# Patient Record
Sex: FEMALE | Race: WHITE | NOT HISPANIC OR LATINO | Employment: OTHER | URBAN - METROPOLITAN AREA
[De-identification: names, ages, dates, MRNs, and addresses within clinical notes are randomized per-mention and may not be internally consistent; named-entity substitution may affect disease eponyms.]

---

## 2017-01-19 ENCOUNTER — ALLSCRIPTS OFFICE VISIT (OUTPATIENT)
Dept: OTHER | Facility: OTHER | Age: 72
End: 2017-01-19

## 2017-01-19 ENCOUNTER — LAB REQUISITION (OUTPATIENT)
Dept: LAB | Facility: HOSPITAL | Age: 72
End: 2017-01-19
Payer: MEDICARE

## 2017-01-19 DIAGNOSIS — N30.90 CYSTITIS WITHOUT HEMATURIA: ICD-10-CM

## 2017-01-19 LAB
BACTERIA UR QL AUTO: ABNORMAL /HPF
BILIRUB UR QL STRIP: NEGATIVE
BILIRUB UR QL STRIP: NORMAL
CLARITY UR: ABNORMAL
CLARITY UR: NORMAL
COLOR UR: ABNORMAL
COLOR UR: YELLOW
GLUCOSE (HISTORICAL): NORMAL
GLUCOSE UR STRIP-MCNC: NEGATIVE MG/DL
HGB UR QL STRIP.AUTO: ABNORMAL
HGB UR QL STRIP.AUTO: NORMAL
HYALINE CASTS #/AREA URNS LPF: ABNORMAL /LPF
KETONES UR STRIP-MCNC: NEGATIVE MG/DL
KETONES UR STRIP-MCNC: NORMAL MG/DL
LEUKOCYTE ESTERASE UR QL STRIP: ABNORMAL
LEUKOCYTE ESTERASE UR QL STRIP: NORMAL
NITRITE UR QL STRIP: NEGATIVE
NITRITE UR QL STRIP: NORMAL
NON-SQ EPI CELLS URNS QL MICRO: ABNORMAL /HPF
PH UR STRIP.AUTO: 5.5 [PH] (ref 4.5–8)
PH UR STRIP.AUTO: 6 [PH]
PROT UR STRIP-MCNC: ABNORMAL MG/DL
PROT UR STRIP-MCNC: NORMAL MG/DL
RBC #/AREA URNS AUTO: ABNORMAL /HPF
SP GR UR STRIP.AUTO: 1.01
SP GR UR STRIP.AUTO: 1.02 (ref 1–1.03)
UROBILINOGEN UR QL STRIP.AUTO: 0.2
UROBILINOGEN UR QL STRIP.AUTO: 0.2 E.U./DL
WBC #/AREA URNS AUTO: ABNORMAL /HPF

## 2017-01-19 PROCEDURE — 87186 SC STD MICRODIL/AGAR DIL: CPT | Performed by: FAMILY MEDICINE

## 2017-01-19 PROCEDURE — 87086 URINE CULTURE/COLONY COUNT: CPT | Performed by: FAMILY MEDICINE

## 2017-01-19 PROCEDURE — 87077 CULTURE AEROBIC IDENTIFY: CPT | Performed by: FAMILY MEDICINE

## 2017-01-19 PROCEDURE — 81001 URINALYSIS AUTO W/SCOPE: CPT | Performed by: FAMILY MEDICINE

## 2017-01-21 ENCOUNTER — GENERIC CONVERSION - ENCOUNTER (OUTPATIENT)
Dept: OTHER | Facility: OTHER | Age: 72
End: 2017-01-21

## 2017-01-22 LAB — BACTERIA UR CULT: NORMAL

## 2017-03-06 ENCOUNTER — TRANSCRIBE ORDERS (OUTPATIENT)
Dept: ADMINISTRATIVE | Facility: HOSPITAL | Age: 72
End: 2017-03-06

## 2017-03-06 ENCOUNTER — GENERIC CONVERSION - ENCOUNTER (OUTPATIENT)
Dept: OTHER | Facility: OTHER | Age: 72
End: 2017-03-06

## 2017-03-06 DIAGNOSIS — M86.9 SAPHO SYNDROME (HCC): Primary | ICD-10-CM

## 2017-03-06 DIAGNOSIS — M65.9 SAPHO SYNDROME (HCC): Primary | ICD-10-CM

## 2017-03-06 DIAGNOSIS — L40.3 SAPHO SYNDROME (HCC): Primary | ICD-10-CM

## 2017-03-06 DIAGNOSIS — M85.80 SAPHO SYNDROME (HCC): Primary | ICD-10-CM

## 2017-03-06 DIAGNOSIS — L70.9 SAPHO SYNDROME (HCC): Primary | ICD-10-CM

## 2017-03-10 ENCOUNTER — HOSPITAL ENCOUNTER (OUTPATIENT)
Dept: BONE DENSITY | Facility: MEDICAL CENTER | Age: 72
Discharge: HOME | End: 2017-03-10
Payer: MEDICARE

## 2017-03-10 DIAGNOSIS — M65.9 SAPHO SYNDROME (HCC): ICD-10-CM

## 2017-03-10 DIAGNOSIS — L40.3 SAPHO SYNDROME (HCC): ICD-10-CM

## 2017-03-10 DIAGNOSIS — M86.9 SAPHO SYNDROME (HCC): ICD-10-CM

## 2017-03-10 DIAGNOSIS — M85.80 SAPHO SYNDROME (HCC): ICD-10-CM

## 2017-03-10 DIAGNOSIS — L70.9 SAPHO SYNDROME (HCC): ICD-10-CM

## 2017-03-10 DIAGNOSIS — M85.80 OSTEOPENIA: ICD-10-CM

## 2017-03-10 PROCEDURE — 77080 DXA BONE DENSITY AXIAL: CPT

## 2017-03-31 ENCOUNTER — APPOINTMENT (EMERGENCY)
Dept: CT IMAGING | Facility: HOSPITAL | Age: 72
DRG: 315 | End: 2017-03-31
Payer: MEDICARE

## 2017-03-31 ENCOUNTER — APPOINTMENT (EMERGENCY)
Dept: RADIOLOGY | Facility: HOSPITAL | Age: 72
DRG: 315 | End: 2017-03-31
Payer: MEDICARE

## 2017-03-31 ENCOUNTER — HOSPITAL ENCOUNTER (INPATIENT)
Facility: HOSPITAL | Age: 72
LOS: 2 days | Discharge: HOME | DRG: 315 | End: 2017-04-02
Attending: EMERGENCY MEDICINE | Admitting: INTERNAL MEDICINE
Payer: MEDICARE

## 2017-03-31 ENCOUNTER — GENERIC CONVERSION - ENCOUNTER (OUTPATIENT)
Dept: OTHER | Facility: OTHER | Age: 72
End: 2017-03-31

## 2017-03-31 DIAGNOSIS — N39.0 UTI (URINARY TRACT INFECTION): Primary | ICD-10-CM

## 2017-03-31 DIAGNOSIS — R55 SYNCOPE: ICD-10-CM

## 2017-03-31 DIAGNOSIS — R00.1 BRADYCARDIA: ICD-10-CM

## 2017-03-31 DIAGNOSIS — R00.1 SINUS BRADYCARDIA: ICD-10-CM

## 2017-03-31 PROBLEM — E11.9 DIABETES MELLITUS (HCC): Chronic | Status: ACTIVE | Noted: 2017-03-31

## 2017-03-31 PROBLEM — E89.0 POSTOPERATIVE HYPOTHYROIDISM: Chronic | Status: ACTIVE | Noted: 2017-03-31

## 2017-03-31 PROBLEM — N18.30 CHRONIC KIDNEY DISEASE, STAGE III (MODERATE) (HCC): Chronic | Status: ACTIVE | Noted: 2017-03-31

## 2017-03-31 PROBLEM — R32 URINARY INCONTINENCE: Chronic | Status: ACTIVE | Noted: 2017-03-31

## 2017-03-31 PROBLEM — I10 HTN (HYPERTENSION): Chronic | Status: ACTIVE | Noted: 2017-03-31

## 2017-03-31 LAB
ANION GAP SERPL CALCULATED.3IONS-SCNC: 8 MMOL/L (ref 4–13)
ATRIAL RATE: 50 BPM
BACTERIA UR QL AUTO: ABNORMAL /HPF
BASOPHILS # BLD AUTO: 0.04 THOUSANDS/ΜL (ref 0–0.1)
BASOPHILS NFR BLD AUTO: 0 % (ref 0–1)
BILIRUB UR QL STRIP: NEGATIVE
BUN SERPL-MCNC: 21 MG/DL (ref 5–25)
CALCIUM SERPL-MCNC: 8.7 MG/DL (ref 8.3–10.1)
CHLORIDE SERPL-SCNC: 102 MMOL/L (ref 100–108)
CLARITY UR: CLEAR
CO2 SERPL-SCNC: 32 MMOL/L (ref 21–32)
COLOR UR: YELLOW
CREAT SERPL-MCNC: 1.23 MG/DL (ref 0.6–1.3)
EOSINOPHIL # BLD AUTO: 0.53 THOUSAND/ΜL (ref 0–0.61)
EOSINOPHIL NFR BLD AUTO: 4 % (ref 0–6)
ERYTHROCYTE [DISTWIDTH] IN BLOOD BY AUTOMATED COUNT: 14.4 % (ref 11.6–15.1)
GFR SERPL CREATININE-BSD FRML MDRD: 42.8 ML/MIN/1.73SQ M
GLUCOSE SERPL-MCNC: 65 MG/DL (ref 65–140)
GLUCOSE UR STRIP-MCNC: NEGATIVE MG/DL
HCT VFR BLD AUTO: 43.4 % (ref 34.8–46.1)
HGB BLD-MCNC: 14.6 G/DL (ref 11.5–15.4)
HGB UR QL STRIP.AUTO: NEGATIVE
KETONES UR STRIP-MCNC: NEGATIVE MG/DL
LEUKOCYTE ESTERASE UR QL STRIP: ABNORMAL
LYMPHOCYTES # BLD AUTO: 4 THOUSANDS/ΜL (ref 0.6–4.47)
LYMPHOCYTES NFR BLD AUTO: 28 % (ref 14–44)
MCH RBC QN AUTO: 30.2 PG (ref 26.8–34.3)
MCHC RBC AUTO-ENTMCNC: 33.6 G/DL (ref 31.4–37.4)
MCV RBC AUTO: 90 FL (ref 82–98)
MONOCYTES # BLD AUTO: 1.29 THOUSAND/ΜL (ref 0.17–1.22)
MONOCYTES NFR BLD AUTO: 9 % (ref 4–12)
NEUTROPHILS # BLD AUTO: 8.31 THOUSANDS/ΜL (ref 1.85–7.62)
NEUTS SEG NFR BLD AUTO: 59 % (ref 43–75)
NITRITE UR QL STRIP: POSITIVE
NON-SQ EPI CELLS URNS QL MICRO: ABNORMAL /HPF
NRBC BLD AUTO-RTO: 0 /100 WBCS
P AXIS: 69 DEGREES
PH UR STRIP.AUTO: 6.5 [PH] (ref 4.5–8)
PLATELET # BLD AUTO: 289 THOUSANDS/UL (ref 149–390)
PMV BLD AUTO: 11.5 FL (ref 8.9–12.7)
POTASSIUM SERPL-SCNC: 3.7 MMOL/L (ref 3.5–5.3)
PR INTERVAL: 184 MS
PROT UR STRIP-MCNC: ABNORMAL MG/DL
QRS AXIS: 51 DEGREES
QRSD INTERVAL: 76 MS
QT INTERVAL: 456 MS
QTC INTERVAL: 415 MS
RBC # BLD AUTO: 4.83 MILLION/UL (ref 3.81–5.12)
RBC #/AREA URNS AUTO: ABNORMAL /HPF
SODIUM SERPL-SCNC: 142 MMOL/L (ref 136–145)
SP GR UR STRIP.AUTO: 1.02 (ref 1–1.03)
SPECIMEN SOURCE: NORMAL
T WAVE AXIS: 59 DEGREES
T4 FREE SERPL-MCNC: 1.29 NG/DL (ref 0.76–1.46)
TROPONIN I BLD-MCNC: 0 NG/ML (ref 0–0.08)
TSH SERPL DL<=0.05 MIU/L-ACNC: 9.06 UIU/ML (ref 0.36–3.74)
UROBILINOGEN UR QL STRIP.AUTO: 1 E.U./DL
VENTRICULAR RATE: 50 BPM
WBC # BLD AUTO: 14.17 THOUSAND/UL (ref 4.31–10.16)
WBC #/AREA URNS AUTO: ABNORMAL /HPF

## 2017-03-31 PROCEDURE — 81002 URINALYSIS NONAUTO W/O SCOPE: CPT | Performed by: EMERGENCY MEDICINE

## 2017-03-31 PROCEDURE — 71020 HB CHEST X-RAY 2VW FRONTAL&LATL: CPT

## 2017-03-31 PROCEDURE — 70450 CT HEAD/BRAIN W/O DYE: CPT

## 2017-03-31 PROCEDURE — 80048 BASIC METABOLIC PNL TOTAL CA: CPT

## 2017-03-31 PROCEDURE — 84484 ASSAY OF TROPONIN QUANT: CPT

## 2017-03-31 PROCEDURE — 81001 URINALYSIS AUTO W/SCOPE: CPT

## 2017-03-31 PROCEDURE — 93005 ELECTROCARDIOGRAM TRACING: CPT

## 2017-03-31 PROCEDURE — 99285 EMERGENCY DEPT VISIT HI MDM: CPT

## 2017-03-31 PROCEDURE — 87040 BLOOD CULTURE FOR BACTERIA: CPT | Performed by: EMERGENCY MEDICINE

## 2017-03-31 PROCEDURE — 84439 ASSAY OF FREE THYROXINE: CPT | Performed by: EMERGENCY MEDICINE

## 2017-03-31 PROCEDURE — 84443 ASSAY THYROID STIM HORMONE: CPT | Performed by: EMERGENCY MEDICINE

## 2017-03-31 PROCEDURE — 87077 CULTURE AEROBIC IDENTIFY: CPT

## 2017-03-31 PROCEDURE — 87186 SC STD MICRODIL/AGAR DIL: CPT

## 2017-03-31 PROCEDURE — 36415 COLL VENOUS BLD VENIPUNCTURE: CPT | Performed by: EMERGENCY MEDICINE

## 2017-03-31 PROCEDURE — 87086 URINE CULTURE/COLONY COUNT: CPT

## 2017-03-31 PROCEDURE — 85025 COMPLETE CBC W/AUTO DIFF WBC: CPT

## 2017-03-31 RX ORDER — ASPIRIN 81 MG/1
81 TABLET, CHEWABLE ORAL DAILY
Status: DISCONTINUED | OUTPATIENT
Start: 2017-04-01 | End: 2017-04-02 | Stop reason: HOSPADM

## 2017-03-31 RX ORDER — ACETAMINOPHEN 325 MG/1
650 TABLET ORAL EVERY 6 HOURS PRN
Status: DISCONTINUED | OUTPATIENT
Start: 2017-03-31 | End: 2017-04-02 | Stop reason: HOSPADM

## 2017-03-31 RX ORDER — DONEPEZIL HYDROCHLORIDE 10 MG/1
TABLET, FILM COATED ORAL
COMMUNITY
Start: 2012-11-21 | End: 2017-09-24

## 2017-03-31 RX ORDER — LEVOFLOXACIN 5 MG/ML
750 INJECTION, SOLUTION INTRAVENOUS EVERY 24 HOURS
Status: DISCONTINUED | OUTPATIENT
Start: 2017-04-01 | End: 2017-04-01

## 2017-03-31 RX ORDER — B-COMPLEX WITH VITAMIN C
1 TABLET ORAL
Status: DISCONTINUED | OUTPATIENT
Start: 2017-04-01 | End: 2017-04-02 | Stop reason: HOSPADM

## 2017-03-31 RX ORDER — NICOTINE 21 MG/24HR
1 PATCH, TRANSDERMAL 24 HOURS TRANSDERMAL DAILY
Status: DISCONTINUED | OUTPATIENT
Start: 2017-04-01 | End: 2017-04-02 | Stop reason: HOSPADM

## 2017-03-31 RX ORDER — LISINOPRIL AND HYDROCHLOROTHIAZIDE 20; 12.5 MG/1; MG/1
TABLET ORAL
COMMUNITY
End: 2017-04-02 | Stop reason: HOSPADM

## 2017-03-31 RX ORDER — DONEPEZIL HYDROCHLORIDE 5 MG/1
10 TABLET, FILM COATED ORAL
Status: DISCONTINUED | OUTPATIENT
Start: 2017-03-31 | End: 2017-04-02 | Stop reason: HOSPADM

## 2017-03-31 RX ORDER — ONDANSETRON 2 MG/ML
4 INJECTION INTRAMUSCULAR; INTRAVENOUS EVERY 6 HOURS PRN
Status: DISCONTINUED | OUTPATIENT
Start: 2017-03-31 | End: 2017-04-02 | Stop reason: HOSPADM

## 2017-03-31 RX ORDER — DOCUSATE SODIUM 100 MG/1
100 CAPSULE, LIQUID FILLED ORAL 2 TIMES DAILY
Status: DISCONTINUED | OUTPATIENT
Start: 2017-04-01 | End: 2017-04-02 | Stop reason: HOSPADM

## 2017-03-31 RX ORDER — METHOCARBAMOL 500 MG/1
500 TABLET, FILM COATED ORAL 2 TIMES DAILY
Status: CANCELLED | OUTPATIENT
Start: 2017-03-31

## 2017-03-31 RX ORDER — CALCIUM CARBONATE 200(500)MG
1000 TABLET,CHEWABLE ORAL DAILY PRN
Status: DISCONTINUED | OUTPATIENT
Start: 2017-03-31 | End: 2017-04-02 | Stop reason: HOSPADM

## 2017-03-31 RX ORDER — LEVOFLOXACIN 5 MG/ML
750 INJECTION, SOLUTION INTRAVENOUS ONCE
Status: COMPLETED | OUTPATIENT
Start: 2017-03-31 | End: 2017-03-31

## 2017-03-31 RX ORDER — OXYBUTYNIN CHLORIDE 5 MG/1
5 TABLET ORAL 2 TIMES DAILY
Status: DISCONTINUED | OUTPATIENT
Start: 2017-04-01 | End: 2017-04-02 | Stop reason: HOSPADM

## 2017-03-31 RX ORDER — ATORVASTATIN CALCIUM 40 MG/1
TABLET, FILM COATED ORAL
COMMUNITY
End: 2017-09-24

## 2017-03-31 RX ORDER — ATORVASTATIN CALCIUM 40 MG/1
40 TABLET, FILM COATED ORAL
Status: DISCONTINUED | OUTPATIENT
Start: 2017-03-31 | End: 2017-04-02 | Stop reason: HOSPADM

## 2017-03-31 RX ORDER — LEVOTHYROXINE SODIUM 88 UG/1
88 TABLET ORAL
Status: DISCONTINUED | OUTPATIENT
Start: 2017-04-01 | End: 2017-04-01

## 2017-03-31 RX ORDER — LEVOTHYROXINE SODIUM 88 UG/1
TABLET ORAL
COMMUNITY
End: 2018-02-01 | Stop reason: SDUPTHER

## 2017-03-31 RX ADMIN — LEVOFLOXACIN 750 MG: 5 INJECTION, SOLUTION INTRAVENOUS at 21:11

## 2017-03-31 RX ADMIN — DONEPEZIL HYDROCHLORIDE 10 MG: 5 TABLET, FILM COATED ORAL at 23:21

## 2017-03-31 RX ADMIN — ATORVASTATIN CALCIUM 40 MG: 40 TABLET, FILM COATED ORAL at 23:21

## 2017-04-01 LAB
ALBUMIN SERPL BCP-MCNC: 3.2 G/DL (ref 3.5–5)
ALP SERPL-CCNC: 68 U/L (ref 46–116)
ALT SERPL W P-5'-P-CCNC: 30 U/L (ref 12–78)
ANION GAP SERPL CALCULATED.3IONS-SCNC: 8 MMOL/L (ref 4–13)
AST SERPL W P-5'-P-CCNC: 23 U/L (ref 5–45)
BILIRUB SERPL-MCNC: 0.44 MG/DL (ref 0.2–1)
BUN SERPL-MCNC: 21 MG/DL (ref 5–25)
CALCIUM SERPL-MCNC: 8.3 MG/DL (ref 8.3–10.1)
CHLORIDE SERPL-SCNC: 103 MMOL/L (ref 100–108)
CO2 SERPL-SCNC: 29 MMOL/L (ref 21–32)
CREAT SERPL-MCNC: 1.32 MG/DL (ref 0.6–1.3)
ERYTHROCYTE [DISTWIDTH] IN BLOOD BY AUTOMATED COUNT: 14.5 % (ref 11.6–15.1)
GFR SERPL CREATININE-BSD FRML MDRD: 39.4 ML/MIN/1.73SQ M
GLUCOSE SERPL-MCNC: 110 MG/DL (ref 65–140)
HCT VFR BLD AUTO: 41.4 % (ref 34.8–46.1)
HGB BLD-MCNC: 14.1 G/DL (ref 11.5–15.4)
MCH RBC QN AUTO: 30.1 PG (ref 26.8–34.3)
MCHC RBC AUTO-ENTMCNC: 34.1 G/DL (ref 31.4–37.4)
MCV RBC AUTO: 89 FL (ref 82–98)
PLATELET # BLD AUTO: 252 THOUSANDS/UL (ref 149–390)
PMV BLD AUTO: 11.3 FL (ref 8.9–12.7)
POTASSIUM SERPL-SCNC: 3.8 MMOL/L (ref 3.5–5.3)
PROT SERPL-MCNC: 6.5 G/DL (ref 6.4–8.2)
RBC # BLD AUTO: 4.68 MILLION/UL (ref 3.81–5.12)
SODIUM SERPL-SCNC: 140 MMOL/L (ref 136–145)
T4 SERPL-MCNC: 11 UG/DL (ref 4.7–13.3)
TROPONIN I SERPL-MCNC: <0.02 NG/ML
WBC # BLD AUTO: 13.54 THOUSAND/UL (ref 4.31–10.16)

## 2017-04-01 PROCEDURE — 84484 ASSAY OF TROPONIN QUANT: CPT | Performed by: INTERNAL MEDICINE

## 2017-04-01 PROCEDURE — 80053 COMPREHEN METABOLIC PANEL: CPT | Performed by: PHYSICIAN ASSISTANT

## 2017-04-01 PROCEDURE — 84436 ASSAY OF TOTAL THYROXINE: CPT | Performed by: INTERNAL MEDICINE

## 2017-04-01 PROCEDURE — 85027 COMPLETE CBC AUTOMATED: CPT | Performed by: PHYSICIAN ASSISTANT

## 2017-04-01 RX ORDER — LISINOPRIL 10 MG/1
10 TABLET ORAL DAILY
Status: DISCONTINUED | OUTPATIENT
Start: 2017-04-02 | End: 2017-04-02 | Stop reason: HOSPADM

## 2017-04-01 RX ORDER — LEVOTHYROXINE SODIUM 0.1 MG/1
100 TABLET ORAL
Status: DISCONTINUED | OUTPATIENT
Start: 2017-04-02 | End: 2017-04-02 | Stop reason: HOSPADM

## 2017-04-01 RX ORDER — CIPROFLOXACIN 250 MG/1
250 TABLET, FILM COATED ORAL 2 TIMES DAILY
Status: DISCONTINUED | OUTPATIENT
Start: 2017-04-01 | End: 2017-04-02 | Stop reason: HOSPADM

## 2017-04-01 RX ADMIN — OXYBUTYNIN CHLORIDE 5 MG: 5 TABLET ORAL at 09:17

## 2017-04-01 RX ADMIN — CALCIUM CARBONATE 500 MG (1,250 MG)-VITAMIN D3 200 UNIT TABLET 1 TABLET: at 09:13

## 2017-04-01 RX ADMIN — ASPIRIN 81 MG: 81 TABLET, CHEWABLE ORAL at 09:13

## 2017-04-01 RX ADMIN — OXYBUTYNIN CHLORIDE 5 MG: 5 TABLET ORAL at 17:36

## 2017-04-01 RX ADMIN — LEVOTHYROXINE SODIUM 88 MCG: 88 TABLET ORAL at 05:17

## 2017-04-01 RX ADMIN — METFORMIN HYDROCHLORIDE 1000 MG: 500 TABLET, FILM COATED ORAL at 09:17

## 2017-04-01 RX ADMIN — ENOXAPARIN SODIUM 30 MG: 30 INJECTION SUBCUTANEOUS at 09:15

## 2017-04-01 RX ADMIN — CIPROFLOXACIN 250 MG: 250 TABLET, FILM COATED ORAL at 20:00

## 2017-04-01 RX ADMIN — ATORVASTATIN CALCIUM 40 MG: 40 TABLET, FILM COATED ORAL at 17:36

## 2017-04-01 RX ADMIN — LISINOPRIL: 20 TABLET ORAL at 09:13

## 2017-04-01 RX ADMIN — DONEPEZIL HYDROCHLORIDE 10 MG: 5 TABLET, FILM COATED ORAL at 22:02

## 2017-04-02 ENCOUNTER — APPOINTMENT (INPATIENT)
Dept: NON INVASIVE DIAGNOSTICS | Facility: HOSPITAL | Age: 72
DRG: 315 | End: 2017-04-02
Payer: MEDICARE

## 2017-04-02 VITALS
SYSTOLIC BLOOD PRESSURE: 106 MMHG | DIASTOLIC BLOOD PRESSURE: 63 MMHG | TEMPERATURE: 97 F | OXYGEN SATURATION: 94 % | WEIGHT: 140.21 LBS | HEIGHT: 62 IN | HEART RATE: 71 BPM | RESPIRATION RATE: 18 BRPM | BODY MASS INDEX: 25.8 KG/M2

## 2017-04-02 RX ORDER — NICOTINE 21 MG/24HR
1 PATCH, TRANSDERMAL 24 HOURS TRANSDERMAL DAILY
Qty: 30 PATCH | Refills: 0 | Status: SHIPPED | OUTPATIENT
Start: 2017-04-02 | End: 2017-05-02

## 2017-04-02 RX ORDER — LEVOTHYROXINE SODIUM 0.1 MG/1
100 TABLET ORAL
Qty: 30 TABLET | Refills: 0 | Status: SHIPPED | OUTPATIENT
Start: 2017-04-02 | End: 2017-09-24

## 2017-04-02 RX ORDER — CIPROFLOXACIN 250 MG/1
250 TABLET, FILM COATED ORAL 2 TIMES DAILY
Qty: 8 TABLET | Refills: 0 | Status: SHIPPED | OUTPATIENT
Start: 2017-04-02 | End: 2017-04-06

## 2017-04-02 RX ORDER — LISINOPRIL 10 MG/1
10 TABLET ORAL DAILY
Qty: 30 TABLET | Refills: 0 | Status: SHIPPED | OUTPATIENT
Start: 2017-04-02 | End: 2018-01-31 | Stop reason: ALTCHOICE

## 2017-04-02 RX ADMIN — OXYBUTYNIN CHLORIDE 5 MG: 5 TABLET ORAL at 10:06

## 2017-04-02 RX ADMIN — METFORMIN HYDROCHLORIDE 1000 MG: 500 TABLET, FILM COATED ORAL at 10:05

## 2017-04-02 RX ADMIN — DOCUSATE SODIUM 100 MG: 100 CAPSULE, LIQUID FILLED ORAL at 10:05

## 2017-04-02 RX ADMIN — LISINOPRIL 10 MG: 20 TABLET ORAL at 10:06

## 2017-04-02 RX ADMIN — LEVOTHYROXINE SODIUM 100 MCG: 100 TABLET ORAL at 05:03

## 2017-04-02 RX ADMIN — ASPIRIN 81 MG: 81 TABLET, CHEWABLE ORAL at 10:05

## 2017-04-02 RX ADMIN — CALCIUM CARBONATE 500 MG (1,250 MG)-VITAMIN D3 200 UNIT TABLET 1 TABLET: at 10:05

## 2017-04-02 RX ADMIN — CIPROFLOXACIN 250 MG: 250 TABLET, FILM COATED ORAL at 05:03

## 2017-04-03 LAB
BACTERIA UR CULT: NORMAL
BACTERIA UR CULT: NORMAL

## 2017-04-06 LAB
BACTERIA BLD CULT: NORMAL
BACTERIA BLD CULT: NORMAL

## 2017-04-14 ENCOUNTER — ALLSCRIPTS OFFICE VISIT (OUTPATIENT)
Dept: OTHER | Facility: OTHER | Age: 72
End: 2017-04-14

## 2017-04-14 DIAGNOSIS — R79.89 OTHER SPECIFIED ABNORMAL FINDINGS OF BLOOD CHEMISTRY: ICD-10-CM

## 2017-04-26 ENCOUNTER — GENERIC CONVERSION - ENCOUNTER (OUTPATIENT)
Dept: OTHER | Facility: OTHER | Age: 72
End: 2017-04-26

## 2017-04-26 ENCOUNTER — ALLSCRIPTS OFFICE VISIT (OUTPATIENT)
Dept: OTHER | Facility: OTHER | Age: 72
End: 2017-04-26

## 2017-04-27 ENCOUNTER — GENERIC CONVERSION - ENCOUNTER (OUTPATIENT)
Dept: OTHER | Facility: OTHER | Age: 72
End: 2017-04-27

## 2017-06-20 ENCOUNTER — ALLSCRIPTS OFFICE VISIT (OUTPATIENT)
Dept: OTHER | Facility: OTHER | Age: 72
End: 2017-06-20

## 2017-09-06 ENCOUNTER — GENERIC CONVERSION - ENCOUNTER (OUTPATIENT)
Dept: OTHER | Facility: OTHER | Age: 72
End: 2017-09-06

## 2017-09-13 ENCOUNTER — GENERIC CONVERSION - ENCOUNTER (OUTPATIENT)
Dept: OTHER | Facility: OTHER | Age: 72
End: 2017-09-13

## 2017-09-24 ENCOUNTER — HOSPITAL ENCOUNTER (EMERGENCY)
Facility: HOSPITAL | Age: 72
Discharge: HOME | End: 2017-09-25
Attending: EMERGENCY MEDICINE | Admitting: EMERGENCY MEDICINE
Payer: MEDICARE

## 2017-09-24 ENCOUNTER — APPOINTMENT (EMERGENCY)
Dept: CT IMAGING | Facility: HOSPITAL | Age: 72
End: 2017-09-24
Payer: MEDICARE

## 2017-09-24 ENCOUNTER — APPOINTMENT (EMERGENCY)
Dept: RADIOLOGY | Facility: HOSPITAL | Age: 72
End: 2017-09-24
Payer: MEDICARE

## 2017-09-24 VITALS
DIASTOLIC BLOOD PRESSURE: 62 MMHG | TEMPERATURE: 97 F | SYSTOLIC BLOOD PRESSURE: 130 MMHG | HEART RATE: 55 BPM | RESPIRATION RATE: 16 BRPM | OXYGEN SATURATION: 96 %

## 2017-09-24 DIAGNOSIS — M79.605 LEFT LEG PAIN: ICD-10-CM

## 2017-09-24 DIAGNOSIS — N39.0 UTI (URINARY TRACT INFECTION): Primary | ICD-10-CM

## 2017-09-24 LAB
ALBUMIN SERPL BCP-MCNC: 4 G/DL (ref 3.5–5)
ALP SERPL-CCNC: 110 U/L (ref 46–116)
ALT SERPL W P-5'-P-CCNC: 17 U/L (ref 12–78)
ANION GAP SERPL CALCULATED.3IONS-SCNC: 9 MMOL/L (ref 4–13)
AST SERPL W P-5'-P-CCNC: 14 U/L (ref 5–45)
BACTERIA UR QL AUTO: ABNORMAL /HPF
BASOPHILS # BLD AUTO: 0.03 THOUSANDS/ΜL (ref 0–0.1)
BASOPHILS NFR BLD AUTO: 0 % (ref 0–1)
BILIRUB SERPL-MCNC: 0.47 MG/DL (ref 0.2–1)
BILIRUB UR QL STRIP: ABNORMAL
BUN SERPL-MCNC: 18 MG/DL (ref 5–25)
CALCIUM SERPL-MCNC: 10.2 MG/DL (ref 8.3–10.1)
CHLORIDE SERPL-SCNC: 103 MMOL/L (ref 100–108)
CLARITY UR: ABNORMAL
CO2 SERPL-SCNC: 30 MMOL/L (ref 21–32)
COARSE GRAN CASTS URNS QL MICRO: ABNORMAL /LPF
COLOR UR: YELLOW
CREAT SERPL-MCNC: 1.19 MG/DL (ref 0.6–1.3)
EOSINOPHIL # BLD AUTO: 0.19 THOUSAND/ΜL (ref 0–0.61)
EOSINOPHIL NFR BLD AUTO: 1 % (ref 0–6)
ERYTHROCYTE [DISTWIDTH] IN BLOOD BY AUTOMATED COUNT: 14.5 % (ref 11.6–15.1)
FINE GRAN CASTS URNS QL MICRO: ABNORMAL /LPF
GFR SERPL CREATININE-BSD FRML MDRD: 45 ML/MIN/1.73SQ M
GLUCOSE SERPL-MCNC: 107 MG/DL (ref 65–140)
GLUCOSE UR STRIP-MCNC: NEGATIVE MG/DL
HCT VFR BLD AUTO: 44.4 % (ref 34.8–46.1)
HGB BLD-MCNC: 15.1 G/DL (ref 11.5–15.4)
HGB UR QL STRIP.AUTO: ABNORMAL
HYALINE CASTS #/AREA URNS LPF: ABNORMAL /LPF
KETONES UR STRIP-MCNC: ABNORMAL MG/DL
LEUKOCYTE ESTERASE UR QL STRIP: ABNORMAL
LYMPHOCYTES # BLD AUTO: 5.14 THOUSANDS/ΜL (ref 0.6–4.47)
LYMPHOCYTES NFR BLD AUTO: 30 % (ref 14–44)
MCH RBC QN AUTO: 29.6 PG (ref 26.8–34.3)
MCHC RBC AUTO-ENTMCNC: 34 G/DL (ref 31.4–37.4)
MCV RBC AUTO: 87 FL (ref 82–98)
MONOCYTES # BLD AUTO: 1.12 THOUSAND/ΜL (ref 0.17–1.22)
MONOCYTES NFR BLD AUTO: 6 % (ref 4–12)
NEUTROPHILS # BLD AUTO: 10.95 THOUSANDS/ΜL (ref 1.85–7.62)
NEUTS SEG NFR BLD AUTO: 63 % (ref 43–75)
NITRITE UR QL STRIP: POSITIVE
NON-SQ EPI CELLS URNS QL MICRO: ABNORMAL /HPF
NRBC BLD AUTO-RTO: 0 /100 WBCS
PH UR STRIP.AUTO: 6.5 [PH] (ref 4.5–8)
PLATELET # BLD AUTO: 347 THOUSANDS/UL (ref 149–390)
PMV BLD AUTO: 10.9 FL (ref 8.9–12.7)
POTASSIUM SERPL-SCNC: 4 MMOL/L (ref 3.5–5.3)
PROT SERPL-MCNC: 8.2 G/DL (ref 6.4–8.2)
PROT UR STRIP-MCNC: >=300 MG/DL
RBC # BLD AUTO: 5.1 MILLION/UL (ref 3.81–5.12)
RBC #/AREA URNS AUTO: ABNORMAL /HPF
SODIUM SERPL-SCNC: 142 MMOL/L (ref 136–145)
SP GR UR STRIP.AUTO: 1.02 (ref 1–1.03)
SPECIMEN SOURCE: NORMAL
TROPONIN I BLD-MCNC: 0 NG/ML (ref 0–0.08)
UROBILINOGEN UR QL STRIP.AUTO: 0.2 E.U./DL
WBC # BLD AUTO: 17.43 THOUSAND/UL (ref 4.31–10.16)
WBC #/AREA URNS AUTO: ABNORMAL /HPF

## 2017-09-24 PROCEDURE — 93005 ELECTROCARDIOGRAM TRACING: CPT | Performed by: EMERGENCY MEDICINE

## 2017-09-24 PROCEDURE — 80053 COMPREHEN METABOLIC PANEL: CPT | Performed by: EMERGENCY MEDICINE

## 2017-09-24 PROCEDURE — 87077 CULTURE AEROBIC IDENTIFY: CPT

## 2017-09-24 PROCEDURE — 81001 URINALYSIS AUTO W/SCOPE: CPT

## 2017-09-24 PROCEDURE — 84484 ASSAY OF TROPONIN QUANT: CPT

## 2017-09-24 PROCEDURE — 87186 SC STD MICRODIL/AGAR DIL: CPT

## 2017-09-24 PROCEDURE — 36415 COLL VENOUS BLD VENIPUNCTURE: CPT

## 2017-09-24 PROCEDURE — 96366 THER/PROPH/DIAG IV INF ADDON: CPT

## 2017-09-24 PROCEDURE — 96361 HYDRATE IV INFUSION ADD-ON: CPT

## 2017-09-24 PROCEDURE — 96365 THER/PROPH/DIAG IV INF INIT: CPT

## 2017-09-24 PROCEDURE — 87086 URINE CULTURE/COLONY COUNT: CPT

## 2017-09-24 PROCEDURE — 71020 HB CHEST X-RAY 2VW FRONTAL&LATL: CPT

## 2017-09-24 PROCEDURE — 81002 URINALYSIS NONAUTO W/O SCOPE: CPT | Performed by: EMERGENCY MEDICINE

## 2017-09-24 PROCEDURE — 74177 CT ABD & PELVIS W/CONTRAST: CPT

## 2017-09-24 PROCEDURE — 85025 COMPLETE CBC W/AUTO DIFF WBC: CPT | Performed by: EMERGENCY MEDICINE

## 2017-09-24 RX ORDER — CIPROFLOXACIN 500 MG/1
500 TABLET, FILM COATED ORAL EVERY 12 HOURS SCHEDULED
Qty: 20 TABLET | Refills: 0 | Status: SHIPPED | OUTPATIENT
Start: 2017-09-24 | End: 2017-10-04

## 2017-09-24 RX ORDER — CIPROFLOXACIN 2 MG/ML
400 INJECTION, SOLUTION INTRAVENOUS ONCE
Status: COMPLETED | OUTPATIENT
Start: 2017-09-24 | End: 2017-09-25

## 2017-09-24 RX ADMIN — CIPROFLOXACIN 400 MG: 2 INJECTION, SOLUTION INTRAVENOUS at 22:29

## 2017-09-24 RX ADMIN — IODIXANOL 100 ML: 320 INJECTION, SOLUTION INTRAVASCULAR at 22:31

## 2017-09-24 RX ADMIN — SODIUM CHLORIDE 1000 ML: 0.9 INJECTION, SOLUTION INTRAVENOUS at 21:44

## 2017-09-25 LAB
ATRIAL RATE: 62 BPM
P AXIS: 72 DEGREES
PR INTERVAL: 182 MS
QRS AXIS: 37 DEGREES
QRSD INTERVAL: 82 MS
QT INTERVAL: 412 MS
QTC INTERVAL: 418 MS
T WAVE AXIS: 63 DEGREES
VENTRICULAR RATE: 62 BPM

## 2017-09-25 PROCEDURE — 99284 EMERGENCY DEPT VISIT MOD MDM: CPT

## 2017-09-26 ENCOUNTER — GENERIC CONVERSION - ENCOUNTER (OUTPATIENT)
Dept: OTHER | Facility: OTHER | Age: 72
End: 2017-09-26

## 2017-09-27 LAB — BACTERIA UR CULT: NORMAL

## 2017-10-02 ENCOUNTER — GENERIC CONVERSION - ENCOUNTER (OUTPATIENT)
Dept: OTHER | Facility: OTHER | Age: 72
End: 2017-10-02

## 2017-10-10 ENCOUNTER — GENERIC CONVERSION - ENCOUNTER (OUTPATIENT)
Dept: OTHER | Facility: OTHER | Age: 72
End: 2017-10-10

## 2017-10-10 DIAGNOSIS — M62.838 OTHER MUSCLE SPASM: ICD-10-CM

## 2017-10-11 ENCOUNTER — APPOINTMENT (OUTPATIENT)
Dept: PHYSICAL THERAPY | Facility: MEDICAL CENTER | Age: 72
End: 2017-10-11
Payer: MEDICARE

## 2017-10-11 ENCOUNTER — GENERIC CONVERSION - ENCOUNTER (OUTPATIENT)
Dept: OTHER | Facility: OTHER | Age: 72
End: 2017-10-11

## 2017-10-11 DIAGNOSIS — M62.838 OTHER MUSCLE SPASM: ICD-10-CM

## 2017-10-11 PROCEDURE — 97110 THERAPEUTIC EXERCISES: CPT

## 2017-10-11 PROCEDURE — 97161 PT EVAL LOW COMPLEX 20 MIN: CPT

## 2017-10-11 PROCEDURE — G8991 OTHER PT/OT GOAL STATUS: HCPCS

## 2017-10-11 PROCEDURE — G8990 OTHER PT/OT CURRENT STATUS: HCPCS

## 2017-10-16 ENCOUNTER — APPOINTMENT (OUTPATIENT)
Dept: PHYSICAL THERAPY | Facility: MEDICAL CENTER | Age: 72
End: 2017-10-16
Payer: MEDICARE

## 2017-10-16 PROCEDURE — 97110 THERAPEUTIC EXERCISES: CPT

## 2017-10-18 ENCOUNTER — APPOINTMENT (OUTPATIENT)
Dept: PHYSICAL THERAPY | Facility: MEDICAL CENTER | Age: 72
End: 2017-10-18
Payer: MEDICARE

## 2017-10-18 PROCEDURE — 97110 THERAPEUTIC EXERCISES: CPT

## 2017-10-23 ENCOUNTER — APPOINTMENT (OUTPATIENT)
Dept: PHYSICAL THERAPY | Facility: MEDICAL CENTER | Age: 72
End: 2017-10-23
Payer: MEDICARE

## 2017-10-23 PROCEDURE — 97110 THERAPEUTIC EXERCISES: CPT

## 2017-10-25 ENCOUNTER — APPOINTMENT (OUTPATIENT)
Dept: PHYSICAL THERAPY | Facility: MEDICAL CENTER | Age: 72
End: 2017-10-25
Payer: MEDICARE

## 2017-10-25 PROCEDURE — 97110 THERAPEUTIC EXERCISES: CPT

## 2017-11-14 ENCOUNTER — APPOINTMENT (OUTPATIENT)
Dept: LAB | Facility: CLINIC | Age: 72
End: 2017-11-14
Payer: MEDICARE

## 2017-11-14 ENCOUNTER — TRANSCRIBE ORDERS (OUTPATIENT)
Dept: LAB | Facility: CLINIC | Age: 72
End: 2017-11-14

## 2017-11-14 ENCOUNTER — ALLSCRIPTS OFFICE VISIT (OUTPATIENT)
Dept: OTHER | Facility: OTHER | Age: 72
End: 2017-11-14

## 2017-11-14 DIAGNOSIS — D51.0 VITAMIN B12 DEFICIENCY ANEMIA DUE TO INTRINSIC FACTOR DEFICIENCY: ICD-10-CM

## 2017-11-14 DIAGNOSIS — N30.00 ACUTE CYSTITIS WITHOUT HEMATURIA: ICD-10-CM

## 2017-11-14 DIAGNOSIS — D72.829 ELEVATED WHITE BLOOD CELL COUNT: ICD-10-CM

## 2017-11-14 DIAGNOSIS — E05.00 THYROTOXICOSIS WITH DIFFUSE GOITER AND WITHOUT THYROID STORM: ICD-10-CM

## 2017-11-14 DIAGNOSIS — R05.9 COUGH: ICD-10-CM

## 2017-11-14 DIAGNOSIS — F17.200 NICOTINE DEPENDENCE, UNCOMPLICATED: ICD-10-CM

## 2017-11-14 DIAGNOSIS — R63.4 ABNORMAL WEIGHT LOSS: ICD-10-CM

## 2017-11-14 DIAGNOSIS — I10 ESSENTIAL (PRIMARY) HYPERTENSION: ICD-10-CM

## 2017-11-14 DIAGNOSIS — Z12.31 ENCOUNTER FOR SCREENING MAMMOGRAM FOR MALIGNANT NEOPLASM OF BREAST: ICD-10-CM

## 2017-11-14 LAB
ALBUMIN SERPL BCP-MCNC: 3.8 G/DL (ref 3.5–5)
ALP SERPL-CCNC: 104 U/L (ref 46–116)
ALT SERPL W P-5'-P-CCNC: 36 U/L (ref 12–78)
ANION GAP SERPL CALCULATED.3IONS-SCNC: 6 MMOL/L (ref 4–13)
AST SERPL W P-5'-P-CCNC: 22 U/L (ref 5–45)
BASOPHILS # BLD AUTO: 0.03 THOUSANDS/ΜL (ref 0–0.1)
BASOPHILS NFR BLD AUTO: 0 % (ref 0–1)
BILIRUB SERPL-MCNC: 0.43 MG/DL (ref 0.2–1)
BUN SERPL-MCNC: 19 MG/DL (ref 5–25)
CALCIUM SERPL-MCNC: 9.5 MG/DL (ref 8.3–10.1)
CHLORIDE SERPL-SCNC: 101 MMOL/L (ref 100–108)
CO2 SERPL-SCNC: 31 MMOL/L (ref 21–32)
CREAT SERPL-MCNC: 1.05 MG/DL (ref 0.6–1.3)
EOSINOPHIL # BLD AUTO: 0.32 THOUSAND/ΜL (ref 0–0.61)
EOSINOPHIL NFR BLD AUTO: 2 % (ref 0–6)
ERYTHROCYTE [DISTWIDTH] IN BLOOD BY AUTOMATED COUNT: 14.6 % (ref 11.6–15.1)
GFR SERPL CREATININE-BSD FRML MDRD: 52 ML/MIN/1.73SQ M
GLUCOSE P FAST SERPL-MCNC: 95 MG/DL (ref 65–99)
HCT VFR BLD AUTO: 46 % (ref 34.8–46.1)
HGB BLD-MCNC: 15.3 G/DL (ref 11.5–15.4)
LYMPHOCYTES # BLD AUTO: 2.3 THOUSANDS/ΜL (ref 0.6–4.47)
LYMPHOCYTES NFR BLD AUTO: 11 % (ref 14–44)
MCH RBC QN AUTO: 29.3 PG (ref 26.8–34.3)
MCHC RBC AUTO-ENTMCNC: 33.3 G/DL (ref 31.4–37.4)
MCV RBC AUTO: 88 FL (ref 82–98)
MONOCYTES # BLD AUTO: 2.17 THOUSAND/ΜL (ref 0.17–1.22)
MONOCYTES NFR BLD AUTO: 11 % (ref 4–12)
NEUTROPHILS # BLD AUTO: 15.78 THOUSANDS/ΜL (ref 1.85–7.62)
NEUTS SEG NFR BLD AUTO: 76 % (ref 43–75)
NRBC BLD AUTO-RTO: 0 /100 WBCS
PLATELET # BLD AUTO: 331 THOUSANDS/UL (ref 149–390)
PMV BLD AUTO: 10.8 FL (ref 8.9–12.7)
POTASSIUM SERPL-SCNC: 4.1 MMOL/L (ref 3.5–5.3)
PROT SERPL-MCNC: 8 G/DL (ref 6.4–8.2)
RBC # BLD AUTO: 5.22 MILLION/UL (ref 3.81–5.12)
SODIUM SERPL-SCNC: 138 MMOL/L (ref 136–145)
TSH SERPL DL<=0.05 MIU/L-ACNC: 1.98 UIU/ML (ref 0.36–3.74)
VIT B12 SERPL-MCNC: 515 PG/ML (ref 100–900)
WBC # BLD AUTO: 20.66 THOUSAND/UL (ref 4.31–10.16)

## 2017-11-14 PROCEDURE — 85025 COMPLETE CBC W/AUTO DIFF WBC: CPT

## 2017-11-14 PROCEDURE — 87086 URINE CULTURE/COLONY COUNT: CPT

## 2017-11-14 PROCEDURE — 84443 ASSAY THYROID STIM HORMONE: CPT

## 2017-11-14 PROCEDURE — 36415 COLL VENOUS BLD VENIPUNCTURE: CPT

## 2017-11-14 PROCEDURE — 80053 COMPREHEN METABOLIC PANEL: CPT

## 2017-11-14 PROCEDURE — 82607 VITAMIN B-12: CPT

## 2017-11-15 ENCOUNTER — GENERIC CONVERSION - ENCOUNTER (OUTPATIENT)
Dept: OTHER | Facility: OTHER | Age: 72
End: 2017-11-15

## 2017-11-15 LAB — BACTERIA UR CULT: NORMAL

## 2017-11-28 ENCOUNTER — GENERIC CONVERSION - ENCOUNTER (OUTPATIENT)
Dept: OTHER | Facility: OTHER | Age: 72
End: 2017-11-28

## 2017-11-30 ENCOUNTER — HOSPITAL ENCOUNTER (OUTPATIENT)
Dept: CT IMAGING | Facility: HOSPITAL | Age: 72
Discharge: HOME | End: 2017-11-30
Payer: MEDICARE

## 2017-11-30 DIAGNOSIS — R05.9 COUGH: ICD-10-CM

## 2017-11-30 DIAGNOSIS — R63.4 ABNORMAL WEIGHT LOSS: ICD-10-CM

## 2017-11-30 DIAGNOSIS — F17.200 NICOTINE DEPENDENCE, UNCOMPLICATED: ICD-10-CM

## 2017-11-30 DIAGNOSIS — D72.829 ELEVATED WHITE BLOOD CELL COUNT: ICD-10-CM

## 2017-11-30 PROCEDURE — 71250 CT THORAX DX C-: CPT

## 2017-12-04 ENCOUNTER — GENERIC CONVERSION - ENCOUNTER (OUTPATIENT)
Dept: OTHER | Facility: OTHER | Age: 72
End: 2017-12-04

## 2017-12-07 ENCOUNTER — ALLSCRIPTS OFFICE VISIT (OUTPATIENT)
Dept: OTHER | Facility: OTHER | Age: 72
End: 2017-12-07

## 2017-12-08 NOTE — CONSULTS
Assessment  1  Candidiasis (112 9) (B37 9)   2  Graves' disease (242 00) (E05 00)   3  Dementia without behavioral disturbance (294 20) (F03 90)   4  Acute cystitis without hematuria (595 0) (N30 00)   5  Leukocytosis (288 60) (D72 829)    Plan  Leukocytosis    · (1) BCR/ABL, PCR; Status:Active; Requested for:28Rib8805;    Perform:[facility]; BJJ:34KIY9530; Ordered; For:Leukocytosis; Ordered By:Keaton Mccain Kit Reusing);   · (1) CBC/PLT/DIFF; Status:Active; Requested for:74Cnj4867;    Perform:[facility]; UJS:01BDR4083; Ordered; For:Leukocytosis; Ordered By:Keaton Mccain Kit Reusing);   · (1) COMPREHENSIVE METABOLIC PANEL; Status:Active; Requested for:32Hqn3444;    Perform:[facility]; JAYLENE:64WHD3569; Ordered;Ordered By:Yoli Mccain);   · * CT ABDOMEN PELVIS W CONTRAST; Status:Active; Requested for:11Jan201812:30PM;    Perform:[facility]; CRR:66NHS3285; Last Updated By:Janes Nunez; 12/7/2017 3:48:52 PM;Ordered;Ordered By:Yoli Mccain); · Follow-up visit in 6 months Evaluation and Treatment  Follow-up  Status: Complete Done: 35QNN5676 02:40PM   Ordered;Leukocytosis; Ordered By: Saloni Frederick) Performed:  Due: 50IOZ0908; Last Updated By: Benji Sequeira; 12/7/2017 3:41:42 PM    Discussion/Summary    1   Enlarged right tonsil any heavy smoker, bilateral pulmonary nodules measuring up to 3 millimeter in the left upper lobe on the right lower lobe near the diaphragm, physical examination showed tenderness in the left lower quadrant of the abdomen, she has dementia also she has Candida infection of area beneath the breasts bilaterally more pronounced beneath the left breast, non intentional weight loss 30 pounds over the past 6 months to 1 year, loss of appetite she eats 1 meal of the not only might be related to dementiaI will order CT scan of the abdomen and pelvis for weight lossfollow-up in 6 weeks, I will look at the right tonsil again if this is enlarging will have ENT evaluationCandida of the bilateral below the breasts area, gold Bond powder and to follow up with you for possible nystatin powder if no improvement this might be the reason for leukocytosis however bcr/ABL by PCR is orderedA repeat CT scan of the chest in 6-12 months  Chief Complaint  Leukocytosis, bilateral pulmonary nodules      History of Present Illness  58-year-old demented  female who is here with her daughter for discussion of mildly persistent leukocytosis, a new CT scan of the chest in November 2017 showed bilateral small pulmonary nodules measuring up to 3 millimeterspatient is demented, the information were obtained from Anastasia Osborn and Nikolai Addison her daughters and physical examinationNovember 2017 WBC 20 6, hemoglobin 15 3, MCV 88, platelets 016,920, 76 percent neutrophils, 11 percent lymphocytes, 11 percent monocytesJanuary 2015 WBC 14 5, hemoglobin 13 9, MCV 88, platelets 216223, 64 percent neutrophils, 23 percent lymphocytes, 8 percent monocytespatient had a history of cholecystectomy, thyroidectomy, hysterectomy and bilateral oophorectomies, psoriasis, dementia, COPD, Graves disease, non intentional weight loss about 30 pounds in 1 yearsmokes 1-2 pack of cigarettes daily for many many years, she does not drink alcoholhistory significant for cancer in her mother      Review of Systems   Constitutional: recent 30pounds in 1 year lb weight loss, but-- no fever,-- not feeling poorly,-- no chills-- and-- not feeling tired  Eyes: No complaints of eye pain, no red eyes, no eyesight problems, no discharge, no dry eyes, no itching of eyes  ENT: no earache-- and-- no sore throat  Cardiovascular: No complaints of slow heart rate, no fast heart rate, no chest pain, no palpitations, no leg claudication, no lower extremity edema  Respiratory: shortness of breath,-- wheezing-- and-- shortness of breath during exertion  Gastrointestinal: no abdominal pain-- and-- no blood in stools  Genitourinary: No complaints of dysuria, no incontinence, no pelvic pain, no dysmenorrhea, no vaginal discharge or bleeding  Musculoskeletal: No complaints of arthralgias, no myalgias, no joint swelling or stiffness, no limb pain or swelling  Integumentary: No complaints of skin rash or lesions, no itching, no skin wounds, no breast pain or lump--   The patient presents with complaints of bilateral truncal area a rash  Neurological: No complaints of headache, no confusion, no convulsions, no numbness, no dizziness or fainting, no tingling, no limb weakness, no difficulty walking  Psychiatric: Not suicidal, no sleep disturbance, no anxiety or depression, no change in personality, no emotional problems  Endocrine: No complaints of proptosis, no hot flashes, no muscle weakness, no deepening of the voice, no feelings of weakness  Hematologic/Lymphatic: No complaints of swollen glands, no swollen glands in the neck, does not bleed easily, does not bruise easily  Active Problems  1  History of Accidental fall (E888 9) (W19 XXXA)   2  Acute cystitis without hematuria (595 0) (N30 00)   3  History of Bedbug bite (919 4) (W57 XXXA)   4  Candidiasis (112 9) (B37 9)   5  Cervical strain (847 0) (S16 1XXA)   6  Copy of advanced directive obtained from patient (V49 89) (Z78 9)   7  Cough (786 2) (R05)   8  Creatinine elevation (790 99) (R79 89)   9  Current every day smoker (305 1) (F17 200)   10  Dementia without behavioral disturbance (294 20) (F03 90)   11  Disc degeneration, lumbar (722 52) (M51 36)   12  Dizziness (780 4) (R42)   13  Esophageal Reflux   14  Gait disturbance (781 2) (R26 9)   15  Graves' disease (242 00) (E05 00)   16  Hypercholesterolemia (272 0) (E78 00)   17  Hyperglycemia (790 29) (R73 9)   18  Hypertension (401 9) (I10)   19  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)   20  Leukocytosis (288 60) (D72 829)   21  History of LLL pneumonia (486) (J18 1)   22   Low back pain radiating to left lower extremity (724 2) (M54 5)   23  Malaise (780 79) (R53 81)   24  Neuropathy of both feet (356 9) (G57 93)   25  Osteoarthritis (715 90) (M19 90)   26  Pernicious anemia (281 0) (D51 0)   27  Postsurgical hypothyroidism (244 0) (E89 0)   28  Sciatica of left side (724 3) (M54 32)   29  History of Syncope and collapse (780 2) (R55)   30  History of Thyroid Surgery Total Thyroidectomy   31  Trigger middle finger of right hand (727 03) (M65 331)   32  Umbilical hernia (410 3) (K42 9)   33  Urinary incontinence (788 30) (R32)   34  Weight loss (783 21) (R63 4)    Past Medical History  1  History of Accidental fall (E888 9) (W19 XXXA)   2  History of Bedbug bite (919 4) (W57 XXXA)   3  History of Grief reaction (309 0) (F43 20)   4  History of acute conjunctivitis (V12 49) (Z86 69)   5  History of Graves' disease (V12 29) (Z86 39)   6  History of impacted cerumen (V12 49) (Z86 69)   7  History of migraine (V12 49) (Z86 69)   8  History of scabies (V12 09) (Z86 19)   9  History of screening mammography (V15 89) (Z92 89)   10  History of urinary incontinence (V13 09) (Z87 898)   11  History of urinary tract infection (V13 02) (Z87 440)   12  History of urinary tract infection (V13 02) (Z87 440)   13  History of vertigo (V12 49) (Z87 898)   14  History of LLL pneumonia (486) (J18 1)   15  History of Syncope and collapse (780 2) (R55)    The active problems and past medical history were reviewed and updated today  Surgical History  1  History of Cholecystectomy   2  History of Hysterectomy   3  History of Thyroid Surgery Total Thyroidectomy   4  History of Tubal Ligation    The surgical history was reviewed and updated today  Family History  Sister    1  Family history of malignant neoplasm of breast (V16 3) (Z80 3)  Family History    2  Family history of Diabetes Mellitus (V18 0)    The family history was reviewed and updated today         Social History     · Copy of advanced directive obtained from patient (V43 05) (Z78 9)   · Current every day smoker (305 1) (F17 200)   · No alcohol use   ·   The social history was reviewed and updated today  Current Meds   1  Adult Aspirin Low Strength 81 MG TBDP; Take 1 tablet daily; Therapy: (Recorded:41Ihz5966) to Recorded   2  Atorvastatin Calcium 40 MG Oral Tablet; Take 1 tablet daily; Therapy: (IZHOZANW:95CHG2461) to Recorded   3  Donepezil HCl - 10 MG Oral Tablet; Take 1 tablet daily; Therapy: 45UJX2381 to (Evaluate:33Jlb4668)  Requested for: 41FID9063; Last Rx:20Nov2017 Ordered   4  Kombiglyze XR 2 5-1000 MG Oral Tablet Extended Release 24 Hour; 1 TABLET DAILY WITH SUPPER; Therapy: (Recorded:14Rjo2128) to Recorded   5  Levothyroxine Sodium 100 MCG Oral Tablet; Take 1 tablet daily; Therapy: (Recorded:71Sis2700) to Recorded   6  Lisinopril-Hydrochlorothiazide 20-12 5 MG Oral Tablet; TAKE 2 TABLET Every morning rcvd via Endo; Therapy: (Recorded:14Nov2017) to Recorded   7  Os-Dharmesh TABS; Take 1 tablet twice daily; Therapy: (Recorded:32Jge8478) to Recorded    The medication list was reviewed and updated today  Allergies  1  Azithromycin TABS   2  Cephalexin CAPS   3  NSAIDs   4  Naproxen TABS   5  Simcor TB24    Vitals  Vital Signs    Recorded: 09Mtt2601 03:08PM   Temperature 96 9 F   Heart Rate 112   Respiration 16   Systolic 741   Diastolic 70   Height 5 ft 2 in   Weight 125 lb 4 0 oz   BMI Calculated 22 91   BSA Calculated 1 57   O2 Saturation 96       Physical Exam   Constitutional  General appearance: No acute distress, well appearing and well nourished  Eyes  Conjunctiva and lids: No swelling, erythema or discharge  Pupils and irises: Equal, round and reactive to light  Ears, Nose, Mouth, and Throat  External inspection of ears and nose: Normal    Oropharynx: Abnormal   The posterior pharynx was erythematous  Oropharynx examination showed no lesions,-- no leukoplakia-- and-- no ulcer  There was 1+ enlargement of the right tonsil    Pulmonary  Auscultation of lungs: Abnormal   diminished breath sounds bilaterally  Cardiovascular  Auscultation of heart: Normal rate and rhythm, normal S1 and S2, without murmurs  Examination of extremities for edema and/or varicosities: Normal    Carotid pulses: Normal    Abdomen  Abdomen: Non-tender, no masses  Liver and spleen: No hepatomegaly or splenomegaly  Lymphatic  Palpation of lymph nodes in neck: No lymphadenopathy  Musculoskeletal  Gait and station: Normal    Skin  Skin and subcutaneous tissue: Normal without rashes or lesions  Neurologic  Cranial nerves: Cranial nerves 2-12 intact  Reflexes: 2+ and symmetric  Sensation: No sensory loss  Psychiatric  Orientation to person, place, and time: Abnormal   Mental Status: disoriented to person,-- disoriented to place,-- disoriented to time-- and-- inadequate knowledge of current events, but-- no difficulty naming common objects,-- no difficulty repeating a phrase-- and-- adequate knowledge regarding past history  Mood and affect: Normal         ECOG 1       Results/Data  * CT CHEST WO CONTRAST 15WIC9632 07:48PM Maria Teresa Horner Order Number: [id number]  Performing Comments: do CT scan re cough/ weight loss/ smoker/ leukocytosis   - Patient Instructions: To schedule this appointment, please contact Central Scheduling at [phone]  Test Name Result Flag Reference   CT CHEST WO CONTRAST (Report)       CT CHEST WITHOUT IV CONTRAST   INDICATION: Cough, weight loss  Elevated white blood cell count, smoker  COMPARISON: Chest radiograph 9/24/2017   TECHNIQUE: CT examination of the chest was performed without intravenous contrast  Reformatted images were created in axial, sagittal, and coronal planes  Radiation dose length product (DLP) for this visit: 364 mGy-cm    This examination, like all CT scans performed in the [facility], was performed utilizing techniques to minimize radiation dose exposure, including the use of iterative reconstruction and automated exposure control  FINDINGS:   LUNGS: There is a 3 mm left lower lobe nodule (series 3 image 28)  3 mm nodular density at the right lung base (series 3 image 40)  No pulmonary mass or endobronchial lesion  No focal consolidation  PLEURA: Unremarkable  HEART/GREAT VESSELS: The heart is normal size without pericardial mass or effusion  There are atherosclerotic changes of the coronary arteries and thoracic aorta  MEDIASTINUM AND PHILLY: Unremarkable  CHEST WALL AND LOWER NECK:    Normal    VISUALIZED STRUCTURES IN THE UPPER ABDOMEN: Status post cholecystectomy  OSSEOUS STRUCTURES: No acute fracture  No destructive osseous lesion  There are degenerative changes of the thoracic spine  IMPRESSION:   1  A few bilateral pulmonary nodules measuring up to 3 mm  Based on current Fleischner Society 2017 Guidelines on incidental pulmonary nodule, no routine follow-up is needed if the patient is considered low risk for lung cancer  If the patient  is considered high risk for lung cancer, 12 month follow-up non-contrast chest CT is recommended  2  No focal consolidation to suggest pneumonia  Workstation performed: [id number]   Signed by:  Vanessa Avery MD  12/4/17     (1) TSH WITH FT4 REFLEX [id number] 10:43AM Elli Foss Order Number: [id number]     Test Name Result Flag Reference   TSH 1 980 uIU/mL  0 358-3 740     Patients undergoing fluorescein dye angiography may retain small amounts of fluorescein in the body for 48-72 hours post procedure  Samples containing fluorescein can produce falsely depressed TSH values  If the patient had this procedure,a specimen should be resubmitted post fluorescein clearance       The recommended reference ranges for TSH during pregnancy are as follows: First trimester 0 1 to 2 5 uIU/mL Second trimester  0 2 to 3 0 uIU/mL Third trimester 0 3 to 3 0 uIU/m     (1) CBC/PLT/DIFF 20KFQ3709 10:43AM Marva BLACKMAN Order Number: [id number]     Test Name Result Flag Reference   WBC COUNT 20 66 Thousand/uL H 4 31-10 16   RBC COUNT 5 22 Million/uL H 3 81-5 12   HEMOGLOBIN 15 3 g/dL  11 5-15 4   HEMATOCRIT 46 0 %  34 8-46  1   MCV 88 fL  82-98   MCH 29 3 pg  26 8-34 3   MCHC 33 3 g/dL  31 4-37 4   RDW 14 6 %  11 6-15 1   MPV 10 8 fL  8 9-12 7   PLATELET COUNT 962 Thousands/uL  149-390   nRBC AUTOMATED 0 /100 WBCs     NEUTROPHILS RELATIVE PERCENT 76 % H 43-75   LYMPHOCYTES RELATIVE PERCENT 11 % L 14-44   MONOCYTES RELATIVE PERCENT 11 %  4-12   EOSINOPHILS RELATIVE PERCENT 2 %  0-6   BASOPHILS RELATIVE PERCENT 0 %  0-1   NEUTROPHILS ABSOLUTE COUNT 15 78 Thousands/? ??L H 1 85-7 62   LYMPHOCYTES ABSOLUTE COUNT 2 30 Thousands/? ??L  0 60-4 47   MONOCYTES ABSOLUTE COUNT 2 17 Thousand/? ??L H 0 17-1 22   EOSINOPHILS ABSOLUTE COUNT 0 32 Thousand/? ??L  0 00-0 61   BASOPHILS ABSOLUTE COUNT 0 03 Thousands/? ??L  0 00-0 10     (1) VITAMIN B12 88UPI4999 10:43AM [facility] [place] Number: [id number]     Test Name Result Flag Reference   VITAMIN B12 515 pg/mL  100-900     (1) COMPREHENSIVE METABOLIC PANEL 43IKF7021 59:84GV [facility] [place] [facility] [place] Order Number: [id number]     Test Name Result Flag Reference   SODIUM 138 mmol/L  136-145   POTASSIUM 4 1 mmol/L  3 5-5 3   CHLORIDE 101 mmol/L  100-108   CARBON DIOXIDE 31 mmol/L  21-32   ANION GAP (CALC) 6 mmol/L  4-13   BLOOD UREA NITROGEN 19 mg/dL  5-25   CREATININE 1 05 mg/dL  0 60-1 30   Standardized to IDMS reference method   CALCIUM 9 5 mg/dL  8 3-10 1   BILI, TOTAL 0 43 mg/dL  0 20-1 00   ALK PHOSPHATAS 104 U/L     ALT (SGPT) 36 U/L  12-78   Specimen collection should occur prior to Sulfasalazine and/or Sulfapyridine administration due to the potential for falsely depressed results  AST(SGOT) 22 U/L  5-45   Specimen collection should occur prior to Sulfasalazine administration due to the potential for falsely depressed results     ALBUMIN 3 8 g/dL  3 5-5 0   TOTAL PROTEIN 8 0 g/dL  6 4-8 2   eGFR 52 ml/min/1 73sq m       National Kidney Disease Education Program recommendations are as follows: GFR calculation is accurate only with a steady state creatinine Chronic Kidney disease less than 60 ml/min/1 73 sq  meters Kidney failure less than 15 ml/min/1 73 sq  meters  GLUCOSE FASTING 95 mg/dL  65-99   Specimen collection should occur prior to Sulfasalazine administration due to the potential for falsely depressed results  Specimen collection should occur prior to Sulfapyridine administration due to the potential for falsely elevated results  (1) URINE CULTURE [id number] 10:43AM Edilberto Watson Order Number: [id number]     Test Name Result Flag Reference   CLINICAL REPORT (Report)       Test:        Urine culture Specimen Type:   Urine Specimen Date:   11/14/2017 10:43 AM Result Date:    11/15/2017 3:36 PM Result Status:   Final result Resulting Lab:   [place] [address]           Tel: [phone]   CULTURE                                      ------------------                                 20,000-29,000 cfu/ml    *** Mixed Contaminants X3 ***     Future Appointments    Date/Time Provider Specialty Site   01/18/2018 02:40 PM GENNY Gibson  Hematology Oncology [facility]   03/15/2018 09:30 AM Joselin Rosnethal DO Family Medicine [address] [facility]       Signatures   Electronically signed by :  GENNY Rosales ; Dec  7 2017  4:42PM EST                       (Author)

## 2018-01-09 NOTE — RESULT NOTES
Verified Results  (1) TSH WITH FT4 REFLEX [id number] 10:43AM Arnoldo Bravo Order Number: [id number]     Test Name Result Flag Reference   TSH 1 980 uIU/mL  0 358-3 740   Patients undergoing fluorescein dye angiography may retain small amounts of fluorescein in the body for 48-72 hours post procedure  Samples containing fluorescein can produce falsely depressed TSH values  If the patient had this procedure,a specimen should be resubmitted post fluorescein clearance  The recommended reference ranges for TSH during pregnancy are as follows:  First trimester 0 1 to 2 5 uIU/mL  Second trimester  0 2 to 3 0 uIU/mL  Third trimester 0 3 to 3 0 uIU/m     (1) CBC/PLT/DIFF [id number] 10:43AM Arnoldo Bravo Order Number: [id number]     Test Name Result Flag Reference   WBC COUNT 20 66 Thousand/uL H 4 31-10 16   RBC COUNT 5 22 Million/uL H 3 81-5 12   HEMOGLOBIN 15 3 g/dL  11 5-15 4   HEMATOCRIT 46 0 %  34 8-46  1   MCV 88 fL  82-98   MCH 29 3 pg  26 8-34 3   MCHC 33 3 g/dL  31 4-37 4   RDW 14 6 %  11 6-15 1   MPV 10 8 fL  8 9-12 7   PLATELET COUNT 348 Thousands/uL  149-390   nRBC AUTOMATED 0 /100 WBCs     NEUTROPHILS RELATIVE PERCENT 76 % H 43-75   LYMPHOCYTES RELATIVE PERCENT 11 % L 14-44   MONOCYTES RELATIVE PERCENT 11 %  4-12   EOSINOPHILS RELATIVE PERCENT 2 %  0-6   BASOPHILS RELATIVE PERCENT 0 %  0-1   NEUTROPHILS ABSOLUTE COUNT 15 78 Thousands/? ??L H 1 85-7 62   LYMPHOCYTES ABSOLUTE COUNT 2 30 Thousands/? ??L  0 60-4 47   MONOCYTES ABSOLUTE COUNT 2 17 Thousand/? ??L H 0 17-1 22   EOSINOPHILS ABSOLUTE COUNT 0 32 Thousand/? ??L  0 00-0 61   BASOPHILS ABSOLUTE COUNT 0 03 Thousands/? ??L  0 00-0 10     (1) VITAMIN B12 [id number] 10:43AM Arnoldo Bravo Order Number: [id number]     Test Name Result Flag Reference   VITAMIN B12 515 pg/mL  100-900     (1) COMPREHENSIVE METABOLIC PANEL [id number] 11:67HA Arnoldo Bravo Order Number: [id number]     Test Name Result Flag Reference   SODIUM 138 mmol/L  136-145   POTASSIUM 4 1 mmol/L  3 5-5 3   CHLORIDE 101 mmol/L  100-108   CARBON DIOXIDE 31 mmol/L  21-32   ANION GAP (CALC) 6 mmol/L  4-13   BLOOD UREA NITROGEN 19 mg/dL  5-25   CREATININE 1 05 mg/dL  0 60-1 30   Standardized to IDMS reference method   CALCIUM 9 5 mg/dL  8 3-10 1   BILI, TOTAL 0 43 mg/dL  0 20-1 00   ALK PHOSPHATAS 104 U/L     ALT (SGPT) 36 U/L  12-78   Specimen collection should occur prior to Sulfasalazine and/or Sulfapyridine administration due to the potential for falsely depressed results  AST(SGOT) 22 U/L  5-45   Specimen collection should occur prior to Sulfasalazine administration due to the potential for falsely depressed results  ALBUMIN 3 8 g/dL  3 5-5 0   TOTAL PROTEIN 8 0 g/dL  6 4-8 2   eGFR 52 ml/min/1 73sq m     National Kidney Disease Education Program recommendations are as follows:  GFR calculation is accurate only with a steady state creatinine  Chronic Kidney disease less than 60 ml/min/1 73 sq  meters  Kidney failure less than 15 ml/min/1 73 sq  meters  GLUCOSE FASTING 95 mg/dL  65-99   Specimen collection should occur prior to Sulfasalazine administration due to the potential for falsely depressed results  Specimen collection should occur prior to Sulfapyridine administration due to the potential for falsely elevated results         Plan  Leukocytosis    · *1 - [facility]  *  Status: Hold For - Scheduling   Requested for: 24OCJ6783  Care Summary provided  : Yes

## 2018-01-09 NOTE — RESULT NOTES
Verified Results  (1) URINE CULTURE [id number] 10:43AM Cliff Home Order Number: [id number]     Test Name Result Flag Reference   CLINICAL REPORT (Report)     Test:        Urine culture  Specimen Type:   Urine  Specimen Date:   11/14/2017 10:43 AM  Result Date:    11/15/2017 3:36 PM  Result Status:   Final result  Resulting Lab:   [place] [address]            Tel: [phone]      CULTURE                                       ------------------                                   20,000-29,000 cfu/ml      *** Mixed Contaminants X3 ***

## 2018-01-11 ENCOUNTER — APPOINTMENT (OUTPATIENT)
Dept: LAB | Facility: HOSPITAL | Age: 73
End: 2018-01-11
Attending: INTERNAL MEDICINE
Payer: MEDICARE

## 2018-01-11 ENCOUNTER — HOSPITAL ENCOUNTER (OUTPATIENT)
Dept: CT IMAGING | Facility: HOSPITAL | Age: 73
Discharge: HOME | End: 2018-01-11
Attending: INTERNAL MEDICINE
Payer: MEDICARE

## 2018-01-11 DIAGNOSIS — D72.829 ELEVATED WHITE BLOOD CELL COUNT: ICD-10-CM

## 2018-01-11 LAB
ALBUMIN SERPL BCP-MCNC: 3.5 G/DL (ref 3.5–5)
ALP SERPL-CCNC: 109 U/L (ref 46–116)
ALT SERPL W P-5'-P-CCNC: 14 U/L (ref 12–78)
ANION GAP SERPL CALCULATED.3IONS-SCNC: 11 MMOL/L (ref 4–13)
AST SERPL W P-5'-P-CCNC: 16 U/L (ref 5–45)
BASOPHILS # BLD MANUAL: 0 THOUSAND/UL (ref 0–0.1)
BASOPHILS NFR MAR MANUAL: 0 % (ref 0–1)
BILIRUB SERPL-MCNC: 0.28 MG/DL (ref 0.2–1)
BUN SERPL-MCNC: 14 MG/DL (ref 5–25)
CALCIUM SERPL-MCNC: 9.5 MG/DL (ref 8.3–10.1)
CHLORIDE SERPL-SCNC: 98 MMOL/L (ref 100–108)
CO2 SERPL-SCNC: 31 MMOL/L (ref 21–32)
CREAT SERPL-MCNC: 1.05 MG/DL (ref 0.6–1.3)
EOSINOPHIL # BLD MANUAL: 0.4 THOUSAND/UL (ref 0–0.4)
EOSINOPHIL NFR BLD MANUAL: 3 % (ref 0–6)
ERYTHROCYTE [DISTWIDTH] IN BLOOD BY AUTOMATED COUNT: 14 % (ref 11.6–15.1)
GFR SERPL CREATININE-BSD FRML MDRD: 52 ML/MIN/1.73SQ M
GLUCOSE P FAST SERPL-MCNC: 90 MG/DL (ref 65–99)
HCT VFR BLD AUTO: 43.7 % (ref 34.8–46.1)
HGB BLD-MCNC: 14.3 G/DL (ref 11.5–15.4)
LYMPHOCYTES # BLD AUTO: 27 % (ref 14–44)
LYMPHOCYTES # BLD AUTO: 3.64 THOUSAND/UL (ref 0.6–4.47)
MCH RBC QN AUTO: 28.5 PG (ref 26.8–34.3)
MCHC RBC AUTO-ENTMCNC: 32.7 G/DL (ref 31.4–37.4)
MCV RBC AUTO: 87 FL (ref 82–98)
MONOCYTES # BLD AUTO: 1.08 THOUSAND/UL (ref 0–1.22)
MONOCYTES NFR BLD: 8 % (ref 4–12)
NEUTROPHILS # BLD MANUAL: 8.22 THOUSAND/UL (ref 1.85–7.62)
NEUTS BAND NFR BLD MANUAL: 3 % (ref 0–8)
NEUTS SEG NFR BLD AUTO: 58 % (ref 43–75)
PLATELET # BLD AUTO: 417 THOUSANDS/UL (ref 149–390)
PLATELET BLD QL SMEAR: ABNORMAL
PMV BLD AUTO: 9.9 FL (ref 8.9–12.7)
POTASSIUM SERPL-SCNC: 4.6 MMOL/L (ref 3.5–5.3)
PROT SERPL-MCNC: 8.4 G/DL (ref 6.4–8.2)
RBC # BLD AUTO: 5.02 MILLION/UL (ref 3.81–5.12)
RBC MORPH BLD: NORMAL
SODIUM SERPL-SCNC: 140 MMOL/L (ref 136–145)
TOTAL CELLS COUNTED SPEC: 100
VARIANT LYMPHS # BLD AUTO: 1 %
WBC # BLD AUTO: 13.47 THOUSAND/UL (ref 4.31–10.16)

## 2018-01-11 PROCEDURE — 74177 CT ABD & PELVIS W/CONTRAST: CPT

## 2018-01-11 PROCEDURE — 81207 BCR/ABL1 GENE MINOR BP: CPT

## 2018-01-11 PROCEDURE — 85007 BL SMEAR W/DIFF WBC COUNT: CPT

## 2018-01-11 PROCEDURE — 80053 COMPREHEN METABOLIC PANEL: CPT

## 2018-01-11 PROCEDURE — 81206 BCR/ABL1 GENE MAJOR BP: CPT

## 2018-01-11 PROCEDURE — 85027 COMPLETE CBC AUTOMATED: CPT

## 2018-01-11 PROCEDURE — 36415 COLL VENOUS BLD VENIPUNCTURE: CPT

## 2018-01-11 RX ADMIN — IOHEXOL 100 ML: 350 INJECTION, SOLUTION INTRAVENOUS at 13:05

## 2018-01-11 NOTE — MISCELLANEOUS
Assessment    1  Syncope and collapse (780 2) (R55)   2  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)   3  Urinary tract infection (599 0) (N39 0)   4  Dementia without behavioral disturbance (294 20) (F03 90)   5  Current every day smoker (305 1) (F17 200)   6  Postsurgical hypothyroidism (244 0) (E89 0)   7  Pernicious anemia (281 0) (D51 0)   8  History of Bedbug bite (919 4) (W57 XXXA)   9  Creatinine elevation (790 99) (R79 89)    Plan  Exercise counseling    · Begin or continue regular aerobic exercise  Gradually work up to at least 3 sessions of  30 minutes of exercise a week ; Status:Complete - Retrospective By Protocol  Authorization;   Done: 22LAO2602 11:33AM   Ordered;  For:Exercise counseling; Ordered By:Shady Davis; Discussion/Summary  Discussion Summary:   We reviewed her hospital stay at [facility] March 31 through April 2 with witnessed syncopal episode at rest in home - she has had no further episodes  We reviewed her medications, she is not using beta blocker, EKG slightly bradycardic otherwise unremarkable  Blood work shows hemoglobin 14 1, white count chronically in 13 range, creatinine slightly elevated 1 32 -   Her lisinopril HCT was changed to plain 10 mg lisinopril daily, slip given to redo nonfasting blood test, BMP  We will continue to follow her blood pressure - she did see cardiology inpatient, consult reviewed  Her TSH was mildly elevated inpatient and her levothyroxine dose was increased from 88 to 100 mcg daily, she will be seeing her endocrinologist next month to redo TSH and address this  --Inpatient CT of the head did show chronic lacunar infarcts, we discussed stroke risk, unfortunately she does continue to smoke and accepts increased risk of cancer, stroke, heart disease etc     --She was treated with Cipro x 5 days for UTI symptoms inpatient, long-standing frequent UTIs  See previous discussions -     - B-12 injection given here today   History pernicious anemia as before --Past history of bedbugs, she does have some lingering pruritus with lesions lower extremities, she can use triamcinolone cream twice daily ÃÆÃ¢â¬â10-14 days -- also use skin moisturizer used    --I would like her to keep her June appointment with us, call sooner if needed  Chief Complaint  Chief Complaint Free Text Note Form: GURINDER- SLA Syncope // UTI      History of Present Illness  TCM Communication [facility]: The patient is being contacted for follow-up after hospitalization  Hospital records were not available  She was hospitalized at [facility]  The date of admission: 3/31/2017, date of discharge: 4/2/2017  Diagnosis: SYNCOPE, UTI  She was discharged to home  Medications were not reviewed today  She scheduled a follow up appointment  Symptoms: leg pain left side and leg pain right side  Communication performed and completed by KARO NEUMANN [facility] RN/JANA Macdonald RN   HPI: She is in for recheck after hospital stay with syncopal episode, also found to have another UTI  She is accompanied by her daughter who relates the past with seated in chair and complained of a warmth/flushing, then proceeded to pass out  See hospital notes regarding full history and testing  She was found to be mildly bradycardic at hospital, is not using beta blocker, daughter has medication list with her today  She does have some continued skin lesions lower extremities with past history bedbugs, also with scaling at elbow  Daughter relates no one else at home has issues and that bedbug issue has been resolved with removing furniture/treating home  Had does continue to smoke one half pack per day or so, she has refused to quit despite many years of encouragement to stop  Her CT scan of head inpatient did show chronic infarcts/lacunar  She denies recent falls, no new weakness arms or legs  Chronic slowly progressive dementia as before        Review of Systems  Complete-Female:   Constitutional: No recent lightheadedness or dizziness, appetite is okay, but as noted in HPI, no fever and no chills  Eyes: Denies change in vision, did see eye doctor recently and uses artificial tears  ENT: hearing loss, but no earache and no sore throat  Cardiovascular: No increased palpitations, but as noted in HPI, no chest pain and no lower extremity edema  Respiratory: Occasional chronic cough as before, no worse, no increased dyspnea on exertion, but no orthopnea and no wheezing  Gastrointestinal: No change in bowel, but no abdominal pain, no nausea, no vomiting and no blood in stools  Genitourinary: No further dysuria, long-standing incontinence unchanged, but as noted in HPI and no dysuria  Musculoskeletal: Low back pain actually improved recently  Integumentary: as noted in HPI  Neurological: confusion, but as noted in HPI, no headache, no dizziness and no convulsions  Psychiatric: no anxiety and no depression  Hematologic/Lymphatic: no swollen glands, no tendency for easy bleeding, no tendency for easy bruising and no swollen glands in the neck  Active Problems    1  Accidental fall (E888 9) (W19 XXXA)   2  History of Bedbug bite (919 4) (W57 XXXA)   3  Bladder infection (595 9) (N30 90)   4  Candidiasis (112 9) (B37 9)   5  Current every day smoker (305 1) (F17 200)   6  Dementia without behavioral disturbance (294 20) (F03 90)   7  Disc degeneration, lumbar (722 52) (M51 36)   8  Dizziness (780 4) (R42)   9  Esophageal Reflux   10  Gait disturbance (781 2) (R26 9)   11  Graves' disease (242 00) (E05 00)   12  Hypercholesterolemia (272 0) (E78 00)   13  Hyperglycemia (790 29) (R73 9)   14  Hypertension (401 9) (I10)   15  LLL pneumonia (486) (J18 1)   16  Low back pain (724 2) (M54 5)   17  Lumbar radiculopathy (724 4) (M54 16)   18  Malaise (780 79) (R53 81)   19  Neuropathy of both feet (356 9) (G57 93)   20  Osteoarthritis (715 90) (M19 90)   21  Pernicious anemia (281 0) (D51 0)   22  Postsurgical hypothyroidism (244 0) (E89 0)   23  Scabies (133 0) (B86)   24  History of Thyroid Surgery Total Thyroidectomy   25  Trigger middle finger of right hand (727 03) (M65 331)   26  Urinary incontinence (788 30) (R32)   27  Urinary tract infection (599 0) (N39 0)   28  Visit for screening mammogram (V76 12) (Z12 31)    Past Medical History    1  History of Bedbug bite (919 4) (W57 XXXA)   2  History of Grief reaction (309 0) (F43 20)   3  History of acute conjunctivitis (V12 49) (Z86 69)   4  History of Graves' disease (V12 29) (Z86 39)   5  History of impacted cerumen (V12 49) (Z86 69)   6  History of migraine (V12 49) (Z86 69)   7  History of urinary incontinence (V13 09) (Z87 898)   8  History of urinary tract infection (V13 02) (Z87 440)   9  History of vertigo (V12 49) (P56 165)    Surgical History    1  History of Cholecystectomy   2  History of Hysterectomy   3  History of Thyroid Surgery Total Thyroidectomy   4  History of Tubal Ligation  Surgical History Reviewed: The surgical history was reviewed and updated today  Family History  Sister    1  Family history of malignant neoplasm of breast (V16 3) (Z80 3)  Family History    2  Family history of Diabetes Mellitus (V18 0)    Social History    · Current every day smoker (305 1) (F17 200)   · No alcohol use   ·   Social History Reviewed: The social history was reviewed and updated today  Current Meds   1  Adult Aspirin Low Strength 81 MG TBDP; Take 1 tablet daily; Therapy: (Recorded:50Lhw5549) to Recorded   2  Artificial Tears SOLN;   Therapy: (Recorded:17Zcc9780) to Recorded   3  Atorvastatin Calcium 40 MG Oral Tablet; Take 1 tablet daily; Therapy: (WXAOPRGK:43IKJ4837) to Recorded   4  Donepezil HCl - 10 MG Oral Tablet; Take 1 tablet daily; Therapy: 69LNH8562 to (Evaluate:03Dln7615)  Requested for: 92LID2576; Last   Rx:12Vam9411 Ordered   5  Kombiglyze XR 2 5-1000 MG Oral Tablet Extended Release 24 Hour; 1 TABLET DAILY   WITH SUPPER;    Therapy: (Recorded:17Ryl7816) to Recorded   6  Levothyroxine Sodium 100 MCG Oral Tablet; Take 1 tablet daily; Therapy: (Recorded:14Apr2017) to Recorded   7  Lisinopril 10 MG Oral Tablet; TAKE 1 TABLET DAILY; Therapy: (Recorded:14Apr2017) to Recorded   8  Lisinopril-Hydrochlorothiazide 20-12 5 MG Oral Tablet; TAKE 1 TABLET DAILY; Therapy: (Recorded:24Mar2016) to Recorded   9  Metoprolol Tartrate 50 MG Oral Tablet; TAKE 1 TABLET TWICE DAILY  Requested for:   89KCP1364; Last Rx:12Nov2015 Ordered   10  Nitrofurantoin Monohyd Macro 100 MG Oral Capsule; TAKE 1 CAPSULE TWICE DAILY    WITH MEALS; Therapy: 27GJH4673 to (YFXHVVCF:14CBO3782)  Requested for: 96IUL4574; Last    Rx:23Jan2017 Ordered   11  Os-Dharmesh TABS; Take 1 tablet twice daily; Therapy: (Recorded:54Zkm8243) to Recorded   12  Sulfamethoxazole-Trimethoprim 800-160 MG Oral Tablet; TAKE 1 TABLET TWICE DAILY    UNTIL FINISHED; Therapy: 19QGQ8017 to (Evaluate:24Jan2017)  Requested for: 64SYZ6292; Last    Rx:19Jan2017 Ordered  Medication List Reviewed: The medication list was reviewed and updated today  Allergies    1  Cephalexin CAPS   2  NSAIDs   3  Naproxen TABS   4  Simcor TB24    Vitals  Signs   Recorded: 52ZQU4509 11:26AM   Heart Rate: 82  Systolic: 078  Diastolic: 80  Height: 5 ft 2 in  Weight: 141 lb 4 oz  BMI Calculated: 25 84  BSA Calculated: 1 65    Physical Exam    Constitutional Pleasant 66year-old seated on table no acute distress, not orthostatic here today  Mild cognitive deficit/dementia present as before, appears unchanged  Eyes   Conjunctiva and lids: No swelling, erythema or discharge  No pallor, no icterus  Ears, Nose, Mouth, and Throat   Oropharynx: Normal with no erythema, edema, exudate or lesions  Pulmonary   Auscultation of lungs: Clear to auscultation  Cardiovascular Regular rate and rhythm  No edema  Abdomen   Abdomen: Non-tender, no masses  Lymphatic   Palpation of lymph nodes in neck: No lymphadenopathy      Musculoskeletal Stable gait    Neurologic No focal weakness arms or legs  Psychiatric   Mood and affect: Normal          Health Management  Hyperglycemia   *VB - Eye Exam; every 1 year; Last 31DXL9531; Next Due: 83SWQ6280; Overdue    Future Appointments    Date/Time Provider Specialty Site   06/20/2017 10:30 AM Cullen Hodgson DO Family [facility] [address] [facility]     Signatures   Electronically signed by : Collette Punch, ; Apr  3 2017  6:33PM EST                       (Author)    Electronically signed by : Irene Sousa DO;  Apr 14 2017  1:17PM EST                       (Author)

## 2018-01-13 VITALS
WEIGHT: 130.25 LBS | SYSTOLIC BLOOD PRESSURE: 132 MMHG | HEIGHT: 62 IN | DIASTOLIC BLOOD PRESSURE: 78 MMHG | HEART RATE: 96 BPM | BODY MASS INDEX: 23.97 KG/M2

## 2018-01-13 VITALS
DIASTOLIC BLOOD PRESSURE: 80 MMHG | HEIGHT: 62 IN | SYSTOLIC BLOOD PRESSURE: 138 MMHG | WEIGHT: 141.25 LBS | HEART RATE: 82 BPM | BODY MASS INDEX: 25.99 KG/M2

## 2018-01-13 VITALS
SYSTOLIC BLOOD PRESSURE: 128 MMHG | DIASTOLIC BLOOD PRESSURE: 72 MMHG | WEIGHT: 139.13 LBS | HEIGHT: 62 IN | HEART RATE: 92 BPM | BODY MASS INDEX: 25.6 KG/M2

## 2018-01-13 NOTE — RESULT NOTES
Verified Results  (1) URINALYSIS w URINE C/S REFLEX (will reflex a microscopy if leukocytes, occult blood, or nitrites are not within normal limits) 36XRA3499 05:10PM Gl[facility] BLACKMAN Order Number: [id number]     Test Name Result Flag Reference   COLOR Dk Yellow     CLARITY Cloudy     PH UA 5 5  4 5-8 0   LEUKOCYTE ESTERASE UA Moderate A Negative   NITRITE UA Negative  Negative   PROTEIN UA Trace mg/dl A Negative   GLUCOSE UA Negative mg/dl  Negative   KETONES UA Negative mg/dl  Negative   UROBILINOGEN UA 0 2 E U /dl  0 2, 1 0 E U /dl   BILIRUBIN UA Negative  Negative   BLOOD UA Trace A Negative   SPECIFIC GRAVITY UA 1 021  1 003-1 030   BACTERIA Occasional /hpf  None Seen, Occasional   EPITHELIAL CELLS None Seen /hpf  None Seen, Occasional   HYALINE CASTS 5-10 /lpf A None Seen   RBC UA 2-4 /hpf A None Seen   WBC UA 20-30 /hpf A None Seen

## 2018-01-14 VITALS
BODY MASS INDEX: 25.63 KG/M2 | DIASTOLIC BLOOD PRESSURE: 80 MMHG | TEMPERATURE: 98 F | SYSTOLIC BLOOD PRESSURE: 122 MMHG | RESPIRATION RATE: 16 BRPM | HEART RATE: 74 BPM | WEIGHT: 140.13 LBS

## 2018-01-14 VITALS
BODY MASS INDEX: 25.65 KG/M2 | WEIGHT: 139.38 LBS | TEMPERATURE: 98.1 F | HEART RATE: 88 BPM | OXYGEN SATURATION: 95 % | SYSTOLIC BLOOD PRESSURE: 122 MMHG | HEIGHT: 62 IN | DIASTOLIC BLOOD PRESSURE: 80 MMHG

## 2018-01-14 NOTE — PROGRESS NOTES
Assessment    1  Medicare annual wellness visit, subsequent (V70 0) (Z00 00)   2  Acute cystitis without hematuria (595 0) (N30 00)   3  Dementia without behavioral disturbance (294 20) (F03 90)   · MILD EARLY   4  Pernicious anemia (281 0) (D51 0)   5  Current every day smoker (305 1) (F17 200)   · 1/2 PPD   6  Hypertension (401 9) (I10)   7  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)   8  Creatinine elevation (790 99) (R79 89)   9  Low back pain radiating to left lower extremity (724 2) (M54 5)    Plan  Acute cystitis without hematuria    · (1) URINE CULTURE; Source:Urine, Clean Catch; Status:Active; Requested  for:14Nov2017;   Encounter for screening mammogram for breast cancer    · * MAMMO SCREENING BILATERAL W CAD; Status:Hold For - Scheduling; Requested  for:14Nov2017;   Graves' disease    · (1) TSH WITH FT4 REFLEX; Status:Active; Requested for:14Nov2017;   Hypertension    · Lisinopril 10 MG Oral Tablet   · (1) COMPREHENSIVE METABOLIC PANEL; Status:Active; Requested for:14Nov2017;   Medicare annual wellness visit, subsequent    · *VB - Fall Risk Assessment  (Dx Z13 89 Screen for Neurologic Disorder);  Status:Complete;   Done: 31DXQ8608 11:02AM   · *VB - Urinary Incontinence Screen (Dx Z13 89 Screen for UI); Status:Complete;   Done:  42GZF9933 11:02AM   · *VB-Depression Screening; Status:Complete;   Done: 91WPF0084 11:02AM  Pernicious anemia    · Cyanocobalamin 1000 MCG/ML Injection Solution   · (1) CBC/PLT/DIFF; Status:Active; Requested for:14Nov2017;    · (1) VITAMIN B12; Status:Active; Requested TBI:79FXX7688;     Discussion/Summary    Reviewed health hx w daughter  She will go for blood work this morning, does have malaise, fatigue, sleeps throughout the day, appears to be related to being up at night, regarding dementia she should continue on donepezil every day as is  After blood work this morning she will return for B12 shot regarding history pernicious anemia - has been off B12 pill - poss restart      We did review her visit with Endocrinology in September, the increased lisinopril HCT to 20/12 52 tablets daily  Await blood work regarding creatinine/potassium  Blood pressure acceptable here today, not orthostatic  She does A1c/lipids with Endocrinology, appears by history she has not been using her Kombiglyze  She continues to smoke 1/3 to 1/2 pack per day, well aware of risk, unable to get her to quit, has degree chronic lung disease  We reviewed her visit to emergency room in September, she had E coli UTI with elevated WBC, redo culture, redo CBC  Daughter relates they did receive other antibiotic from emergency room to replace Cipro as culture was resistant to Cipro/Bactrim  She has dropped 10 lb this year, encourage healthy eating  Would like to see her back in 3 to 4 months, call us sooner if needed  Watch for falls, she does have ongoing chronic back pain, recent months has been radicular to left thigh, no hip findings are worrisome symptoms/findings on exam here today, hold off on further imaging at present  She should do home exercise program, she did do therapy  Impression: Subsequent Annual Wellness Visit  Cardiovascular screening and counseling: screening is current  Diabetes screening and counseling: screening is current  Colorectal cancer screening and counseling: the risks and benefits of screening were discussed and the patient declines screening  Breast cancer screening and counseling: due for a screening mammogram    Osteoporosis screening and counseling: screening is current  Glaucoma screening and counseling: screening is current  Immunizations: influenza vaccine is up to date this year, influenza vaccination is recommended annually and the lifetime pneumococcal vaccine has been completed  Advance Directive Planning: complete and up to date        Chief Complaint  AWV/ reg check up      Advance Directives  Advance Directive [facility]:   The patient is not in agreement to receive the Advance Care Planning service    YES - Patient has an advance health care directive  The patient has a living will located   Instructed patient to bring a copy of living will to next office visit  History of Present Illness  HPI: She is in today accompanied by her daughter with whom she resides, she denies any new issues other than some ongoing low back/left thigh pain, she has completed therapy with some improvement, no new weakness in leg  They deny any falls  She continues to see her endocrinologist, lisinopril HCT 2 pills daily was prescribed by Endocrinology, not lisinopril 10 mg daily as we have given in past     She continues with slowly progressive dementia, is hard of hearing, hygiene remains an issue, continues to smoke 1/3 to 1/2 pack daily  Daughter relates she is up at night, does not wander out of home, sleeps a lot of the time during the day    Is not using B12 pills, last B12 injection in September    She did have emergency room visit in September, urine culture had been positive for E coli, resistant to Cipro/Bactrim, daughter relates that they were called and prescribed a 2nd antibiotic to use, unsure of name  They deny dysuria, fever, abdominal pain  CT of abdomen and pelvis was performed at ER, unremarkable  CBC showed elevated WBC  Welcome to [employer] and Wellness Visits: The patient is being seen for the initial annual wellness visit  Medicare Screening and Risk Factors   Hospitalizations: no previous hospitalizations  Medicare Screening Tests Risk Questions   Drug and Alcohol Use: The patient currently smokes 1 1/2 PPD cigarettes per day  The patient reports never drinking alcohol  She has never used illicit drugs     Diet and Physical Activity: Current diet includes well balanced meals, 1 servings of fruit per day, 1 servings of vegetables per day, 1 servings of meat per day, 1 servings of dairy products per day, 2 cups of coffee per day, 1 cans of regular soda per day and 8 oz glass of water daily  She exercises infrequently  Mood Disorder and Cognitive Impairment Screening: She denies feeling down, depressed, or hopeless over the past two weeks  She denies feeling little interest or pleasure in doing things over the past two weeks  Cognitive impairment screening: difficulty learning/retaining new information, difficulty handling complex tasks, difficulty with reasoning, difficulty with spatial ability and orientation, difficulty with language, difficulty with behavior and hx Dementia  Functional Ability/Level of Safety: Hearing is slightly decreased in the right ear and normal in the left ear  She reports hearing difficulties  She uses a hearing aid  Activities of daily living details: transportation help needed, needs help shopping, meal preparation help needed, needs help doing housework, needs help doing laundry, needs help managing medications and needs help managing money, but does not need help using the phone   daughter assists  Fall risk factors: The patient fell none times in the past 12 months  Injury History: urinary incontinence   urinary hesitancy  Home safety risk factors:  no grab bars in the bathroom and no handrails on the stairs, but no unfamiliar surroundings, no loose rugs, no poor household lighting, no uneven floors and no household clutter  Advance Directives: Advance directives: living will, durable power of  for health care directives and advance directives  Co-Managers and Medical Equipment/Suppliers: See Patient Care Team      Review of Systems    Constitutional: She has dropped 10 lb this year, eats 2 meals a day, but as noted in HPI, no fever and no chills  Head and Face: no facial pain and no facial pressure  Eyes: No recent change in vision  ENT: hearing loss, but no earache and no sore throat     Cardiovascular: no chest pain, no palpitations, the heart is not racing, no lightheadedness and no lower extremity edema  Respiratory: Longstanding cough, but no shortness of breath, no wheezing and not vomiting blood  Gastrointestinal: no abdominal pain, no abdominal bloating, no nausea, no vomiting, no bright red blood per rectum and no melena  Genitourinary: as noted in HPI and no dysuria  Musculoskeletal: as noted in HPI and no diffuse joint pain  Integumentary and Breasts: No rash left thigh  Neurological: confusion and No recent dizziness, but no headache, no dizziness and no fainting  Psychiatric: as noted in HPI and no depression  Hematologic and Lymphatic: negative  Active Problems    1  History of Accidental fall (E888 9) (W19 XXXA)   2  Acute cystitis without hematuria (595 0) (N30 00)   3  History of Bedbug bite (919 4) (W57 XXXA)   4  Candidiasis (112 9) (B37 9)   5  Creatinine elevation (790 99) (R79 89)   6  Current every day smoker (305 1) (F17 200)   7  Dementia without behavioral disturbance (294 20) (F03 90)   8  Disc degeneration, lumbar (722 52) (M51 36)   9  Dizziness (780 4) (R42)   10  Esophageal Reflux   11  Gait disturbance (781 2) (R26 9)   12  Graves' disease (242 00) (E05 00)   13  Hypercholesterolemia (272 0) (E78 00)   14  Hyperglycemia (790 29) (R73 9)   15  Hypertension (401 9) (I10)   16  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)   17  History of LLL pneumonia (486) (J18 1)   18  Low back pain radiating to left lower extremity (724 2) (M54 5)   19  Malaise (780 79) (R53 81)   20  Neuropathy of both feet (356 9) (G57 93)   21  Osteoarthritis (715 90) (M19 90)   22  Pernicious anemia (281 0) (D51 0)   23  Postsurgical hypothyroidism (244 0) (E89 0)   24  Sciatica of left side (724 3) (M54 32)   25  History of Syncope and collapse (780 2) (R55)   26  History of Thyroid Surgery Total Thyroidectomy   27  Trigger middle finger of right hand (727 03) (M65 331)   28   Urinary incontinence (788 30) (R32)    Past Medical History    · History of Accidental fall (E888 9) (X29 MDBN)   · History of Bedbug bite (919 4) (W57 XXXA)   · History of Grief reaction (309 0) (F43 20)   · History of acute conjunctivitis (V12 49) (Z86 69)   · History of Graves' disease (V12 29) (Z86 39)   · History of impacted cerumen (V12 49) (Z86 69)   · History of migraine (V12 49) (Z86 69)   · History of scabies (V12 09) (Z86 19)   · History of urinary incontinence (V13 09) (A88 793)   · History of urinary tract infection (V13 02) (Z87 440)   · History of urinary tract infection (V13 02) (Z87 440)   · History of vertigo (V12 49) (Z87 898)   · History of LLL pneumonia (486) (J18 1)   · History of Syncope and collapse (780 2) (R55)    The active problems and past medical history were reviewed and updated today  Surgical History    · History of Cholecystectomy   · History of Hysterectomy   · History of Thyroid Surgery Total Thyroidectomy   · History of Tubal Ligation    The surgical history was reviewed and updated today  Family History  Sister    · Family history of malignant neoplasm of breast (V16 3) (Z80 3)  Family History    · Family history of Diabetes Mellitus (V18 0)    Social History    · Current every day smoker (305 1) (F17 200)   · 1/2 PPD   · No alcohol use   ·   The social history was reviewed and updated today  Current Meds   1  Adult Aspirin Low Strength 81 MG TBDP; Take 1 tablet daily; Therapy: (Recorded:22Sfz3774) to Recorded   2  Atorvastatin Calcium 40 MG Oral Tablet; Take 1 tablet daily; Therapy: (LCJUHXDE:12DIC0121) to Recorded   3  Donepezil HCl - 10 MG Oral Tablet; Take 1 tablet daily; Therapy: 92TKM2138 to (Evaluate:79Yvn0868)  Requested for: 01Aug2017; Last   Rx:46Qmr5252 Ordered   4  Kombiglyze XR 2 5-1000 MG Oral Tablet Extended Release 24 Hour; 1 TABLET DAILY   WITH SUPPER; Therapy: (Recorded:14Apr2017) to Recorded   5  Levothyroxine Sodium 100 MCG Oral Tablet; Take 1 tablet daily; Therapy: (Recorded:14Apr2017) to Recorded   6   Lisinopril 10 MG Oral Tablet; TAKE 1 TABLET DAILY  Requested for: 49KZO8926; Last   Rx:77Zyh7540 Ordered   7  Lisinopril-Hydrochlorothiazide 20-12 5 MG Oral Tablet; TAKE 2 TABLET Every morning   rcvd via Endo; Therapy: (Recorded:24Ybd2147) to Recorded   8  Os-Dharmesh TABS; Take 1 tablet twice daily; Therapy: (Recorded:88Kux1611) to Recorded    The medication list was reviewed and updated today  Allergies    1  Azithromycin TABS   2  Cephalexin CAPS   3  NSAIDs   4  Naproxen TABS   5  Simcor TB24    Immunizations   ** Printed in Appendix #1 below  Vitals  Signs    Heart Rate: 96  Systolic: 665  Diastolic: 78  Height: 5 ft 2 in  Weight: 130 lb 4 oz  BMI Calculated: 23 82  BSA Calculated: 1 59    Physical Exam    Constitutional 76-year-old female seated on table, slightly disheveled, hard of hearing  Head and Face   Head and face: Normal     Eyes   Conjunctiva and lids: No swelling, erythema or discharge  No pallor, no icterus  Ears, Nose, Mouth, and Throat   Otoscopic examination: Tympanic membranes translucent with normal light reflex  Canals patent without erythema  Decreased bilateral    Oropharynx: Normal with no erythema, edema, exudate or lesions  Neck   Neck: Supple, symmetric, trachea midline, no masses  Pulmonary Decreased breath sounds bilateral but clear without rhonchi, rales, wheeze  Cardiovascular RRR 2/6 JESSIKA R2ics  Carotid pulses: 2+ bilaterally  No ankle edema  Abdomen   Abdomen: Non-tender, no masses  Lymphatic   Palpation of lymph nodes in neck: No lymphadenopathy  Musculoskeletal Stable gait here today  Mild tenderness left sacroiliac, negative straight leg raising, no weakness in legs, no femoral head tenderness, no increased pain with internal external rotation of hip  Psychiatric Alert, cooperative, oriented to person and place        Results/Data  *VB - Fall Risk Assessment  (Dx Z13 89 Screen for Neurologic Disorder) 81HQH1404 11:02AM Joselin Antis     Test Name Result Flag Reference   Falls Risk      No falls in the past year     *VB - Urinary Incontinence Screen (Dx Z13 89 Screen for UI) 10MIG7026 11:02AM [facility]     Test Name Result Flag Reference   Urinary Incontinence Assessment 46FJB5145       *VB-Depression Screening 74ZDE7005 11:02AM [facility]     Test Name Result Flag Reference   Depression Scale Result      Depression Screen - Negative For Symptoms       Health Management  Hyperglycemia   *VB - Eye Exam; every 1 year; Last 99Egg7964; Next Due: 55Frz7421;  Active    Future Appointments    Date/Time Provider Specialty Site   03/15/2018 09:30 AM Gonzalo Pemberton DO Family [facility] [address] [facility]     Signatures   Electronically signed by : Yaneth Meyers DO; 2017 12:42PM EST                       (Author)    Appendix #1     Patient: Rosette Pacheco ; : 1945; MRN: [id number]      1 2 3 4 5 6    Influenza  27-Sep-2011 18-Sep-2012 14-Nov-2013 25-Sep-2014 08-Sep-2015 29-Aug-2017    PCV  2017         Pneumo Other  2009         Td/DT  14-Sep-2007

## 2018-01-15 NOTE — RESULT NOTES
Verified Results  * MAMMO SCREENING BILATERAL W CAD 49Zpl2995 11:17AM Samy Willson Order Number: [id number]    - Patient Instructions: To schedule this appointment, please contact Central Scheduling at [phone]  Do not wear any perfume, powder, lotion or deodorant on breast or underarm area  Please bring your doctors order, referral (if needed) and insurance information with you on the day of the test  Failure to bring this information may result in this test being rescheduled  Arrive 15 minutes prior to your appointment time to register  On the day of your test, please bring any prior mammogram or breast studies with you that were not performed at a [facility]  Failure to bring prior exams may result in your test needing to be rescheduled   Order Number: [id number]    - Patient Instructions: To schedule this appointment, please contact Central Scheduling at [phone]  Do not wear any perfume, powder, lotion or deodorant on breast or underarm area  Please bring your doctors order, referral (if needed) and insurance information with you on the day of the test  Failure to bring this information may result in this test being rescheduled  Arrive 15 minutes prior to your appointment time to register  On the day of your test, please bring any prior mammogram or breast studies with you that were not performed at a [facility]  Failure to bring prior exams may result in your test needing to be rescheduled  Test Name Result Flag Reference   MAMMO SCREENING BILATERAL W CAD (Report)     Patient History:   Patient is postmenopausal    Family history of breast cancer in sister at age 48 or over,    breast cancer in mother at age 67, and breast cancer in daughter    at age 50  Benign WB stereo breast biopsy of both breasts, January 24, 2014  Pathology Report of both breasts, January 24, 2014  Took estrogen for 5 years     Patient is an every day smoker, and has smoked for 25 years  Patient's BMI is 25 9  Reason for exam: screening (asymptomatic)  Mammo Screening Bilateral W CAD: August 6, 2016 - Check In #:    [de-identified]   Bilateral CC and MLO view(s) were taken  Technologist: SHAYLEE Bolaños T (R)(M)   Prior study comparison: February 25, 2015, bilateral digital    screening mammogram performed at [place] [address]  January 24, 2014, bilateral Follow-Up, performed   at [address] [place] [address]  January 15, 2014, right    breast unilateral diagnostic mammogram, performed at [address] [place] [address]  January 6, 2014, bilateral digital    screening mammogram performed at [place] [address]  January 3, 2013, bilateral digital screening    mammogram performed at [place] [address]  December 1, 2011, bilateral digital screening mammogram    performed at [place] [address]  There are scattered fibroglandular densities  No dominant soft tissue mass, architectural distortion or    suspicious calcifications are noted in either breast   The skin    and nipple structures are within normal limits  Scattered benign   appearing calcifications are noted  No evidence of malignancy  No significant changes when compared with prior studies  ASSESSMENT: BiRad:2 - Benign     Recommendation:   Routine screening mammogram of both breasts in 1 year  A    reminder letter will be scheduled  Analyzed by CAD     8-10% of cancers will be missed on mammography  Management of a    palpable abnormality must be based on clinical grounds  Patients   will be notified of their results via letter from our facility  Accredited by [employer] of Rad[employer] and FDA       Transcription Location: SHAYLEE Irwin 98: [id number]     Risk Value(s):   Stoneer-Cujerryck 10 Year: 16 620%, Tyrer-Cuzick Lifetime: 21 918%,    Myriad Table: 2 6%, VLADIMIR 5 Year: 8 3%, NCI Lifetime: 20 3%, MRS    : Based on personal and/or family history,    consideration of hereditary risk assessment may be warranted     Signed by:   Abhay Barr MD   8/7/16

## 2018-01-15 NOTE — MISCELLANEOUS
Message   Recorded as Task   Date: 04/27/2017 12:14 PM, Created By: Alem Stearns   Task Name: Medical Complaint Callback   Assigned To: Alem Stearns   Regarding Patient: MARIN WILLOUGHBY, Status: Active   Comment:    Alem Stearns - 27 Apr 2017 12:14 PM     TASK CREATED  Caller: Ligia Martinez; [phone]  Started azithromycin this am   Now nauseous, sweating, and has red blotches under her eyes  Shady Davis - 27 Apr 2017 1:08 PM     TASK REPLIED TO: Previously Assigned To Shady Davis  stop it-           I sent in diff antibx-   Levaquin x 1 week   Alem Stearns - 27 Apr 2017 1:35 PM     TASK EDITED  dtr notified        Active Problems    1  History of Accidental fall (E888 9) (W19 XXXA)   2  Acute bronchitis (466 0) (J20 9)   3  History of Bedbug bite (919 4) (W57 XXXA)   4  Candidiasis (112 9) (B37 9)   5  Creatinine elevation (790 99) (R79 89)   6  Current every day smoker (305 1) (F17 200)   7  Dementia without behavioral disturbance (294 20) (F03 90)   8  Disc degeneration, lumbar (722 52) (M51 36)   9  Dizziness (780 4) (R42)   10  Esophageal Reflux   11  Gait disturbance (781 2) (R26 9)   12  Graves' disease (242 00) (E05 00)   13  Hypercholesterolemia (272 0) (E78 00)   14  Hyperglycemia (790 29) (R73 9)   15  Hypertension (401 9) (I10)   16  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)   17  History of LLL pneumonia (486) (J18 1)   18  Low back pain (724 2) (M54 5)   19  Lumbar radiculopathy (724 4) (M54 16)   20  Malaise (780 79) (R53 81)   21  Neuropathy of both feet (356 9) (G57 93)   22  Osteoarthritis (715 90) (M19 90)   23  Pernicious anemia (281 0) (D51 0)   24  Postsurgical hypothyroidism (244 0) (E89 0)   25  Syncope and collapse (780 2) (R55)   26  History of Thyroid Surgery Total Thyroidectomy   27  Trigger middle finger of right hand (727 03) (M65 331)   28  Urinary incontinence (818 30) (R32)    Current Meds   1  Adult Aspirin Low Strength 81 MG TBDP; Take 1 tablet daily;    Therapy: (Recorded:98Gpg5645) to Recorded   2  Artificial Tears SOLN;   Therapy: (Recorded:70Zoi9163) to Recorded   3  Atorvastatin Calcium 40 MG Oral Tablet; Take 1 tablet daily; Therapy: (XSXMCADM:61RPW4546) to Recorded   4  Donepezil HCl - 10 MG Oral Tablet; Take 1 tablet daily; Therapy: 50MDZ9705 to (Evaluate:13Aip5764)  Requested for: 25NKM9597; Last   Rx:28Lbo4624 Ordered   5  Kombiglyze XR 2 5-1000 MG Oral Tablet Extended Release 24 Hour; 1 TABLET DAILY   WITH SUPPER; Therapy: (Recorded:36Udd4922) to Recorded   6  LevoFLOXacin 500 MG Oral Tablet; Take 1 tablet daily; Therapy: 09Zzg7024 to (Complete:21Kou3483)  Requested for: 27Apr2017; Last   Rx:27Apr2017 Ordered   7  Levothyroxine Sodium 100 MCG Oral Tablet; Take 1 tablet daily; Therapy: (Recorded:72Lcl6188) to Recorded   8  Lisinopril 10 MG Oral Tablet; TAKE 1 TABLET DAILY; Therapy: (Recorded:52Fwm3268) to Recorded   9  Os-Dharmesh TABS; Take 1 tablet twice daily; Therapy: (Recorded:20Hfn7860) to Recorded   10  Triamcinolone Acetonide 0 1 % External Cream; Apply small amt BID to TID to rash legs    as needed; Therapy: 14Apr2017 to (Complete:28Apr2017)  Requested for: 14Apr2017; Last    Rx:14Apr2017 Ordered    Allergies    1  Azithromycin TABS   2  Cephalexin CAPS   3  NSAIDs   4  Naproxen TABS   5   Simcor Aductions    Signatures   Electronically signed by : Natan New, ; Apr 27 2017  1:35PM EST                       (Author)

## 2018-01-15 NOTE — MISCELLANEOUS
Message  Message Free Text Note Form: Patient's daughter called  Patient with shoulder pain and chest pain  Onset this morning  No shortness of breath  Recommended ER evaluation  Daughter to drive patient to emergency room for evaluation        Signatures   Electronically signed by : Claritza Braxton DO; Jul  3 2016 12:10PM EST                       (Author)

## 2018-01-17 LAB — SCAN RESULT: NORMAL

## 2018-01-18 ENCOUNTER — ALLSCRIPTS OFFICE VISIT (OUTPATIENT)
Dept: OTHER | Facility: OTHER | Age: 73
End: 2018-01-18

## 2018-01-18 DIAGNOSIS — J18.1 LOBAR PNEUMONIA (HCC): ICD-10-CM

## 2018-01-18 DIAGNOSIS — D72.829 ELEVATED WHITE BLOOD CELL COUNT: ICD-10-CM

## 2018-01-19 NOTE — PROGRESS NOTES
Assessment   1  Dementia without behavioral disturbance (294 20) (F03 90)   2  Leukocytosis (288 60) (D72 829)   3  History of LLL pneumonia (486) (J18 1)    Plan   Leukocytosis    · Follow-up PRN Evaluation and Treatment  Follow-up  Status: Complete  Done:    [id number]   Ordered; For: Leukocytosis; Ordered By: Sherrell Belle) Performed:  Due: 80LDH0987   · * CT CHEST WO CONTRAST; Status:Active; Requested ELQ:47GMH2123; Perform:[facility]; ITN:[id number]; Last Updated By:Ricardo Nunez; 1/18/2018 3:20:22 PM;Ordered; For:Leukocytosis; Ordered By:Teofilo Mccain);  PMH: LLL pneumonia    · (1) CBC/PLT/DIFF; Status:Active; Requested SYO:[id number]; Perform:[facility]; VRI:[id number];OAPUQGT; [address]: LLL pneumonia; Ordered By:Teofilo Mccain);   · (1) COMPREHENSIVE METABOLIC PANEL; Status:Active; Requested UII:[id number]; Perform:[facility]; MALICK:[id number];ZIHFCFR; [address]: LLL pneumonia; Ordered By:Teofilo Mccain); Discussion/Summary   Discussion Summary:    1  Leukocytosis in a patient who has severe dementia, physical examination at this time showed no enlarged right tonsil, WBC went down to 13,000 from 20,000, most likely secondary to infection /inflammation, she has candidal infection below the breasts bilaterally CT scan of the abdomen pelvis showed no evidence of masses however there are consolidation in both lungs bilaterally most likely post infection/ inflammation, this suggest repeat the CT scan of the chest in 3 months I ordered  I ordered CBC, CMP and the patient to follow up with you Weight loss secondary to severe dementia        Chief Complaint   Chief Complaint Free Text Note Form: Leukocytosis, bilateral pulmonary nodules      History of Present Illness   HPI: 57-year-old demented  female who is here with her daughter for discussion of mildly persistent leukocytosis, a new CT scan of the chest in November 2017 showed bilateral small pulmonary nodules measuring up to 3 millimeters patient is demented, the information were obtained from Juwan Parkinson and Arsalan Book her daughters and physical examination  November 2017 WBC 20 6, hemoglobin 15 3, MCV 88, platelets 254,196, 76 percent neutrophils, 11 percent lymphocytes, 11 percent monocytes January 2015 WBC 14 5, hemoglobin 13 9, MCV 88, platelets 891455, 64 percent neutrophils, 23 percent lymphocytes, 8 percent monocytes patient had a history of cholecystectomy, thyroidectomy, hysterectomy and bilateral oophorectomies, psoriasis, dementia, COPD, Graves disease, non intentional weight loss about 30 pounds in 1 year smokes 1-2 pack of cigarettes daily for many many years, she does not drink alcohol history significant for cancer in her mother          Review of Systems   Complete-Female:      Constitutional: recent 30pounds in 1 year lb weight loss, but-- no fever,-- not feeling poorly,-- no chills-- and-- not feeling tired  Eyes: No complaints of eye pain, no red eyes, no eyesight problems, no discharge, no dry eyes, no itching of eyes  ENT: no earache-- and-- no sore throat  Cardiovascular: No complaints of slow heart rate, no fast heart rate, no chest pain, no palpitations, no leg claudication, no lower extremity edema  Respiratory: shortness of breath,-- wheezing-- and-- shortness of breath during exertion  Gastrointestinal: no abdominal pain-- and-- no blood in stools  Genitourinary: No complaints of dysuria, no incontinence, no pelvic pain, no dysmenorrhea, no vaginal discharge or bleeding  Musculoskeletal: No complaints of arthralgias, no myalgias, no joint swelling or stiffness, no limb pain or swelling  Integumentary: No complaints of skin rash or lesions, no itching, no skin wounds, no breast pain or lump--       The patient presents with complaints of bilateral truncal area a rash        Neurological: No complaints of headache, no confusion, no convulsions, no numbness, no dizziness or fainting, no tingling, no limb weakness, no difficulty walking  Psychiatric: Not suicidal, no sleep disturbance, no anxiety or depression, no change in personality, no emotional problems  Endocrine: No complaints of proptosis, no hot flashes, no muscle weakness, no deepening of the voice, no feelings of weakness  Hematologic/Lymphatic: No complaints of swollen glands, no swollen glands in the neck, does not bleed easily, does not bruise easily  Active Problems   1  History of Accidental fall (E888 9) (W19 XXXA)   2  Acute cystitis without hematuria (595 0) (N30 00)   3  History of Bedbug bite (919 4) (W57 XXXA)   4  Candidiasis (112 9) (B37 9)   5  Cervical strain (847 0) (S16 1XXA)   6  Copy of advanced directive obtained from patient (V49 89) (Z78 9)   7  Cough (786 2) (R05)   8  Creatinine elevation (790 99) (R79 89)   9  Current every day smoker (305 1) (F17 200)   10  Dementia without behavioral disturbance (294 20) (F03 90)   11  Disc degeneration, lumbar (722 52) (M51 36)   12  Dizziness (780 4) (R42)   13  Esophageal Reflux   14  Gait disturbance (781 2) (R26 9)   15  Graves' disease (242 00) (E05 00)   16  Hypercholesterolemia (272 0) (E78 00)   17  Hyperglycemia (790 29) (R73 9)   18  Hypertension (401 9) (I10)   19  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)   20  Leukocytosis (288 60) (D72 829)   21  History of LLL pneumonia (486) (J18 1)   22  Low back pain radiating to left lower extremity (724 2) (M54 5)   23  Malaise (780 79) (R53 81)   24  Neuropathy of both feet (356 9) (G57 93)   25  Osteoarthritis (715 90) (M19 90)   26  Pernicious anemia (281 0) (D51 0)   27  Postsurgical hypothyroidism (244 0) (E89 0)   28  Sciatica of left side (724 3) (M54 32)   29  History of Syncope and collapse (780 2) (R55)   30  History of Thyroid Surgery Total Thyroidectomy   31  Trigger middle finger of right hand (727 03) (M65 331)   32  Umbilical hernia (944 7) (K42 9)   33   Urinary incontinence (788 30) (R32)   34  Weight loss (783 21) (R63 4)    Past Medical History   1  History of Accidental fall (E888 9) (W19 XXXA)   2  History of Bedbug bite (919 4) (W57 XXXA)   3  History of Grief reaction (309 0) (F43 20)   4  History of acute conjunctivitis (V12 49) (Z86 69)   5  History of Graves' disease (V12 29) (Z86 39)   6  History of impacted cerumen (V12 49) (Z86 69)   7  History of migraine (V12 49) (Z86 69)   8  History of scabies (V12 09) (Z86 19)   9  History of screening mammography (V15 89) (Z92 89)   10  History of urinary incontinence (V13 09) (Z87 898)   11  History of urinary tract infection (V13 02) (Z87 440)   12  History of urinary tract infection (V13 02) (Z87 440)   13  History of vertigo (V12 49) (Z87 898)   14  History of LLL pneumonia (486) (J18 1)   15  History of Syncope and collapse (780 2) (R55)    Surgical History   1  History of Cholecystectomy   2  History of Hysterectomy   3  History of Thyroid Surgery Total Thyroidectomy   4  History of Tubal Ligation    Family History   Sister    1  Family history of malignant neoplasm of breast (V16 3) (Z80 3)  Family History    2  Family history of Diabetes Mellitus (V18 0)    Social History    · Copy of advanced directive obtained from patient (V49 89) (Z78 9)   · Current every day smoker (305 1) (F17 200)   · No alcohol use   ·     Current Meds    1  Adult Aspirin Low Strength 81 MG TBDP; Take 1 tablet daily; Therapy: (Recorded:58Emo6088) to Recorded   2  Atorvastatin Calcium 40 MG Oral Tablet; Take 1 tablet daily; Therapy: (CODSWPOX:95WSA2761) to Recorded   3  Donepezil HCl - 10 MG Oral Tablet; Take 1 tablet daily; Therapy: 73XEI1292 to (Evaluate:66Adb4132)  Requested for: 27TLL3216; Last     Rx:20Nov2017 Ordered   4  Kombiglyze XR 2 5-1000 MG Oral Tablet Extended Release 24 Hour; 1 TABLET DAILY     WITH SUPPER; Therapy: (Recorded:31Zbm8566) to Recorded   5  Levothyroxine Sodium 100 MCG Oral Tablet;  Take 1 tablet daily; Therapy: (Recorded:73Kgv8141) to Recorded   6  Lisinopril-Hydrochlorothiazide 20-12 5 MG Oral Tablet; TAKE 2 TABLET Every morning     rcvd via Endo; Therapy: (Recorded:32Jov2647) to Recorded   7  Os-Dharmesh TABS; Take 1 tablet twice daily; Therapy: (Recorded:76Inn2774) to Recorded    Allergies   1  Azithromycin TABS   2  Cephalexin CAPS   3  NSAIDs   4  Naproxen TABS   5  Simcor TB24    Vitals   Vital Signs    Recorded: [id number] 02:44PM   Temperature 97 6 F   Heart Rate 94   Respiration 16   Systolic 950   Diastolic 80   Height 5 ft 2 in   Weight 123 lb    BMI Calculated 22 5   BSA Calculated 1 55   O2 Saturation 95     Physical Exam        Constitutional      General appearance: No acute distress, well appearing and well nourished  Eyes      Conjunctiva and lids: No swelling, erythema or discharge  Pupils and irises: Equal, round and reactive to light  Ears, Nose, Mouth, and Throat      External inspection of ears and nose: Normal        Oropharynx: Abnormal   The posterior pharynx was erythematous  Oropharynx examination showed no lesions,-- no leukoplakia-- and-- no ulcer  There was 1+ enlargement of the right tonsil  Pulmonary      Auscultation of lungs: Abnormal   diminished breath sounds bilaterally  Cardiovascular      Auscultation of heart: Normal rate and rhythm, normal S1 and S2, without murmurs  Examination of extremities for edema and/or varicosities: Normal        Carotid pulses: Normal        Abdomen      Abdomen: Non-tender, no masses  Liver and spleen: No hepatomegaly or splenomegaly  Lymphatic      Palpation of lymph nodes in neck: No lymphadenopathy  Musculoskeletal      Gait and station: Normal        Skin      Skin and subcutaneous tissue: Normal without rashes or lesions  Neurologic      Cranial nerves: Cranial nerves 2-12 intact  Reflexes: 2+ and symmetric  Sensation: No sensory loss         Psychiatric Orientation to person, place, and time: Abnormal   Mental Status: disoriented to person,-- disoriented to place,-- disoriented to time-- and-- inadequate knowledge of current events, but-- no difficulty naming common objects,-- no difficulty repeating a phrase-- and-- adequate knowledge regarding past history  Mood and affect: Normal            ECOG 1       Results/Data   Results Form:    Results      Pathology Bcr ABL by PCR is negative  (1) CBC/PLT/DIFF [id number] 12:26PM [facility])    Order Number: [id number]      Test Name Result Flag Reference   WBC COUNT 13 47 Thousand/uL H 4 31-10 16   RBC COUNT 5 02 Million/uL  3 81-5 12   HEMOGLOBIN 14 3 g/dL  11 5-15 4   HEMATOCRIT 43 7 %  34 8-46  1   MCV 87 fL  82-98   MCH 28 5 pg  26 8-34 3   MCHC 32 7 g/dL  31 4-37 4   RDW 14 0 %  11 6-15 1   MPV 9 9 fL  8 9-12 7   PLATELET COUNT 317 Thousands/uL H 149-390   This is an appended report  These results have been appended to a previously verified report     NEUTROPHILS - REL 58 %  43-75   BANDS - REL 3 %  0-8   LYMPHOCYTES - REL 27 %  14-44   MONOCYTES - REL 8 %  4-12   EOSINOPHILS - REL 3 %  0-6   BASOPHILS - REL 0 %  0-1   ATYPICAL LYMPH 1 % H <=0   NEUTROPHILS ABS 8 22 Thousand/uL H 1 85-7 62   LYMPHOTCYTES ABS 3 64 Thousand/uL  0 60-4 47   MONOCYTES ABS 1 08 Thousand/uL  0 00-1 22   EOSINOPHILS ABS 0 40 Thousand/uL  0 00-0 40   BASOPHILS ABS 0 00 Thousand/uL  0 00-0 10   TOTAL COUNTED 100     RBC MORPHOLOGY Normal     PLT ESTIMATE Increased A Adequate      (1) BCR/ABL, PCR [id number] 12:26PM [facility])    Order Number: [id number]      Test Name Result Flag Reference   SCAN RESULT SEE WRITTEN REPORT        (1) COMPREHENSIVE METABOLIC PANEL 05WUY8747 19:77OZ [facility])    Order Number: [id number]      Test Name Result Flag Reference   SODIUM 140 mmol/L  136-145   POTASSIUM 4 6 mmol/L  3 5-5 3   CHLORIDE 98 mmol/L L 100-108   CARBON DIOXIDE 31 mmol/L  21-32   ANION GAP (CALC) 11 mmol/L  4-13   BLOOD UREA NITROGEN 14 mg/dL  5-25   CREATININE 1 05 mg/dL  0 60-1 30   Standardized to IDMS reference method   CALCIUM 9 5 mg/dL  8 3-10 1   BILI, TOTAL 0 28 mg/dL  0 20-1 00   ALK PHOSPHATAS 109 U/L     ALT (SGPT) 14 U/L  12-78   Specimen collection should occur prior to Sulfasalazine administration due to the potential for falsely depressed results  AST(SGOT) 16 U/L  5-45   Specimen collection should occur prior to Sulfasalazine administration due to the potential for falsely depressed results  ALBUMIN 3 5 g/dL  3 5-5 0   TOTAL PROTEIN 8 4 g/dL H 6 4-8 2   eGFR 52 ml/min/1 73sq m     National Kidney Disease Education Program recommendations are as follows:     GFR calculation is accurate only with a steady state creatinine     Chronic Kidney disease less than 60 ml/min/1 73 sq  meters     Kidney failure less than 15 ml/min/1 73 sq  meters  GLUCOSE FASTING 90 mg/dL  65-99   Specimen collection should occur prior to Sulfasalazine administration due to the potential for falsely depressed results  Specimen collection should occur prior to Sulfapyridine administration due to the potential for falsely elevated results  * CT ABDOMEN PELVIS W CONTRAST [id number] 12:14PM Maciej Perdomo    Order Number: [id number]       - Patient Instructions: To schedule this appointment, please contact Central Scheduling at [phone]  Test Name Result Flag Reference   CT ABDOMEN PELVIS W CONTRAST (Report)     CT ABDOMEN AND PELVIS WITH IV CONTRAST           INDICATION: D72 829: Elevated white blood cell count, unspecified  History taken directly from the electronic ordering system  COMPARISON: CT abdomen pelvis 9/24/2017  Chest CT 11/30/2017  TECHNIQUE: CT examination of the abdomen and pelvis was performed  Reformatted images were created in axial, sagittal, and coronal planes              Radiation dose length product (DLP) for this visit: 241 mGy-cm   This examination, like all CT scans performed in the [facility], was performed utilizing techniques to minimize radiation dose exposure, including the use of iterative       reconstruction and automated exposure control  IV Contrast: 100 mL of iohexol (OMNIPAQUE)           Enteric Contrast: Enteric contrast was not administered  FINDINGS:           ABDOMEN           LOWER CHEST: There are new densities in the right lower lobe, measuring 1 5 cm and 1 1 cm on image 2/3, and smaller densities at the left base on image 2/7-8  These are suggestive of postinfectious/inflammatory process  No effusions  LIVER/BILIARY TREE: Unremarkable  GALLBLADDER: Gallbladder is surgically absent  SPLEEN: Unremarkable  PANCREAS: Unremarkable  ADRENAL GLANDS: Unremarkable  KIDNEYS/URETERS: Unremarkable  No hydronephrosis  STOMACH AND BOWEL: There is colonic diverticulosis without evidence of acute diverticulitis  APPENDIX: No findings to suggest appendicitis  ABDOMINOPELVIC CAVITY: No ascites or free intraperitoneal air  No lymphadenopathy  VESSELS: Atherosclerotic changes are present  No evidence of aneurysm  PELVIS           REPRODUCTIVE ORGANS: Patient is status post hysterectomy  URINARY BLADDER: Unremarkable  ABDOMINAL WALL/INGUINAL REGIONS: Unremarkable  OSSEOUS STRUCTURES: No acute fracture or destructive osseous lesion  IMPRESSION:           Bibasilar lung densities, new since 11/30/2027, suggestive of postinfectious/inflammatory process  Suggest follow-up in 3 months to ensure resolution  No acute pathology in the abdomen and pelvis  Colonic diverticulosis without diverticulitis                  Workstation performed: [id number]           Signed by:      Ba Araujo MD      1/11/18      * CT CHEST WO CONTRAST [id number] 07:48PM [facility] Order Number: [id number]      Performing Comments: do CT scan re cough/ weight loss/ smoker/ leukocytosis   - Patient Instructions: To schedule this appointment, please contact Central Scheduling at [phone]  Test Name Result Flag Reference   CT CHEST WO CONTRAST (Report)     CT CHEST WITHOUT IV CONTRAST           INDICATION: Cough, weight loss  Elevated white blood cell count, smoker  COMPARISON: Chest radiograph 9/24/2017           TECHNIQUE: CT examination of the chest was performed without intravenous contrast  Reformatted images were created in axial, sagittal, and coronal planes  Radiation dose length product (DLP) for this visit: 364 mGy-cm   This examination, like all CT scans performed in the [facility], was performed utilizing techniques to minimize radiation dose exposure, including the use of iterative       reconstruction and automated exposure control  FINDINGS:           LUNGS: There is a 3 mm left lower lobe nodule (series 3 image 28)  3 mm nodular density at the right lung base (series 3 image 40)  No pulmonary mass or endobronchial lesion  No focal consolidation  PLEURA: Unremarkable  HEART/GREAT VESSELS: The heart is normal size without pericardial mass or effusion  There are atherosclerotic changes of the coronary arteries and thoracic aorta  MEDIASTINUM AND PHILLY: Unremarkable  CHEST WALL AND LOWER NECK:    Normal            VISUALIZED STRUCTURES IN THE UPPER ABDOMEN: Status post cholecystectomy  OSSEOUS STRUCTURES: No acute fracture  No destructive osseous lesion  There are degenerative changes of the thoracic spine  IMPRESSION:           1  A few bilateral pulmonary nodules measuring up to 3 mm       Based on current Fleischner Society 2017 Guidelines on incidental pulmonary nodule, no routine follow-up is needed if the patient is considered low risk for lung cancer  If the patient      is considered high risk for lung cancer, 12 month follow-up non-contrast chest CT is recommended  2  No focal consolidation to suggest pneumonia  Workstation performed: [id number]           Signed by:      Mario Cooper MD      12/4/17      Health Management   Hyperglycemia   *VB - Eye Exam; every 1 year; Last 13Sep2017; Next Due: 45Wzp8996; Active    Future Appointments      Date/Time Provider Specialty Site   03/15/2018 09:30 AM Yaquelin Suarez DO Family Medicine [address] FAMILY MED[facility]     Signatures    Electronically signed by :  GENNY Simon ; Jan 18 2018  3:26PM EST                       (Author)

## 2018-01-22 VITALS
TEMPERATURE: 98 F | HEART RATE: 83 BPM | HEIGHT: 62 IN | DIASTOLIC BLOOD PRESSURE: 70 MMHG | WEIGHT: 135.5 LBS | BODY MASS INDEX: 24.93 KG/M2 | RESPIRATION RATE: 18 BRPM | OXYGEN SATURATION: 97 % | SYSTOLIC BLOOD PRESSURE: 120 MMHG

## 2018-01-22 VITALS
HEART RATE: 92 BPM | WEIGHT: 125.38 LBS | SYSTOLIC BLOOD PRESSURE: 142 MMHG | DIASTOLIC BLOOD PRESSURE: 66 MMHG | BODY MASS INDEX: 23.07 KG/M2 | HEIGHT: 62 IN | OXYGEN SATURATION: 96 %

## 2018-01-22 VITALS
OXYGEN SATURATION: 96 % | HEIGHT: 62 IN | DIASTOLIC BLOOD PRESSURE: 70 MMHG | SYSTOLIC BLOOD PRESSURE: 122 MMHG | BODY MASS INDEX: 23.05 KG/M2 | HEART RATE: 112 BPM | RESPIRATION RATE: 16 BRPM | WEIGHT: 125.25 LBS | TEMPERATURE: 96.9 F

## 2018-01-22 VITALS
BODY MASS INDEX: 24.29 KG/M2 | SYSTOLIC BLOOD PRESSURE: 124 MMHG | DIASTOLIC BLOOD PRESSURE: 80 MMHG | RESPIRATION RATE: 18 BRPM | HEART RATE: 80 BPM | WEIGHT: 132 LBS | HEIGHT: 62 IN

## 2018-01-23 VITALS
SYSTOLIC BLOOD PRESSURE: 118 MMHG | RESPIRATION RATE: 16 BRPM | OXYGEN SATURATION: 95 % | HEART RATE: 94 BPM | WEIGHT: 123 LBS | TEMPERATURE: 97.6 F | BODY MASS INDEX: 22.63 KG/M2 | HEIGHT: 62 IN | DIASTOLIC BLOOD PRESSURE: 80 MMHG

## 2018-01-23 NOTE — RESULT NOTES
Verified Results  * CT CHEST WO CONTRAST [id number] 07:48PM Shannan Raamn Order Number: [id number]   Performing Comments: do CT scan re cough/ weight loss/ smoker/ leukocytosis   - Patient Instructions: To schedule this appointment, please contact Central Scheduling at [phone]  Test Name Result Flag Reference   CT CHEST WO CONTRAST (Report)     CT CHEST WITHOUT IV CONTRAST     INDICATION: Cough, weight loss  Elevated white blood cell count, smoker  COMPARISON: Chest radiograph 9/24/2017     TECHNIQUE: CT examination of the chest was performed without intravenous contrast  Reformatted images were created in axial, sagittal, and coronal planes  Radiation dose length product (DLP) for this visit: 364 mGy-cm   This examination, like all CT scans performed in the [facility], was performed utilizing techniques to minimize radiation dose exposure, including the use of iterative    reconstruction and automated exposure control  FINDINGS:     LUNGS: There is a 3 mm left lower lobe nodule (series 3 image 28)  3 mm nodular density at the right lung base (series 3 image 40)  No pulmonary mass or endobronchial lesion  No focal consolidation  PLEURA: Unremarkable  HEART/GREAT VESSELS: The heart is normal size without pericardial mass or effusion  There are atherosclerotic changes of the coronary arteries and thoracic aorta  MEDIASTINUM AND PHILLY: Unremarkable  CHEST WALL AND LOWER NECK:    Normal      VISUALIZED STRUCTURES IN THE UPPER ABDOMEN: Status post cholecystectomy  OSSEOUS STRUCTURES: No acute fracture  No destructive osseous lesion  There are degenerative changes of the thoracic spine  IMPRESSION:     1  A few bilateral pulmonary nodules measuring up to 3 mm  Based on current Fleischner Society 2017 Guidelines on incidental pulmonary nodule, no routine follow-up is needed if the patient is considered low risk for lung cancer   If the patient is considered high risk for lung cancer, 12 month follow-up non-contrast chest CT is recommended  2  No focal consolidation to suggest pneumonia         Workstation performed: [id number]     Signed by:   Sydnie Meyers MD   12/4/17

## 2018-01-23 NOTE — CONSULTS
Chief Complaint  Leukocytosis, bilateral pulmonary nodules      History of Present Illness  26-year-old demented  female who is here with her daughter for discussion of mildly persistent leukocytosis, a new CT scan of the chest in November 2017 showed bilateral small pulmonary nodules measuring up to 3 millimeters  the patient is demented, the information were obtained from Adiel Dhaliwal and Lenka Flores her daughters and physical examination   On November 2017 WBC 20 6, hemoglobin 15 3, MCV 88, platelets 680,601, 76 percent neutrophils, 11 percent lymphocytes, 11 percent monocytes  In January 2015 WBC 14 5, hemoglobin 13 9, MCV 88, platelets 785640, 64 percent neutrophils, 23 percent lymphocytes, 8 percent monocytes  The patient had a history of cholecystectomy, thyroidectomy, hysterectomy and bilateral oophorectomies, psoriasis, dementia, COPD, Graves disease, non intentional weight loss about 30 pounds in 1 year  she smokes 1-2 pack of cigarettes daily for many many years, she does not drink alcohol  family history significant for cancer in her mother       Review of Systems    Constitutional: recent 30pounds in 1 year lb weight loss, but no fever, not feeling poorly, no chills and not feeling tired  Eyes: No complaints of eye pain, no red eyes, no eyesight problems, no discharge, no dry eyes, no itching of eyes  ENT: no earache and no sore throat  Cardiovascular: No complaints of slow heart rate, no fast heart rate, no chest pain, no palpitations, no leg claudication, no lower extremity edema  Respiratory: shortness of breath, wheezing and shortness of breath during exertion  Gastrointestinal: no abdominal pain and no blood in stools  Genitourinary: No complaints of dysuria, no incontinence, no pelvic pain, no dysmenorrhea, no vaginal discharge or bleeding  Musculoskeletal: No complaints of arthralgias, no myalgias, no joint swelling or stiffness, no limb pain or swelling     Integumentary: No complaints of skin rash or lesions, no itching, no skin wounds, no breast pain or lump    The patient presents with complaints of bilateral truncal area a rash  Neurological: No complaints of headache, no confusion, no convulsions, no numbness, no dizziness or fainting, no tingling, no limb weakness, no difficulty walking  Psychiatric: Not suicidal, no sleep disturbance, no anxiety or depression, no change in personality, no emotional problems  Endocrine: No complaints of proptosis, no hot flashes, no muscle weakness, no deepening of the voice, no feelings of weakness  Hematologic/Lymphatic: No complaints of swollen glands, no swollen glands in the neck, does not bleed easily, does not bruise easily  Active Problems    1  History of Accidental fall (E888 9) (W19 XXXA)   2  Acute cystitis without hematuria (595 0) (N30 00)   3  History of Bedbug bite (919 4) (W57 XXXA)   4  Candidiasis (112 9) (B37 9)   5  Cervical strain (847 0) (S16 1XXA)   6  Copy of advanced directive obtained from patient (V49 89) (Z78 9)   7  Cough (786 2) (R05)   8  Creatinine elevation (790 99) (R79 89)   9  Current every day smoker (305 1) (F17 200)   10  Dementia without behavioral disturbance (294 20) (F03 90)   11  Disc degeneration, lumbar (722 52) (M51 36)   12  Dizziness (780 4) (R42)   13  Esophageal Reflux   14  Gait disturbance (781 2) (R26 9)   15  Graves' disease (242 00) (E05 00)   16  Hypercholesterolemia (272 0) (E78 00)   17  Hyperglycemia (790 29) (R73 9)   18  Hypertension (401 9) (I10)   19  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)   20  Leukocytosis (288 60) (D72 829)   21  History of LLL pneumonia (486) (J18 1)   22  Low back pain radiating to left lower extremity (724 2) (M54 5)   23  Malaise (780 79) (R53 81)   24  Neuropathy of both feet (356 9) (G57 93)   25  Osteoarthritis (715 90) (M19 90)   26  Pernicious anemia (281 0) (D51 0)   27  Postsurgical hypothyroidism (244 0) (E89 0)   28   Sciatica of left side (724 3) (M54 32)   29  History of Syncope and collapse (780 2) (R55)   30  History of Thyroid Surgery Total Thyroidectomy   31  Trigger middle finger of right hand (727 03) (M65 331)   32  Umbilical hernia (898 2) (K42 9)   33  Urinary incontinence (788 30) (R32)   34  Weight loss (783 21) (R63 4)    Past Medical History    · History of Accidental fall (E888 9) (W19 XXXA)   · History of Bedbug bite (919 4) (W57 XXXA)   · History of Grief reaction (309 0) (F43 20)   · History of acute conjunctivitis (V12 49) (Z86 69)   · History of Graves' disease (V12 29) (Z86 39)   · History of impacted cerumen (V12 49) (Z86 69)   · History of migraine (V12 49) (Z86 69)   · History of scabies (V12 09) (Z86 19)   · History of screening mammography (V15 89) (Z92 89)   · History of urinary incontinence (V13 09) (O34 029)   · History of urinary tract infection (V13 02) (Z87 440)   · History of urinary tract infection (V13 02) (Z87 440)   · History of vertigo (V12 49) (Z87 898)   · History of LLL pneumonia (486) (J18 1)   · History of Syncope and collapse (780 2) (R55)    The active problems and past medical history were reviewed and updated today  Surgical History    · History of Cholecystectomy   · History of Hysterectomy   · History of Thyroid Surgery Total Thyroidectomy   · History of Tubal Ligation    The surgical history was reviewed and updated today  Family History    · Family history of malignant neoplasm of breast (V16 3) (Z80 3)    · Family history of Diabetes Mellitus (V18 0)    The family history was reviewed and updated today  Social History    · Copy of advanced directive obtained from patient (V49 89) (Z78 9)   · Current every day smoker (305 1) (F17 200)   · 1/2 PPD   · No alcohol use   ·   The social history was reviewed and updated today  Current Meds   1  Adult Aspirin Low Strength 81 MG TBDP; Take 1 tablet daily; Therapy: (Recorded:61Gqj7791) to Recorded   2   Atorvastatin Calcium 40 MG Oral Tablet; Take 1 tablet daily; Therapy: (NUSHULFR:60CLZ3807) to Recorded   3  Donepezil HCl - 10 MG Oral Tablet; Take 1 tablet daily; Therapy: 87DRD1245 to (Evaluate:03Tio7210)  Requested for: 68JNE9954; Last   Rx:20Nov2017 Ordered   4  Kombiglyze XR 2 5-1000 MG Oral Tablet Extended Release 24 Hour; 1 TABLET DAILY   WITH SUPPER; Therapy: (Recorded:14Apr2017) to Recorded   5  Levothyroxine Sodium 100 MCG Oral Tablet; Take 1 tablet daily; Therapy: (Recorded:14Apr2017) to Recorded   6  Lisinopril-Hydrochlorothiazide 20-12 5 MG Oral Tablet; TAKE 2 TABLET Every morning   rcvd via Endo; Therapy: (Recorded:14Nov2017) to Recorded   7  Os-Dharmesh TABS; Take 1 tablet twice daily; Therapy: (Recorded:55Ssz4011) to Recorded    The medication list was reviewed and updated today  Allergies    1  Azithromycin TABS   2  Cephalexin CAPS   3  NSAIDs   4  Naproxen TABS   5  Simcor TB24    Vitals   Recorded: [id number] 03:08PM   Temperature 96 9 F   Heart Rate 112   Respiration 16   Systolic 738   Diastolic 70   Height 5 ft 2 in   Weight 125 lb 4 0 oz   BMI Calculated 22 91   BSA Calculated 1 57   O2 Saturation 96     Physical Exam    Constitutional   General appearance: No acute distress, well appearing and well nourished  Eyes   Conjunctiva and lids: No swelling, erythema or discharge  Pupils and irises: Equal, round and reactive to light  Ears, Nose, Mouth, and Throat   External inspection of ears and nose: Normal     Oropharynx: Abnormal   The posterior pharynx was erythematous  Oropharynx examination showed no lesions, no leukoplakia and no ulcer  There was 1+ enlargement of the right tonsil  Pulmonary   Auscultation of lungs: Abnormal   diminished breath sounds bilaterally  Cardiovascular   Auscultation of heart: Normal rate and rhythm, normal S1 and S2, without murmurs      Examination of extremities for edema and/or varicosities: Normal     Carotid pulses: Normal     Abdomen   Abdomen: Non-tender, no masses  Liver and spleen: No hepatomegaly or splenomegaly  Lymphatic   Palpation of lymph nodes in neck: No lymphadenopathy  Musculoskeletal   Gait and station: Normal     Skin   Skin and subcutaneous tissue: Normal without rashes or lesions  Neurologic   Cranial nerves: Cranial nerves 2-12 intact  Reflexes: 2+ and symmetric  Sensation: No sensory loss  Psychiatric   Orientation to person, place, and time: Abnormal   Mental Status: disoriented to person, disoriented to place, disoriented to time and inadequate knowledge of current events, but no difficulty naming common objects, no difficulty repeating a phrase and adequate knowledge regarding past history  Mood and affect: Normal         ECOG 1       Results/Data  * CT CHEST WO CONTRAST 49FJT0814 07:48PM Urban Rinaldi Order Number: [id number]   Performing Comments: do CT scan re cough/ weight loss/ smoker/ leukocytosis   - Patient Instructions: To schedule this appointment, please contact Central Scheduling at [phone]  Test Name Result Flag Reference   CT CHEST WO CONTRAST (Report)     CT CHEST WITHOUT IV CONTRAST     INDICATION: Cough, weight loss  Elevated white blood cell count, smoker  COMPARISON: Chest radiograph 9/24/2017     TECHNIQUE: CT examination of the chest was performed without intravenous contrast  Reformatted images were created in axial, sagittal, and coronal planes  Radiation dose length product (DLP) for this visit: 364 mGy-cm   This examination, like all CT scans performed in the [facility], was performed utilizing techniques to minimize radiation dose exposure, including the use of iterative    reconstruction and automated exposure control  FINDINGS:     LUNGS: There is a 3 mm left lower lobe nodule (series 3 image 28)  3 mm nodular density at the right lung base (series 3 image 40)  No pulmonary mass or endobronchial lesion  No focal consolidation       PLEURA: Unremarkable  HEART/GREAT VESSELS: The heart is normal size without pericardial mass or effusion  There are atherosclerotic changes of the coronary arteries and thoracic aorta  MEDIASTINUM AND PHILLY: Unremarkable  CHEST WALL AND LOWER NECK:    Normal      VISUALIZED STRUCTURES IN THE UPPER ABDOMEN: Status post cholecystectomy  OSSEOUS STRUCTURES: No acute fracture  No destructive osseous lesion  There are degenerative changes of the thoracic spine  IMPRESSION:     1  A few bilateral pulmonary nodules measuring up to 3 mm  Based on current Fleischner Society 2017 Guidelines on incidental pulmonary nodule, no routine follow-up is needed if the patient is considered low risk for lung cancer  If the patient   is considered high risk for lung cancer, 12 month follow-up non-contrast chest CT is recommended  2  No focal consolidation to suggest pneumonia  Workstation performed: [id number]     Signed by:   Marquise Garza MD   12/4/17     (1) TSH WITH FT4 REFLEX [id number] 10:43AM Delorise Slim Order Number: [id number]     Test Name Result Flag Reference   TSH 1 980 uIU/mL  0 358-3 740   Patients undergoing fluorescein dye angiography may retain small amounts of fluorescein in the body for 48-72 hours post procedure  Samples containing fluorescein can produce falsely depressed TSH values  If the patient had this procedure,a specimen should be resubmitted post fluorescein clearance  The recommended reference ranges for TSH during pregnancy are as follows:  First trimester 0 1 to 2 5 uIU/mL  Second trimester  0 2 to 3 0 uIU/mL  Third trimester 0 3 to 3 0 uIU/m     (1) CBC/PLT/DIFF [id number] 10:43AM ISISim Order Number: [id number]     Test Name Result Flag Reference   WBC COUNT 20 66 Thousand/uL H 4 31-10 16   RBC COUNT 5 22 Million/uL H 3 81-5 12   HEMOGLOBIN 15 3 g/dL  11 5-15 4   HEMATOCRIT 46 0 %  34 8-46  1   MCV 88 fL  82-98   MCH 29 3 pg 26 8-34 3   MCHC 33 3 g/dL  31 4-37 4   RDW 14 6 %  11 6-15 1   MPV 10 8 fL  8 9-12 7   PLATELET COUNT 465 Thousands/uL  149-390   nRBC AUTOMATED 0 /100 WBCs     NEUTROPHILS RELATIVE PERCENT 76 % H 43-75   LYMPHOCYTES RELATIVE PERCENT 11 % L 14-44   MONOCYTES RELATIVE PERCENT 11 %  4-12   EOSINOPHILS RELATIVE PERCENT 2 %  0-6   BASOPHILS RELATIVE PERCENT 0 %  0-1   NEUTROPHILS ABSOLUTE COUNT 15 78 Thousands/? ??L H 1 85-7 62   LYMPHOCYTES ABSOLUTE COUNT 2 30 Thousands/? ??L  0 60-4 47   MONOCYTES ABSOLUTE COUNT 2 17 Thousand/? ??L H 0 17-1 22   EOSINOPHILS ABSOLUTE COUNT 0 32 Thousand/? ??L  0 00-0 61   BASOPHILS ABSOLUTE COUNT 0 03 Thousands/? ??L  0 00-0 10     (1) VITAMIN B12 [id number] 10:43AM Areatha Pear Order Number: [id number]     Test Name Result Flag Reference   VITAMIN B12 515 pg/mL  100-900     (1) COMPREHENSIVE METABOLIC PANEL 26PYX2348 23:10GB Areatha Pear Order Number: [id number]     Test Name Result Flag Reference   SODIUM 138 mmol/L  136-145   POTASSIUM 4 1 mmol/L  3 5-5 3   CHLORIDE 101 mmol/L  100-108   CARBON DIOXIDE 31 mmol/L  21-32   ANION GAP (CALC) 6 mmol/L  4-13   BLOOD UREA NITROGEN 19 mg/dL  5-25   CREATININE 1 05 mg/dL  0 60-1 30   Standardized to IDMS reference method   CALCIUM 9 5 mg/dL  8 3-10 1   BILI, TOTAL 0 43 mg/dL  0 20-1 00   ALK PHOSPHATAS 104 U/L     ALT (SGPT) 36 U/L  12-78   Specimen collection should occur prior to Sulfasalazine and/or Sulfapyridine administration due to the potential for falsely depressed results  AST(SGOT) 22 U/L  5-45   Specimen collection should occur prior to Sulfasalazine administration due to the potential for falsely depressed results  ALBUMIN 3 8 g/dL  3 5-5 0   TOTAL PROTEIN 8 0 g/dL  6 4-8 2   eGFR 52 ml/min/1 73sq m     National Kidney Disease Education Program recommendations are as follows:  GFR calculation is accurate only with a steady state creatinine  Chronic Kidney disease less than 60 ml/min/1 73 sq  meters  Kidney failure less than 15 ml/min/1 73 sq  meters  GLUCOSE FASTING 95 mg/dL  65-99   Specimen collection should occur prior to Sulfasalazine administration due to the potential for falsely depressed results  Specimen collection should occur prior to Sulfapyridine administration due to the potential for falsely elevated results  (1) URINE CULTURE [id number] 10:43AM Allen Gifford Order Number: [id number]     Test Name Result Flag Reference   CLINICAL REPORT (Report)     Test:        Urine culture  Specimen Type:   Urine  Specimen Date:   11/14/2017 10:43 AM  Result Date:    11/15/2017 3:36 PM  Result Status:   Final result  Resulting Lab:   [place] [address]            Tel: [phone]      CULTURE                                       ------------------                                   20,000-29,000 cfu/ml      *** Mixed Contaminants X3 ***     Assessment    1  Candidiasis (112 9) (B37 9)   2  Graves' disease (242 00) (E05 00)   3  Dementia without behavioral disturbance (294 20) (F03 90)   · MILD EARLY   4  Acute cystitis without hematuria (595 0) (N30 00)   5  Leukocytosis (288 60) (D72 829)    Plan  Leukocytosis    · (1) BCR/ABL, PCR; Status:Active; Requested for:65Njw6653;    Perform:[facility]; NEVILLE:98GTI2809; Ordered; For:Leukocytosis; Ordered By:Rosalina Mccain);   · (1) CBC/PLT/DIFF; Status:Active; Requested for:76Ezm7966;    Perform:[facility]; SEW:50MFP5857; Ordered; For:Leukocytosis; Ordered By:Rosalina Mccain);   · (1) COMPREHENSIVE METABOLIC PANEL; Status:Active; Requested for:06Fdw0310;    Perform:[facility]; CYQ:65QDQ4141; Ordered; For:Leukocytosis; Ordered By:Ganesh Mccain);   · * CT ABDOMEN PELVIS W CONTRAST; Status:Active;  Requested YTM:27WCL9462  12:30PM;    Perform:[facility]; JVP:53OVI9189; Last Updated By:Courtney Nunez; 12/7/2017 3:48:52 PM;Ordered; For:Leukocytosis; Ordered By:Faroun, Reyes Sos [place]); · Follow-up visit in 6 months Evaluation and Treatment  Follow-up  Status: Complete   Done: 25NSQ2167 02:40PM   Ordered; For: Leukocytosis; Ordered By: Randy Ureña) Performed:  Due: 41SJD7496; Last Updated By: Jihan Holloway; 12/7/2017 3:41:42 PM    Discussion/Summary    1  Enlarged right tonsil any heavy smoker, bilateral pulmonary nodules measuring up to 3 millimeter in the left upper lobe on the right lower lobe near the diaphragm, physical examination showed tenderness in the left lower quadrant of the abdomen, she has dementia also she has Candida infection of area beneath the breasts bilaterally more pronounced beneath the left breast, non intentional weight loss 30 pounds over the past 6 months to 1 year, loss of appetite she eats 1 meal of the not only might be related to dementia  2  I will order CT scan of the abdomen and pelvis for weight loss  3  follow-up in 6 weeks, I will look at the right tonsil again if this is enlarging will have ENT evaluation  4  Candida of the bilateral below the breasts area, gold Bond powder and to follow up with you for possible nystatin powder if no improvement this might be the reason for leukocytosis however bcr/ABL by PCR is ordered  5  A repeat CT scan of the chest in 6-12 months  Signatures   Electronically signed by :  GENNY Lang ; Dec  7 2017  4:42PM EST                       (Author)

## 2018-01-31 PROBLEM — M54.50 LOW BACK PAIN RADIATING TO LEFT LOWER EXTREMITY: Status: ACTIVE | Noted: 2017-10-10

## 2018-01-31 PROBLEM — IMO0002 CREATININE ELEVATION: Status: ACTIVE | Noted: 2017-04-14

## 2018-01-31 PROBLEM — R05.9 COUGH: Status: ACTIVE | Noted: 2017-11-28

## 2018-01-31 PROBLEM — I63.81 LACUNAR STROKE (HCC): Status: ACTIVE | Noted: 2017-04-14

## 2018-01-31 PROBLEM — N30.00 ACUTE CYSTITIS WITHOUT HEMATURIA: Status: ACTIVE | Noted: 2017-09-26

## 2018-01-31 PROBLEM — D72.829 LEUKOCYTOSIS: Status: ACTIVE | Noted: 2017-11-15

## 2018-01-31 PROBLEM — R79.89 CREATININE ELEVATION: Status: ACTIVE | Noted: 2017-04-14

## 2018-01-31 PROBLEM — R63.4 WEIGHT LOSS: Status: ACTIVE | Noted: 2017-11-28

## 2018-01-31 PROBLEM — S16.1XXA CERVICAL STRAIN: Status: ACTIVE | Noted: 2017-11-28

## 2018-01-31 PROBLEM — M79.605 LOW BACK PAIN RADIATING TO LEFT LOWER EXTREMITY: Status: ACTIVE | Noted: 2017-10-10

## 2018-01-31 PROBLEM — K42.9 UMBILICAL HERNIA: Status: ACTIVE | Noted: 2017-11-14

## 2018-01-31 RX ORDER — LISINOPRIL AND HYDROCHLOROTHIAZIDE 20; 12.5 MG/1; MG/1
2 TABLET ORAL DAILY
COMMUNITY
End: 2018-02-01 | Stop reason: SDUPTHER

## 2018-02-01 ENCOUNTER — TELEPHONE (OUTPATIENT)
Dept: FAMILY MEDICINE CLINIC | Facility: CLINIC | Age: 73
End: 2018-02-01

## 2018-02-01 ENCOUNTER — CONSULT (OUTPATIENT)
Dept: FAMILY MEDICINE CLINIC | Facility: CLINIC | Age: 73
End: 2018-02-01
Payer: MEDICARE

## 2018-02-01 VITALS
SYSTOLIC BLOOD PRESSURE: 132 MMHG | DIASTOLIC BLOOD PRESSURE: 68 MMHG | HEIGHT: 62 IN | HEART RATE: 72 BPM | OXYGEN SATURATION: 95 % | WEIGHT: 123.8 LBS | BODY MASS INDEX: 22.78 KG/M2

## 2018-02-01 DIAGNOSIS — I10 ESSENTIAL HYPERTENSION: Chronic | ICD-10-CM

## 2018-02-01 DIAGNOSIS — E89.0 POSTOPERATIVE HYPOTHYROIDISM: Chronic | ICD-10-CM

## 2018-02-01 DIAGNOSIS — D51.0 PERNICIOUS ANEMIA: ICD-10-CM

## 2018-02-01 DIAGNOSIS — I63.81 LACUNAR STROKE (HCC): ICD-10-CM

## 2018-02-01 DIAGNOSIS — R63.4 WEIGHT LOSS: ICD-10-CM

## 2018-02-01 DIAGNOSIS — F02.81 LATE ONSET ALZHEIMER'S DISEASE WITH BEHAVIORAL DISTURBANCE (HCC): Primary | ICD-10-CM

## 2018-02-01 DIAGNOSIS — G30.1 LATE ONSET ALZHEIMER'S DISEASE WITH BEHAVIORAL DISTURBANCE (HCC): Primary | ICD-10-CM

## 2018-02-01 PROCEDURE — 99214 OFFICE O/P EST MOD 30 MIN: CPT | Performed by: FAMILY MEDICINE

## 2018-02-01 PROCEDURE — 96372 THER/PROPH/DIAG INJ SC/IM: CPT

## 2018-02-01 RX ORDER — LEVOTHYROXINE SODIUM 0.1 MG/1
100 TABLET ORAL EVERY MORNING
Qty: 30 TABLET | Refills: 5 | Status: SHIPPED | OUTPATIENT
Start: 2018-02-01 | End: 2018-12-12 | Stop reason: ALTCHOICE

## 2018-02-01 RX ORDER — CYANOCOBALAMIN 1000 UG/ML
1000 INJECTION INTRAMUSCULAR; SUBCUTANEOUS
Status: DISCONTINUED | OUTPATIENT
Start: 2018-02-01 | End: 2018-12-13

## 2018-02-01 RX ORDER — LISINOPRIL AND HYDROCHLOROTHIAZIDE 20; 12.5 MG/1; MG/1
1 TABLET ORAL 2 TIMES DAILY
Qty: 60 TABLET | Refills: 5 | Status: SHIPPED | OUTPATIENT
Start: 2018-02-01 | End: 2018-08-25 | Stop reason: SDUPTHER

## 2018-02-01 RX ORDER — DONEPEZIL HYDROCHLORIDE 10 MG/1
10 TABLET, FILM COATED ORAL
Qty: 30 TABLET | Refills: 5 | Status: SHIPPED | OUTPATIENT
Start: 2018-02-01 | End: 2018-08-25 | Stop reason: SDUPTHER

## 2018-02-01 RX ADMIN — CYANOCOBALAMIN 1000 MCG: 1000 INJECTION INTRAMUSCULAR; SUBCUTANEOUS at 17:44

## 2018-02-01 NOTE — TELEPHONE ENCOUNTER
Spoke omar Vyas at [address] [facility] and initiated referral   He will call her dtr, Andry Flynn and get all necessary infor

## 2018-02-01 NOTE — PROGRESS NOTES
FAMILY PRACTICE OFFICE VISIT       NAME: Jenny Ramsey  AGE: 74 y o  SEX: female       : 1945        MRN: [id number]    DATE: 2018  TIME: 7:24 PM    Assessment and Plan     Problem List Items Addressed This Visit     Postoperative hypothyroidism (Chronic)    Relevant Medications    levothyroxine 100 mcg tablet    Hypertension (Chronic)    Relevant Medications    lisinopril-hydrochlorothiazide (PRINZIDE,ZESTORETIC) 20-12 5 MG per tablet    Weight loss    Pernicious anemia    Relevant Medications    cyanocobalamin injection 1,000 mcg    Lacunar stroke ([facility] [address] )    Late onset Alzheimer's disease with behavioral disturbance - Primary    Relevant Medications    donepezil (ARICEPT) 10 mg tablet            Patient Instructions   She continues with progressive dementia, mental status exam here today scores 1830  We will try to get increased care in home, family wishes to try to do all they can to keep her in home, referral to [facility] agency on Aging, is nursing home eligible    -hold off on any medications regarding behavioral changes at this point  Maximize proper lighting, encourage proper sleep habits, try to encourage her to use hearing aids which she has been refusing  She will use other medication as is, is off any medication regarding sugars, with weight loss I would like her to use Ensure / boost / milkshake or [place] instant once to twice daily for extra caloric intake    CBC, CMP were fine earlier in January, last TSH in  0 98, B12 515, she did receive her monthly B12 shot here today regarding pernicious anemia     previous CT scanning abdomen and pelvis did not show any tumor/mass, CT of chest with chronic changes, she is a smoker, recent evaluation with Hematology/Oncology reviewed, she does plan to redo CT of chest in a few months, last CT of head 2017, after discussion we will hold off on re-doing this, does have stroke risk with smoking history along with other medical issues, continue to try to encourage smoke cessation, continue with aspirin, statin  Use other medication as is, endocrine had been using lisinopril HCT b i d , I did refill that dose  redo B12 here 1 month    Visit here 3 months  Call us sooner as needed  Chief Complaint     Chief Complaint   Patient presents with    Weight Loss       History of Present Illness   Loren Calderon is a 74y o -year-old female who is in today accompanied by her 3 daughters, 2 of whom she lives with  She has progressive dementia as outlined in prior notes, she is very resistant to self-care at home, appetite is decreasing, continues to lose weight, irritable very often, they are frustrated  She has had no falls  She does continue to smoke, daughters monitor number of cigarettes she smokes per day and when so as to prevent a fire  She is off any medication regarding hyperglycemia, she does continue to see Endocrinology  She did see Hematology for consultation, redo of CT abdomen and pelvis was unremarkable, has slip from them to redo CT of chest   No increased headaches, no focal neurological deficit to suggest new stroke  Review of Systems   Review of Systems   Constitutional: Positive for activity change, appetite change, fatigue and unexpected weight change  HENT: Positive for hearing loss (Refuses to wear hearing aids) and sinus pain  Negative for sore throat and voice change  Eyes: Negative for pain  Respiratory: Positive for cough (Chronic mild cough)  Negative for apnea, choking, chest tightness and wheezing  Cardiovascular: Negative for chest pain, palpitations and leg swelling  Gastrointestinal: Positive for constipation (Bowel movement every 1 to 2 days)  Negative for abdominal distention, abdominal pain, nausea and vomiting  Genitourinary: Negative for dysuria and hematuria  Musculoskeletal: Positive for back pain (Chronic back pain, unchanged)  Neurological: Positive for tremors  Negative for dizziness, syncope, light-headedness and headaches  Psychiatric/Behavioral: Positive for agitation, behavioral problems, confusion and sleep disturbance  Negative for hallucinations, self-injury and suicidal ideas  Active Problem List     Patient Active Problem List   Diagnosis    Syncope    Sinus bradycardia    Postoperative hypothyroidism    [facility]    Hypertension    Urinary incontinence    Chronic kidney disease, stage III (moderate)    Weight loss    Umbilical hernia    Trigger middle finger of right hand    Tobacco abuse    Thyroid nodule    Pernicious anemia    Osteoarthritis    Neuropathy of both feet    Malaise    Low back pain radiating to left lower extremity    Leukocytosis    Lacunar stroke (HCC)    Hyperglycemia    Hypercholesterolemia    Graves' disease    Gait disturbance    Gastro-esophageal reflux    Dizziness    Disc degeneration, lumbar    Late onset Alzheimer's disease with behavioral disturbance    Creatinine elevation    Cough    Cervical strain    Candidiasis    Acute cystitis without hematuria       Past Medical History:  Past Medical History:   Diagnosis Date    Bed bug bite     LAST ASSESSED [id number]    Disease of thyroid gland     Graves' disease     Hyperlipidemia     Hypertension     [facility]     LAST ASSESSED [id number]    Migraine 2001    OCULAR    Scabies     LAST ASSESSED [id number]    Syncope and collapse     LAST ASSESSED [id number]    Vertigo     RESOLVED        Past Surgical History:  Past Surgical History:   Procedure Laterality Date    CHOLECYSTECTOMY      HYSTERECTOMY      TOTAL THYROIDECTOMY      TUBAL LIGATION Bilateral        Family History:  Family History   Problem Relation Age of Onset    Breast cancer Sister     Diabetes Family        Social History:  Social History     Social History    Marital status:       Spouse name: N/A    Number of children: N/A    Years of education: N/A     Occupational History    Not on file  Social History Main Topics    Smoking status: Current Every Day Smoker     Packs/day: 0 50    Smokeless tobacco: Never Used    Alcohol use No    Drug use: No    Sexual activity: Not on file     Other Topics Concern    Not on file     Social History Narrative    COPY OF ADVANCED DIRECTIVE OBTAINED FROM PATIENT            Objective     Vitals:    02/01/18 1347   BP: 132/68   Pulse: 72   SpO2: 95%   Weight: 56 2 kg (123 lb 12 8 oz)   Height: 5' 2" (1 575 m)     Wt Readings from Last 3 Encounters:   02/01/18 56 2 kg (123 lb 12 8 oz)   01/18/18 55 8 kg (123 lb)   12/07/17 56 8 kg (125 lb 4 oz)       Physical Exam   Constitutional:   75-year-old female, somewhat withdrawn, seated on table no acute distress, mildly disheveled   Eyes: Conjunctivae are normal  No scleral icterus  Cardiovascular:   Regular rate and rhythm with occasional ectopic, 2/6 systolic ejection murmur left sternal border   Pulmonary/Chest:   Decreased breath sounds bilateral but clear   Abdominal: Soft  There is no tenderness  Lymphadenopathy:     She has no cervical adenopathy  Neurological: She is alert  Oriented to person and place, 18/30 on mini-mental status exam here today       Pertinent Laboratory/Diagnostic Studies:          ALLERGIES:  Allergies   Allergen Reactions    Naproxen Itching    Nsaids Other (See Comments) and Swelling    Simcor  [Niacin-Simvastatin Er]      Reaction Date: 20Apr2011;     Azithromycin Nausea Only and Rash    Cephalexin Rash       Current Medications     Current Outpatient Prescriptions   Medication Sig Dispense Refill    aspirin 81 MG tablet Take 81 mg by mouth daily   atorvastatin (LIPITOR) 40 mg tablet Take 40 mg by mouth daily   calcium-vitamin D (OSCAL) 250-125 MG-UNIT per tablet Take 1 tablet by mouth daily        donepezil (ARICEPT) 10 mg tablet Take 1 tablet (10 mg total) by mouth daily at bedtime 30 tablet 5    levothyroxine 100 mcg tablet Take 1 tablet (100 mcg total) by mouth every morning 30 tablet 5    lisinopril-hydrochlorothiazide (PRINZIDE,ZESTORETIC) 20-12 5 MG per tablet Take 1 tablet by mouth 2 (two) times a day 60 tablet 5     Current Facility-Administered Medications   Medication Dose Route Frequency Provider Last Rate Last Dose    cyanocobalamin injection 1,000 mcg  1,000 mcg Intramuscular Q30 Days Maryse Singh DO   1,000 mcg at 02/01/18 2316 [facility] DO Khai

## 2018-02-01 NOTE — PATIENT INSTRUCTIONS
She continues with progressive dementia, mental status exam here today scores 18/30  We will try to get increased care in home, family wishes to try to do all they can to keep her in home, referral to area agency on Aging, is nursing home eligible    -hold off on any medications regarding behavioral changes at this point  Maximize proper lighting, encourage proper sleep habits, try to encourage her to use hearing aids which she has been refusing  She will use other medication as is, is off any medication regarding sugars, with weight loss I would like her to use Ensure / boost / milkshake or [place] instant once to twice daily for extra caloric intake  CBC, CMP were fine earlier in January, last TSH in November 1 0 98, B12 515, she did receive her monthly B12 shot here today regarding pernicious anemia     previous CT scanning abdomen and pelvis did not show any tumor/mass, CT of chest with chronic changes, she is a smoker, recent evaluation with Hematology/Oncology reviewed, she does plan to redo CT of chest in a few months, last CT of head March 2017, after discussion we will hold off on re-doing this, does have stroke risk with smoking history along with other medical issues, continue to try to encourage smoke cessation, continue with aspirin, statin  Use other medication as is, endocrine had been using lisinopril HCT b i d , I did refill that dose  redo B12 here 1 month    Visit here 3 months  Call us sooner as needed

## 2018-03-02 LAB
ALBUMIN SERPL-MCNC: 4.2 G/DL (ref 3.6–5.1)
ALBUMIN/GLOB SERPL: 1.3 (CALC) (ref 1–2.5)
ALP SERPL-CCNC: 85 U/L (ref 33–130)
ALT SERPL-CCNC: 13 U/L (ref 6–29)
AST SERPL-CCNC: 16 U/L (ref 10–35)
BASOPHILS # BLD AUTO: 55 CELLS/UL (ref 0–200)
BASOPHILS NFR BLD AUTO: 0.4 %
BILIRUB SERPL-MCNC: 0.5 MG/DL (ref 0.2–1.2)
BUN SERPL-MCNC: 25 MG/DL (ref 7–25)
BUN/CREAT SERPL: 25 (CALC) (ref 6–22)
CALCIUM SERPL-MCNC: 9.8 MG/DL (ref 8.6–10.4)
CHLORIDE SERPL-SCNC: 99 MMOL/L (ref 98–110)
CO2 SERPL-SCNC: 30 MMOL/L (ref 20–31)
CREAT SERPL-MCNC: 1.01 MG/DL (ref 0.6–0.93)
EOSINOPHIL # BLD AUTO: 356 CELLS/UL (ref 15–500)
EOSINOPHIL NFR BLD AUTO: 2.6 %
ERYTHROCYTE [DISTWIDTH] IN BLOOD BY AUTOMATED COUNT: 13.4 % (ref 11–15)
GLOBULIN SER CALC-MCNC: 3.2 G/DL (CALC) (ref 1.9–3.7)
GLUCOSE SERPL-MCNC: 87 MG/DL (ref 65–99)
HCT VFR BLD AUTO: 42.6 % (ref 35–45)
HGB BLD-MCNC: 14 G/DL (ref 11.7–15.5)
LYMPHOCYTES # BLD AUTO: 2370 CELLS/UL (ref 850–3900)
LYMPHOCYTES NFR BLD AUTO: 17.3 %
MCH RBC QN AUTO: 29.3 PG (ref 27–33)
MCHC RBC AUTO-ENTMCNC: 32.9 G/DL (ref 32–36)
MCV RBC AUTO: 89.1 FL (ref 80–100)
MONOCYTES # BLD AUTO: 1014 CELLS/UL (ref 200–950)
MONOCYTES NFR BLD AUTO: 7.4 %
NEUTROPHILS # BLD AUTO: 9905 CELLS/UL (ref 1500–7800)
NEUTROPHILS NFR BLD AUTO: 72.3 %
PLATELET # BLD AUTO: 372 THOUSAND/UL (ref 140–400)
PMV BLD REES-ECKER: 10.7 FL (ref 7.5–12.5)
POTASSIUM SERPL-SCNC: 4.8 MMOL/L (ref 3.5–5.3)
PROT SERPL-MCNC: 7.4 G/DL (ref 6.1–8.1)
RBC # BLD AUTO: 4.78 MILLION/UL (ref 3.8–5.1)
SL AMB EGFR AFRICAN AMERICAN: 64 ML/MIN/1.73M2
SL AMB EGFR NON AFRICAN AMERICAN: 55 ML/MIN/1.73M2
SODIUM SERPL-SCNC: 139 MMOL/L (ref 135–146)
WBC # BLD AUTO: 13.7 THOUSAND/UL (ref 3.8–10.8)

## 2018-05-03 ENCOUNTER — OFFICE VISIT (OUTPATIENT)
Dept: FAMILY MEDICINE CLINIC | Facility: CLINIC | Age: 73
End: 2018-05-03
Payer: MEDICARE

## 2018-05-03 VITALS
WEIGHT: 130 LBS | BODY MASS INDEX: 23.92 KG/M2 | OXYGEN SATURATION: 95 % | HEART RATE: 105 BPM | DIASTOLIC BLOOD PRESSURE: 64 MMHG | HEIGHT: 62 IN | SYSTOLIC BLOOD PRESSURE: 112 MMHG

## 2018-05-03 DIAGNOSIS — E89.0 POSTOPERATIVE HYPOTHYROIDISM: Chronic | ICD-10-CM

## 2018-05-03 DIAGNOSIS — F02.81 LATE ONSET ALZHEIMER'S DISEASE WITH BEHAVIORAL DISTURBANCE (HCC): ICD-10-CM

## 2018-05-03 DIAGNOSIS — I63.81 LACUNAR STROKE (HCC): ICD-10-CM

## 2018-05-03 DIAGNOSIS — I10 ESSENTIAL HYPERTENSION: Chronic | ICD-10-CM

## 2018-05-03 DIAGNOSIS — Z72.0 TOBACCO ABUSE: ICD-10-CM

## 2018-05-03 DIAGNOSIS — D51.0 PERNICIOUS ANEMIA: Primary | ICD-10-CM

## 2018-05-03 DIAGNOSIS — G30.1 LATE ONSET ALZHEIMER'S DISEASE WITH BEHAVIORAL DISTURBANCE (HCC): ICD-10-CM

## 2018-05-03 PROBLEM — R55 SYNCOPE: Status: RESOLVED | Noted: 2017-03-31 | Resolved: 2018-05-03

## 2018-05-03 PROBLEM — E11.9 DIABETES MELLITUS (HCC): Chronic | Status: RESOLVED | Noted: 2017-03-31 | Resolved: 2018-05-03

## 2018-05-03 PROCEDURE — 99214 OFFICE O/P EST MOD 30 MIN: CPT | Performed by: FAMILY MEDICINE

## 2018-05-03 PROCEDURE — 96372 THER/PROPH/DIAG INJ SC/IM: CPT | Performed by: FAMILY MEDICINE

## 2018-05-03 RX ORDER — CIPROFLOXACIN 500 MG/1
TABLET, FILM COATED ORAL
COMMUNITY
End: 2018-05-03 | Stop reason: ALTCHOICE

## 2018-05-03 RX ORDER — CYANOCOBALAMIN 1000 UG/ML
1000 INJECTION INTRAMUSCULAR; SUBCUTANEOUS
Status: DISCONTINUED | OUTPATIENT
Start: 2018-05-03 | End: 2018-12-13

## 2018-05-03 RX ADMIN — CYANOCOBALAMIN 1000 MCG: 1000 INJECTION INTRAMUSCULAR; SUBCUTANEOUS at 13:52

## 2018-05-03 RX ADMIN — CYANOCOBALAMIN 1000 MCG: 1000 INJECTION INTRAMUSCULAR; SUBCUTANEOUS at 13:37

## 2018-05-03 NOTE — PROGRESS NOTES
FAMILY PRACTICE OFFICE VISIT  [address]  [address] [facility] [address]  [place], [place], [address]      NAME: Ravi Roberts  AGE: 74 y o  SEX: female  : 1945   MRN: [id number]    DATE: 5/3/2018  TIME: 1:27 PM    Assessment and Plan     Problem List Items Addressed This Visit        Endocrine    Postoperative hypothyroidism (Chronic)       Cardiovascular and Mediastinum    Hypertension (Chronic)       Nervous and Auditory    Lacunar stroke ([address] )    Late onset Alzheimer's disease with behavioral disturbance       Other    Pernicious anemia - Primary    Tobacco abuse          Patient Instructions    Medically stable here today, B12 1000 mcg injection here today and I would like her to return monthly for this, we will see her again in 5-6 months, redo AWV then  Mini-mental status at last visit , she will stay on Aricept / donepezil as is  Continue with home assistance  She will continue to see her endocrinologist, remains off medication with history diabetes  She will stay on levothyroxine as is, last TSH in November was fine  March CBC, CMP were stable  She also will be seeing Hematology in follow-up in   Call if any new issues arise in the meantime      Chief Complaint     Chief Complaint   Patient presents with    Follow-up     3 month        History of Present Illness   Ravi Roberts is a 74y o -year-old female who Is in today for re-evaluation, I had seen her 3 months ago  She now has home aide twice a week, she is eating better and has gained 7 lb  She has had no falls  She is using medication as directed  Still smoking the same, unwilling/unable to quit  Sees Endo  Remains off meds for diabetes  Review of Systems   Review of Systems   Constitutional: Negative for appetite change, fatigue, fever and unexpected weight change (eating better   gained 7 lbs x 3 mo)   Activity change: gets out to marjorie, watches baseball games down the street from her  HENT: Negative for congestion, sore throat and trouble swallowing  Respiratory: Negative for cough (min), chest tightness and shortness of breath  Cardiovascular: Negative for chest pain, palpitations and leg swelling  Gastrointestinal: Negative for abdominal pain, blood in stool, diarrhea and nausea  Genitourinary: Negative for dysuria and hematuria  Musculoskeletal: Positive for back pain (no current back or neck pain   long hx low back)  Neurological: Negative for dizziness, syncope, light-headedness and headaches  Psychiatric/Behavioral: Positive for confusion (18/30 MMSE at visit 3 mo ago      appears stable)  Negative for behavioral problems         Active Problem List     Patient Active Problem List   Diagnosis    Sinus bradycardia    Postoperative hypothyroidism    Hypertension    Urinary incontinence    Chronic kidney disease, stage III (moderate)    Weight loss    Umbilical hernia    Trigger middle finger of right hand    Tobacco abuse    Thyroid nodule    Pernicious anemia    Osteoarthritis    Neuropathy of both feet    Low back pain radiating to left lower extremity    Leukocytosis    Lacunar stroke (HCC)    Hyperglycemia    Hypercholesterolemia    Graves' disease    Gait disturbance    Gastro-esophageal reflux    Dizziness    Disc degeneration, lumbar    Late onset Alzheimer's disease with behavioral disturbance    Creatinine elevation    Cough    Candidiasis       Past Medical History:  Past Medical History:   Diagnosis Date    Bed bug bite     LAST ASSESSED [id number]    Disease of thyroid gland     Graves' disease     Hyperlipidemia     Hypertension     LLL pneumonia ([address] )     LAST ASSESSED [id number]    Migraine 2001    OCULAR    Scabies     LAST ASSESSED [id number]    Syncope and collapse     LAST ASSESSED [id number]    Vertigo     RESOLVED        Past Surgical History:  Past Surgical History: Procedure Laterality Date    CHOLECYSTECTOMY      HYSTERECTOMY      TOTAL THYROIDECTOMY      TUBAL LIGATION Bilateral        Family History:  Family History   Problem Relation Age of Onset    Breast cancer Sister     Diabetes Family        Social History:  Social History     Social History    Marital status:      Spouse name: N/A    Number of children: N/A    Years of education: N/A     Occupational History    Not on file  Social History Main Topics    Smoking status: Current Every Day Smoker     Packs/day: 0 50    Smokeless tobacco: Never Used    Alcohol use No    Drug use: No    Sexual activity: Not on file     Other Topics Concern    Not on file     Social History Narrative    COPY OF ADVANCED DIRECTIVE OBTAINED FROM PATIENT            Objective     Vitals:    05/03/18 1255   BP: 112/64   Pulse: 105   SpO2: 95%   Weight: 59 kg (130 lb)   Height: 5' 2" (1 575 m)     Body mass index is 23 78 kg/m²  BP Readings from Last 3 Encounters:   05/03/18 112/64   02/01/18 132/68   01/18/18 118/80       Wt Readings from Last 3 Encounters:   05/03/18 59 kg (130 lb)   02/01/18 56 2 kg (123 lb 12 8 oz)   01/18/18 55 8 kg (123 lb)       Physical Exam   Constitutional:   Pleasant well kept here today  NAD   HENT:   Right Ear: External ear normal    Left Ear: External ear normal    Eyes: Conjunctivae are normal  No scleral icterus  Cardiovascular:   RRR   occ ectopic  2/6 LSB/ R2ics     Pulmonary/Chest: Effort normal  No respiratory distress  Dec b/l but clear   Abdominal: Soft  There is no tenderness  Musculoskeletal: She exhibits no edema  Lymphadenopathy:     She has no cervical adenopathy  Neurological: She is alert     Oriented to person, place     W/ prompting she guesses correctly that it is Thursday   Psychiatric:   Good spirits here today       ALLERGIES:  Allergies   Allergen Reactions    Naproxen Itching    Nsaids Other (See Comments) and Swelling    Simcor  [Niacin-Simvastatin Er]      Reaction Date: 20Apr2011;     Azithromycin Nausea Only and Rash    Cephalexin Rash       Current Medications     Current Outpatient Prescriptions   Medication Sig Dispense Refill    aspirin 81 MG tablet Take 81 mg by mouth daily   atorvastatin (LIPITOR) 40 mg tablet Take 40 mg by mouth daily   calcium-vitamin D (OSCAL) 250-125 MG-UNIT per tablet Take 1 tablet by mouth daily   donepezil (ARICEPT) 10 mg tablet Take 1 tablet (10 mg total) by mouth daily at bedtime 30 tablet 5    levothyroxine 100 mcg tablet Take 1 tablet (100 mcg total) by mouth every morning 30 tablet 5    lisinopril-hydrochlorothiazide (PRINZIDE,ZESTORETIC) 20-12 5 MG per tablet Take 1 tablet by mouth 2 (two) times a day 60 tablet 5     Current Facility-Administered Medications   Medication Dose Route Frequency Provider Last Rate Last Dose    cyanocobalamin injection 1,000 mcg  1,000 mcg Intramuscular Q30 Days Terrea Goldmann, DO   1,000 mcg at 02/01/18 1744             Most recent labs available from [address] [place] [address]   ( others may be available in Care Everywhere / Media sections)  Lab Results   Component Value Date    WBC 13 47 (H) 01/11/2018    HGB 14 0 03/01/2018    HCT 42 6 03/01/2018     03/01/2018    ALT 14 01/11/2018    AST 16 01/11/2018     01/11/2018    K 4 6 01/11/2018    CL 98 (L) 01/11/2018    CREATININE 1 01 (H) 03/01/2018    BUN 25 03/01/2018    CO2 31 01/11/2018    GLUF 90 01/11/2018    HGBA1C 5 8 12/05/2016     No results found for: LDLCALC  No results found for: TSH      No orders of the defined types were placed in this encounter          Terrea Goldmann, DO

## 2018-05-03 NOTE — PATIENT INSTRUCTIONS
Medically stable here today, B12 1000 mcg injection here today and I would like her to return monthly for this, we will see her again in 5-6 months, redo AWV then  Mini-mental status at last visit 18/30, she will stay on Aricept / donepezil as is  Continue with home assistance  She will continue to see her endocrinologist, remains off medication with history diabetes  She will stay on levothyroxine as is, last TSH in November was fine  March CBC, CMP were stable  She also will be seeing Hematology in follow-up in June        Call if any new issues arise in the meantime

## 2018-06-04 ENCOUNTER — CLINICAL SUPPORT (OUTPATIENT)
Dept: FAMILY MEDICINE CLINIC | Facility: CLINIC | Age: 73
End: 2018-06-04
Payer: MEDICARE

## 2018-06-04 DIAGNOSIS — D51.0 PERNICIOUS ANEMIA: Primary | ICD-10-CM

## 2018-06-04 PROCEDURE — 96372 THER/PROPH/DIAG INJ SC/IM: CPT | Performed by: FAMILY MEDICINE

## 2018-06-04 RX ORDER — CYANOCOBALAMIN 1000 UG/ML
1000 INJECTION INTRAMUSCULAR; SUBCUTANEOUS
Status: DISCONTINUED | OUTPATIENT
Start: 2018-06-04 | End: 2018-12-13

## 2018-06-04 RX ADMIN — CYANOCOBALAMIN 1000 MCG: 1000 INJECTION INTRAMUSCULAR; SUBCUTANEOUS at 11:31

## 2018-06-08 LAB
ALBUMIN SERPL-MCNC: 4.3 G/DL (ref 3.6–5.1)
ALBUMIN/GLOB SERPL: 1.4 (CALC) (ref 1–2.5)
ALP SERPL-CCNC: 80 U/L (ref 33–130)
ALT SERPL-CCNC: 8 U/L (ref 6–29)
AST SERPL-CCNC: 12 U/L (ref 10–35)
BASOPHILS # BLD AUTO: 94 CELLS/UL (ref 0–200)
BASOPHILS NFR BLD AUTO: 0.8 %
BILIRUB SERPL-MCNC: 0.5 MG/DL (ref 0.2–1.2)
BUN SERPL-MCNC: 21 MG/DL (ref 7–25)
BUN/CREAT SERPL: 16 (CALC) (ref 6–22)
CALCIUM SERPL-MCNC: 10 MG/DL (ref 8.6–10.4)
CALCIUM SERPL-MCNC: 10 MG/DL (ref 8.6–10.4)
CHLORIDE SERPL-SCNC: 101 MMOL/L (ref 98–110)
CO2 SERPL-SCNC: 29 MMOL/L (ref 20–31)
CREAT SERPL-MCNC: 1.28 MG/DL (ref 0.6–0.93)
EOSINOPHIL # BLD AUTO: 661 CELLS/UL (ref 15–500)
EOSINOPHIL NFR BLD AUTO: 5.6 %
ERYTHROCYTE [DISTWIDTH] IN BLOOD BY AUTOMATED COUNT: 12.8 % (ref 11–15)
GLOBULIN SER CALC-MCNC: 3.1 G/DL (CALC) (ref 1.9–3.7)
GLUCOSE SERPL-MCNC: 84 MG/DL (ref 65–99)
HBA1C MFR BLD: 5.2 % OF TOTAL HGB
HCT VFR BLD AUTO: 44 % (ref 35–45)
HGB BLD-MCNC: 14.6 G/DL (ref 11.7–15.5)
LYMPHOCYTES # BLD AUTO: 3741 CELLS/UL (ref 850–3900)
LYMPHOCYTES NFR BLD AUTO: 31.7 %
MCH RBC QN AUTO: 29.3 PG (ref 27–33)
MCHC RBC AUTO-ENTMCNC: 33.2 G/DL (ref 32–36)
MCV RBC AUTO: 88.4 FL (ref 80–100)
MONOCYTES # BLD AUTO: 897 CELLS/UL (ref 200–950)
MONOCYTES NFR BLD AUTO: 7.6 %
NEUTROPHILS # BLD AUTO: 6407 CELLS/UL (ref 1500–7800)
NEUTROPHILS NFR BLD AUTO: 54.3 %
PLATELET # BLD AUTO: 354 THOUSAND/UL (ref 140–400)
PMV BLD REES-ECKER: 10.3 FL (ref 7.5–12.5)
POTASSIUM SERPL-SCNC: 4.5 MMOL/L (ref 3.5–5.3)
PROT SERPL-MCNC: 7.4 G/DL (ref 6.1–8.1)
PTH-INTACT SERPL-MCNC: 24 PG/ML (ref 14–64)
RBC # BLD AUTO: 4.98 MILLION/UL (ref 3.8–5.1)
SL AMB EGFR AFRICAN AMERICAN: 48 ML/MIN/1.73M2
SL AMB EGFR NON AFRICAN AMERICAN: 41 ML/MIN/1.73M2
SODIUM SERPL-SCNC: 140 MMOL/L (ref 135–146)
T4 FREE SERPL-MCNC: 1.7 NG/DL (ref 0.8–1.8)
TSH SERPL-ACNC: 3.27 MIU/L (ref 0.4–4.5)
WBC # BLD AUTO: 11.8 THOUSAND/UL (ref 3.8–10.8)

## 2018-07-05 ENCOUNTER — CLINICAL SUPPORT (OUTPATIENT)
Dept: FAMILY MEDICINE CLINIC | Facility: CLINIC | Age: 73
End: 2018-07-05
Payer: MEDICARE

## 2018-07-05 DIAGNOSIS — D51.0 PERNICIOUS ANEMIA: Primary | ICD-10-CM

## 2018-07-05 RX ADMIN — CYANOCOBALAMIN 1000 MCG: 1000 INJECTION INTRAMUSCULAR; SUBCUTANEOUS at 13:16

## 2018-08-08 ENCOUNTER — CLINICAL SUPPORT (OUTPATIENT)
Dept: FAMILY MEDICINE CLINIC | Facility: CLINIC | Age: 73
End: 2018-08-08
Payer: MEDICARE

## 2018-08-08 DIAGNOSIS — E53.8 B12 DEFICIENCY: Primary | ICD-10-CM

## 2018-08-08 RX ADMIN — CYANOCOBALAMIN 1000 MCG: 1000 INJECTION INTRAMUSCULAR; SUBCUTANEOUS at 14:12

## 2018-08-23 DIAGNOSIS — E89.0 POSTOPERATIVE HYPOTHYROIDISM: Chronic | ICD-10-CM

## 2018-08-23 RX ORDER — LEVOTHYROXINE SODIUM 0.1 MG/1
100 TABLET ORAL EVERY MORNING
Qty: 30 TABLET | Refills: 5 | OUTPATIENT
Start: 2018-08-23

## 2018-08-23 NOTE — TELEPHONE ENCOUNTER
She should receive levothyroxine prescription from her endocrinologist, this is requesting 100 mcg tablet, endocrinology note from March states 88 mcg daily, I am unsure which dose she is to be on and she does see them long-term    Please let her endocrinologist office know she needs refill

## 2018-08-24 NOTE — TELEPHONE ENCOUNTER
Spoke to daughter, Leisa Aguilera, who will contact her mom's endocrinologist, Dr Chad Darby for levothyroxine refill

## 2018-08-25 DIAGNOSIS — F02.81 LATE ONSET ALZHEIMER'S DISEASE WITH BEHAVIORAL DISTURBANCE (HCC): ICD-10-CM

## 2018-08-25 DIAGNOSIS — G30.1 LATE ONSET ALZHEIMER'S DISEASE WITH BEHAVIORAL DISTURBANCE (HCC): ICD-10-CM

## 2018-08-25 DIAGNOSIS — I10 ESSENTIAL HYPERTENSION: Chronic | ICD-10-CM

## 2018-08-25 RX ORDER — DONEPEZIL HYDROCHLORIDE 10 MG/1
10 TABLET, FILM COATED ORAL
Qty: 30 TABLET | Refills: 5 | Status: SHIPPED | OUTPATIENT
Start: 2018-08-25 | End: 2019-03-12 | Stop reason: SDUPTHER

## 2018-08-25 RX ORDER — LISINOPRIL AND HYDROCHLOROTHIAZIDE 20; 12.5 MG/1; MG/1
TABLET ORAL
Qty: 60 TABLET | Refills: 5 | Status: SHIPPED | OUTPATIENT
Start: 2018-08-25 | End: 2019-03-04 | Stop reason: SDUPTHER

## 2018-12-10 LAB
LEFT EYE DIABETIC RETINOPATHY: NORMAL
RIGHT EYE DIABETIC RETINOPATHY: NORMAL

## 2018-12-12 PROBLEM — IMO0002 CREATININE ELEVATION: Status: RESOLVED | Noted: 2017-04-14 | Resolved: 2018-12-12

## 2018-12-12 PROBLEM — R79.89 CREATININE ELEVATION: Status: RESOLVED | Noted: 2017-04-14 | Resolved: 2018-12-12

## 2018-12-12 PROBLEM — I10 ESSENTIAL HYPERTENSION: Status: ACTIVE | Noted: 2017-03-31

## 2018-12-12 RX ORDER — LEVOTHYROXINE SODIUM 88 UG/1
1 TABLET ORAL DAILY
Refills: 0 | COMMUNITY
Start: 2018-11-29 | End: 2020-02-19 | Stop reason: HOSPADM

## 2018-12-12 NOTE — PROGRESS NOTES
FAMILY PRACTICE OFFICE VISIT  [address] [facility] [address]  [place], [place], [address]      NAME: Connie Martinez  AGE: 74 y o  SEX: female  : 1945   MRN: [id number]    DATE: 2018  TIME: 4:14 PM    Assessment and Plan     Problem List Items Addressed This Visit        Unprioritized    Essential hypertension     BP controlled, continue on lisinopril HCT  5 twice daily  Lacunar stroke     Continues on atorvastatin 40 mg daily, aspirin 81 mg daily  Late onset Alzheimer's disease with behavioral disturbance     Alzheimers w vascular dementia - Last mini-mental status exam   Mental status has worsened since daughter Florian Campbell  suddenly in September  She lives with 1 of her other daughters, does have twice weekly 3 hour home assistance  Continue on Aricept 10 mg daily  Is looking at adult   Could see Geriatrics for opinion  Discussed poss adding Namenda-  Hold off for now  Still smokes 1/2 PPD, will not quit  Pernicious anemia     Last B12 injection was in August, she will redo B12 level with blood work for Endocrinology next week, possible restart injections  Previous poor response with oral supplementation         Relevant Orders    Vitamin B12    Tobacco abuse     Still 1/2 PPD smoker             Other Visit Diagnoses     Medicare annual wellness visit, subsequent    -  Primary          Patient Instructions       Immunization History   Administered Date(s) Administered    Influenza Split High Dose Preservative Free IM 2012, 2013, 2014, 2015, 2017    Influenza TIV (IM) 2011    Pneumococcal Conjugate 13-Valent 2017    Pneumococcal Polysaccharide PPV23 2009    Td (adult), adsorbed 2007    influenza, trivalent, adjuvanted 2018     Discussed Pneumococcal vaccines,    Prevnar  is up to date   Pneumovax  is up to date  She does do yearly Flu shot  Tdap/tetanus shot will be done at a future date  (done every 10 yrs for superficial cuts, every 5 yrs for deep wounds)   Can also look into coverage for new shingles shot, Shingrix (indicated if over 48years of age ) Can do that at pharmacy  still smokes- 1/2 PPD     Regarding Colon Cancer screening,    Had colonoscopy around 2008, after discussion hold off on redo  She does not see her Gynecologist routinely  not indicated  Mammogram screening was discussed, last August 2016, wishes to hold off at present  Discussed bone density screening/ DEXA Scan, this is  ordered by Endo ( will see Dr Greg Mays again in January)  has slip to do blood work this month from him      We discussed end of life planning, she does have a  "LIVING WILL"     Glaucoma screening is up-to-date -  does have nonocclusive hard wax bilateral ear canals, use peroxide, is seeing audiology  We did review previous blood work, back in June TSH, PTH were fine  CMP was fine with creatinine 1 28, A1c 5 3  She is up to date with Lipid screening  She is up to date with Diabetes screening  Is off medication regarding hyperglycemia  Has gained 14 lb with healthier diet since February  Had seen Hematology in the past w CT C/A/P -  Had slip to redo CT chest-   Has held off on doing  Last WBC 11 8 w Hb 14 6    We will see her back in 6 months, sooner as needed  Await B12 level -  Poss resume monthly B12               Chief Complaint     Chief Complaint   Patient presents with    Medicare Wellness Visit     Subsequent        History of Present Illness   Camron Haney is a 74y o -year-old female who is in today for a follow-up visit along with annual wellness, she is accompanied by her daughter who she resides with  Unfortunately another daughter with whom she resided passed away suddenly in September    This appeared to worsen pats mental status which was not good to start with, previous mini-mental status 1830  She continues on Aricept  She continues to see Endocrinology, is using medication  She does continue to smoke, 1/2 pack per day  She has had no falls, has not wandered out of home, does not touch stove  She does have home assistance 3 hr twice weekly  They had looked into adult , was not covered at present but may be covered in the future      Review of Systems   Review of Systems   Constitutional: Positive for unexpected weight change (She has gained weight since February with improved appetite)  Negative for activity change, appetite change, chills, fatigue and fever  HENT: Positive for hearing loss (Does not use hearing aids, has them at home)  Negative for congestion, mouth sores, nosebleeds, sinus pressure, sore throat and trouble swallowing  Eyes: Positive for visual disturbance (No worse than baseline)  Respiratory: Positive for cough (Mild few days cough, nonproductive, no wheezing or increased shortness of breath, no hemoptysis, no orthopnea)  Negative for choking, chest tightness and shortness of breath  Cardiovascular: Negative for chest pain, palpitations and leg swelling  Gastrointestinal: Negative for abdominal pain, blood in stool, constipation, diarrhea, nausea and vomiting  No acid reflux     No change in bowel   Genitourinary: Negative for dysuria and hematuria  Uses adult diapers, no skin breakdown, occasional irritation   Musculoskeletal:        Longstanding back pain, no complaints recently   Neurological: Negative for dizziness (None recent), syncope, speech difficulty, light-headedness and headaches  Hematological: Bruises/bleeds easily  Psychiatric/Behavioral: Positive for confusion and hallucinations (Family relates she sees  Nimesh aB who  years ago)  Negative for behavioral problems         Active Problem List     Patient Active Problem List   Diagnosis    Sinus bradycardia    Postoperative hypothyroidism    Essential hypertension    Urinary incontinence    Chronic kidney disease, stage III (moderate) (HCC)    Umbilical hernia    Trigger middle finger of right hand    Tobacco abuse    Thyroid nodule    Pernicious anemia    Osteoarthritis    Neuropathy of both feet    Low back pain radiating to left lower extremity    Leukocytosis    Lacunar stroke    Hyperglycemia    Hypercholesterolemia    Graves' disease    Gait disturbance    Dizziness    Disc degeneration, lumbar    Late onset Alzheimer's disease with behavioral disturbance    Cough    Hearing loss       Past Medical History:  Past Medical History:   Diagnosis Date    Bed bug bite     LAST ASSESSED [id number]    Disease of thyroid gland     Graves' disease     Hyperlipidemia     Hypertension     LLL pneumonia ([address] )     LAST ASSESSED [id number]    Migraine 2001    OCULAR    Scabies     LAST ASSESSED [id number]    Syncope and collapse     LAST ASSESSED [id number]    Vertigo     RESOLVED        Past Surgical History:  Past Surgical History:   Procedure Laterality Date    CHOLECYSTECTOMY      HYSTERECTOMY      TOTAL THYROIDECTOMY      TUBAL LIGATION Bilateral        Family History:  Family History   Problem Relation Age of Onset    Breast cancer Sister     Diabetes Family        Social History:  Social History     Social History    Marital status:      Spouse name: N/A    Number of children: N/A    Years of education: N/A     Occupational History    Not on file       Social History Main Topics    Smoking status: Current Every Day Smoker     Packs/day: 0 50    Smokeless tobacco: Never Used    Alcohol use No    Drug use: No    Sexual activity: Not on file     Other Topics Concern    Not on file     Social History Narrative    COPY OF ADVANCED DIRECTIVE OBTAINED FROM PATIENT            Objective     Vitals:    12/13/18 1100   BP: 130/70   BP Location: Left arm   Patient Position: Sitting   Cuff Size: Large   Pulse: 81 SpO2: 95%   Weight: 62 4 kg (137 lb 9 6 oz)   Height: 5' 2" (1 575 m)     Body mass index is 25 17 kg/m²  BP Readings from Last 3 Encounters:   12/13/18 130/70   05/03/18 112/64   02/01/18 132/68       Wt Readings from Last 3 Encounters:   12/13/18 62 4 kg (137 lb 9 6 oz)   05/03/18 59 kg (130 lb)   02/01/18 56 2 kg (123 lb 12 8 oz)       Physical Exam   Constitutional: No distress  Pleasant somewhat reserved female seated on table, no acute distress  Is aware of person, place  Does relate to me that her daughter had passed away   HENT:   Mouth/Throat: Oropharynx is clear and moist  No oropharyngeal exudate  Hard nonocclusive wax bilateral TM clear   Eyes: Conjunctivae are normal  No scleral icterus  Cardiovascular: Normal rate and regular rhythm  Murmur (2/6 systolic ejection murmur) heard  No carotid bruit   Pulmonary/Chest: Effort normal and breath sounds normal  No respiratory distress  She has no wheezes  She has no rales  Abdominal: Soft  There is no tenderness  Musculoskeletal: She exhibits no edema  Lymphadenopathy:     She has no cervical adenopathy  Neurological: She is alert  Psychiatric: She has a normal mood and affect  ALLERGIES:  Allergies   Allergen Reactions    Naproxen Itching    Nsaids Other (See Comments) and Swelling    Simcor  [Niacin-Simvastatin Er]      Reaction Date: 20Apr2011;     Azithromycin Nausea Only and Rash    Cephalexin Rash       Current Medications     Current Outpatient Prescriptions   Medication Sig Dispense Refill    aspirin 81 MG tablet Take 81 mg by mouth daily   atorvastatin (LIPITOR) 40 mg tablet Take 40 mg by mouth daily   calcium-vitamin D (OSCAL) 250-125 MG-UNIT per tablet Take 1 tablet by mouth daily        donepezil (ARICEPT) 10 mg tablet TAKE 1 TABLET (10 MG TOTAL) BY MOUTH DAILY AT BEDTIME 30 tablet 5    levothyroxine 88 mcg tablet 1 tablet daily  0    lisinopril-hydrochlorothiazide (PRINZIDE,ZESTORETIC) 20-12 5 MG per tablet TAKE 1TABLET BY MOUTH 2 (TWO) TIMES A DAY 60 tablet 5     No current facility-administered medications for this visit                Most recent labs available from [address] [place] [address]   ( others may be available in [facility] / Media sections)  Lab Results   Component Value Date    WBC 11 8 (H) 06/07/2018    HGB 14 6 06/07/2018    HCT 44 0 06/07/2018     06/07/2018    ALT 14 01/11/2018    AST 16 01/11/2018     06/08/2015    K 4 5 06/07/2018     06/07/2018    CREATININE 1 05 01/11/2018    BUN 21 06/07/2018    CO2 29 06/07/2018    TSH 3 27 06/07/2018    GLUF 90 01/11/2018    HGBA1C 5 2 06/07/2018     No results found for: [address]  Lab Results   Component Value Date    MSS1NYESLQCT 1 980 11/14/2017    TSH 3 27 06/07/2018         Orders Placed This Encounter   Procedures    Vitamin B12         Mally Quintanilla DO

## 2018-12-13 ENCOUNTER — OFFICE VISIT (OUTPATIENT)
Dept: FAMILY MEDICINE CLINIC | Facility: CLINIC | Age: 73
End: 2018-12-13
Payer: MEDICARE

## 2018-12-13 VITALS
HEART RATE: 81 BPM | HEIGHT: 62 IN | OXYGEN SATURATION: 95 % | WEIGHT: 137.6 LBS | SYSTOLIC BLOOD PRESSURE: 130 MMHG | BODY MASS INDEX: 25.32 KG/M2 | DIASTOLIC BLOOD PRESSURE: 70 MMHG

## 2018-12-13 DIAGNOSIS — I10 ESSENTIAL HYPERTENSION: ICD-10-CM

## 2018-12-13 DIAGNOSIS — G30.1 LATE ONSET ALZHEIMER'S DISEASE WITHOUT BEHAVIORAL DISTURBANCE (HCC): Primary | ICD-10-CM

## 2018-12-13 DIAGNOSIS — Z00.00 MEDICARE ANNUAL WELLNESS VISIT, SUBSEQUENT: Primary | ICD-10-CM

## 2018-12-13 DIAGNOSIS — I63.81 LACUNAR STROKE (HCC): ICD-10-CM

## 2018-12-13 DIAGNOSIS — G30.1 LATE ONSET ALZHEIMER'S DISEASE WITH BEHAVIORAL DISTURBANCE (HCC): ICD-10-CM

## 2018-12-13 DIAGNOSIS — Z86.73 HISTORY OF CVA IN ADULTHOOD: ICD-10-CM

## 2018-12-13 DIAGNOSIS — D51.0 PERNICIOUS ANEMIA: ICD-10-CM

## 2018-12-13 DIAGNOSIS — F02.81 LATE ONSET ALZHEIMER'S DISEASE WITH BEHAVIORAL DISTURBANCE (HCC): ICD-10-CM

## 2018-12-13 DIAGNOSIS — F02.80 LATE ONSET ALZHEIMER'S DISEASE WITHOUT BEHAVIORAL DISTURBANCE (HCC): Primary | ICD-10-CM

## 2018-12-13 DIAGNOSIS — Z72.0 TOBACCO ABUSE: ICD-10-CM

## 2018-12-13 PROBLEM — H91.90 HEARING LOSS: Status: ACTIVE | Noted: 2018-12-13

## 2018-12-13 PROBLEM — R63.4 WEIGHT LOSS: Status: RESOLVED | Noted: 2017-11-28 | Resolved: 2018-12-13

## 2018-12-13 PROCEDURE — G0439 PPPS, SUBSEQ VISIT: HCPCS | Performed by: FAMILY MEDICINE

## 2018-12-13 PROCEDURE — 99213 OFFICE O/P EST LOW 20 MIN: CPT | Performed by: FAMILY MEDICINE

## 2018-12-13 NOTE — PROGRESS NOTES
Assessment and Plan:    Problem List Items Addressed This Visit        Unprioritized    Essential hypertension     BP controlled, continue on lisinopril HCT  5 twice daily  Lacunar stroke     Continues on atorvastatin 40 mg daily, aspirin 81 mg daily  Late onset Alzheimer's disease with behavioral disturbance     Alzheimers w vascular dementia - Last mini-mental status exam   Mental status has worsened since daughter Joel Singh  suddenly in September  She lives with 1 of her other daughters, does have twice weekly 3 hour home assistance  Continue on Aricept 10 mg daily  Is looking at adult   Could see Geriatrics for opinion  Discussed poss adding Namenda-  Hold off for now  Still smokes 1/2 PPD, will not quit  Pernicious anemia     Last B12 injection was in August, she will redo B12 level with blood work for Endocrinology next week, possible restart injections  Previous poor response with oral supplementation         Relevant Orders    Vitamin B12    Tobacco abuse     Still 1/2 PPD smoker  Other Visit Diagnoses     Medicare annual wellness visit, subsequent    -  Primary        Health Maintenance Due   Topic Date Due    Depression Screening PHQ  1945    Medicare Annual Wellness Visit (AWV)  1945    CRC Screening: Colonoscopy  1945    DTaP,Tdap,and Td Vaccines (1 - Tdap) 09/15/2007    HEMOGLOBIN A1C  2018     See other note today regarding provider information    HPI:  Julianna Ashraf is a 74 y o  female here for her Subsequent Wellness Visit      Patient Active Problem List   Diagnosis    Sinus bradycardia    Postoperative hypothyroidism    Essential hypertension    Urinary incontinence    Chronic kidney disease, stage III (moderate) (HCC)    Umbilical hernia    Trigger middle finger of right hand    Tobacco abuse    Thyroid nodule    Pernicious anemia    Osteoarthritis    Neuropathy of both feet    Low back pain radiating to left lower extremity    Leukocytosis    Lacunar stroke    Hyperglycemia    Hypercholesterolemia    Graves' disease    Gait disturbance    Dizziness    Disc degeneration, lumbar    Late onset Alzheimer's disease with behavioral disturbance    Cough    Hearing loss     Past Medical History:   Diagnosis Date    Bed bug bite     LAST ASSESSED [id number]    Disease of thyroid gland     Graves' disease     Hyperlipidemia     Hypertension     LLL pneumonia ([address] )     LAST ASSESSED [id number]    Migraine 2001    OCULAR    Scabies     LAST ASSESSED [id number]    Syncope and collapse     LAST ASSESSED [id number]    Vertigo     RESOLVED      Past Surgical History:   Procedure Laterality Date    CHOLECYSTECTOMY      HYSTERECTOMY      TOTAL THYROIDECTOMY      TUBAL LIGATION Bilateral      Family History   Problem Relation Age of Onset    Breast cancer Sister     Diabetes Family      History   Smoking Status    Current Every Day Smoker    Packs/day: 0 50   Smokeless Tobacco    Never Used     History   Alcohol Use No      History   Drug Use No       Current Outpatient Prescriptions   Medication Sig Dispense Refill    aspirin 81 MG tablet Take 81 mg by mouth daily   atorvastatin (LIPITOR) 40 mg tablet Take 40 mg by mouth daily   calcium-vitamin D (OSCAL) 250-125 MG-UNIT per tablet Take 1 tablet by mouth daily   donepezil (ARICEPT) 10 mg tablet TAKE 1 TABLET (10 MG TOTAL) BY MOUTH DAILY AT BEDTIME 30 tablet 5    levothyroxine 88 mcg tablet 1 tablet daily  0    lisinopril-hydrochlorothiazide (PRINZIDE,ZESTORETIC) 20-12 5 MG per tablet TAKE 1TABLET BY MOUTH 2 (TWO) TIMES A DAY 60 tablet 5     No current facility-administered medications for this visit        Allergies   Allergen Reactions    Naproxen Itching    Nsaids Other (See Comments) and Swelling    Simcor  [Niacin-Simvastatin Er]      Reaction Date: 20Apr2011;     Azithromycin Nausea Only and Rash    Cephalexin Rash Immunization History   Administered Date(s) Administered    Influenza Split High Dose Preservative Free IM 09/18/2012, 11/14/2013, 09/25/2014, 09/08/2015, 08/29/2017    Influenza TIV (IM) 09/27/2011    Pneumococcal Conjugate 13-Valent 06/20/2017    Pneumococcal Polysaccharide PPV23 01/16/2009    Td (adult), adsorbed 09/14/2007    influenza, trivalent, adjuvanted 09/30/2018       Patient Care Team:  Maryse Singh DO as PCP - General  Anup Felix MD    Medicare Screening Tests and Risk Assessments:  Coleen Olivier is here for her Subsequent Wellness visit  Health Risk Assessment:  Patient rates overall health as good  Patient feels that their physical health rating is Same  Eyesight was rated as Same  Hearing was rated as Slightly worse  Patient feels that their emotional and mental health rating is Same  Pain experienced by patient in the last 7 days has been Some  Patient's pain rating has been 3/10  Emotional/Mental Health:  Patient has been feeling nervous/anxious  PHQ-9 Depression Screening:    Frequency of the following problems over the past two weeks:      1  Little interest or pleasure in doing things: 1 - several days      2  Feeling down, depressed, or hopeless: 0 - not at all  PHQ-2 Score: 1          Broken Bones/Falls: Fall Risk Assessment:    In the past year, patient has experienced: No history of falling in past year          Bladder/Bowel:  Patient has leaked urine accidently in the last six months  Patient reports no loss of bowel control  Immunizations:  Patient has had a flu vaccination within the last year  Patient has received a pneumonia shot  Patient has not received a shingles shot  Patient has received tetanus/diphtheria shot  Date of tetanus/diphtheria shot: 9/14/2007    Home Safety:  Patient does not have trouble with stairs inside or outside of their home     Patient currently reports that there are no safety hazards present in home, working smoke alarms, working carbon monoxide detectors  Preventative Screenings:   Breast cancer screening performed, no colon cancer screen completed, cholesterol screen completed, glaucoma eye exam completed,     Nutrition:  Current diet: Regular and No Added Salt with servings of the following:    Medications:  Patient is currently taking over-the-counter supplements  List of OTC medications includes: see medication list   Patient is able to manage medications  Lifestyle Choices:  Patient reports current tobacco use  Patient reports no alcohol use  Patient does not drive a vehicle  Patient wears seat belt  Activities of Daily Living:  Can get out of bed by his or her self, able to dress self, unable to make own meals, able to do own shopping, able to bathe self, unable to do laundry/housekeeping, can manage own money, pay bills and track expenses    Previous Hospitalizations:  No hospitalization or ED visit in past 12 months        Advanced Directives:  Patient has decided on a power of   Patient has spoken to designated power of   Patient has completed advanced directive

## 2018-12-13 NOTE — ASSESSMENT & PLAN NOTE
Alzheimers w vascular dementia - Last mini-mental status exam   Mental status has worsened since daughter Nisreen Douglas  suddenly in September  She lives with 1 of her other daughters, does have twice weekly 3 hour home assistance  Continue on Aricept 10 mg daily  Is looking at adult   Could see Geriatrics for opinion  Discussed poss adding Namenda-  Hold off for now  Still smokes 1/2 PPD, will not quit

## 2018-12-13 NOTE — ASSESSMENT & PLAN NOTE
Last B12 injection was in August, she will redo B12 level with blood work for Endocrinology next week, possible restart injections    Previous poor response with oral supplementation

## 2018-12-13 NOTE — PATIENT INSTRUCTIONS
Immunization History   Administered Date(s) Administered    Influenza Split High Dose Preservative Free IM 09/18/2012, 11/14/2013, 09/25/2014, 09/08/2015, 08/29/2017    Influenza TIV (IM) 09/27/2011    Pneumococcal Conjugate 13-Valent 06/20/2017    Pneumococcal Polysaccharide PPV23 01/16/2009    Td (adult), adsorbed 09/14/2007    influenza, trivalent, adjuvanted 09/30/2018     Discussed Pneumococcal vaccines,    Prevnar  is up to date   Pneumovax  is up to date  She does do yearly Flu shot  Tdap/tetanus shot will be done at a future date  (done every 10 yrs for superficial cuts, every 5 yrs for deep wounds)   Can also look into coverage for new shingles shot, Shingrix (indicated if over 48years of age ) Can do that at pharmacy  still smokes- 1/2 PPD     Regarding Colon Cancer screening,    Had colonoscopy around 2008, after discussion hold off on redo  She does not see her Gynecologist routinely  not indicated  Mammogram screening was discussed, last August 2016, wishes to hold off at present  Discussed bone density screening/ DEXA Scan, this is  ordered by Endo ( will see Dr Pascale Nixon again in January)  has slip to do blood work this month from him      We discussed end of life planning, she does have a  "LIVING WILL"     Glaucoma screening is up-to-date -  does have nonocclusive hard wax bilateral ear canals, use peroxide, is seeing audiology  We did review previous blood work, back in June TSH, PTH were fine  CMP was fine with creatinine 1 28, A1c 5 3  She is up to date with Lipid screening  She is up to date with Diabetes screening  Is off medication regarding hyperglycemia  Has gained 14 lb with healthier diet since February  Had seen Hematology in the past w CT C/A/P -  Had slip to redo CT chest-   Has held off on doing  Last WBC 11 8 w Hb 14 6    We will see her back in 6 months, sooner as needed    Await B12 level -  Poss resume monthly B12

## 2018-12-27 LAB — VIT B12 SERPL-MCNC: 407 PG/ML (ref 200–1100)

## 2019-03-04 DIAGNOSIS — I10 ESSENTIAL HYPERTENSION: Chronic | ICD-10-CM

## 2019-03-04 RX ORDER — LISINOPRIL AND HYDROCHLOROTHIAZIDE 20; 12.5 MG/1; MG/1
TABLET ORAL
Qty: 60 TABLET | Refills: 5 | Status: SHIPPED | OUTPATIENT
Start: 2019-03-04 | End: 2019-06-18 | Stop reason: SDUPTHER

## 2019-03-12 DIAGNOSIS — G30.1 LATE ONSET ALZHEIMER'S DISEASE WITH BEHAVIORAL DISTURBANCE (HCC): ICD-10-CM

## 2019-03-12 DIAGNOSIS — F02.81 LATE ONSET ALZHEIMER'S DISEASE WITH BEHAVIORAL DISTURBANCE (HCC): ICD-10-CM

## 2019-03-12 RX ORDER — DONEPEZIL HYDROCHLORIDE 10 MG/1
TABLET, FILM COATED ORAL
Qty: 30 TABLET | Refills: 5 | Status: SHIPPED | OUTPATIENT
Start: 2019-03-12 | End: 2019-06-18 | Stop reason: SDUPTHER

## 2019-04-01 ENCOUNTER — TELEPHONE (OUTPATIENT)
Dept: GERIATRICS | Age: 74
End: 2019-04-01

## 2019-04-03 ENCOUNTER — CONSULT (OUTPATIENT)
Dept: GERIATRICS | Age: 74
End: 2019-04-03
Payer: MEDICARE

## 2019-04-03 VITALS
RESPIRATION RATE: 20 BRPM | HEART RATE: 88 BPM | BODY MASS INDEX: 24.55 KG/M2 | OXYGEN SATURATION: 97 % | HEIGHT: 62 IN | DIASTOLIC BLOOD PRESSURE: 82 MMHG | SYSTOLIC BLOOD PRESSURE: 100 MMHG | TEMPERATURE: 97.8 F | WEIGHT: 133.4 LBS

## 2019-04-03 DIAGNOSIS — G31.84 MCI (MILD COGNITIVE IMPAIRMENT): Primary | ICD-10-CM

## 2019-04-03 DIAGNOSIS — F32.0 CURRENT MILD EPISODE OF MAJOR DEPRESSIVE DISORDER WITHOUT PRIOR EPISODE (HCC): ICD-10-CM

## 2019-04-03 DIAGNOSIS — I63.81 LACUNAR STROKE (HCC): ICD-10-CM

## 2019-04-03 DIAGNOSIS — K08.109 FULL DENTURES: ICD-10-CM

## 2019-04-03 DIAGNOSIS — H91.93 BILATERAL HEARING LOSS, UNSPECIFIED HEARING LOSS TYPE: ICD-10-CM

## 2019-04-03 DIAGNOSIS — H54.7 VISUAL IMPAIRMENT: ICD-10-CM

## 2019-04-03 DIAGNOSIS — E78.00 HYPERCHOLESTEROLEMIA: ICD-10-CM

## 2019-04-03 DIAGNOSIS — Z72.0 TOBACCO ABUSE: ICD-10-CM

## 2019-04-03 DIAGNOSIS — E89.0 POSTOPERATIVE HYPOTHYROIDISM: Chronic | ICD-10-CM

## 2019-04-03 DIAGNOSIS — R32 URINARY INCONTINENCE, UNSPECIFIED TYPE: Chronic | ICD-10-CM

## 2019-04-03 DIAGNOSIS — Z97.2 FULL DENTURES: ICD-10-CM

## 2019-04-03 DIAGNOSIS — I10 ESSENTIAL HYPERTENSION: ICD-10-CM

## 2019-04-03 PROBLEM — F32.9 MAJOR DEPRESSIVE DISORDER WITH SINGLE EPISODE: Status: ACTIVE | Noted: 2019-04-03

## 2019-04-03 PROCEDURE — 99205 OFFICE O/P NEW HI 60 MIN: CPT | Performed by: FAMILY MEDICINE

## 2019-04-15 ENCOUNTER — TELEPHONE (OUTPATIENT)
Dept: GERIATRICS | Age: 74
End: 2019-04-15

## 2019-04-16 ENCOUNTER — TELEPHONE (OUTPATIENT)
Dept: GERIATRICS | Age: 74
End: 2019-04-16

## 2019-04-16 ENCOUNTER — OFFICE VISIT (OUTPATIENT)
Dept: GERIATRICS | Age: 74
End: 2019-04-16
Payer: MEDICARE

## 2019-04-16 DIAGNOSIS — G30.1 LATE ONSET ALZHEIMER'S DISEASE WITH BEHAVIORAL DISTURBANCE (HCC): ICD-10-CM

## 2019-04-16 DIAGNOSIS — F02.81 LATE ONSET ALZHEIMER'S DISEASE WITH BEHAVIORAL DISTURBANCE (HCC): ICD-10-CM

## 2019-04-16 DIAGNOSIS — G31.84 MCI (MILD COGNITIVE IMPAIRMENT): ICD-10-CM

## 2019-04-16 PROCEDURE — 96138 PSYCL/NRPSYC TECH 1ST: CPT | Performed by: FAMILY MEDICINE

## 2019-04-16 PROCEDURE — 96139 PSYCL/NRPSYC TST TECH EA: CPT | Performed by: FAMILY MEDICINE

## 2019-04-17 ENCOUNTER — OFFICE VISIT (OUTPATIENT)
Dept: GERIATRICS | Age: 74
End: 2019-04-17
Payer: MEDICARE

## 2019-04-17 VITALS
HEART RATE: 74 BPM | OXYGEN SATURATION: 97 % | SYSTOLIC BLOOD PRESSURE: 102 MMHG | RESPIRATION RATE: 18 BRPM | TEMPERATURE: 98.2 F | HEIGHT: 62 IN | BODY MASS INDEX: 23.96 KG/M2 | WEIGHT: 130.2 LBS | DIASTOLIC BLOOD PRESSURE: 70 MMHG

## 2019-04-17 DIAGNOSIS — Z72.0 TOBACCO ABUSE: ICD-10-CM

## 2019-04-17 DIAGNOSIS — F32.A DEPRESSION, UNSPECIFIED DEPRESSION TYPE: Primary | ICD-10-CM

## 2019-04-17 DIAGNOSIS — F02.80 LATE ONSET ALZHEIMER'S DISEASE WITHOUT BEHAVIORAL DISTURBANCE (HCC): ICD-10-CM

## 2019-04-17 DIAGNOSIS — H90.3 SENSORINEURAL HEARING LOSS (SNHL) OF BOTH EARS: ICD-10-CM

## 2019-04-17 DIAGNOSIS — G30.1 LATE ONSET ALZHEIMER'S DISEASE WITHOUT BEHAVIORAL DISTURBANCE (HCC): ICD-10-CM

## 2019-04-17 DIAGNOSIS — N39.46 MIXED STRESS AND URGE URINARY INCONTINENCE: Chronic | ICD-10-CM

## 2019-04-17 PROBLEM — M85.80 SAPHO SYNDROME (HCC): Status: ACTIVE | Noted: 2019-04-17

## 2019-04-17 PROBLEM — M86.9 SAPHO SYNDROME (HCC): Status: ACTIVE | Noted: 2019-04-17

## 2019-04-17 PROBLEM — L70.9 SAPHO SYNDROME (HCC): Status: ACTIVE | Noted: 2019-04-17

## 2019-04-17 PROBLEM — M65.90 SAPHO SYNDROME (HCC): Status: ACTIVE | Noted: 2019-04-17

## 2019-04-17 PROBLEM — M65.9 SAPHO SYNDROME (HCC): Status: ACTIVE | Noted: 2019-04-17

## 2019-04-17 PROBLEM — L40.3 SAPHO SYNDROME (HCC): Status: ACTIVE | Noted: 2019-04-17

## 2019-04-17 PROCEDURE — 99215 OFFICE O/P EST HI 40 MIN: CPT | Performed by: FAMILY MEDICINE

## 2019-04-17 RX ORDER — ERGOCALCIFEROL 1.25 MG/1
CAPSULE ORAL
Refills: 0 | COMMUNITY
Start: 2019-03-18 | End: 2021-03-10 | Stop reason: SDUPTHER

## 2019-04-17 RX ORDER — CITALOPRAM 10 MG/1
10 TABLET ORAL DAILY
Qty: 90 TABLET | Refills: 1 | Status: SHIPPED | OUTPATIENT
Start: 2019-04-17 | End: 2019-10-07 | Stop reason: SDUPTHER

## 2019-06-18 ENCOUNTER — OFFICE VISIT (OUTPATIENT)
Dept: FAMILY MEDICINE CLINIC | Facility: CLINIC | Age: 74
End: 2019-06-18
Payer: MEDICARE

## 2019-06-18 VITALS
OXYGEN SATURATION: 96 % | SYSTOLIC BLOOD PRESSURE: 102 MMHG | HEIGHT: 62 IN | WEIGHT: 125.2 LBS | BODY MASS INDEX: 23.04 KG/M2 | DIASTOLIC BLOOD PRESSURE: 66 MMHG | HEART RATE: 75 BPM

## 2019-06-18 DIAGNOSIS — F17.200 CURRENT SMOKER: ICD-10-CM

## 2019-06-18 DIAGNOSIS — I10 ESSENTIAL HYPERTENSION: Primary | ICD-10-CM

## 2019-06-18 DIAGNOSIS — F02.81 LATE ONSET ALZHEIMER'S DISEASE WITH BEHAVIORAL DISTURBANCE (HCC): ICD-10-CM

## 2019-06-18 DIAGNOSIS — G30.1 LATE ONSET ALZHEIMER'S DISEASE WITH BEHAVIORAL DISTURBANCE (HCC): ICD-10-CM

## 2019-06-18 DIAGNOSIS — D51.0 PERNICIOUS ANEMIA: ICD-10-CM

## 2019-06-18 DIAGNOSIS — I63.81 LACUNAR STROKE (HCC): ICD-10-CM

## 2019-06-18 PROCEDURE — 99214 OFFICE O/P EST MOD 30 MIN: CPT | Performed by: FAMILY MEDICINE

## 2019-06-18 RX ORDER — LISINOPRIL AND HYDROCHLOROTHIAZIDE 20; 12.5 MG/1; MG/1
1 TABLET ORAL 2 TIMES DAILY
Qty: 60 TABLET | Refills: 5 | Status: SHIPPED | OUTPATIENT
Start: 2019-06-18 | End: 2019-09-04 | Stop reason: HOSPADM

## 2019-06-18 RX ORDER — DONEPEZIL HYDROCHLORIDE 10 MG/1
10 TABLET, FILM COATED ORAL
Qty: 30 TABLET | Refills: 5 | Status: SHIPPED | OUTPATIENT
Start: 2019-06-18 | End: 2020-04-09

## 2019-06-18 RX ORDER — NIACIN 1000 MG/1
1 TABLET, EXTENDED RELEASE ORAL DAILY
Refills: 0 | COMMUNITY
Start: 2019-05-19 | End: 2019-07-09

## 2019-06-20 DIAGNOSIS — D51.0 PERNICIOUS ANEMIA: Primary | ICD-10-CM

## 2019-06-20 LAB
25(OH)D3 SERPL-MCNC: 36 NG/ML (ref 30–100)
ALBUMIN SERPL-MCNC: 4 G/DL (ref 3.6–5.1)
ALBUMIN/GLOB SERPL: 1.5 (CALC) (ref 1–2.5)
ALP SERPL-CCNC: 76 U/L (ref 33–130)
ALT SERPL-CCNC: 9 U/L (ref 6–29)
AST SERPL-CCNC: 13 U/L (ref 10–35)
BILIRUB SERPL-MCNC: 0.4 MG/DL (ref 0.2–1.2)
BUN SERPL-MCNC: 15 MG/DL (ref 7–25)
BUN/CREAT SERPL: 13 (CALC) (ref 6–22)
CALCIUM SERPL-MCNC: 9.4 MG/DL (ref 8.6–10.4)
CALCIUM SERPL-MCNC: 9.4 MG/DL (ref 8.6–10.4)
CHLORIDE SERPL-SCNC: 103 MMOL/L (ref 98–110)
CHOLEST SERPL-MCNC: 112 MG/DL
CHOLEST/HDLC SERPL: 3.2 (CALC)
CO2 SERPL-SCNC: 28 MMOL/L (ref 20–32)
CREAT SERPL-MCNC: 1.2 MG/DL (ref 0.6–0.93)
GLOBULIN SER CALC-MCNC: 2.7 G/DL (CALC) (ref 1.9–3.7)
GLUCOSE SERPL-MCNC: 95 MG/DL (ref 65–99)
HBA1C MFR BLD: 5.5 % OF TOTAL HGB
HDLC SERPL-MCNC: 35 MG/DL
LDLC SERPL CALC-MCNC: 57 MG/DL (CALC)
NONHDLC SERPL-MCNC: 77 MG/DL (CALC)
POTASSIUM SERPL-SCNC: 4.8 MMOL/L (ref 3.5–5.3)
PROT SERPL-MCNC: 6.7 G/DL (ref 6.1–8.1)
PTH-INTACT SERPL-MCNC: 42 PG/ML (ref 14–64)
SL AMB EGFR AFRICAN AMERICAN: 51 ML/MIN/1.73M2
SL AMB EGFR NON AFRICAN AMERICAN: 44 ML/MIN/1.73M2
SODIUM SERPL-SCNC: 139 MMOL/L (ref 135–146)
T4 FREE SERPL-MCNC: 1.4 NG/DL (ref 0.8–1.8)
TRIGL SERPL-MCNC: 121 MG/DL
TSH SERPL-ACNC: 1.79 MIU/L (ref 0.4–4.5)
VIT B12 SERPL-MCNC: 270 PG/ML (ref 200–1100)

## 2019-06-20 RX ORDER — CYANOCOBALAMIN 1000 UG/ML
INJECTION INTRAMUSCULAR; SUBCUTANEOUS
Qty: 1 ML
Start: 2019-06-20 | End: 2022-06-21 | Stop reason: ALTCHOICE

## 2019-06-28 ENCOUNTER — CLINICAL SUPPORT (OUTPATIENT)
Dept: FAMILY MEDICINE CLINIC | Facility: CLINIC | Age: 74
End: 2019-06-28
Payer: MEDICARE

## 2019-06-28 DIAGNOSIS — E53.8 B12 DEFICIENCY: Primary | ICD-10-CM

## 2019-06-28 PROCEDURE — 96372 THER/PROPH/DIAG INJ SC/IM: CPT

## 2019-06-28 RX ORDER — CYANOCOBALAMIN 1000 UG/ML
1000 INJECTION INTRAMUSCULAR; SUBCUTANEOUS
Status: DISCONTINUED | OUTPATIENT
Start: 2019-06-28 | End: 2020-02-19 | Stop reason: HOSPADM

## 2019-06-28 RX ADMIN — CYANOCOBALAMIN 1000 MCG: 1000 INJECTION INTRAMUSCULAR; SUBCUTANEOUS at 16:11

## 2019-07-06 ENCOUNTER — HOSPITAL ENCOUNTER (EMERGENCY)
Facility: HOSPITAL | Age: 74
Discharge: HOME | End: 2019-07-06
Attending: EMERGENCY MEDICINE | Admitting: EMERGENCY MEDICINE
Payer: MEDICARE

## 2019-07-06 ENCOUNTER — APPOINTMENT (EMERGENCY)
Dept: RADIOLOGY | Facility: HOSPITAL | Age: 74
End: 2019-07-06
Payer: MEDICARE

## 2019-07-06 VITALS
WEIGHT: 125.22 LBS | DIASTOLIC BLOOD PRESSURE: 59 MMHG | RESPIRATION RATE: 16 BRPM | SYSTOLIC BLOOD PRESSURE: 126 MMHG | TEMPERATURE: 99.5 F | HEART RATE: 100 BPM | OXYGEN SATURATION: 96 % | BODY MASS INDEX: 22.9 KG/M2

## 2019-07-06 DIAGNOSIS — M25.511 ACUTE PAIN OF RIGHT SHOULDER: ICD-10-CM

## 2019-07-06 DIAGNOSIS — M62.838 TRAPEZIUS MUSCLE SPASM: Primary | ICD-10-CM

## 2019-07-06 PROCEDURE — 73030 X-RAY EXAM OF SHOULDER: CPT

## 2019-07-06 PROCEDURE — 99283 EMERGENCY DEPT VISIT LOW MDM: CPT

## 2019-07-06 PROCEDURE — 99283 EMERGENCY DEPT VISIT LOW MDM: CPT | Performed by: PHYSICIAN ASSISTANT

## 2019-07-06 RX ORDER — ACETAMINOPHEN 325 MG/1
650 TABLET ORAL ONCE
Status: COMPLETED | OUTPATIENT
Start: 2019-07-06 | End: 2019-07-06

## 2019-07-06 RX ORDER — LIDOCAINE 50 MG/G
1 PATCH TOPICAL ONCE
Status: DISCONTINUED | OUTPATIENT
Start: 2019-07-06 | End: 2019-07-07 | Stop reason: HOSPADM

## 2019-07-06 RX ADMIN — ACETAMINOPHEN 650 MG: 325 TABLET ORAL at 21:50

## 2019-07-06 RX ADMIN — LIDOCAINE 1 PATCH: 50 PATCH TOPICAL at 21:50

## 2019-07-08 ENCOUNTER — TRANSCRIBE ORDERS (OUTPATIENT)
Dept: ADMINISTRATIVE | Facility: HOSPITAL | Age: 74
End: 2019-07-08

## 2019-07-08 DIAGNOSIS — R63.4 WEIGHT LOSS: Primary | ICD-10-CM

## 2019-07-08 NOTE — ED PROVIDER NOTES
History  Chief Complaint   Patient presents with    Shoulder Pain     pain right shoulder radiating into right lateral neck worse with movement  denies injury  pain "several days" worse today  74 y o  Female presents with daughter for evaluation of right anterior shoulder pain when pushing up from a seated position, and rolling onto right side  She also notes posterior/ lateral right neck pain when turning head to left or "pushing on it " First noticed pain several days ago getting out of bed, neck pain started yesterday  Denies injury, slips, trips, falls, HA, blurry vision, SOB, CP, syncope, near syncope, fevers, chills, N/V/D  Notes she took tylenol and it did help, states pain is only there sometimes and with certain movements  PMHx significant for dementia, vertigo, graves disease          Prior to Admission Medications   Prescriptions Last Dose Informant Patient Reported? Taking?   aspirin 81 MG tablet   Yes No   Sig: Take 81 mg by mouth daily  atorvastatin (LIPITOR) 40 mg tablet   Yes No   Sig: Take 40 mg by mouth daily  calcium-vitamin D (OSCAL) 250-125 MG-UNIT per tablet   Yes No   Sig: Take 1 tablet by mouth daily     citalopram (CeleXA) 10 mg tablet   No No   Sig: Take 1 tablet (10 mg total) by mouth daily   cyanocobalamin 1,000 mcg/mL   No No   Sig: Injection Q 6-8 weeks re PA   donepezil (ARICEPT) 10 mg tablet   No No   Sig: Take 1 tablet (10 mg total) by mouth daily at bedtime   ergocalciferol (VITAMIN D2) 50,000 units   Yes No   levothyroxine 88 mcg tablet   Yes No   Si tablet daily   lisinopril-hydrochlorothiazide (PRINZIDE,ZESTORETIC) 20-12 5 MG per tablet   No No   Sig: Take 1 tablet by mouth 2 (two) times a day   niacin (NIASPAN) 1000 MG CR tablet   Yes No   Sig: Take 1 tablet by mouth daily      Facility-Administered Medications Last Administration Doses Remaining   cyanocobalamin injection 1,000 mcg 2019  4:11 PM           Past Medical History:   Diagnosis Date    Bed bug bite LAST ASSESSED [id number]    Disease of thyroid gland     Graves' disease     Hyperlipidemia     Hypertension     LLL pneumonia ([address] )     LAST ASSESSED [id number]    Migraine 2001    OCULAR    Scabies     LAST ASSESSED [id number]    Syncope and collapse     LAST ASSESSED [id number]    Vertigo     RESOLVED        Past Surgical History:   Procedure Laterality Date    CHOLECYSTECTOMY      HYSTERECTOMY      TOTAL THYROIDECTOMY      TUBAL LIGATION Bilateral        Family History   Problem Relation Age of Onset    Breast cancer Sister     Diabetes Family      I have reviewed and agree with the history as documented  Social History     Tobacco Use    Smoking status: Current Every Day Smoker     Packs/day: 0 50    Smokeless tobacco: Never Used   Substance Use Topics    Alcohol use: No    Drug use: No        Review of Systems   Musculoskeletal: Positive for arthralgias and myalgias  All other systems reviewed and are negative  Physical Exam  Physical Exam   Constitutional: She is oriented to person, place, and time  She appears well-developed and well-nourished  HENT:   Head: Normocephalic and atraumatic  Right Ear: External ear normal    Left Ear: External ear normal    Nose: Nose normal    Eyes: Conjunctivae and EOM are normal    Neck: Trachea normal, normal range of motion and phonation normal  Neck supple  Muscular tenderness present  No spinous process tenderness present  No neck rigidity  No edema, no erythema and normal range of motion present  Cardiovascular: Normal rate, regular rhythm, normal heart sounds and intact distal pulses  Pulmonary/Chest: Effort normal and breath sounds normal    Abdominal: Soft  Musculoskeletal: Normal range of motion  She exhibits tenderness  Right shoulder: She exhibits tenderness, pain and spasm  She exhibits normal range of motion, no swelling, no effusion, no crepitus, no deformity and no laceration          Arms:  Neurological: She is alert and oriented to person, place, and time  Skin: Skin is warm  Capillary refill takes less than 2 seconds  Psychiatric: She has a normal mood and affect  Her behavior is normal    Nursing note and vitals reviewed  Vital Signs  ED Triage Vitals   Temperature Pulse Respirations Blood Pressure SpO2   07/06/19 2112 07/06/19 2111 07/06/19 2111 07/06/19 2111 07/06/19 2111   99 5 °F (37 5 °C) 100 16 126/59 96 %      Temp Source Heart Rate Source Patient Position - Orthostatic VS BP Location FiO2 (%)   07/06/19 2112 -- 07/06/19 2111 07/06/19 2111 --   Temporal  Sitting Left arm       Pain Score       07/06/19 2111       6           Vitals:    07/06/19 2111   BP: 126/59   Pulse: 100   Patient Position - Orthostatic VS: Sitting         Visual Acuity      ED Medications  Medications   acetaminophen (TYLENOL) tablet 650 mg (650 mg Oral Given 7/6/19 2150)       Diagnostic Studies  Results Reviewed     None                 XR shoulder 2+ views RIGHT   Final Result by Joel Flores MD (07/07 1033)      No acute osseous abnormality  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  Workstation performed: [id number]                    Procedures  Procedures       ED Course                               MDM  Number of Diagnoses or Management Options  Acute pain of right shoulder: new and requires workup  Trapezius muscle spasm: new and requires workup  Diagnosis management comments: 74 y o  Female presents with c o  Right anterior shoulder pain when pushing up from seated position, not present at rest, exacerbated by certain weight bearing motions, also notes development of pain and tenderness along righ lateral/posterior neck  Denies numbness, tingling, SOB, CP, HA, blurry vision, trauma, surgeries, slips or falls  Will check xray give tylenol, lidocaine patch and reassess  Pt  Notes she is feeling better and xrays with no acute pathology   Will DC home follow up with PCP and ortho as needed  Discussed strict return precautions  Daughter and pt verbalized undersdtanding       Amount and/or Complexity of Data Reviewed  Tests in the radiology section of CPT®: ordered and reviewed        Disposition  Final diagnoses:   Trapezius muscle spasm   Acute pain of right shoulder     Time reflects when diagnosis was documented in both MDM as applicable and the Disposition within this note     Time User Action Codes Description Comment    7/6/2019 10:22 PM Tally Overcast Add [I62 993] Trapezius muscle spasm     7/6/2019 10:22 PM Tally Overcast Add [M25 511] Acute pain of right shoulder       ED Disposition     ED Disposition Condition Date/Time Comment    Discharge Stable Sat Jul 6, 2019 10:22 PM Miley Rasmey discharge to home/self care  Follow-up Information     Follow up With Specialties Details Why Contact Info Additional [address]   [address]   [address] [facility]   [address] [facility] [address] [address]  [address] [place] [address] Specialists [facility], [address] [facility],  [address] [place] [address], [phone]          Discharge Medication List as of 7/6/2019 10:23 PM      CONTINUE these medications which have NOT CHANGED    Details   aspirin 81 MG tablet Take 81 mg by mouth daily  , Until Discontinued, Historical Med      atorvastatin (LIPITOR) 40 mg tablet Take 40 mg by mouth daily  , Until Discontinued, Historical Med      calcium-vitamin D (OSCAL) 250-125 MG-UNIT per tablet Take 1 tablet by mouth daily  , Until Discontinued, Historical Med      citalopram (CeleXA) 10 mg tablet Take 1 tablet (10 mg total) by mouth daily, Starting Wed 4/17/2019, Normal      cyanocobalamin 1,000 mcg/mL Injection Q 6-8 weeks re PA, No Print      donepezil (ARICEPT) 10 mg tablet Take 1 tablet (10 mg total) by mouth daily at bedtime, Starting Tue 6/18/2019, Normal      ergocalciferol (VITAMIN D2) 50,000 units Starting Mon 3/18/2019, Historical Med      levothyroxine 88 mcg tablet 1 tablet daily, Starting Thu 11/29/2018, Historical Med      lisinopril-hydrochlorothiazide (PRINZIDE,ZESTORETIC) 20-12 5 MG per tablet Take 1 tablet by mouth 2 (two) times a day, Starting Tue 6/18/2019, Normal      niacin (NIASPAN) 1000 MG CR tablet Take 1 tablet by mouth daily, Starting Sun 5/19/2019, Historical Med           No discharge procedures on file      ED Provider  Electronically Signed by           Clare Tobar PA-C  07/08/19 3319

## 2019-07-09 DIAGNOSIS — E78.00 HYPERCHOLESTEROLEMIA: Primary | ICD-10-CM

## 2019-07-09 RX ORDER — NIACIN 1000 MG/1
2000 TABLET, EXTENDED RELEASE ORAL DAILY
Refills: 0
Start: 2019-07-09 | End: 2021-06-20 | Stop reason: SDUPTHER

## 2019-07-09 RX ORDER — ATORVASTATIN CALCIUM 20 MG/1
20 TABLET, FILM COATED ORAL DAILY
Start: 2019-07-09 | End: 2020-02-19 | Stop reason: HOSPADM

## 2019-07-09 NOTE — PROGRESS NOTES
I updated medication list, endocrinology changed atorvastatin to 20 mg daily, niacin 2000 at bedtime

## 2019-07-11 ENCOUNTER — HOSPITAL ENCOUNTER (OUTPATIENT)
Dept: BONE DENSITY | Facility: MEDICAL CENTER | Age: 74
Discharge: HOME | End: 2019-07-11
Payer: MEDICARE

## 2019-07-11 DIAGNOSIS — R63.4 WEIGHT LOSS: ICD-10-CM

## 2019-07-11 DIAGNOSIS — Z78.0 POSTMENOPAUSAL: ICD-10-CM

## 2019-07-11 DIAGNOSIS — M85.80 SENILE OSTEOPENIA: ICD-10-CM

## 2019-07-11 PROCEDURE — 77080 DXA BONE DENSITY AXIAL: CPT

## 2019-07-29 ENCOUNTER — CLINICAL SUPPORT (OUTPATIENT)
Dept: FAMILY MEDICINE CLINIC | Facility: CLINIC | Age: 74
End: 2019-07-29
Payer: MEDICARE

## 2019-07-29 DIAGNOSIS — D51.0 PERNICIOUS ANEMIA: Primary | ICD-10-CM

## 2019-07-29 PROCEDURE — 90471 IMMUNIZATION ADMIN: CPT

## 2019-07-29 RX ADMIN — CYANOCOBALAMIN 1000 MCG: 1000 INJECTION INTRAMUSCULAR; SUBCUTANEOUS at 13:10

## 2019-08-29 ENCOUNTER — CLINICAL SUPPORT (OUTPATIENT)
Dept: FAMILY MEDICINE CLINIC | Facility: CLINIC | Age: 74
End: 2019-08-29
Payer: MEDICARE

## 2019-08-29 DIAGNOSIS — E53.8 B12 DEFICIENCY: Primary | ICD-10-CM

## 2019-08-29 PROCEDURE — 96372 THER/PROPH/DIAG INJ SC/IM: CPT

## 2019-08-29 RX ADMIN — CYANOCOBALAMIN 1000 MCG: 1000 INJECTION INTRAMUSCULAR; SUBCUTANEOUS at 14:03

## 2019-09-02 ENCOUNTER — APPOINTMENT (EMERGENCY)
Dept: CT IMAGING | Facility: HOSPITAL | Age: 74
DRG: 683 | End: 2019-09-02
Payer: MEDICARE

## 2019-09-02 ENCOUNTER — HOSPITAL ENCOUNTER (INPATIENT)
Facility: HOSPITAL | Age: 74
LOS: 2 days | Discharge: HOME HEALTH | DRG: 683 | End: 2019-09-04
Attending: EMERGENCY MEDICINE | Admitting: INTERNAL MEDICINE
Payer: MEDICARE

## 2019-09-02 ENCOUNTER — APPOINTMENT (EMERGENCY)
Dept: RADIOLOGY | Facility: HOSPITAL | Age: 74
DRG: 683 | End: 2019-09-02
Payer: MEDICARE

## 2019-09-02 ENCOUNTER — TELEPHONE (OUTPATIENT)
Dept: OTHER | Facility: OTHER | Age: 74
End: 2019-09-02
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
## 2019-09-02 DIAGNOSIS — D72.829 LEUKOCYTOSIS: ICD-10-CM

## 2019-09-02 DIAGNOSIS — N17.9 AKI (ACUTE KIDNEY INJURY) (HCC): ICD-10-CM

## 2019-09-02 DIAGNOSIS — I10 ESSENTIAL HYPERTENSION: ICD-10-CM
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
## 2019-09-02 PROBLEM — R53.1 WEAKNESS: Status: ACTIVE | Noted: 2019-09-02

## 2019-09-02 LAB
ANION GAP SERPL CALCULATED.3IONS-SCNC: 13 MMOL/L (ref 4–13)
BACTERIA UR QL AUTO: ABNORMAL /HPF
BASOPHILS # BLD AUTO: 0.04 THOUSANDS/ΜL (ref 0–0.1)
BASOPHILS NFR BLD AUTO: 0 % (ref 0–1)
BILIRUB UR QL STRIP: ABNORMAL
BUN SERPL-MCNC: 46 MG/DL (ref 5–25)
CALCIUM SERPL-MCNC: 9.3 MG/DL (ref 8.3–10.1)
CHLORIDE SERPL-SCNC: 99 MMOL/L (ref 100–108)
CLARITY UR: CLEAR
CO2 SERPL-SCNC: 25 MMOL/L (ref 21–32)
COLOR UR: YELLOW
CREAT SERPL-MCNC: 1.84 MG/DL (ref 0.6–1.3)
EOSINOPHIL # BLD AUTO: 0.04 THOUSAND/ΜL (ref 0–0.61)
EOSINOPHIL NFR BLD AUTO: 0 % (ref 0–6)
ERYTHROCYTE [DISTWIDTH] IN BLOOD BY AUTOMATED COUNT: 14 % (ref 11.6–15.1)
GFR SERPL CREATININE-BSD FRML MDRD: 26 ML/MIN/1.73SQ M
GLUCOSE SERPL-MCNC: 94 MG/DL (ref 65–140)
GLUCOSE UR STRIP-MCNC: NEGATIVE MG/DL
HCT VFR BLD AUTO: 42.3 % (ref 34.8–46.1)
HGB BLD-MCNC: 13.5 G/DL (ref 11.5–15.4)
HGB UR QL STRIP.AUTO: ABNORMAL
IMM GRANULOCYTES # BLD AUTO: 0.07 THOUSAND/UL (ref 0–0.2)
IMM GRANULOCYTES NFR BLD AUTO: 1 % (ref 0–2)
KETONES UR STRIP-MCNC: ABNORMAL MG/DL
LEUKOCYTE ESTERASE UR QL STRIP: ABNORMAL
LYMPHOCYTES # BLD AUTO: 1.28 THOUSANDS/ΜL (ref 0.6–4.47)
LYMPHOCYTES NFR BLD AUTO: 9 % (ref 14–44)
MCH RBC QN AUTO: 29.8 PG (ref 26.8–34.3)
MCHC RBC AUTO-ENTMCNC: 31.9 G/DL (ref 31.4–37.4)
MCV RBC AUTO: 93 FL (ref 82–98)
MONOCYTES # BLD AUTO: 1.06 THOUSAND/ΜL (ref 0.17–1.22)
MONOCYTES NFR BLD AUTO: 8 % (ref 4–12)
NEUTROPHILS # BLD AUTO: 11.68 THOUSANDS/ΜL (ref 1.85–7.62)
NEUTS SEG NFR BLD AUTO: 82 % (ref 43–75)
NITRITE UR QL STRIP: NEGATIVE
NON-SQ EPI CELLS URNS QL MICRO: ABNORMAL /HPF
NRBC BLD AUTO-RTO: 0 /100 WBCS
PH UR STRIP.AUTO: 5 [PH] (ref 4.5–8)
PLATELET # BLD AUTO: 314 THOUSANDS/UL (ref 149–390)
PMV BLD AUTO: 10.1 FL (ref 8.9–12.7)
POTASSIUM SERPL-SCNC: 4.2 MMOL/L (ref 3.5–5.3)
PROT UR STRIP-MCNC: ABNORMAL MG/DL
RBC # BLD AUTO: 4.53 MILLION/UL (ref 3.81–5.12)
RBC #/AREA URNS AUTO: ABNORMAL /HPF
SODIUM SERPL-SCNC: 137 MMOL/L (ref 136–145)
SP GR UR STRIP.AUTO: 1.02 (ref 1–1.03)
T4 FREE SERPL-MCNC: 1.64 NG/DL (ref 0.76–1.46)
TROPONIN I SERPL-MCNC: 0.02 NG/ML
TROPONIN I SERPL-MCNC: 0.02 NG/ML
TROPONIN I SERPL-MCNC: <0.02 NG/ML
TSH SERPL DL<=0.05 MIU/L-ACNC: 3.85 UIU/ML (ref 0.36–3.74)
UROBILINOGEN UR QL STRIP.AUTO: 0.2 E.U./DL
WBC # BLD AUTO: 14.17 THOUSAND/UL (ref 4.31–10.16)
WBC #/AREA URNS AUTO: ABNORMAL /HPF
WBC CASTS URNS QL MICRO: ABNORMAL /LPF

## 2019-09-02 PROCEDURE — 70450 CT HEAD/BRAIN W/O DYE: CPT

## 2019-09-02 PROCEDURE — 84439 ASSAY OF FREE THYROXINE: CPT | Performed by: EMERGENCY MEDICINE

## 2019-09-02 PROCEDURE — 99285 EMERGENCY DEPT VISIT HI MDM: CPT | Performed by: EMERGENCY MEDICINE

## 2019-09-02 PROCEDURE — 1124F ACP DISCUSS-NO DSCNMKR DOCD: CPT | Performed by: EMERGENCY MEDICINE

## 2019-09-02 PROCEDURE — 84484 ASSAY OF TROPONIN QUANT: CPT | Performed by: INTERNAL MEDICINE

## 2019-09-02 PROCEDURE — 72125 CT NECK SPINE W/O DYE: CPT

## 2019-09-02 PROCEDURE — 84443 ASSAY THYROID STIM HORMONE: CPT | Performed by: EMERGENCY MEDICINE

## 2019-09-02 PROCEDURE — 99285 EMERGENCY DEPT VISIT HI MDM: CPT

## 2019-09-02 PROCEDURE — 99223 1ST HOSP IP/OBS HIGH 75: CPT | Performed by: INTERNAL MEDICINE

## 2019-09-02 PROCEDURE — 85025 COMPLETE CBC W/AUTO DIFF WBC: CPT | Performed by: EMERGENCY MEDICINE

## 2019-09-02 PROCEDURE — 81001 URINALYSIS AUTO W/SCOPE: CPT

## 2019-09-02 PROCEDURE — 84484 ASSAY OF TROPONIN QUANT: CPT | Performed by: EMERGENCY MEDICINE

## 2019-09-02 PROCEDURE — 96360 HYDRATION IV INFUSION INIT: CPT

## 2019-09-02 PROCEDURE — 80048 BASIC METABOLIC PNL TOTAL CA: CPT | Performed by: EMERGENCY MEDICINE

## 2019-09-02 PROCEDURE — 71046 X-RAY EXAM CHEST 2 VIEWS: CPT

## 2019-09-02 PROCEDURE — 36415 COLL VENOUS BLD VENIPUNCTURE: CPT | Performed by: EMERGENCY MEDICINE

## 2019-09-02 RX ORDER — ATORVASTATIN CALCIUM 10 MG/1
20 TABLET, FILM COATED ORAL
Status: DISCONTINUED | OUTPATIENT
Start: 2019-09-02 | End: 2019-09-04 | Stop reason: HOSPADM

## 2019-09-02 RX ORDER — LEVOTHYROXINE SODIUM 88 UG/1
88 TABLET ORAL
Status: DISCONTINUED | OUTPATIENT
Start: 2019-09-03 | End: 2019-09-04 | Stop reason: HOSPADM

## 2019-09-02 RX ORDER — NIACIN 500 MG/1
2000 TABLET, EXTENDED RELEASE ORAL DAILY
Status: DISCONTINUED | OUTPATIENT
Start: 2019-09-03 | End: 2019-09-04 | Stop reason: HOSPADM

## 2019-09-02 RX ORDER — HEPARIN SODIUM 5000 [USP'U]/ML
5000 INJECTION, SOLUTION INTRAVENOUS; SUBCUTANEOUS EVERY 8 HOURS SCHEDULED
Status: DISCONTINUED | OUTPATIENT
Start: 2019-09-02 | End: 2019-09-04 | Stop reason: HOSPADM

## 2019-09-02 RX ORDER — ASPIRIN 81 MG/1
81 TABLET ORAL DAILY
Status: DISCONTINUED | OUTPATIENT
Start: 2019-09-03 | End: 2019-09-04 | Stop reason: HOSPADM

## 2019-09-02 RX ORDER — SODIUM CHLORIDE 9 MG/ML
100 INJECTION, SOLUTION INTRAVENOUS CONTINUOUS
Status: DISCONTINUED | OUTPATIENT
Start: 2019-09-02 | End: 2019-09-03

## 2019-09-02 RX ORDER — NICOTINE 21 MG/24HR
1 PATCH, TRANSDERMAL 24 HOURS TRANSDERMAL DAILY
Status: DISCONTINUED | OUTPATIENT
Start: 2019-09-02 | End: 2019-09-03

## 2019-09-02 RX ORDER — DONEPEZIL HYDROCHLORIDE 10 MG/1
10 TABLET, FILM COATED ORAL
Status: DISCONTINUED | OUTPATIENT
Start: 2019-09-02 | End: 2019-09-04 | Stop reason: HOSPADM

## 2019-09-02 RX ORDER — CITALOPRAM 20 MG/1
10 TABLET ORAL DAILY
Status: DISCONTINUED | OUTPATIENT
Start: 2019-09-03 | End: 2019-09-04 | Stop reason: HOSPADM

## 2019-09-02 RX ADMIN — SODIUM CHLORIDE 100 ML/HR: 0.9 INJECTION, SOLUTION INTRAVENOUS at 16:19

## 2019-09-02 RX ADMIN — DONEPEZIL HYDROCHLORIDE 10 MG: 10 TABLET, FILM COATED ORAL at 21:43

## 2019-09-02 RX ADMIN — ATORVASTATIN CALCIUM 20 MG: 80 TABLET, FILM COATED ORAL at 16:19

## 2019-09-02 RX ADMIN — NICOTINE 1 PATCH: 14 PATCH TRANSDERMAL at 16:19

## 2019-09-02 RX ADMIN — HEPARIN SODIUM 5000 UNITS: 5000 INJECTION INTRAVENOUS; SUBCUTANEOUS at 21:43

## 2019-09-02 RX ADMIN — SODIUM CHLORIDE 1000 ML: 0.9 INJECTION, SOLUTION INTRAVENOUS at 13:17

## 2019-09-02 NOTE — ASSESSMENT & PLAN NOTE
-will hold ACE-inhibitor, hydrochlorothiazide  -patient's blood pressure is normotensive/hypotensive there for at this point will monitor blood pressure and continue with IV fluids

## 2019-09-02 NOTE — ED PROVIDER NOTES
History  Chief Complaint   Patient presents with   [facility] Fall     Per daughter, she was upstairs when she heard a "loud thud" from downstairs  She states she came downstairs and her mother was on the floor and told her she became weak and fell  Daughter reporting patient was "clammy" when she found her  No known LOC, daughter states patient is not on blood thinners  This is a 68-year-old female with a history of Alzheimer's dementia, hyperlipidemia, hypertension who presents with an unwitnessed fall  The patient lives at home with her daughter  The daughter states that the patient went to the bathroom and walked downstairs  She then heard a "loud thud"  She went downstairs to find the patient awake and alert and on the floor  The patient is unsure what happened  Unsure of head strike or loss of consciousness  Daughter was able to help the patient onto the couch  The daughter states that the patient did look clammy while on the floor  When interviewing the patient, she is unable to remember the events of this morning  This is baseline according to the daughter  Currently, the patient has no complaints  The daughter states that over the past few days, the patient has been sleeping more than usual   However, she offers no other complaints  The patient is alert and oriented x2 (to person and place)  Denies fever/chills, nausea/vomiting, lightheadedness/dizziness, numbness/weakness, headache, change in vision, URI symptoms, neck pain, chest pain, palpitations, shortness of breath, cough, back pain, flank pain, abdominal pain, diarrhea, hematochezia, melena, dysuria, hematuria, abnormal vaginal discharge/bleeding  Prior to Admission Medications   Prescriptions Last Dose Informant Patient Reported? Taking?   aspirin 81 MG tablet   Yes No   Sig: Take 81 mg by mouth daily     atorvastatin (LIPITOR) 20 mg tablet   No Yes   Sig: Take 1 tablet (20 mg total) by mouth daily   calcium-vitamin D (OSCAL) 250-125 MG-UNIT per tablet   Yes Yes   Sig: Take 1 tablet by mouth daily  citalopram (CeleXA) 10 mg tablet   No Yes   Sig: Take 1 tablet (10 mg total) by mouth daily   cyanocobalamin 1,000 mcg/mL   No Yes   Sig: Injection Q 6-8 weeks re PA   donepezil (ARICEPT) 10 mg tablet   No Yes   Sig: Take 1 tablet (10 mg total) by mouth daily at bedtime   ergocalciferol (VITAMIN D2) 50,000 units   Yes Yes   levothyroxine 88 mcg tablet   Yes Yes   Si tablet daily   lisinopril-hydrochlorothiazide (PRINZIDE,ZESTORETIC) 20-12 5 MG per tablet   No Yes   Sig: Take 1 tablet by mouth 2 (two) times a day   niacin (NIASPAN) 1000 MG CR tablet   No Yes   Sig: Take 2 tablets (2,000 mg total) by mouth daily      Facility-Administered Medications Last Administration Doses Remaining   cyanocobalamin injection 1,000 mcg 2019  2:03 PM           Past Medical History:   Diagnosis Date    Bed bug bite     LAST ASSESSED [id number]    Disease of thyroid gland     Graves' disease     Hyperlipidemia     Hypertension     LLL pneumonia ([facility] [address] )     LAST ASSESSED [id number]    Migraine     OCULAR    Scabies     LAST ASSESSED [id number]    Syncope and collapse     LAST ASSESSED [id number]    Vertigo     RESOLVED        Past Surgical History:   Procedure Laterality Date    CHOLECYSTECTOMY      HYSTERECTOMY      TOTAL THYROIDECTOMY      TUBAL LIGATION Bilateral        Family History   Problem Relation Age of Onset    Breast cancer Sister     Diabetes Family      I have reviewed and agree with the history as documented  Social History     Tobacco Use    Smoking status: Current Every Day Smoker     Packs/day: 0 50    Smokeless tobacco: Never Used   Substance Use Topics    Alcohol use: No    Drug use: No        Review of Systems   Constitutional: Negative for chills and fever  HENT: Negative for rhinorrhea, sore throat and trouble swallowing  Eyes: Negative for photophobia and visual disturbance     Respiratory: Negative for cough, chest tightness and shortness of breath  Cardiovascular: Negative for chest pain, palpitations and leg swelling  Gastrointestinal: Negative for abdominal pain, blood in stool, diarrhea, nausea and vomiting  Endocrine: Negative for polyuria  Genitourinary: Negative for dysuria, flank pain, hematuria, vaginal bleeding and vaginal discharge  Musculoskeletal: Negative for back pain and neck pain  Skin: Negative for color change and rash  Allergic/Immunologic: Negative for immunocompromised state  Neurological: Negative for dizziness, weakness, light-headedness, numbness and headaches  All other systems reviewed and are negative  Physical Exam  Physical Exam   Constitutional: Vital signs are normal  She appears well-developed and well-nourished  She is cooperative  Non-toxic appearance  She does not appear ill  HENT:   Head: Normocephalic and atraumatic  Right Ear: Hearing, tympanic membrane, external ear and ear canal normal  No hemotympanum  Left Ear: Hearing, tympanic membrane, external ear and ear canal normal  No hemotympanum  Nose: Nose normal    Mouth/Throat: Uvula is midline, oropharynx is clear and moist and mucous membranes are normal  No tonsillar exudate  Eyes: Pupils are equal, round, and reactive to light  Conjunctivae, EOM and lids are normal    Neck: Trachea normal, normal range of motion and full passive range of motion without pain  Neck supple  No spinous process tenderness present  Cardiovascular: Normal rate, regular rhythm and normal pulses  Pulses:       Radial pulses are 2+ on the right side, and 2+ on the left side  Dorsalis pedis pulses are 2+ on the right side, and 2+ on the left side  Pulmonary/Chest: Effort normal and breath sounds normal  She exhibits no tenderness, no bony tenderness and no crepitus  Abdominal: Soft  Normal appearance and bowel sounds are normal  There is no tenderness   There is no rigidity, no rebound, no guarding, no CVA tenderness, no tenderness at McBurney's point and negative Elliott's sign  Musculoskeletal:   No C-spine, T-spine, L-spine tenderness  No bony tenderness throughout but otherwise noted  No other evidence of trauma  Patient able to range all joints without pain  Pelvis is stable and nontender  Negative MEEK/FADIR  Neurological: She is alert  She has normal strength  No cranial nerve deficit or sensory deficit  Coordination normal  GCS eye subscore is 4  GCS verbal subscore is 5  GCS motor subscore is 6  Cranial nerves 2-12 intact, strength 5/5 throughout, sensation intact throughout  Visual fields normal  Normal finger-to-nose  Psychiatric: She has a normal mood and affect   Her speech is normal and behavior is normal  Thought content normal        Vital Signs  ED Triage Vitals   Temperature Pulse Respirations Blood Pressure SpO2   09/02/19 1301 09/02/19 1301 09/02/19 1301 09/02/19 1301 09/02/19 1301   98 4 °F (36 9 °C) 84 18 99/57 94 %      Temp Source Heart Rate Source Patient Position - Orthostatic VS BP Location FiO2 (%)   09/02/19 1301 09/02/19 1301 09/02/19 1301 09/02/19 1301 --   Oral Monitor Lying Right arm       Pain Score       09/02/19 1303       No Pain           Vitals:    09/02/19 1301 09/02/19 1458   BP: 99/57 109/59   Pulse: 84 86   Patient Position - Orthostatic VS: Lying Lying         Visual Acuity  Visual Acuity      Most Recent Value   L Pupil Size (mm)  2   R Pupil Size (mm)  2          ED Medications  Medications   sodium chloride 0 9 % bolus 1,000 mL (0 mL Intravenous Stopped 9/2/19 1422)       Diagnostic Studies  Results Reviewed     Procedure Component Value Units Date/Time    Urine Microscopic [[id number]]  (Abnormal) Collected:  09/02/19 1416    Lab Status:  Final result Specimen:  Urine, Clean Catch Updated:  09/02/19 1438     RBC, UA 1-2 /hpf      WBC, UA 2-4 /hpf      Epithelial Cells Occasional /hpf      Bacteria, UA Occasional /hpf      WBC Casts, UA 0-3 /lpf     POCT urinalysis dipstick [[id number]]  (Abnormal) Resulted:  09/02/19 1423    Lab Status:  Final result Specimen:  Urine Updated:  09/02/19 1423    ED Urine Macroscopic [[id number]]  (Abnormal) Collected:  09/02/19 1416    Lab Status:  Final result Specimen:  Urine Updated:  09/02/19 1417     Color, UA Yellow     Clarity, UA Clear     pH, UA 5 0     Leukocytes, UA Small     Nitrite, UA Negative     Protein,  (2+) mg/dl      Glucose, UA Negative mg/dl      Ketones, UA 15 (1+) mg/dl      Urobilinogen, UA 0 2 E U /dl      Bilirubin, UA Interference- unable to analyze     Blood, UA Trace     Specific [place], UA 1 025    Narrative:       CLINITEK RESULT    TSH, 3rd generation with Free T4 reflex [[id number]]  (Abnormal) Collected:  09/02/19 1317    Lab Status:  Final result Specimen:  Blood from Arm, Left Updated:  09/02/19 1354     TSH 3RD GENERATON 3 849 uIU/mL     Narrative:       Patients undergoing fluorescein dye angiography may retain small amounts of fluorescein in the body for 48-72 hours post procedure  Samples containing fluorescein can produce falsely depressed TSH values  If the patient had this procedure,a specimen should be resubmitted post fluorescein clearance  T4, free [[id number]] Collected:  09/02/19 1317    Lab Status:   In process Specimen:  Blood from Arm, Left Updated:  09/02/19 1354    Troponin I [[id number]]  (Normal) Collected:  09/02/19 1316    Lab Status:  Final result Specimen:  Blood from Arm, Left Updated:  09/02/19 1343     Troponin I 0 02 ng/mL     Basic metabolic panel [[id number]]  (Abnormal) Collected:  09/02/19 1316    Lab Status:  Final result Specimen:  Blood from Arm, Left Updated:  09/02/19 1334     Sodium 137 mmol/L      Potassium 4 2 mmol/L      Chloride 99 mmol/L      CO2 25 mmol/L      ANION GAP 13 mmol/L      BUN 46 mg/dL      Creatinine 1 84 mg/dL      Glucose 94 mg/dL      Calcium 9 3 mg/dL      eGFR 26 ml/min/1 73sq m     Narrative:       National Kidney Disease Foundation guidelines for Chronic Kidney Disease (CKD):     Stage 1 with normal or high GFR (GFR > 90 mL/min/1 73 square meters)    Stage 2 Mild CKD (GFR = 60-89 mL/min/1 73 square meters)    Stage 3A Moderate CKD (GFR = 45-59 mL/min/1 73 square meters)    Stage 3B Moderate CKD (GFR = 30-44 mL/min/1 73 square meters)    Stage 4 Severe CKD (GFR = 15-29 mL/min/1 73 square meters)    Stage 5 End Stage CKD (GFR <15 mL/min/1 73 square meters)  Note: GFR calculation is accurate only with a steady state creatinine    CBC and differential [[id number]]  (Abnormal) Collected:  09/02/19 1316    Lab Status:  Final result Specimen:  Blood from Arm, Left Updated:  09/02/19 1326     WBC 14 17 Thousand/uL      RBC 4 53 Million/uL      Hemoglobin 13 5 g/dL      Hematocrit 42 3 %      MCV 93 fL      MCH 29 8 pg      MCHC 31 9 g/dL      RDW 14 0 %      MPV 10 1 fL      Platelets 195 Thousands/uL      nRBC 0 /100 WBCs      Neutrophils Relative 82 %      Immat GRANS % 1 %      Lymphocytes Relative 9 %      Monocytes Relative 8 %      Eosinophils Relative 0 %      Basophils Relative 0 %      Neutrophils Absolute 11 68 Thousands/µL      Immature Grans Absolute 0 07 Thousand/uL      Lymphocytes Absolute 1 28 Thousands/µL      Monocytes Absolute 1 06 Thousand/µL      Eosinophils Absolute 0 04 Thousand/µL      Basophils Absolute 0 04 Thousands/µL                  XR chest 2 views   Final Result by Kate Hull MD (09/02 1407)      No acute cardiopulmonary disease  Workstation performed: [id number]         CT cervical spine without contrast   Final Result by Deidra Kong MD (09/02 1340)      No cervical spine fracture or traumatic malalignment  Workstation performed: [id number]         CT head without contrast   Final Result by Deidra Kong MD (09/02 1335)      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  No acute intracranial abnormality                          Workstation performed: [id number]                    Procedures  ECG 12 Lead Documentation Only  Date/Time: 9/2/2019 1:05 PM  Performed by: Hilary Ferrer MD  Authorized by: Hilary Ferrer MD     ECG reviewed by me, the ED Provider: yes    Patient location:  ED  Previous ECG:     Previous ECG:  Compared to current    Comparison ECG info:  9/24/17    Similarity:  No change    Comparison to cardiac monitor: Yes    Interpretation:     Interpretation: normal    Rate:     ECG rate:  74    ECG rate assessment: normal    Rhythm:     Rhythm: sinus rhythm    Ectopy:     Ectopy: none    QRS:     QRS axis:  Normal    QRS intervals:  Normal  Conduction:     Conduction: normal    ST segments:     ST segments:  Normal  T waves:     T waves: normal             ED Course  ED Course as of Sep 02 1518   Mon Sep 02, 2019   1326 Will await urine sample, afebrile, not tachy    WBC(!): 14 17   1349 Layla, receiving fluids   Creatinine(!): 1 84                               MDM  Number of Diagnoses or Management Options  Diagnosis management comments:   Vasovagal syncope versus fall  The patient does not take any blood thinners or aspirin  Will check CT head, neck, chest x-ray  Will check lab work and urinalysis  Disposition pending results and ambulatory test       Disposition  Final diagnoses:   Fall, initial encounter   LAYLA (acute kidney injury) ([facility] [address] )   Leukocytosis     Time reflects when diagnosis was documented in both MDM as applicable and the Disposition within this note     Time User Action Codes Description Comment    9/2/2019  2:42 PM Goldie Saver Add [O74  XXXA] Fall, initial encounter     9/2/2019  2:42 PM Randal BATES Add [N17 9] [facility]     9/2/2019  2:42 PM Goldie Saver Add [L98 [id number]] Leukocytosis       ED Disposition     ED Disposition Condition Date/Time Comment    Admit Stable Mon Sep 2, 2019  2:51 PM Case was discussed with Dr Livia Alexander and the patient's admission status was agreed to be Admission Status: inpatient status to the service of Dr Cr Choi   Follow-up Information    None         Patient's Medications   Discharge Prescriptions    No medications on file     No discharge procedures on file      ED Provider  Electronically Signed by           Félix Whitman MD  09/02/19 0190

## 2019-09-02 NOTE — ASSESSMENT & PLAN NOTE
-patient with creatinine of 1 84, baseline creatinine is close to 1 2  -likely prerenal from dehydration from recent diarrhea for the last 2 days  -continue with IV fluids, hold ACE-inhibitor/hydrochlorothiazide  -monitor renal function and avoid nephrotoxin

## 2019-09-02 NOTE — H&P
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, no acute intracranial abnormality  - CT cervical spine negative for any fracture or traumatic malalignment -patient currently at her baseline mental status, no focal neurological deficits  -PT OT eval    LAYLA (acute kidney injury) ([facility] [address] )  Assessment & Plan  -patient with creatinine of 1 84, baseline creatinine is close to 1 2  -likely prerenal from dehydration from recent diarrhea for the last 2 days  -urinalysis reveals trace blood, negative nitrite, small leukocytes, WBC 2-4, occasional bacteria  -patient denies any dysuria, hematuria  -continue with IV fluids, hold ACE-inhibitor/hydrochlorothiazide  -monitor renal function and avoid nephrotoxin    Leukocytosis  Assessment & Plan  -white blood cell count 14, patient afebrile  -will defer antibiotics at this point, monitor CBC    Essential hypertension  Assessment & Plan  -will hold ACE-inhibitor, hydrochlorothiazide  -patient's blood pressure is normotensive/hypotensive there for at this point will monitor blood pressure and continue with IV fluids    Major depressive disorder with single episode  Assessment & Plan  -continue with Celexa    Late onset Alzheimer's disease with behavioral disturbance  Assessment & Plan  -continue with Aricept  -mental status at baseline    Hypercholesterolemia  Assessment & Plan  -resume statin    Postoperative hypothyroidism  Assessment & Plan  -continue levothyroxine  -TSH 3 849        VTE Prophylaxis: Heparin  / sequential compression device   Code Status: DNR/DNI  POLST: There is no POLST form on file for this patient (pre-hospital)    Anticipated Length of Stay:  Patient will be admitted on an Inpatient basis with an anticipated length of stay of  greater than 2 midnights  Justification for Hospital Stay:  Fall, acute kidney injury    Total Time for Visit, including Counseling / Coordination of Care: 30 minutes  Greater than 50% of this total time spent on direct patient counseling and coordination of care  Chief Complaint:   Fall    History of Present Illness:    Arun Johnston is a 74 y o  female who presents with fall  Patient has history of Alzheimer's dementia, hyperlipidemia, hypertension, hypothyroidism who lives at home with her daughter and walks without assistance  As per daughter, at bedside patient woke up to go to the bathroom and then went down the stairs  Daughter heard a "loud thump"she went downstairs and found her mother awake and alert on the floor  Patient cannot recall details of events that took place  She knows she was on the floor  She does not know how she got there  Patient denies any headaches, chest pain, palpitations, dyspnea  Denies any nausea, vomiting, fever, chills  Patient currently has no other complaints  Daughter does state that patient has had been having diarrhea for the last 2 days cannot recall rosey times per day  No recent travel or sick contact  Upon arrival to the ED patient's vitals are stable, blood work significant for acute kidney injury and mild leukocytosis  Patient will be admitted for further management and evaluation  Review of Systems:    Review of Systems   All other systems reviewed and are negative        Past Medical and Surgical History:     Past Medical History:   Diagnosis Date    Bed bug bite     LAST ASSESSED [id number]    Disease of thyroid gland     Graves' disease     Hyperlipidemia     Hypertension     LLL pneumonia ([facility] [address] )     LAST ASSESSED [id number]    Migraine 2001    OCULAR    Scabies     LAST ASSESSED [id number]    Syncope and collapse     LAST ASSESSED [id number]    Vertigo     RESOLVED        Past Surgical History:   Procedure Laterality Date    CHOLECYSTECTOMY      HYSTERECTOMY      TOTAL THYROIDECTOMY      TUBAL LIGATION Bilateral        Meds/Allergies:    Prior to Admission medications    Medication Sig Start Date End Date Taking? Authorizing Provider   atorvastatin (LIPITOR) 20 mg tablet Take 1 tablet (20 mg total) by mouth daily 7/9/19  Yes Juan Coronado DO   calcium-vitamin D (OSCAL) 250-125 MG-UNIT per tablet Take 1 tablet by mouth daily  Yes Historical Provider, MD   citalopram (CeleXA) 10 mg tablet Take 1 tablet (10 mg total) by mouth daily 4/17/19  Yes Natalie Taylor MD   cyanocobalamin 1,000 mcg/mL Injection Q 6-8 weeks re PA 6/20/19  Yes Juan Coronado DO   donepezil (ARICEPT) 10 mg tablet Take 1 tablet (10 mg total) by mouth daily at bedtime 6/18/19  Yes Juan Coronado DO   ergocalciferol (VITAMIN D2) 50,000 units  3/18/19  Yes Historical Provider, MD   levothyroxine 88 mcg tablet 1 tablet daily 11/29/18  Yes Historical Provider, MD   lisinopril-hydrochlorothiazide (PRINZIDE,ZESTORETIC) 20-12 5 MG per tablet Take 1 tablet by mouth 2 (two) times a day 6/18/19  Yes Juan Coronado DO   niacin (NIASPAN) 1000 MG CR tablet Take 2 tablets (2,000 mg total) by mouth daily 7/9/19  Yes Juan Coronado DO   aspirin 81 MG tablet Take 81 mg by mouth daily  Historical Provider, MD     I have reviewed home medications with patient personally  Allergies:    Allergies   Allergen Reactions    Naproxen Itching    Nsaids Other (See Comments) and Swelling    Azithromycin Nausea Only and Rash    Cephalexin Rash       Social History:     Social History     Substance and Sexual Activity   Alcohol Use Never    Binge frequency: Never     Social History     Tobacco Use   Smoking Status Current Every Day Smoker    Packs/day: 0 50   Smokeless Tobacco Never Used     Social History     Substance and Sexual Activity   Drug Use No       Family History:    Family History   Problem Relation Age of Onset    Breast cancer Sister     Diabetes Family        Physical Exam:     Vitals:   Blood Pressure: 108/58 (09/02/19 1518)  Pulse: 70 (09/02/19 1518)  Temperature: 98 2 °F (36 8 °C) (09/02/19 1518)  Temp Source: Temporal (09/02/19 1518)  Respirations: 18 (09/02/19 1518)  Height: 5' 2" (157 5 cm) (09/02/19 1518)  Weight - Scale: 54 1 kg (119 lb 4 3 oz) (09/02/19 1518)  SpO2: 96 % (09/02/19 1518)    Constitutional: Patient is oriented to person, and place-which is at her baseline  HEENT:  Normocephalic, atraumatic, EOMI, PERRLA, no scleral icterus, no pallor, moist oral mucosa  Neck:  Supple, no masses, no thyromegaly, no bruits Normal range of motion  Lymph nodes:  No lymphadenopathy  Cardiovascular: Normal S1S2, RRR, No murmurs/rubs/gallops appreciated  Pulmonary:  Bilateral air entry, No rhonchi/rales/wheezing appreciated  Abdominal: Soft, Bowel sounds present, Non-tender, Non-distended, No rebound/guarding, no hepatomegaly   Musculoskeletal: No tenderness/abnormality   Extremities:  No cyanosis, clubbing or edema  Peripheral pulses palpable and equal bilaterally  Neurological: Cranial nerves II-XII grossly intact, sensation intact, otherwise no focal neurological symptoms  Skin: Skin is warm and dry, no rashes  Additional Data:     Lab Results: I have personally reviewed pertinent reports        Results from last 7 days   Lab Units 09/02/19  1316   WBC Thousand/uL 14 17*   HEMOGLOBIN g/dL 13 5   HEMATOCRIT % 42 3   PLATELETS Thousands/uL 314   NEUTROS PCT % 82*   LYMPHS PCT % 9*   MONOS PCT % 8   EOS PCT % 0     Results from last 7 days   Lab Units 09/02/19  1316   POTASSIUM mmol/L 4 2   CHLORIDE mmol/L 99*   CO2 mmol/L 25   BUN mg/dL 46*   CREATININE mg/dL 1 84*   CALCIUM mg/dL 9 3           Imaging: I have personally reviewed pertinent reports  Xr Chest 2 Views    Result Date: 9/2/2019  Narrative: CHEST INDICATION:   fall  COMPARISON:  Chest x-ray dated September 24, 2017  EXAM PERFORMED/VIEWS:  XR CHEST PA & LATERAL FINDINGS: Cardiomediastinal silhouette appears unremarkable  The lungs are clear  No pneumothorax or pleural effusion  Osseous structures appear within normal limits for patient age  Impression: No acute cardiopulmonary disease  Workstation performed: [id number]     Ct Head Without Contrast    Result Date: 9/2/2019  Narrative: CT BRAIN - WITHOUT CONTRAST INDICATION:  Syncope COMPARISON:  None  TECHNIQUE:  CT examination of the brain was performed  In addition to axial images, coronal reformatted images were created and submitted for interpretation  Radiation dose length product (DLP) for this visit:  415 mGy-cm   This examination, like all CT scans performed in the [facility], was performed utilizing techniques to minimize radiation dose exposure, including the use of iterative reconstruction and automated exposure control  IMAGE QUALITY:  Diagnostic  FINDINGS:  PARENCHYMA:  Encephalomalacia and volume loss left frontal opercular region, and right temporal parietal periventricular region representing sequela of prior infarct  Mild chronic microangiopathic changes are noted  No mass, mass effect, or midline shift  There is no parenchymal hemorrhage  VENTRICLES AND EXTRA-AXIAL SPACES:  Normal for patient's age  VISUALIZED ORBITS AND PARANASAL SINUSES:  Unremarkable  CALVARIUM AND EXTRACRANIAL SOFT TISSUES:   Normal      Impression: 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  No acute intracranial abnormality  Workstation performed: [id number]     Ct Cervical Spine Without Contrast    Result Date: 9/2/2019  Narrative: CT CERVICAL SPINE - WITHOUT CONTRAST INDICATION:   fall  COMPARISON:  None  TECHNIQUE:  CT examination of the cervical spine was performed without intravenous contrast   Contiguous axial images were obtained  Sagittal and coronal reconstructions were performed  Radiation dose length product (DLP) for this visit:  987 mGy-cm   This examination, like all CT scans performed in the [facility], was performed utilizing techniques to minimize radiation dose exposure, including the use of iterative reconstruction and automated exposure control  IMAGE QUALITY:  Diagnostic  FINDINGS: ALIGNMENT:  Normal alignment of the cervical spine  No subluxation  VERTEBRAL BODIES:  No fracture  DEGENERATIVE CHANGES:  Moderate multilevel cervical degenerative changes are noted  No critical central canal stenosis  PREVERTEBRAL AND PARASPINAL SOFT TISSUES:  Unremarkable  THORACIC INLET:  Normal      Impression: No cervical spine fracture or traumatic malalignment  Workstation performed: [id number]       EKG, Pathology, and Other Studies Reviewed on Admission:   · EKG:  Sinus arrhythmia, no acute ST-T changes    Allscripts / Epic Records Reviewed: Yes     ** Please Note: This note has been constructed using a voice recognition system   **

## 2019-09-02 NOTE — PLAN OF CARE
Problem: Potential for Falls  Goal: Patient will remain free of falls  Description  INTERVENTIONS:  - Assess patient frequently for physical needs  -  Identify cognitive and physical deficits and behaviors that affect risk of falls  -  [place] fall precautions as indicated by assessment   - Educate patient/family on patient safety including physical limitations  - Instruct patient to call for assistance with activity based on assessment  - Modify environment to reduce risk of injury  - Consider OT/PT consult to assist with strengthening/mobility  Outcome: Progressing     Problem: DISCHARGE PLANNING  Goal: Discharge to home or other facility with appropriate resources  Description  INTERVENTIONS:  - Identify barriers to discharge w/patient and caregiver  - Arrange for needed discharge resources and transportation as appropriate  - Identify discharge learning needs (meds, wound care, etc )  - Arrange for interpretive services to assist at discharge as needed  - Refer to Case Management Department for coordinating discharge planning if the patient needs post-hospital services based on physician/advanced practitioner order or complex needs related to functional status, cognitive ability, or social support system  Outcome: Progressing     Problem: Knowledge Deficit  Goal: Patient/family/caregiver demonstrates understanding of disease process, treatment plan, medications, and discharge instructions  Description  Complete learning assessment and assess knowledge base    Interventions:  - Provide teaching at level of understanding  - Provide teaching via preferred learning methods  Outcome: Progressing     Problem: Prexisting or High Potential for Compromised Skin Integrity  Goal: Skin integrity is maintained or improved  Description  INTERVENTIONS:  - Identify patients at risk for skin breakdown  - Assess and monitor skin integrity  - Assess and monitor nutrition and hydration status  - Monitor labs   - Assess for incontinence   - Turn and reposition patient  - Assist with mobility/ambulation  - Relieve pressure over bony prominences  - Avoid friction and shearing  - Provide appropriate hygiene as needed including keeping skin clean and dry  - Evaluate need for skin moisturizer/barrier cream  - Collaborate with interdisciplinary team   - Patient/family teaching  - Consider wound care consult   Outcome: Progressing     Problem: CARDIOVASCULAR - ADULT  Goal: Maintains optimal cardiac output and hemodynamic stability  Description  INTERVENTIONS:  - Monitor I/O, vital signs and rhythm  - Monitor for S/S and trends of decreased cardiac output  - Administer and titrate ordered vasoactive medications to optimize hemodynamic stability  - Assess quality of pulses, skin color and temperature  - Assess for signs of decreased coronary artery perfusion  - Instruct patient to report change in severity of symptoms  Outcome: Progressing     Problem: RESPIRATORY - ADULT  Goal: Achieves optimal ventilation and oxygenation  Description  INTERVENTIONS:  - Assess for changes in respiratory status  - Assess for changes in mentation and behavior  - Position to facilitate oxygenation and minimize respiratory effort  - Oxygen administered by appropriate delivery if ordered  - Initiate smoking cessation education as indicated  - Encourage broncho-pulmonary hygiene including cough, deep breathe, Incentive Spirometry  - Assess the need for suctioning and aspirate as needed  - Assess and instruct to report SOB or any respiratory difficulty  - Respiratory Therapy support as indicated  Outcome: Progressing     Problem: METABOLIC, FLUID AND ELECTROLYTES - ADULT  Goal: Electrolytes maintained within normal limits  Description  INTERVENTIONS:  - Monitor labs and assess patient for signs and symptoms of electrolyte imbalances  - Administer electrolyte replacement as ordered  - Monitor response to electrolyte replacements, including repeat lab results as appropriate  - Instruct patient on fluid and nutrition as appropriate  Outcome: Progressing  Goal: Fluid balance maintained  Description  INTERVENTIONS:  - Monitor labs   - Monitor I/O and WT  - Instruct patient on fluid and nutrition as appropriate  - Assess for signs & symptoms of volume excess or deficit  Outcome: Progressing     Problem: SKIN/TISSUE INTEGRITY - ADULT  Goal: Skin integrity remains intact  Description  INTERVENTIONS  - Identify patients at risk for skin breakdown  - Assess and monitor skin integrity  - Assess and monitor nutrition and hydration status  - Monitor labs (i e  albumin)  - Assess for incontinence   - Turn and reposition patient  - Assist with mobility/ambulation  - Relieve pressure over bony prominences  - Avoid friction and shearing  - Provide appropriate hygiene as needed including keeping skin clean and dry  - Evaluate need for skin moisturizer/barrier cream  - Collaborate with interdisciplinary team (i e  Nutrition, Rehabilitation, etc )   - Patient/family teaching  Outcome: Progressing     Problem: PAIN - ADULT  Goal: Verbalizes/displays adequate comfort level or baseline comfort level  Description  Interventions:  - Encourage patient to monitor pain and request assistance  - Assess pain using appropriate pain scale  - Administer analgesics based on type and severity of pain and evaluate response  - Implement non-pharmacological measures as appropriate and evaluate response  - Consider cultural and social influences on pain and pain management  - Notify physician/advanced practitioner if interventions unsuccessful or patient reports new pain  Outcome: Progressing     Problem: INFECTION - ADULT  Goal: Absence or prevention of progression during hospitalization  Description  INTERVENTIONS:  - Assess and monitor for signs and symptoms of infection  - Monitor lab/diagnostic results  - Monitor all insertion sites, i e  indwelling lines, tubes, and drains  - Monitor endotracheal if appropriate and nasal secretions for changes in amount and color  - [place] appropriate cooling/warming therapies per order  - Administer medications as ordered  - Instruct and encourage patient and family to use good hand hygiene technique  - Identify and instruct in appropriate isolation precautions for identified infection/condition  Outcome: Progressing  Goal: Absence of fever/infection during neutropenic period  Description  INTERVENTIONS:  - Monitor WBC    Outcome: Progressing     Problem: SAFETY ADULT  Goal: Maintain or return to baseline ADL function  Description  INTERVENTIONS:  -  Assess patient's ability to carry out ADLs; assess patient's baseline for ADL function and identify physical deficits which impact ability to perform ADLs (bathing, care of mouth/teeth, toileting, grooming, dressing, etc )  - Assess/evaluate cause of self-care deficits   - Assess range of motion  - Assess patient's mobility; develop plan if impaired  - Assess patient's need for assistive devices and provide as appropriate  - Encourage maximum independence but intervene and supervise when necessary  - Involve family in performance of ADLs  - Assess for home care needs following discharge   - Consider OT consult to assist with ADL evaluation and planning for discharge  - Provide patient education as appropriate  Outcome: Progressing  Goal: Maintain or return mobility status to optimal level  Description  INTERVENTIONS:  - Assess patient's baseline mobility status (ambulation, transfers, stairs, etc )    - Identify cognitive and physical deficits and behaviors that affect mobility  - Identify mobility aids required to assist with transfers and/or ambulation (gait belt, sit-to-stand, lift, walker, cane, etc )  - [place] fall precautions as indicated by assessment  - Record patient progress and toleration of activity level on Mobility SBAR; progress patient to next Phase/Stage  - Instruct patient to call for assistance with activity based on assessment  - Consider rehabilitation consult to assist with strengthening/weightbearing, etc   Outcome: Progressing

## 2019-09-02 NOTE — TELEPHONE ENCOUNTER
Alexey Vela 1945  CONFIDENTIALTY NOTICE: This fax transmission is intended only for the addressee  It contains information that is legally privileged,  confidential or otherwise protected from use or disclosure  If you are not the intended recipient, you are strictly prohibited from reviewing,  disclosing, copying using or disseminating any of this information or taking any action in reliance on or regarding this information  If you have  received this fax in error, please notify us immediately by telephone so that we can arrange for its return to us  Page: 1  2  Call Id: [id number]  Health Call  Standard Call Report  Health Call  Patient Name: Alexey Vela  Gender: Female  : 1945  Age: 74 Y 6 M 13 D  Return Phone  Number: [phone] (Home)  Address: [address] [place] [address]/[facility]/Zip: [address] [place] [address]  Practice Name: Jorge Acharya [id number]  Practice Charged:  Physician:  0 [facility] Name: Aracelis Stevenson  Relationship To  Patient: Daughter  Return Phone Number: [phone] (Home)  Presenting Problem: "My mom for the past 2 days she  has been sleeping a lot  She felt sick  and fell over this morning  She feels  clammy "  Service Type: Triage  Charged Service 1: Virgen Matson U  38  Name and  Number:  Nurse Assessment  Nurse: Lopez Moreno Date/Time: 2019 12:32:08 PM  Type of assessment required:  ---General (Adult or Child)  Duration of Current S/S  ---Few days  Location/Radiation  ---Generalized  Temperature (F) and route:  ---None  Symptom Specific Meds (Dose/Time):  ---Nothing other than routine medication  Other S/S  ---Diarrhea on and off for the last few days  Sleeping more then normal  Today got out  of bed and fell over  Feels clammy to daughter  Pain Scale on scale of 1-10, 10 being the worst:  ---None  Symptom progression:  ---worse  Intake and Output  Alexey Vela 1945  CONFIDENTIALTY NOTICE: This fax transmission is intended only for the addressee   It contains information that is legally privileged,  confidential or otherwise protected from use or disclosure  If you are not the intended recipient, you are strictly prohibited from reviewing,  disclosing, copying using or disseminating any of this information or taking any action in reliance on or regarding this information  If you have  received this fax in error, please notify us immediately by telephone so that we can arrange for its return to us  Page: 2 of 2  Call Id: [id number]  Nurse Assessment  ---Not Assessed  Last Exam/Treatment:  ---June - Well Check  Protocols  Protocol Title Nurse Date/Time  Weakness (Generalized) and Fatigue Fatmata Poe 9/2/2019 12:35:31 PM  Question Caller Affirmed  Disp  Time Disposition Final User  9/2/2019 12:36:50 PM Go to ED Now (or PCP triage) Fatmata Vaughnm  9/2/2019 12:37:10 PM RN Triaged Yes Manolo Cook [facility] Advice Given Per Protocol  GO TO ED NOW (OR PCP TRIAGE): * IF NO PCP TRIAGE: You need to be seen  [facility] to the [facility] at [facility]  within the next hour  Leave as soon as you can  DRIVING: Another adult should drive  CARE ADVICE given per Weakness and Fatigue  (Adult) guideline    Caller Understands: Yes  Caller Disagree/Comply: Comply  PreDisposition: Unsure

## 2019-09-02 NOTE — ASSESSMENT & PLAN NOTE
-patient admitted for fall and weakness at home  -unclear whether this is a mechanical fall as patient cannot recall details of event due to dementia  -patient has had diarrhea in the past 2 days defer could be dehydration and possible mechanical fall as she was going down the stairs  -CT head negative, patient currently at her baseline mental status, no focal neurological deficits  -PT OT hanane

## 2019-09-02 NOTE — PLAN OF CARE
Problem: Potential for Falls  Goal: Patient will remain free of falls  Description  INTERVENTIONS:  - Assess patient frequently for physical needs  -  Identify cognitive and physical deficits and behaviors that affect risk of falls  -  [place]le fall precautions as indicated by assessment   - Educate patient/family on patient safety including physical limitations  - Instruct patient to call for assistance with activity based on assessment  - Modify environment to reduce risk of injury  - Consider OT/PT consult to assist with strengthening/mobility  Outcome: Progressing     Problem: DISCHARGE PLANNING  Goal: Discharge to home or other facility with appropriate resources  Description  INTERVENTIONS:  - Identify barriers to discharge w/patient and caregiver  - Arrange for needed discharge resources and transportation as appropriate  - Identify discharge learning needs (meds, wound care, etc )  - Arrange for interpretive services to assist at discharge as needed  - Refer to Case Management Department for coordinating discharge planning if the patient needs post-hospital services based on physician/advanced practitioner order or complex needs related to functional status, cognitive ability, or social support system  Outcome: Progressing     Problem: Knowledge Deficit  Goal: Patient/family/caregiver demonstrates understanding of disease process, treatment plan, medications, and discharge instructions  Description  Complete learning assessment and assess knowledge base    Interventions:  - Provide teaching at level of understanding  - Provide teaching via preferred learning methods  Outcome: Progressing

## 2019-09-03 PROBLEM — N17.9 AKI (ACUTE KIDNEY INJURY) (HCC): Status: RESOLVED | Noted: 2019-09-02 | Resolved: 2019-09-03

## 2019-09-03 PROBLEM — D64.9 ANEMIA: Status: ACTIVE | Noted: 2019-09-03

## 2019-09-03 LAB
ALBUMIN SERPL BCP-MCNC: 2.8 G/DL (ref 3.5–5)
ALP SERPL-CCNC: 65 U/L (ref 46–116)
ALT SERPL W P-5'-P-CCNC: 13 U/L (ref 12–78)
ANION GAP SERPL CALCULATED.3IONS-SCNC: 9 MMOL/L (ref 4–13)
AST SERPL W P-5'-P-CCNC: 17 U/L (ref 5–45)
BASOPHILS # BLD AUTO: 0.02 THOUSANDS/ΜL (ref 0–0.1)
BASOPHILS NFR BLD AUTO: 0 % (ref 0–1)
BILIRUB SERPL-MCNC: 0.4 MG/DL (ref 0.2–1)
BUN SERPL-MCNC: 33 MG/DL (ref 5–25)
CALCIUM SERPL-MCNC: 8.2 MG/DL (ref 8.3–10.1)
CHLORIDE SERPL-SCNC: 103 MMOL/L (ref 100–108)
CO2 SERPL-SCNC: 25 MMOL/L (ref 21–32)
CREAT SERPL-MCNC: 1.34 MG/DL (ref 0.6–1.3)
EOSINOPHIL # BLD AUTO: 0.14 THOUSAND/ΜL (ref 0–0.61)
EOSINOPHIL NFR BLD AUTO: 1 % (ref 0–6)
ERYTHROCYTE [DISTWIDTH] IN BLOOD BY AUTOMATED COUNT: 13.9 % (ref 11.6–15.1)
GFR SERPL CREATININE-BSD FRML MDRD: 39 ML/MIN/1.73SQ M
GLUCOSE SERPL-MCNC: 86 MG/DL (ref 65–140)
HCT VFR BLD AUTO: 33.6 % (ref 34.8–46.1)
HGB BLD-MCNC: 10.7 G/DL (ref 11.5–15.4)
IMM GRANULOCYTES # BLD AUTO: 0.04 THOUSAND/UL (ref 0–0.2)
IMM GRANULOCYTES NFR BLD AUTO: 0 % (ref 0–2)
LYMPHOCYTES # BLD AUTO: 2.64 THOUSANDS/ΜL (ref 0.6–4.47)
LYMPHOCYTES NFR BLD AUTO: 24 % (ref 14–44)
MCH RBC QN AUTO: 29.7 PG (ref 26.8–34.3)
MCHC RBC AUTO-ENTMCNC: 31.8 G/DL (ref 31.4–37.4)
MCV RBC AUTO: 93 FL (ref 82–98)
MONOCYTES # BLD AUTO: 0.9 THOUSAND/ΜL (ref 0.17–1.22)
MONOCYTES NFR BLD AUTO: 8 % (ref 4–12)
NEUTROPHILS # BLD AUTO: 7.19 THOUSANDS/ΜL (ref 1.85–7.62)
NEUTS SEG NFR BLD AUTO: 67 % (ref 43–75)
NRBC BLD AUTO-RTO: 0 /100 WBCS
PLATELET # BLD AUTO: 244 THOUSANDS/UL (ref 149–390)
PMV BLD AUTO: 10.1 FL (ref 8.9–12.7)
POTASSIUM SERPL-SCNC: 3.5 MMOL/L (ref 3.5–5.3)
PROT SERPL-MCNC: 5.7 G/DL (ref 6.4–8.2)
RBC # BLD AUTO: 3.6 MILLION/UL (ref 3.81–5.12)
SODIUM SERPL-SCNC: 137 MMOL/L (ref 136–145)
WBC # BLD AUTO: 10.93 THOUSAND/UL (ref 4.31–10.16)

## 2019-09-03 PROCEDURE — 80053 COMPREHEN METABOLIC PANEL: CPT | Performed by: INTERNAL MEDICINE

## 2019-09-03 PROCEDURE — 97116 GAIT TRAINING THERAPY: CPT

## 2019-09-03 PROCEDURE — 99232 SBSQ HOSP IP/OBS MODERATE 35: CPT | Performed by: PHYSICIAN ASSISTANT

## 2019-09-03 PROCEDURE — G8979 MOBILITY GOAL STATUS: HCPCS

## 2019-09-03 PROCEDURE — 85025 COMPLETE CBC W/AUTO DIFF WBC: CPT | Performed by: INTERNAL MEDICINE

## 2019-09-03 PROCEDURE — G8978 MOBILITY CURRENT STATUS: HCPCS

## 2019-09-03 PROCEDURE — 97163 PT EVAL HIGH COMPLEX 45 MIN: CPT

## 2019-09-03 RX ORDER — SODIUM CHLORIDE 9 MG/ML
100 INJECTION, SOLUTION INTRAVENOUS CONTINUOUS
Status: DISPENSED | OUTPATIENT
Start: 2019-09-03 | End: 2019-09-03

## 2019-09-03 RX ORDER — NICOTINE 21 MG/24HR
1 PATCH, TRANSDERMAL 24 HOURS TRANSDERMAL DAILY
Status: DISCONTINUED | OUTPATIENT
Start: 2019-09-03 | End: 2019-09-04 | Stop reason: HOSPADM

## 2019-09-03 RX ORDER — SODIUM CHLORIDE 9 MG/ML
100 INJECTION, SOLUTION INTRAVENOUS CONTINUOUS
Status: DISCONTINUED | OUTPATIENT
Start: 2019-09-03 | End: 2019-09-03

## 2019-09-03 RX ADMIN — NICOTINE 1 PATCH: 14 PATCH TRANSDERMAL at 22:21

## 2019-09-03 RX ADMIN — HEPARIN SODIUM 5000 UNITS: 5000 INJECTION INTRAVENOUS; SUBCUTANEOUS at 05:19

## 2019-09-03 RX ADMIN — CITALOPRAM HYDROBROMIDE 10 MG: 20 TABLET ORAL at 09:27

## 2019-09-03 RX ADMIN — NIACIN 2000 MG: 500 TABLET, EXTENDED RELEASE ORAL at 09:29

## 2019-09-03 RX ADMIN — DONEPEZIL HYDROCHLORIDE 10 MG: 10 TABLET, FILM COATED ORAL at 22:14

## 2019-09-03 RX ADMIN — SODIUM CHLORIDE 100 ML/HR: 0.9 INJECTION, SOLUTION INTRAVENOUS at 12:59

## 2019-09-03 RX ADMIN — LEVOTHYROXINE SODIUM 88 MCG: 88 TABLET ORAL at 05:19

## 2019-09-03 RX ADMIN — HEPARIN SODIUM 5000 UNITS: 5000 INJECTION INTRAVENOUS; SUBCUTANEOUS at 22:15

## 2019-09-03 RX ADMIN — HEPARIN SODIUM 5000 UNITS: 5000 INJECTION INTRAVENOUS; SUBCUTANEOUS at 13:00

## 2019-09-03 RX ADMIN — ATORVASTATIN CALCIUM 20 MG: 80 TABLET, FILM COATED ORAL at 16:22

## 2019-09-03 RX ADMIN — SODIUM CHLORIDE 100 ML/HR: 0.9 INJECTION, SOLUTION INTRAVENOUS at 01:16

## 2019-09-03 RX ADMIN — ASPIRIN 81 MG: 81 TABLET, COATED ORAL at 09:28

## 2019-09-03 RX ADMIN — NICOTINE 1 PATCH: 14 PATCH TRANSDERMAL at 09:27

## 2019-09-03 NOTE — ASSESSMENT & PLAN NOTE
· hgb dropped 13 5 to 10 7   · Could be aspect of dilution with IVF given   · No active signs of bleeding   · Will continue to monitor

## 2019-09-03 NOTE — ASSESSMENT & PLAN NOTE
· patient admitted for fall and weakness at home  · unclear whether this is a mechanical fall as patient cannot recall details of event due to dementia  · patient has had diarrhea in the past 2 days defer could be dehydration and possible mechanical fall as she was going down the stairs  · CT head negative, CT cervical spine negative for traumatic injuries, CXR without acute abnormalities   · Did present with LAYLA likely 2/2 dehydration as stated below now resolved   · patient currently at her baseline mental status, no focal neurological deficits  · PT OT eval pending

## 2019-09-03 NOTE — ASSESSMENT & PLAN NOTE
· patient admitted for fall and weakness at home  · unclear whether this is a mechanical fall as patient cannot recall details of event due to dementia  · patient has had diarrhea in the past 2 days defer could be dehydration and possible mechanical fall as she was going down the stairs  · CT head negative, CT cervical spine negative for traumatic injuries, CXR without acute abnormalities   · Did present with LAYLA likely 2/2 dehydration as stated below now resolved   · patient currently at her baseline mental status, no focal neurological deficits  · PT eval recommending home PT upon discharge   · Walker script provided   · Lives with daughter

## 2019-09-03 NOTE — ASSESSMENT & PLAN NOTE
· hgb dropped 13 5 to 10 7 improved today to 11 2 no evidence of active bleeding   · likely aspect of dilution with IVF given   · No active signs of bleeding   · Monitor CBC outpatient

## 2019-09-03 NOTE — UTILIZATION REVIEW
Initial Clinical Review    Admission: Date/Time/Statement: Inpatient Admission Orders (From admission, onward)     Ordered        09/02/19 1451  Inpatient Admission  Once                   Orders Placed This Encounter   Procedures    Inpatient Admission     Standing Status:   Standing     Number of Occurrences:   1     Order Specific Question:   Admitting Physician     Answer:   Alexsander Wang [[id number]]     Order Specific Question:   Level of Care     Answer:   Med Surg [[id number]]     Order Specific Question:   Estimated length of stay     Answer:   More than 2 Midnights     Order Specific Question:   Certification     Answer:   I certify that inpatient services are medically necessary for this patient for a duration of greater than two midnights  See H&P and MD Progress Notes for additional information about the patient's course of treatment  ED Arrival Information     Expected Arrival Acuity Means of Arrival Escorted By Service Admission Type    - 9/2/2019 12:51 Urgent Walk-In Family Member Hospitalist Urgent    Arrival Complaint    weekness, fall         Chief Complaint   Patient presents with   Mollie Sizer Fall     Per daughter, she was upstairs when she heard a "loud thud" from downstairs  She states she came downstairs and her mother was on the floor and told her she became weak and fell  Daughter reporting patient was "clammy" when she found her  No known LOC, daughter states patient is not on blood thinners        Assessment/Plan: Fall  Assessment & Plan  -unclear whether this is a mechanical fall as patient cannot recall details of event due to dementia  -patient has had diarrhea in the past 2 days defer could be dehydration and possible mechanical fall as she was going down the stairs  -CT 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, no acute intracranial abnormality  - CT cervical spine negative for any fracture or traumatic malalignment -patient currently at her baseline mental status, no focal neurological deficits  -PT OT eval     LAYLA (acute kidney injury) ([facility] [address] )  Assessment & Plan  -patient with creatinine of 1 84, baseline creatinine is close to 1 2  -likely prerenal from dehydration from recent diarrhea for the last 2 days  -urinalysis reveals trace blood, negative nitrite, small leukocytes, WBC 2-4, occasional bacteria  -continue with IV fluids, hold ACE-inhibitor/hydrochlorothiazide  -monitor renal function and avoid nephrotoxin     Leukocytosis  Assessment & Plan  -white blood cell count 14, patient afebrile  -will defer antibiotics at this point, monitor CBC     Essential hypertension  Assessment & Plan  -will hold ACE-inhibitor, hydrochlorothiazide  -patient's blood pressure is normotensive/hypotensive there for at this point will monitor blood pressure and continue with IV fluids      Late onset Alzheimer's disease with behavioral disturbance  Assessment & Plan  -continue with Aricept  -mental status at baseline          9/3  · present with LAYLA likely 2/2 dehydration as stated below now resolved   · patient currently at her baseline mental status, no focal neurological deficits  · Still with episodes of intermittent hypotension will continue to hole ACE-inhibitor and HCTZ to prevent hypotension and fluctuations in BP   · Continue with IVF hydration for now   · monitor renal function and avoid nephrotoxin        ED Triage Vitals   Temperature Pulse Respirations Blood Pressure SpO2   09/02/19 1301 09/02/19 1301 09/02/19 1301 09/02/19 1301 09/02/19 1301   98 4 °F (36 9 °C) 84 18 99/57 94 %      Temp Source Heart Rate Source Patient Position - Orthostatic VS BP Location FiO2 (%)   09/02/19 1301 09/02/19 1301 09/02/19 1301 09/02/19 1301 --   Oral Monitor Lying Right arm       Pain Score       09/02/19 1303       No Pain        Wt Readings from Last 1 Encounters:   09/02/19 54 1 kg (119 lb 4 3 oz)     Additional Vital Signs:   09/03/19 0754  96 8 °F (36 °C)Abnormal   84  18  125/63  94 %  None (Room air)  Lying   09/03/19 0024  97 8 °F (36 6 °C)  66  18  90/51  95 %  None (Room air)  Lying   09/02/19 1921    68    96/52      Lying   09/02/19 1916  98 °F (36 7 °C)  72  18  81/48Abnormal   96 %  None (Room air)  Sitting   09/02/19 1518  98 2 °F (36 8 °C)  70  18  108/58  96 %  None (Room air)  Lying   09/02/19 1458    86  18  109/59  97 %           Pertinent Labs/Diagnostic Test Results:   9/2/2019  - EKG , nsr   -CT 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, no acute intracranial abnormality  - CT cervical spine negative for any fracture or traumatic malalignment -patient currently at her baseline mental status, no focal neurological deficits  -  CXR -  nad    Results from last 7 days   Lab Units 09/03/19 0227 09/02/19  1316   WBC Thousand/uL 10 93* 14 17*   HEMOGLOBIN g/dL 10 7* 13 5   HEMATOCRIT % 33 6* 42 3   PLATELETS Thousands/uL 244 314   NEUTROS ABS Thousands/µL 7 19 11 68*         Results from last 7 days   Lab Units 09/03/19 0227 09/02/19  1316   SODIUM mmol/L 137 137   POTASSIUM mmol/L 3 5 4 2   CHLORIDE mmol/L 103 99*   CO2 mmol/L 25 25   ANION GAP mmol/L 9 13   BUN mg/dL 33* 46*   CREATININE mg/dL 1 34* 1 84*   EGFR ml/min/1 73sq m 39 26   CALCIUM mg/dL 8 2* 9 3     Results from last 7 days   Lab Units 09/03/19  0227   AST U/L 17   ALT U/L 13   ALK PHOS U/L 65   TOTAL PROTEIN g/dL 5 7*   ALBUMIN g/dL 2 8*   TOTAL BILIRUBIN mg/dL 0 40     Results from last 7 days   Lab Units 09/03/19 0227 09/02/19  1316   GLUCOSE RANDOM mg/dL 86 94       Results from last 7 days   Lab Units 09/02/19  2024 09/02/19  1714 09/02/19  1316   TROPONIN I ng/mL 0 02 <0 02 0 02     Results from last 7 days   Lab Units 09/02/19  1317   TSH 3RD GENERATON uIU/mL 3 849*     Results from last 7 days   Lab Units 09/02/19  1416   CLARITY UA  Clear   COLOR UA  Yellow   SPEC GRAV UA 1 025   PH UA  5 0   GLUCOSE UA mg/dl Negative   KETONES UA mg/dl 15 (1+)*   BLOOD UA  Trace*   PROTEIN UA mg/dl 100 (2+)*   NITRITE UA  Negative   BILIRUBIN UA  Interference- unable to analyze*   UROBILINOGEN UA E U /dl 0 2   LEUKOCYTES UA  Small*   WBC UA /hpf 2-4*   RBC UA /hpf 1-2*   BACTERIA UA /hpf Occasional   EPITHELIAL CELLS WET PREP /hpf Occasional     ED Treatment:   Medication Administration from 09/02/2019 1251 to 09/02/2019 1520       Date/Time Order Dose Route     09/02/2019 1317 sodium chloride 0 9 % bolus 1,000 mL 1,000 mL Intravenous        Past Medical History:   Diagnosis Date    Bed bug bite     LAST ASSESSED [id number]    Disease of thyroid gland     Graves' disease     Hyperlipidemia     Hypertension     LLL pneumonia ([facility] [address] )     LAST ASSESSED [id number]    Migraine 2001    OCULAR    Scabies     LAST ASSESSED [id number]    Syncope and collapse     LAST ASSESSED [id number]    Vertigo     RESOLVED      Present on Admission:   Leukocytosis   Postoperative hypothyroidism   Essential hypertension   Hypercholesterolemia   Late onset Alzheimer's disease with behavioral disturbance   Major depressive disorder with single episode      Admitting Diagnosis: Leukocytosis [D72 829]  Weakness [R53 1]  LAYLA (acute kidney injury) ([facility] [address] ) [N17 9]  Fall, initial encounter [W19  XXXA]  Age/Sex: 74 y o  female  Admission Orders:  Telemetry;   SCD's;  PT/OT eval and treat;  IVF NS @  100     Current Facility-Administered Medications:  aspirin 81 mg Oral Daily   atorvastatin 20 mg Oral Daily With Dinner   citalopram 10 mg Oral Daily   donepezil 10 mg Oral HS   heparin (porcine) 5,000 Units Subcutaneous Q8H [address]   levothyroxine 88 mcg Oral Early Morning   niacin 2,000 mg Oral Daily   nicotine 1 patch Transdermal Daily   sodium chloride 100 mL/hr Intravenous Continuous       Network Utilization Review Department  Phone: [phone]; Fax [phone]  [email or web address]  ATTENTION: Please call with any questions or concerns to [phone]  and carefully listen to the prompts so that you are directed to the right person  Send all requests for admission clinical reviews, approved or denied determinations and any other requests to fax [phone]   All voicemails are confidential

## 2019-09-03 NOTE — ASSESSMENT & PLAN NOTE
· held ACE-inhibitor and hydrochlorothiazide on admission due to hypotension and LAYLA   · Patient blood pressures have been running low normal   · Will d/c HCTZ upon discharge to prevent hypotension and continue lisinopril only   · Would recommend keep BP log upon discharge and follow up with PCP outpatient

## 2019-09-03 NOTE — PHYSICAL THERAPY NOTE
PT EVALUATION    74 y o     [id number]    Leukocytosis [D72 829]  Weakness [R53 1]  LAYLA (acute kidney injury) ([facility] [address] ) [N17 9]  Fall, initial encounter [W19  XXXA]    Past Medical History:   Diagnosis Date    Bed bug bite     LAST ASSESSED [id number]    Disease of thyroid gland     Graves' disease     Hyperlipidemia     Hypertension     LLL pneumonia ([facility] [address] )     LAST ASSESSED [id number]    Migraine 2001    OCULAR    Scabies     LAST ASSESSED [id number]    Syncope and collapse     LAST ASSESSED [id number]    Vertigo     RESOLVED          Past Surgical History:   Procedure Laterality Date    CHOLECYSTECTOMY      HYSTERECTOMY      TOTAL THYROIDECTOMY      TUBAL LIGATION Bilateral         09/03/19 1115   Note Type   Note type Eval/Treat   Pain Assessment   Pain Assessment No/denies pain   Pain Score No Pain   Home Living   Type of [address] [place] [address] Two level;Stairs to enter with rails;Bed/bath upstairs  (4 JOE)   Bathroom Shower/Tub Tub/shower unit   Bathroom Toilet Standard   Bathroom Equipment Grab bars in [address]; Wheelchair-manual   Prior Function   Level of [place] Independent with ADLs and functional mobility   Lives With Daughter  (24 hour supervision)   Receives Help From Family;Personal care attendant  (Aide 2x/wk (Adult and Aging) - bathing)   ADL Assistance Independent   IADLs Needs assistance   Falls in the last 6 months 1 to 4   Vocational Retired   [address] without AD   General   Additional Pertinent History Fall on 9/2, unwitnessed (pt reported not feeling well to dtr)   Family/Caregiver Present Yes   Cognition   Overall Cognitive Status WFL   Arousal/Participation Cooperative   Orientation Level Oriented X4   Following Commands Follows one step commands without difficulty   RUE Assessment   RUE Assessment WFL   LUE Assessment   LUE Assessment WFL   RLE Assessment   RLE Assessment WFL  (4/5 throughout)   LLE Assessment LLE Assessment WFL  (4/5 throughout)   Coordination   Movements are Fluid and Coordinated 1   Bed Mobility   Supine to Sit 5  Supervision   Transfers   Sit to Stand 5  Supervision   Additional items   (cues for safety)   Ambulation/Elevation   Gait pattern Decreased foot clearance; Wide SARAHI; Forward Flexion; Shuffling;Excessively slow; Step to  (Unsteady, reaching for wall/ furniture)   Gait Assistance 4  Minimal assist   Additional items Assist x 1   Assistive Device   (None)   Distance 100   Balance   Static Sitting Good   Dynamic Sitting Good   Static Standing Fair   Dynamic Standing Poor +   Ambulatory Poor +   Endurance Deficit   Endurance Deficit Yes   Endurance Deficit Description needs rest breaks between walks   Activity Tolerance   Activity Tolerance Patient tolerated treatment well   Assessment   Prognosis Good   Problem List Decreased endurance; Impaired balance;Decreased mobility; Decreased safety awareness   Assessment Pt  Admitted on 9/2/19 s/p unwitnessed fall and treated for LAYLA (now resolved)  PT consulted for Eval and tx with up and OOB orders  Pt lives with her daughter in a [facility] with 4 JOE  Pt has 24 hour supervision  Pt also has an aide 2x/wk for bathing and dressing  However, pt/ dtr report pt bathes and dresses independently the other days  At baseline, pt ambulates without an AD  Pt currently presents with unsteady gait and reaches for furniture and walls during ambulation  Recommend RW for safe ambulation and improved stability and return home at discharge with continued 24 hour supervision and home PT to maximize safety with mobility  Barriers to Discharge None   Goals   Patient Goals 10 days: 1)  Pt will perform bed mobility with supervision demonstrating appropriate technique 100% of the time in order to improve function  2)  Perform all transfers with supervision demonstrating safe and appropriate technique 100% of the time in order to improve ability to negotiate safely in home environment  3) Amb with least restrictive AD > 200'x1 with supervision in order to demonstrate ability to negotiate in home environment  4)  Improve overall strength and balance 1/2 grade in order to optimize ability to perform functional tasks and reduce fall risk  5) Increase activity tolerance to 45 minutes in order to improve endurance to functional tasks  6)  Negotiate stairs using most appropriate technique and S in order to be able to negotiate safely in home environment  7) PT for ongoing patient and family/caregiver education, DME needs and d/c planning in order to promote highest level of function in least restrictive environment  STG Expiration Date 09/13/19   Plan   Treatment/Interventions Functional transfer training;LE strengthening/ROM; Therapeutic exercise; Endurance training;Patient/family training;Equipment eval/education;Gait training;Bed mobility   PT Frequency   (3-5x/wk)   Recommendation   Recommendation 24 hour supervision/assist;Home PT   Equipment Recommended Walker   PT - OK to Discharge Yes     Barthel = 75      History: co - morbidities, fall risk, use of assistive device, assist for adl's  Exam: impairments in locomotion, musculoskeletal, balance,posture, cardiac   Clinical: unstable/unpredictable  Complexity:high      Time In: 11:15   Time Out: 11:25  Total Time: 10 minutes      S:  Pt denies pain, 0/10  O:  GT x 10'         Pt ambulated 100' with SPC and min A  Pt with unsteady gait and improper sequencing despite constant cuing, (+) LOB x 2  Pt demonstrated uneven step lengths  Pt ambulated 100' with RW and Cl S    A:  Improved gait pattern and stability noted with use of RW  Recommend RW and home PT/ continued 24 hour supervision     P:  Cont per POC    Ailyn Telles, PT    Ailyn Telles, PT

## 2019-09-03 NOTE — ASSESSMENT & PLAN NOTE
· white blood cell count 14 on admission --> 10 93 today likely secondary to dehydration as well as fall PTA   ·  patient has remained afebrile  · No indication for antibiotics at this time

## 2019-09-03 NOTE — PROGRESS NOTES
Progress Note - Zara Hernandes 1945, 74 y o  female MRN: [id number]  Unit/Bed#: E4 -01 Encounter: [id number]  Primary Care Provider: Lars Leblanc DO   Date and time admitted to hospital: 9/2/2019 12:57 PM    * [address]  · patient admitted for fall and weakness at home  · unclear whether this is a mechanical fall as patient cannot recall details of event due to dementia  · patient has had diarrhea in the past 2 days defer could be dehydration and possible mechanical fall as she was going down the stairs  · CT head negative, CT cervical spine negative for traumatic injuries, CXR without acute abnormalities   · Did present with LAYLA likely 2/2 dehydration as stated below now resolved   · patient currently at her baseline mental status, no focal neurological deficits  · PT OT eval pending     LAYLA (acute kidney injury) (HCC)resolved as of 9/3/2019  Assessment & Plan  · patient with creatinine of 1 84 POA improved to 1 34 today with IVF   · baseline creatinine is close to 1 2  · likely prerenal from dehydration from recent diarrhea for the last 2 days PTA   · Still with episodes of intermittent hypotension will continue to hole ACE-inhibitor and HCTZ to prevent hypotension and fluctuations in BP   · Continue with IVF hydration for now   · monitor renal function and avoid nephrotoxin    Anemia  Assessment & Plan  · hgb dropped 13 5 to 10 7   · Could be aspect of dilution with IVF given   · No active signs of bleeding   · Will continue to monitor     Essential hypertension  Assessment & Plan  · will continue to hold ACE-inhibitor, hydrochlorothiazide  · BP continued to be on the low end with epidoes of hypotension   · Continue with IVF hydration   · Monitor VS closely     Hypercholesterolemia  Assessment & Plan  · Continue statin     Late onset Alzheimer's disease with behavioral disturbance  Assessment & Plan  · continue with Aricept  · mental status at baseline    Leukocytosis  Assessment & Plan  · white blood cell count 14 on admission --> 10 93 today likely secondary to dehydration as well as fall PTA   ·  patient has remained afebrile  · No indication for antibiotics at this time       Major depressive disorder with single episode  Assessment & Plan  -continue with Celexa    Postoperative hypothyroidism  Assessment & Plan  -continue levothyroxine  -TSH 3 849  - recheck thyroid function tests in 4-6 week outpatient       VTE Pharmacologic Prophylaxis:   Pharmacologic: Heparin  Mechanical VTE Prophylaxis in Place: Yes    Patient Centered Rounds: I have performed bedside rounds with nursing staff today  Discussions with Specialists or Other Care Team Provider:     Education and Discussions with Family / Patient: patient  Daughter at bedside     Time Spent for Care: 20 minutes  More than 50% of total time spent on counseling and coordination of care as described above  Current Length of Stay: 1 day(s)    Current Patient Status: Inpatient   Certification Statement: The patient will continue to require additional inpatient hospital stay due to fall    Discharge Plan: need PT/OT eval     Code Status: Level 3 - DNAR and DNI      Subjective:   Doing well without acute complaints  No chest pain, SOB, lightheaedeness, dizziness, chest tightness, no abdominal pain, diarrhea, bleeding noted from anywhere, no urinary compalints, no N/V, eating well  No pain anywhere  Objective:     Vitals:   Temp (24hrs), Av 8 °F (36 6 °C), Min:96 8 °F (36 °C), Max:98 4 °F (36 9 °C)    Temp:  [96 8 °F (36 °C)-98 4 °F (36 9 °C)] 96 8 °F (36 °C)  HR:  [66-86] 84  Resp:  [18] 18  BP: ()/(48-63) 125/63  SpO2:  [94 %-97 %] 94 %  Body mass index is 21 81 kg/m²  Input and Output Summary (last 24 hours):        Intake/Output Summary (Last 24 hours) at 9/3/2019 1043  Last data filed at 9/3/2019 0114  Gross per 24 hour   Intake 1841 67 ml   Output    Net 1841 67 ml       Physical Exam:     Physical Exam Constitutional: No distress  HENT:   Head: Normocephalic and atraumatic  Eyes: Conjunctivae are normal    Neck: Neck supple  Cardiovascular: Normal rate and regular rhythm  Pulmonary/Chest: Effort normal and breath sounds normal    Abdominal: Soft  Bowel sounds are normal  She exhibits no distension  There is no tenderness  Musculoskeletal: She exhibits no edema  Neurological: She is alert  Oriented to person, place, month only    Skin: Skin is warm  She is not diaphoretic  Psychiatric: Cognition and memory are impaired  Nursing note and vitals reviewed  Additional Data:     Labs:    Results from last 7 days   Lab Units 09/03/19  0227   WBC Thousand/uL 10 93*   HEMOGLOBIN g/dL 10 7*   HEMATOCRIT % 33 6*   PLATELETS Thousands/uL 244   NEUTROS PCT % 67   LYMPHS PCT % 24   MONOS PCT % 8   EOS PCT % 1     Results from last 7 days   Lab Units 09/03/19 0227   SODIUM mmol/L 137   POTASSIUM mmol/L 3 5   CHLORIDE mmol/L 103   CO2 mmol/L 25   BUN mg/dL 33*   CREATININE mg/dL 1 34*   ANION GAP mmol/L 9   CALCIUM mg/dL 8 2*   ALBUMIN g/dL 2 8*   TOTAL BILIRUBIN mg/dL 0 40   ALK PHOS U/L 65   ALT U/L 13   AST U/L 17   GLUCOSE RANDOM mg/dL 86                           * I Have Reviewed All Lab Data Listed Above  * Additional Pertinent Lab Tests Reviewed:  All Labs Within Last 24 Hours Reviewed    Imaging:    Imaging Reports Reviewed Today Include: reviewed  Imaging Personally Reviewed by Myself Includes:  none    Recent Cultures (last 7 days):           Last 24 Hours Medication List:     Current Facility-Administered Medications:  aspirin 81 mg Oral Daily Marciano Cancino MD   atorvastatin 20 mg Oral Daily With Farhan Espinoza MD   citalopram 10 mg Oral Daily Marciano Cancino MD   donepezil 10 mg Oral HS Marciano Cancino MD   heparin (porcine) 5,000 Units Subcutaneous Q8H [address] Marciano Cancino MD   levothyroxine 88 mcg Oral Early Morning Marciano Cancino MD   niacin 2,000 mg Oral Daily Marciano Cancino MD   nicotine 1 patch Transdermal Daily Latricia Jones MD   sodium chloride 100 mL/hr Intravenous Continuous Juan Ervin PA-C        Today, Patient Was Seen By: Juan Ervin PA-C    ** Please Note: Dictation voice to text software may have been used in the creation of this document   **

## 2019-09-03 NOTE — PLAN OF CARE
Problem: PHYSICAL THERAPY ADULT  Goal: Performs mobility at highest level of function for planned discharge setting  See evaluation for individualized goals  Description  Treatment/Interventions: Functional transfer training, LE strengthening/ROM, Therapeutic exercise, Endurance training, Patient/family training, Equipment eval/education, Gait training, Bed mobility  Equipment Recommended: Walker       See flowsheet documentation for full assessment, interventions and recommendations  Note:   Prognosis: Good  Problem List: Decreased endurance, Impaired balance, Decreased mobility, Decreased safety awareness  Assessment: Pt  Admitted on 9/2/19 s/p unwitnessed fall and treated for LAYLA (now resolved)  PT consulted for Eval and tx with up and OOB orders  Pt lives with her daughter in a [facility] with 4 JOE  Pt has 24 hour supervision  Pt also has an aide 2x/wk for bathing and dressing  However, pt/ dtr report pt bathes and dresses independently the other days  At baseline, pt ambulates without an AD  Pt currently presents with unsteady gait and reaches for furniture and walls during ambulation  Recommend RW for safe ambulation and improved stability and return home at discharge with continued 24 hour supervision and home PT to maximize safety with mobility  Barriers to Discharge: None     Recommendation: 24 hour supervision/assist, Home PT     PT - OK to Discharge: Yes    See flowsheet documentation for full assessment

## 2019-09-03 NOTE — PLAN OF CARE
Problem: Potential for Falls  Goal: Patient will remain free of falls  Description  INTERVENTIONS:  - Assess patient frequently for physical needs  -  Identify cognitive and physical deficits and behaviors that affect risk of falls  -  [place] fall precautions as indicated by assessment   - Educate patient/family on patient safety including physical limitations  - Instruct patient to call for assistance with activity based on assessment  - Modify environment to reduce risk of injury  - Consider OT/PT consult to assist with strengthening/mobility  Outcome: Progressing     Problem: DISCHARGE PLANNING  Goal: Discharge to home or other facility with appropriate resources  Description  INTERVENTIONS:  - Identify barriers to discharge w/patient and caregiver  - Arrange for needed discharge resources and transportation as appropriate  - Identify discharge learning needs (meds, wound care, etc )  - Arrange for interpretive services to assist at discharge as needed  - Refer to Case Management Department for coordinating discharge planning if the patient needs post-hospital services based on physician/advanced practitioner order or complex needs related to functional status, cognitive ability, or social support system  Outcome: Progressing     Problem: Knowledge Deficit  Goal: Patient/family/caregiver demonstrates understanding of disease process, treatment plan, medications, and discharge instructions  Description  Complete learning assessment and assess knowledge base    Interventions:  - Provide teaching at level of understanding  - Provide teaching via preferred learning methods  Outcome: Progressing     Problem: Prexisting or High Potential for Compromised Skin Integrity  Goal: Skin integrity is maintained or improved  Description  INTERVENTIONS:  - Identify patients at risk for skin breakdown  - Assess and monitor skin integrity  - Assess and monitor nutrition and hydration status  - Monitor labs   - Assess for incontinence   - Turn and reposition patient  - Assist with mobility/ambulation  - Relieve pressure over bony prominences  - Avoid friction and shearing  - Provide appropriate hygiene as needed including keeping skin clean and dry  - Evaluate need for skin moisturizer/barrier cream  - Collaborate with interdisciplinary team   - Patient/family teaching  - Consider wound care consult   Outcome: Progressing     Problem: CARDIOVASCULAR - ADULT  Goal: Maintains optimal cardiac output and hemodynamic stability  Description  INTERVENTIONS:  - Monitor I/O, vital signs and rhythm  - Monitor for S/S and trends of decreased cardiac output  - Administer and titrate ordered vasoactive medications to optimize hemodynamic stability  - Assess quality of pulses, skin color and temperature  - Assess for signs of decreased coronary artery perfusion  - Instruct patient to report change in severity of symptoms  Outcome: Progressing     Problem: RESPIRATORY - ADULT  Goal: Achieves optimal ventilation and oxygenation  Description  INTERVENTIONS:  - Assess for changes in respiratory status  - Assess for changes in mentation and behavior  - Position to facilitate oxygenation and minimize respiratory effort  - Oxygen administered by appropriate delivery if ordered  - Initiate smoking cessation education as indicated  - Encourage broncho-pulmonary hygiene including cough, deep breathe, Incentive Spirometry  - Assess the need for suctioning and aspirate as needed  - Assess and instruct to report SOB or any respiratory difficulty  - Respiratory Therapy support as indicated  Outcome: Progressing     Problem: METABOLIC, FLUID AND ELECTROLYTES - ADULT  Goal: Electrolytes maintained within normal limits  Description  INTERVENTIONS:  - Monitor labs and assess patient for signs and symptoms of electrolyte imbalances  - Administer electrolyte replacement as ordered  - Monitor response to electrolyte replacements, including repeat lab results as appropriate  - Instruct patient on fluid and nutrition as appropriate  Outcome: Progressing  Goal: Fluid balance maintained  Description  INTERVENTIONS:  - Monitor labs   - Monitor I/O and WT  - Instruct patient on fluid and nutrition as appropriate  - Assess for signs & symptoms of volume excess or deficit  Outcome: Progressing     Problem: SKIN/TISSUE INTEGRITY - ADULT  Goal: Skin integrity remains intact  Description  INTERVENTIONS  - Identify patients at risk for skin breakdown  - Assess and monitor skin integrity  - Assess and monitor nutrition and hydration status  - Monitor labs (i e  albumin)  - Assess for incontinence   - Turn and reposition patient  - Assist with mobility/ambulation  - Relieve pressure over bony prominences  - Avoid friction and shearing  - Provide appropriate hygiene as needed including keeping skin clean and dry  - Evaluate need for skin moisturizer/barrier cream  - Collaborate with interdisciplinary team (i e  Nutrition, Rehabilitation, etc )   - Patient/family teaching  Outcome: Progressing     Problem: PAIN - ADULT  Goal: Verbalizes/displays adequate comfort level or baseline comfort level  Description  Interventions:  - Encourage patient to monitor pain and request assistance  - Assess pain using appropriate pain scale  - Administer analgesics based on type and severity of pain and evaluate response  - Implement non-pharmacological measures as appropriate and evaluate response  - Consider cultural and social influences on pain and pain management  - Notify physician/advanced practitioner if interventions unsuccessful or patient reports new pain  Outcome: Progressing     Problem: INFECTION - ADULT  Goal: Absence or prevention of progression during hospitalization  Description  INTERVENTIONS:  - Assess and monitor for signs and symptoms of infection  - Monitor lab/diagnostic results  - Monitor all insertion sites, i e  indwelling lines, tubes, and drains  - Monitor endotracheal if appropriate and nasal secretions for changes in amount and color  - [place] appropriate cooling/warming therapies per order  - Administer medications as ordered  - Instruct and encourage patient and family to use good hand hygiene technique  - Identify and instruct in appropriate isolation precautions for identified infection/condition  Outcome: Progressing  Goal: Absence of fever/infection during neutropenic period  Description  INTERVENTIONS:  - Monitor WBC    Outcome: Progressing     Problem: SAFETY ADULT  Goal: Maintain or return to baseline ADL function  Description  INTERVENTIONS:  -  Assess patient's ability to carry out ADLs; assess patient's baseline for ADL function and identify physical deficits which impact ability to perform ADLs (bathing, care of mouth/teeth, toileting, grooming, dressing, etc )  - Assess/evaluate cause of self-care deficits   - Assess range of motion  - Assess patient's mobility; develop plan if impaired  - Assess patient's need for assistive devices and provide as appropriate  - Encourage maximum independence but intervene and supervise when necessary  - Involve family in performance of ADLs  - Assess for home care needs following discharge   - Consider OT consult to assist with ADL evaluation and planning for discharge  - Provide patient education as appropriate  Outcome: Progressing  Goal: Maintain or return mobility status to optimal level  Description  INTERVENTIONS:  - Assess patient's baseline mobility status (ambulation, transfers, stairs, etc )    - Identify cognitive and physical deficits and behaviors that affect mobility  - Identify mobility aids required to assist with transfers and/or ambulation (gait belt, sit-to-stand, lift, walker, cane, etc )  - [place] fall precautions as indicated by assessment  - Record patient progress and toleration of activity level on Mobility SBAR; progress patient to next Phase/Stage  - Instruct patient to call for assistance with activity based on assessment  - Consider rehabilitation consult to assist with strengthening/weightbearing, etc   Outcome: Progressing

## 2019-09-03 NOTE — SOCIAL WORK
Cm reviewed pt care coordination rounds  Pt is not medically cleared for d/c  Anticipating d/c for tomorrow  PT recommended - Home PT with a assist of a walker  Gee Mac from [facility] made aware and the need of a script to make DME referrals  CM will f/u for final medial clearance and dc/c planning

## 2019-09-03 NOTE — ASSESSMENT & PLAN NOTE
· patient with creatinine of 1 84 POA improved to 1 34 today with IVF   · baseline creatinine is close to 1 2  · likely prerenal from dehydration from recent diarrhea for the last 2 days PTA   · Still with episodes of intermittent hypotension will continue to hole ACE-inhibitor and HCTZ to prevent hypotension and fluctuations in BP   · Continue with IVF hydration for now   · monitor renal function and avoid nephrotoxin

## 2019-09-03 NOTE — ASSESSMENT & PLAN NOTE
· white blood cell count 14 on admission --> mild 11 49 today   ·  patient has remained afebrile  · No indication for antibiotics at this time   · No signs of acute infection   · Check CBC outpatient in 3-5 days to monitor resolution

## 2019-09-03 NOTE — ASSESSMENT & PLAN NOTE
· will continue to hold ACE-inhibitor, hydrochlorothiazide  · BP continued to be on the low end with epidoes of hypotension   · Continue with IVF hydration   · Monitor VS closely

## 2019-09-04 ENCOUNTER — TRANSITIONAL CARE MANAGEMENT (OUTPATIENT)
Dept: FAMILY MEDICINE CLINIC | Facility: CLINIC | Age: 74
End: 2019-09-04

## 2019-09-04 VITALS
BODY MASS INDEX: 21.95 KG/M2 | DIASTOLIC BLOOD PRESSURE: 65 MMHG | SYSTOLIC BLOOD PRESSURE: 117 MMHG | TEMPERATURE: 97.4 F | OXYGEN SATURATION: 93 % | RESPIRATION RATE: 18 BRPM | WEIGHT: 119.27 LBS | HEART RATE: 79 BPM | HEIGHT: 62 IN

## 2019-09-04 LAB
ANION GAP SERPL CALCULATED.3IONS-SCNC: 9 MMOL/L (ref 4–13)
BASOPHILS # BLD AUTO: 0.04 THOUSANDS/ΜL (ref 0–0.1)
BASOPHILS NFR BLD AUTO: 0 % (ref 0–1)
BUN SERPL-MCNC: 14 MG/DL (ref 5–25)
CALCIUM SERPL-MCNC: 8.7 MG/DL (ref 8.3–10.1)
CHLORIDE SERPL-SCNC: 106 MMOL/L (ref 100–108)
CO2 SERPL-SCNC: 26 MMOL/L (ref 21–32)
CREAT SERPL-MCNC: 0.96 MG/DL (ref 0.6–1.3)
EOSINOPHIL # BLD AUTO: 0.49 THOUSAND/ΜL (ref 0–0.61)
EOSINOPHIL NFR BLD AUTO: 4 % (ref 0–6)
ERYTHROCYTE [DISTWIDTH] IN BLOOD BY AUTOMATED COUNT: 13.7 % (ref 11.6–15.1)
GFR SERPL CREATININE-BSD FRML MDRD: 58 ML/MIN/1.73SQ M
GLUCOSE SERPL-MCNC: 93 MG/DL (ref 65–140)
HCT VFR BLD AUTO: 35.7 % (ref 34.8–46.1)
HGB BLD-MCNC: 11.2 G/DL (ref 11.5–15.4)
IMM GRANULOCYTES # BLD AUTO: 0.04 THOUSAND/UL (ref 0–0.2)
IMM GRANULOCYTES NFR BLD AUTO: 0 % (ref 0–2)
LYMPHOCYTES # BLD AUTO: 3.68 THOUSANDS/ΜL (ref 0.6–4.47)
LYMPHOCYTES NFR BLD AUTO: 32 % (ref 14–44)
MCH RBC QN AUTO: 29.8 PG (ref 26.8–34.3)
MCHC RBC AUTO-ENTMCNC: 31.4 G/DL (ref 31.4–37.4)
MCV RBC AUTO: 95 FL (ref 82–98)
MONOCYTES # BLD AUTO: 0.97 THOUSAND/ΜL (ref 0.17–1.22)
MONOCYTES NFR BLD AUTO: 8 % (ref 4–12)
NEUTROPHILS # BLD AUTO: 6.27 THOUSANDS/ΜL (ref 1.85–7.62)
NEUTS SEG NFR BLD AUTO: 56 % (ref 43–75)
NRBC BLD AUTO-RTO: 0 /100 WBCS
PLATELET # BLD AUTO: 221 THOUSANDS/UL (ref 149–390)
PMV BLD AUTO: 10.3 FL (ref 8.9–12.7)
POTASSIUM SERPL-SCNC: 3.7 MMOL/L (ref 3.5–5.3)
RBC # BLD AUTO: 3.76 MILLION/UL (ref 3.81–5.12)
SODIUM SERPL-SCNC: 141 MMOL/L (ref 136–145)
WBC # BLD AUTO: 11.49 THOUSAND/UL (ref 4.31–10.16)

## 2019-09-04 PROCEDURE — 80048 BASIC METABOLIC PNL TOTAL CA: CPT | Performed by: PHYSICIAN ASSISTANT

## 2019-09-04 PROCEDURE — 85025 COMPLETE CBC W/AUTO DIFF WBC: CPT | Performed by: PHYSICIAN ASSISTANT

## 2019-09-04 PROCEDURE — 99239 HOSP IP/OBS DSCHRG MGMT >30: CPT | Performed by: PHYSICIAN ASSISTANT

## 2019-09-04 RX ORDER — LISINOPRIL 20 MG/1
20 TABLET ORAL DAILY
Qty: 30 TABLET | Refills: 0 | Status: SHIPPED | OUTPATIENT
Start: 2019-09-04 | End: 2019-10-17 | Stop reason: SDUPTHER

## 2019-09-04 RX ADMIN — NIACIN 2000 MG: 500 TABLET, EXTENDED RELEASE ORAL at 08:47

## 2019-09-04 RX ADMIN — NICOTINE 1 PATCH: 14 PATCH TRANSDERMAL at 08:46

## 2019-09-04 RX ADMIN — CITALOPRAM HYDROBROMIDE 10 MG: 20 TABLET ORAL at 08:46

## 2019-09-04 RX ADMIN — ASPIRIN 81 MG: 81 TABLET, COATED ORAL at 08:46

## 2019-09-04 RX ADMIN — LEVOTHYROXINE SODIUM 88 MCG: 88 TABLET ORAL at 05:17

## 2019-09-04 RX ADMIN — HEPARIN SODIUM 5000 UNITS: 5000 INJECTION INTRAVENOUS; SUBCUTANEOUS at 05:17

## 2019-09-04 NOTE — SOCIAL WORK
Script received from [facility] for a Ivana Dillonin  Script fax to [employer] via [facility]  PT request to have walker delivered at home  Cm spoke to pt and list of [facility] agencies given  Pt choose [facility] for Home PT/OT  Referral sent to facility via [facility]  Cm will f/u

## 2019-09-04 NOTE — DISCHARGE INSTRUCTIONS
Alzheimer Disease   WHAT YOU NEED TO KNOW:   Alzheimer disease (AD) is an irreversible brain disorder that results in the gradual loss of memory  Parts of the brain die and cannot make normal levels of brain chemicals  This causes problems with how you think, behave, and remember things  The disease usually starts at about age 72 to 79 years but can start earlier  A person usually has AD for 2 to 10 years, but some people may live 20 years or more with the disease  The exact cause of AD is not known  DISCHARGE INSTRUCTIONS:   Medicines:   · Medicines  may be given to help you think better or to slow the death of brain cells  You may also need medicines to help you feel less depressed, anxious, angry, or restless  These medicines can also help you sleep better  Medicines can also help with bladder and bowel control or to control delusions (false beliefs) and hallucinations  · Take your medicine as directed  Contact your healthcare provider if you think your medicine is not helping or if you have side effects  Tell him or her if you are allergic to any medicine  Keep a list of the medicines, vitamins, and herbs you take  Include the amounts, and when and why you take them  Bring the list or the pill bottles to follow-up visits  Carry your medicine list with you in case of an emergency  Follow up with your healthcare provider as directed:  Write down your questions so you remember to ask them during your visits  Care for someone who has AD:   · Keep activities the same from day to day  People with AD do best with daily routines  Take breaks often  Save difficult activities for when the person seems the most alert  Choose activities that the person is interested in doing  Help the person get started and try to break difficult activities into small steps  · Keep mealtimes at the same time each day  It is best to limit food choices  Eating patterns may change in people with AD   It may help to have the person eat small meals and snacks  Ask healthcare providers about giving vitamins if you do not think the person is eating enough  · Get regular exercise  Regular exercise may help decrease depression and anxiety and improve sleep  Walking is a good exercise for people with AD  Check local senior centers for information about group exercise activities  · Learn to recognize pain or discomfort  Noisy breathing or rigid body movements may be a sign of pain  A sad or scared look may also mean the person is having discomfort  The person may also constantly make certain noises when he or she is in pain  · Provide personal care  Make sure to check the water temperature of the bath or shower so they do not burn themselves  It may help to choose and lay out clothes each night for them to wear the next day  Make sure to brush the person's teeth or dentures daily  Do not forget to take the person to the dentist for exams  · Provide a safe environment  Go through the house and remove things that could cause accidents  Make sure household  and medicines are out of reach  You may need to remove the stove burner knobs and hide matches and lighters to prevent injury  Remove loose rugs to prevent falls  Keep doors and windows tightly closed to prevent wandering or other injuries  If the person smokes, make sure cigarettes are always put out  · Keep the person with AD active and awake during the day  Limit how much liquid the person drinks in the evening  This may help him or her sleep through the night  Make sure that the person goes to bed at the same time every night  · Use short words or sentences when speaking to the person with AD  Call the person by name and speak slowly, clearly, and calmly  Use short words and sentences  Use the same words if you have to repeat yourself  Do not ask too many questions  Do not argue with the person  · Create a bathroom schedule    This may mean reminding the person to urinate every 4 hours  Be aware of the person's bowel movement routine and keep it the same  · Prevent wandering  New door locks may be needed to keep the person from going outside alone  An alarm system near doors may tell you if the person wanders out  Have the person wear a necklace or bracelet with their name and phone number on it in case they wander away from you and are found by someone else  Care for the family care provider:   · AD also affects the family and friends who care for the person  As the disease gets worse, family and friends may feel frustrated and resentful no matter how much they love the person  AD may cause financial problems, which adds more stress to those caring for the person  Be sure to visit your healthcare provider if you feel overwhelmed  · People with AD may be cared for at home instead of at a nursing home, especially in the early and middle stages of the illness  It is important for the family healthcare provider to be trained so that he can understand what is happening and learn ways to help  Support services are available that help the person with AD stay at home longer  Ask your healthcare providers about training to learn how to care for someone with AD  Ask for more information about programs to help care for someone with AD:    ¨ Adult day care  is a program the person with AD can go to for part of every day  It is held in a center with other adults who may have AD  ¨ Respite care  is a temporary service  Patients may stay in a nursing home for a limited number of days  This service is especially helpful if the family healthcare provider needs to go away for a few days  It can also be used when the family healthcare provider needs a break from caregiving  For more information:   · Alzheimer's Association  [address], [place] [address] , [facility]  Phone: [phone]  Web Address: https://[employer]/  · Alzheimer's Disease Education and Referral [place]  [address]  [place] [address]  [address]  [place] [address]  Phone: [phone]  Web Address: http://[employer]/  org  Contact your healthcare provider if:   · You have a fever  · You have a rash or your skin is itchy and swollen  · You are depressed and have difficulty coping with your symptoms  · You have questions or concerns about your condition or care  © 2017 [facility] Information is for End User's use only and may not be sold, redistributed or otherwise used for commercial purposes  All illustrations and images included in CareNotes® are the copyrighted property of A D A M , Inc  or Igor Rizvi  The above information is an  only  It is not intended as medical advice for individual conditions or treatments  Talk to your doctor, nurse or pharmacist before following any medical regimen to see if it is safe and effective for you  Fall Prevention   WHAT YOU NEED TO KNOW:   Fall prevention includes ways to make your home and other areas safer  It also includes ways you can move more carefully to prevent a fall  Health conditions that cause changes in your blood pressure, vision, or muscle strength and coordination may increase your risk for falls  Medicines may also increase your risk for falls if they make you dizzy, weak, or sleepy  DISCHARGE INSTRUCTIONS:   Call 911 or have someone else call if:   · You have fallen and are unconscious  · You have fallen and cannot move part of your body  Contact your healthcare provider if:   · You have fallen and have pain or a headache  · You have questions or concerns about your condition or care  Fall prevention tips:   · Stand or sit up slowly  This may help you keep your balance and prevent falls  · Use assistive devices as directed  Your healthcare provider may suggest that you use a cane or walker to help you keep your balance   You may need to have grab bars put in your bathroom near the toilet or in the shower  · Wear shoes that fit well and have soles that   Wear shoes both inside and outside  Use slippers with good   Do not wear shoes with high heels  · Wear a personal alarm  This is a device that allows you to call 911 if you fall and need help  Ask your healthcare provider for more information  · Stay active  Exercise can help strengthen your muscles and improve your balance  Your healthcare provider may recommend water aerobics or walking  He or she may also recommend physical therapy to improve your coordination  Never start an exercise program without talking to your healthcare provider first      · Manage your medical conditions  Keep all appointments with your healthcare providers  Visit your eye doctor as directed  Home safety tips:   · Add items to prevent falls in the bathroom  Put nonslip strips on your bath or shower floor to prevent you from slipping  Use a bath mat if you do not have carpet in the bathroom  This will prevent you from falling when you step out of the bath or shower  Use a shower seat so you do not need to stand while you shower  Sit on the toilet or a chair in your bathroom to dry yourself and put on clothing  This will prevent you from losing your balance from drying or dressing yourself while you are standing  · Keep paths clear  Remove books, shoes, and other objects from walkways and stairs  Place cords for telephones and lamps out of the way so that you do not need to walk over them  Tape them down if you cannot move them  Remove small rugs  If you cannot remove a rug, secure it with double-sided tape  This will prevent you from tripping  · Install bright lights in your home  Use night lights to help light paths to the bathroom or kitchen  Always turn on the light before you start walking  · Keep items you use often on shelves within reach    Do not use a step stool to help you reach an item     · Paint or place reflective tape on the edges of your stairs  This will help you see the stairs better  Follow up with your healthcare provider as directed:  Write down your questions so you remember to ask them during your visits  © 2017 [address] [employer] Information is for End User's use only and may not be sold, redistributed or otherwise used for commercial purposes  All illustrations and images included in CareNotes® are the copyrighted property of A D A M , Inc  or Igor Rizvi  The above information is an  only  It is not intended as medical advice for individual conditions or treatments  Talk to your doctor, nurse or pharmacist before following any medical regimen to see if it is safe and effective for you

## 2019-09-04 NOTE — DISCHARGE SUMMARY
Discharge- Suzanne Barone 1945, 74 y o  female MRN: [id number]  Unit/Bed#: E4 -01 Encounter: [id number]  Primary Care Provider: Ernestine Holman DO   Date and time admitted to hospital: 9/2/2019 12:57 PM    * [address]  · patient admitted for fall and weakness at home  · unclear whether this is a mechanical fall as patient cannot recall details of event due to dementia  · patient has had diarrhea in the past 2 days defer could be dehydration and possible mechanical fall as she was going down the stairs  · CT head negative, CT cervical spine negative for traumatic injuries, CXR without acute abnormalities   · Did present with LAYLA likely 2/2 dehydration as stated below now resolved   · patient currently at her baseline mental status, no focal neurological deficits  · PT eval recommending home PT upon discharge   · Walker script provided   · Lives with daughter     Anemia  Assessment & Plan  · hgb dropped 13 5 to 10 7 improved today to 11 2 no evidence of active bleeding   · likely aspect of dilution with IVF given   · No active signs of bleeding   · Monitor CBC outpatient     Essential hypertension  Assessment & Plan  · held ACE-inhibitor and hydrochlorothiazide on admission due to hypotension and LAYLA   · Patient blood pressures have been running low normal   · Will d/c HCTZ upon discharge to prevent hypotension and continue lisinopril only   · Would recommend keep BP log upon discharge and follow up with PCP outpatient       Hypercholesterolemia  Assessment & Plan  · Continue statin     Late onset Alzheimer's disease with behavioral disturbance  Assessment & Plan  · continue with Aricept  · mental status at baseline    Leukocytosis  Assessment & Plan  · white blood cell count 14 on admission --> mild 11 49 today   ·  patient has remained afebrile  · No indication for antibiotics at this time   · No signs of acute infection   · Check CBC outpatient in 3-5 days to monitor resolution       Major depressive disorder with single episode  Assessment & Plan  -continue with Celexa    Postoperative hypothyroidism  Assessment & Plan  -continue levothyroxine  -TSH 3 849  - recheck thyroid function tests in 4-6 week outpatient       Discharging Physician / Practitioner: Alma Sebastian PA-C  PCP: Gissel Albert DO  Admission Date:   Admission Orders (From admission, onward)     Ordered        09/02/19 1451  Inpatient Admission  Once                   Discharge Date: 09/04/19    Resolved Problems  Date Reviewed: 9/4/2019          Resolved    LAYLA (acute kidney injury) ([facility] [address] ) 9/3/2019     Resolved by  Alma Sebastian PA-C          Consultations During Hospital Stay:  · none    Procedures Performed:   · none    Significant Findings / Test Results:   · CXR: no acute cardiopulmonary disease   · CT head: 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  No acute intracranial abnormality  · CT cervical spine:  No cervical spine fracture or traumatic malalignment  · Cr 0 96 upon discharge   · Serial troponin negative   · TSH 3 849, Free T4 1 64     Incidental Findings:   · none    Test Results Pending at Discharge (will require follow up): · Follow up thyroid function tests in 4-6 weeks outpatient   · Repeat CBC in 3-5 days      Outpatient Tests Requested:  · Follow up with PCP in 1 week for post hospital follow up     Complications: LAYLA     Reason for Admission: fall     Hospital Course:     Michelle Davenport is a 74 y o  female patient who originally presented to the hospital on 9/2/2019 due to fall at home  Patient has history of Alzheimer's dementia and lives at home with her days  Per daughter on admission patient woke up to go to bathroom and then went down the stairs and daughter heard a loud "thump" sound and daughter found her mother on the floor alert and awake  Patient has dementia so was unable to provide history of the event  It appears that patient had a mechanical fall   She also presented with LAYLA and suspect aspect of dehydration also playing a role  Her CT head and cervical spine with unremarkable for acute injuries or abnormalities  Her serial troponins were negative  Patient maintained at her baseline mental status and did not exhibit any focal neurologic deficits  She was seen byt PT who was recommending home PT and a script for walker was also given to the patient  Patients LAYLA on admission resolved with IVF hydration and is better than baseline prior to discharge  She was encouraged to continue to maintain oral hydration  Patient also had episodes of hypotension and with LAYLA on admission her blood pressure medication were held on admission  To prevent hypotension in this elderly female will discontinue HCTZ upon discharge and continue lisinopril only  It was encouraged she keep a blood pressure log upon discharge and follow up with PCP within 1 week to monitor blood pressure and for post hospital follow up  She was also noted to have mild leukocytosis on admission that trended down however did persist without any evidence for acute infection or infection for any antibiotics  She will need a repeat CBC in 3-5 days to monitor resolution  Please see above list of diagnoses and related plan for additional information  Condition at Discharge: stable     Discharge Day Visit / Exam:     Subjective:  Feels well today no acute complaints  No lightheadedness, dizziness, chest pain, urinary complaints, SOB, cough, chest pain, tightness, abdominal pain, N/V/D  Vitals: Blood Pressure: 117/65 (09/04/19 0752)  Pulse: 79 (09/04/19 0752)  Temperature: (!) 97 4 °F (36 3 °C) (09/04/19 0752)  Temp Source: Tympanic (09/04/19 0752)  Respirations: 18 (09/04/19 0752)  Height: 5' 2" (157 5 cm) (09/02/19 1518)  Weight - Scale: 54 1 kg (119 lb 4 3 oz) (09/02/19 1518)  SpO2: 93 % (09/04/19 0752)  Exam:   Physical Exam   Constitutional: No distress     Eyes: Conjunctivae are normal    Neck: Neck supple  Cardiovascular: Normal rate and regular rhythm  Pulmonary/Chest: Effort normal and breath sounds normal    Abdominal: Soft  Bowel sounds are normal    Musculoskeletal: She exhibits no edema  Neurological: She is alert  Skin: Skin is warm  She is not diaphoretic  Psychiatric: She has a normal mood and affect  Nursing note and vitals reviewed  Discussion with Family: daughter at bedside     Discharge instructions/Information to patient and family:   See after visit summary for information provided to patient and family  Provisions for Follow-Up Care:  See after visit summary for information related to follow-up care and any pertinent home health orders  Disposition:     Home with VNA Services (Reminder: Complete face to face encounter)    For Discharges to [facility]:   · Not Applicable to this Patient - Not Applicable to this Patient    Planned Readmission: none     Discharge Statement:  I spent 50 minutes discharging the patient  This time was spent on the day of discharge  I had direct contact with the patient on the day of discharge  Greater than 50% of the total time was spent examining patient, answering all patient questions, arranging and discussing plan of care with patient as well as directly providing post-discharge instructions  Additional time then spent on discharge activities  Discharge Medications:  See after visit summary for reconciled discharge medications provided to patient and family        ** Please Note: This note has been constructed using a voice recognition system **

## 2019-09-04 NOTE — PLAN OF CARE
Problem: Potential for Falls  Goal: Patient will remain free of falls  Description  INTERVENTIONS:  - Assess patient frequently for physical needs  -  Identify cognitive and physical deficits and behaviors that affect risk of falls  -  [place] fall precautions as indicated by assessment   - Educate patient/family on patient safety including physical limitations  - Instruct patient to call for assistance with activity based on assessment  - Modify environment to reduce risk of injury  - Consider OT/PT consult to assist with strengthening/mobility  Outcome: Progressing     Problem: DISCHARGE PLANNING  Goal: Discharge to home or other facility with appropriate resources  Description  INTERVENTIONS:  - Identify barriers to discharge w/patient and caregiver  - Arrange for needed discharge resources and transportation as appropriate  - Identify discharge learning needs (meds, wound care, etc )  - Arrange for interpretive services to assist at discharge as needed  - Refer to Case Management Department for coordinating discharge planning if the patient needs post-hospital services based on physician/advanced practitioner order or complex needs related to functional status, cognitive ability, or social support system  Outcome: Progressing     Problem: Knowledge Deficit  Goal: Patient/family/caregiver demonstrates understanding of disease process, treatment plan, medications, and discharge instructions  Description  Complete learning assessment and assess knowledge base    Interventions:  - Provide teaching at level of understanding  - Provide teaching via preferred learning methods  Outcome: Progressing     Problem: Prexisting or High Potential for Compromised Skin Integrity  Goal: Skin integrity is maintained or improved  Description  INTERVENTIONS:  - Identify patients at risk for skin breakdown  - Assess and monitor skin integrity  - Assess and monitor nutrition and hydration status  - Monitor labs   - Assess for incontinence   - Turn and reposition patient  - Assist with mobility/ambulation  - Relieve pressure over bony prominences  - Avoid friction and shearing  - Provide appropriate hygiene as needed including keeping skin clean and dry  - Evaluate need for skin moisturizer/barrier cream  - Collaborate with interdisciplinary team   - Patient/family teaching  - Consider wound care consult   Outcome: Progressing     Problem: CARDIOVASCULAR - ADULT  Goal: Maintains optimal cardiac output and hemodynamic stability  Description  INTERVENTIONS:  - Monitor I/O, vital signs and rhythm  - Monitor for S/S and trends of decreased cardiac output  - Administer and titrate ordered vasoactive medications to optimize hemodynamic stability  - Assess quality of pulses, skin color and temperature  - Assess for signs of decreased coronary artery perfusion  - Instruct patient to report change in severity of symptoms  Outcome: Progressing     Problem: RESPIRATORY - ADULT  Goal: Achieves optimal ventilation and oxygenation  Description  INTERVENTIONS:  - Assess for changes in respiratory status  - Assess for changes in mentation and behavior  - Position to facilitate oxygenation and minimize respiratory effort  - Oxygen administered by appropriate delivery if ordered  - Initiate smoking cessation education as indicated  - Encourage broncho-pulmonary hygiene including cough, deep breathe, Incentive Spirometry  - Assess the need for suctioning and aspirate as needed  - Assess and instruct to report SOB or any respiratory difficulty  - Respiratory Therapy support as indicated  Outcome: Progressing     Problem: METABOLIC, FLUID AND ELECTROLYTES - ADULT  Goal: Electrolytes maintained within normal limits  Description  INTERVENTIONS:  - Monitor labs and assess patient for signs and symptoms of electrolyte imbalances  - Administer electrolyte replacement as ordered  - Monitor response to electrolyte replacements, including repeat lab results as appropriate  - Instruct patient on fluid and nutrition as appropriate  Outcome: Progressing  Goal: Fluid balance maintained  Description  INTERVENTIONS:  - Monitor labs   - Monitor I/O and WT  - Instruct patient on fluid and nutrition as appropriate  - Assess for signs & symptoms of volume excess or deficit  Outcome: Progressing     Problem: SKIN/TISSUE INTEGRITY - ADULT  Goal: Skin integrity remains intact  Description  INTERVENTIONS  - Identify patients at risk for skin breakdown  - Assess and monitor skin integrity  - Assess and monitor nutrition and hydration status  - Monitor labs (i e  albumin)  - Assess for incontinence   - Turn and reposition patient  - Assist with mobility/ambulation  - Relieve pressure over bony prominences  - Avoid friction and shearing  - Provide appropriate hygiene as needed including keeping skin clean and dry  - Evaluate need for skin moisturizer/barrier cream  - Collaborate with interdisciplinary team (i e  Nutrition, Rehabilitation, etc )   - Patient/family teaching  Outcome: Progressing     Problem: PAIN - ADULT  Goal: Verbalizes/displays adequate comfort level or baseline comfort level  Description  Interventions:  - Encourage patient to monitor pain and request assistance  - Assess pain using appropriate pain scale  - Administer analgesics based on type and severity of pain and evaluate response  - Implement non-pharmacological measures as appropriate and evaluate response  - Consider cultural and social influences on pain and pain management  - Notify physician/advanced practitioner if interventions unsuccessful or patient reports new pain  Outcome: Progressing     Problem: INFECTION - ADULT  Goal: Absence or prevention of progression during hospitalization  Description  INTERVENTIONS:  - Assess and monitor for signs and symptoms of infection  - Monitor lab/diagnostic results  - Monitor all insertion sites, i e  indwelling lines, tubes, and drains  - Monitor endotracheal if appropriate and nasal secretions for changes in amount and color  - [place] appropriate cooling/warming therapies per order  - Administer medications as ordered  - Instruct and encourage patient and family to use good hand hygiene technique  - Identify and instruct in appropriate isolation precautions for identified infection/condition  Outcome: Progressing  Goal: Absence of fever/infection during neutropenic period  Description  INTERVENTIONS:  - Monitor WBC    Outcome: Progressing     Problem: SAFETY ADULT  Goal: Maintain or return to baseline ADL function  Description  INTERVENTIONS:  -  Assess patient's ability to carry out ADLs; assess patient's baseline for ADL function and identify physical deficits which impact ability to perform ADLs (bathing, care of mouth/teeth, toileting, grooming, dressing, etc )  - Assess/evaluate cause of self-care deficits   - Assess range of motion  - Assess patient's mobility; develop plan if impaired  - Assess patient's need for assistive devices and provide as appropriate  - Encourage maximum independence but intervene and supervise when necessary  - Involve family in performance of ADLs  - Assess for home care needs following discharge   - Consider OT consult to assist with ADL evaluation and planning for discharge  - Provide patient education as appropriate  Outcome: Progressing  Goal: Maintain or return mobility status to optimal level  Description  INTERVENTIONS:  - Assess patient's baseline mobility status (ambulation, transfers, stairs, etc )    - Identify cognitive and physical deficits and behaviors that affect mobility  - Identify mobility aids required to assist with transfers and/or ambulation (gait belt, sit-to-stand, lift, walker, cane, etc )  - [place] fall precautions as indicated by assessment  - Record patient progress and toleration of activity level on Mobility SBAR; progress patient to next Phase/Stage  - Instruct patient to call for assistance with activity based on assessment  - Consider rehabilitation consult to assist with strengthening/weightbearing, etc   Outcome: Progressing

## 2019-09-04 NOTE — NURSING NOTE
Reviewed discharge instructions with patient and daughter including new and old medications and the importance of obtaining follow-up CBC as well as other follow up appointments  IVs were removed, home PT/VNA set up for patient and walker delivery to home via case management and patient was walked out via wheelchair with a PCA and her daughter

## 2019-09-04 NOTE — PLAN OF CARE
Problem: Potential for Falls  Goal: Patient will remain free of falls  Description  INTERVENTIONS:  - Assess patient frequently for physical needs  -  Identify cognitive and physical deficits and behaviors that affect risk of falls  -  [place] fall precautions as indicated by assessment   - Educate patient/family on patient safety including physical limitations  - Instruct patient to call for assistance with activity based on assessment  - Modify environment to reduce risk of injury  - Consider OT/PT consult to assist with strengthening/mobility  Outcome: Completed     Problem: DISCHARGE PLANNING  Goal: Discharge to home or other facility with appropriate resources  Description  INTERVENTIONS:  - Identify barriers to discharge w/patient and caregiver  - Arrange for needed discharge resources and transportation as appropriate  - Identify discharge learning needs (meds, wound care, etc )  - Arrange for interpretive services to assist at discharge as needed  - Refer to Case Management Department for coordinating discharge planning if the patient needs post-hospital services based on physician/advanced practitioner order or complex needs related to functional status, cognitive ability, or social support system  Outcome: Completed     Problem: Knowledge Deficit  Goal: Patient/family/caregiver demonstrates understanding of disease process, treatment plan, medications, and discharge instructions  Description  Complete learning assessment and assess knowledge base    Interventions:  - Provide teaching at level of understanding  - Provide teaching via preferred learning methods  Outcome: Completed     Problem: Prexisting or High Potential for Compromised Skin Integrity  Goal: Skin integrity is maintained or improved  Description  INTERVENTIONS:  - Identify patients at risk for skin breakdown  - Assess and monitor skin integrity  - Assess and monitor nutrition and hydration status  - Monitor labs   - Assess for incontinence   - Turn and reposition patient  - Assist with mobility/ambulation  - Relieve pressure over bony prominences  - Avoid friction and shearing  - Provide appropriate hygiene as needed including keeping skin clean and dry  - Evaluate need for skin moisturizer/barrier cream  - Collaborate with interdisciplinary team   - Patient/family teaching  - Consider wound care consult   Outcome: Completed     Problem: CARDIOVASCULAR - ADULT  Goal: Maintains optimal cardiac output and hemodynamic stability  Description  INTERVENTIONS:  - Monitor I/O, vital signs and rhythm  - Monitor for S/S and trends of decreased cardiac output  - Administer and titrate ordered vasoactive medications to optimize hemodynamic stability  - Assess quality of pulses, skin color and temperature  - Assess for signs of decreased coronary artery perfusion  - Instruct patient to report change in severity of symptoms  Outcome: Completed     Problem: RESPIRATORY - ADULT  Goal: Achieves optimal ventilation and oxygenation  Description  INTERVENTIONS:  - Assess for changes in respiratory status  - Assess for changes in mentation and behavior  - Position to facilitate oxygenation and minimize respiratory effort  - Oxygen administered by appropriate delivery if ordered  - Initiate smoking cessation education as indicated  - Encourage broncho-pulmonary hygiene including cough, deep breathe, Incentive Spirometry  - Assess the need for suctioning and aspirate as needed  - Assess and instruct to report SOB or any respiratory difficulty  - Respiratory Therapy support as indicated  Outcome: Completed     Problem: METABOLIC, FLUID AND ELECTROLYTES - ADULT  Goal: Electrolytes maintained within normal limits  Description  INTERVENTIONS:  - Monitor labs and assess patient for signs and symptoms of electrolyte imbalances  - Administer electrolyte replacement as ordered  - Monitor response to electrolyte replacements, including repeat lab results as appropriate  - Instruct patient on fluid and nutrition as appropriate  Outcome: Completed  Goal: Fluid balance maintained  Description  INTERVENTIONS:  - Monitor labs   - Monitor I/O and WT  - Instruct patient on fluid and nutrition as appropriate  - Assess for signs & symptoms of volume excess or deficit  Outcome: Completed     Problem: SKIN/TISSUE INTEGRITY - ADULT  Goal: Skin integrity remains intact  Description  INTERVENTIONS  - Identify patients at risk for skin breakdown  - Assess and monitor skin integrity  - Assess and monitor nutrition and hydration status  - Monitor labs (i e  albumin)  - Assess for incontinence   - Turn and reposition patient  - Assist with mobility/ambulation  - Relieve pressure over bony prominences  - Avoid friction and shearing  - Provide appropriate hygiene as needed including keeping skin clean and dry  - Evaluate need for skin moisturizer/barrier cream  - Collaborate with interdisciplinary team (i e  Nutrition, Rehabilitation, etc )   - Patient/family teaching  Outcome: Completed     Problem: PAIN - ADULT  Goal: Verbalizes/displays adequate comfort level or baseline comfort level  Description  Interventions:  - Encourage patient to monitor pain and request assistance  - Assess pain using appropriate pain scale  - Administer analgesics based on type and severity of pain and evaluate response  - Implement non-pharmacological measures as appropriate and evaluate response  - Consider cultural and social influences on pain and pain management  - Notify physician/advanced practitioner if interventions unsuccessful or patient reports new pain  Outcome: Completed     Problem: INFECTION - ADULT  Goal: Absence or prevention of progression during hospitalization  Description  INTERVENTIONS:  - Assess and monitor for signs and symptoms of infection  - Monitor lab/diagnostic results  - Monitor all insertion sites, i e  indwelling lines, tubes, and drains  - Monitor endotracheal if appropriate and nasal secretions for changes in amount and color  - [place] appropriate cooling/warming therapies per order  - Administer medications as ordered  - Instruct and encourage patient and family to use good hand hygiene technique  - Identify and instruct in appropriate isolation precautions for identified infection/condition  Outcome: Completed  Goal: Absence of fever/infection during neutropenic period  Description  INTERVENTIONS:  - Monitor WBC    Outcome: Completed     Problem: SAFETY ADULT  Goal: Maintain or return to baseline ADL function  Description  INTERVENTIONS:  -  Assess patient's ability to carry out ADLs; assess patient's baseline for ADL function and identify physical deficits which impact ability to perform ADLs (bathing, care of mouth/teeth, toileting, grooming, dressing, etc )  - Assess/evaluate cause of self-care deficits   - Assess range of motion  - Assess patient's mobility; develop plan if impaired  - Assess patient's need for assistive devices and provide as appropriate  - Encourage maximum independence but intervene and supervise when necessary  - Involve family in performance of ADLs  - Assess for home care needs following discharge   - Consider OT consult to assist with ADL evaluation and planning for discharge  - Provide patient education as appropriate  Outcome: Completed  Goal: Maintain or return mobility status to optimal level  Description  INTERVENTIONS:  - Assess patient's baseline mobility status (ambulation, transfers, stairs, etc )    - Identify cognitive and physical deficits and behaviors that affect mobility  - Identify mobility aids required to assist with transfers and/or ambulation (gait belt, sit-to-stand, lift, walker, cane, etc )  - [place] fall precautions as indicated by assessment  - Record patient progress and toleration of activity level on Mobility SBAR; progress patient to next Phase/Stage  - Instruct patient to call for assistance with activity based on assessment  - Consider rehabilitation consult to assist with strengthening/weightbearing, etc   Outcome: Completed

## 2019-09-05 ENCOUNTER — EPISODE CHANGES (OUTPATIENT)
Dept: CASE MANAGEMENT | Facility: HOSPITAL | Age: 74
End: 2019-09-05

## 2019-09-06 ENCOUNTER — PATIENT OUTREACH (OUTPATIENT)
Dept: FAMILY MEDICINE CLINIC | Facility: CLINIC | Age: 74
End: 2019-09-06

## 2019-09-12 ENCOUNTER — OFFICE VISIT (OUTPATIENT)
Dept: FAMILY MEDICINE CLINIC | Facility: CLINIC | Age: 74
End: 2019-09-12
Payer: MEDICARE

## 2019-09-12 VITALS
WEIGHT: 124 LBS | HEART RATE: 77 BPM | BODY MASS INDEX: 22.82 KG/M2 | OXYGEN SATURATION: 97 % | HEIGHT: 62 IN | DIASTOLIC BLOOD PRESSURE: 62 MMHG | SYSTOLIC BLOOD PRESSURE: 104 MMHG

## 2019-09-12 DIAGNOSIS — R00.1 SINUS BRADYCARDIA: ICD-10-CM

## 2019-09-12 DIAGNOSIS — E89.0 POSTOPERATIVE HYPOTHYROIDISM: Chronic | ICD-10-CM

## 2019-09-12 DIAGNOSIS — N17.9 AKI (ACUTE KIDNEY INJURY) (HCC): ICD-10-CM

## 2019-09-12 DIAGNOSIS — R53.1 WEAKNESS: ICD-10-CM

## 2019-09-12 DIAGNOSIS — R26.9 GAIT DISTURBANCE: ICD-10-CM

## 2019-09-12 DIAGNOSIS — G30.1 LATE ONSET ALZHEIMER'S DISEASE WITH BEHAVIORAL DISTURBANCE (HCC): ICD-10-CM

## 2019-09-12 DIAGNOSIS — N18.30 CHRONIC KIDNEY DISEASE, STAGE III (MODERATE) (HCC): Chronic | ICD-10-CM
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
## 2019-09-12 DIAGNOSIS — Z23 NEED FOR INFLUENZA VACCINATION: ICD-10-CM

## 2019-09-12 DIAGNOSIS — F17.200 CURRENT SMOKER: ICD-10-CM

## 2019-09-12 DIAGNOSIS — D51.0 PERNICIOUS ANEMIA: ICD-10-CM

## 2019-09-12 DIAGNOSIS — I10 ESSENTIAL HYPERTENSION: ICD-10-CM

## 2019-09-12 DIAGNOSIS — F02.81 LATE ONSET ALZHEIMER'S DISEASE WITH BEHAVIORAL DISTURBANCE (HCC): ICD-10-CM

## 2019-09-12 DIAGNOSIS — I63.81 LACUNAR STROKE (HCC): ICD-10-CM

## 2019-09-12 PROBLEM — G31.84 MCI (MILD COGNITIVE IMPAIRMENT): Status: RESOLVED | Noted: 2019-04-03 | Resolved: 2019-09-12

## 2019-09-12 PROCEDURE — G0008 ADMIN INFLUENZA VIRUS VAC: HCPCS | Performed by: FAMILY MEDICINE

## 2019-09-12 PROCEDURE — 90662 IIV NO PRSV INCREASED AG IM: CPT | Performed by: FAMILY MEDICINE

## 2019-09-12 PROCEDURE — 99495 TRANSJ CARE MGMT MOD F2F 14D: CPT | Performed by: FAMILY MEDICINE

## 2019-09-12 NOTE — PATIENT INSTRUCTIONS
We reviewed hospital stay at [facility] September 2nd through 4th after fall -  had acute kidney injury with creatinine rising to 1 84, this had normalized by discharge, BUN/creatinine 14/0 96 at discharge  Lisinopril HCT was changed to plain Lisinopril 20 mg daily  Stay off lisinopril HCT  Her blood pressure is actually low here today  We will obtain CBC that was run September 9th at [facility]   Hemoglobin inpatient had dropped from 13 5 to 10 7, rebounded to 11 2 by discharge  She does have in-home PT/OT, continue with this to improve strength, less than fall risk  Use walker  Does have home aide  Due to significant dementia along with other medical problems she is nursing home eligible but continues to reside in her home only due to the assistance of her daughter/family  Otherwise she would be residing in longterm care unit  She will continue to see Endocrinology, she had seen them back in July, they increase Lipitor/atorvastatin 20 mg daily at that time  Continues on levothyroxine 88 mcg daily via them  Inpatient TSH 3 85  Continues on 39194 units vitamin-D weekly via them  Also on niacin via them  She did see Geriatrics back in April, she will see them again in October  Continues on citalopram 10 mg daily, donepezil 10 mg daily  She has been weaning down with smoking, currently 1/3 pack per day, keep trying to quit, well aware of risks associated with this  Recheck here 6 months-    In meantime continue to come in for B12 injection every 6 weeks   ( PA )

## 2019-09-12 NOTE — PROGRESS NOTES
FAMILY PRACTICE OFFICE VISIT  Shelbi العلي O  [facility]  [id number]   [address], [address]      NAME: Miranda Gonzalez  AGE: 74 y o  SEX: female  : 1945   MRN: [id number]    DATE: 2019  TIME: 12:03 PM    Assessment and Plan     Problem List Items Addressed This Visit        Endocrine    Postoperative hypothyroidism (Chronic)       Cardiovascular and Mediastinum    Essential hypertension    Sinus bradycardia       Nervous and Auditory    Lac[facility]ar stroke ([facility] )    Late onset Alzheimer's disease with behavioral disturbance       Genitourinary    LAYLA (acute kidney injury) ([facility] )    Chronic kidney disease, stage III (moderate) (HCC) (Chronic)       Other    Current smoker    Fall - Primary    Gait disturbance    Pernicious anemia    Weakness      Other Visit Diagnoses     Need for influenza vaccination        Relevant Orders    influenza vaccine, 6465-1080, high-dose, PF 0 5 mL (FLUZONE HIGH-DOSE) (Completed)          Patient Instructions   We reviewed hospital stay at [address] [place] [address]  through  after fall -  had acute kidney injury with creatinine rising to 1 84, this had normalized by discharge, BUN/creatinine 14/0 96 at discharge  Lisinopril HCT was changed to plain Lisinopril 20 mg daily  Stay off lisinopril HCT  Her blood pressure is actually low here today  We will obtain CBC that was run  at [facility]   Hemoglobin inpatient had dropped from 13 5 to 10 7, rebounded to 11 2 by discharge  She does have in-home PT/OT, continue with this to improve strength, less than fall risk  Use walker  Does have home aide  Due to significant dementia along with other medical problems she is nursing home eligible but continues to reside in her home only due to the assistance of her daughter/family  Otherwise she would be residing in longterm care unit      She will continue to see Endocrinology, she had seen them back in July, they increase Lipitor/atorvastatin 20 mg daily at that time  Continues on levothyroxine 88 mcg daily via them  Inpatient TSH 3 85  Continues on 73719 units vitamin-D weekly via them  Also on niacin via them  She did see Geriatrics back in April, she will see them again in October  Continues on citalopram 10 mg daily, donepezil 10 mg daily  She has been weaning down with smoking, currently 1/3 pack per day, keep trying to quit, well aware of risks associated with this  Recheck here 6 months-    In meantime continue to come in for B12 injection every 6 weeks  ( PA )      Chief Complaint     Chief Complaint   Patient presents with    Transition of Care Management     Fall     TCM Call (since 8/12/2019)     Date and time call was made  9/4/2019 11:37 AM    Hospital care reviewed  Records reviewed    Patient was hospitialized at  [facility] [place] [place] - CLOSED    Date of Admission  09/02/19    Date of discharge  09/04/19    Diagnosis  fall    Disposition  Home    Were the patients medications reviewed and updated  No      TCM Call (since 8/12/2019)     I have advised the patient to call PCP with any new or worsening symptoms  lhunter    Comments  scheduled 9/12 with dr Myrna Tarango            History of Present Illness   Shree Fagan is a 74y o -year-old female who is in today accompanied by her daughter with whom she resides, she is status post hospital stay September 2nd through 4th at [facility], her daughter heard a thud, pat had fallen, was taken to the hospital, CT head neck without acute finding, blood work showed creatinine had risen to 1 84, she was observed inpatient, remains somewhat weak but stabilized, was discharged home under the care of her daughter, has been doing OT/PT, using walker to ambulate, has home aide  No further falls since  Remains with significant dementia, hard of hearing, vision issues  Did see Geriatrics back in April      Still smokes, is angry that her daughter is trying to wean her down, currently 1/3 pack per day  Inpatient her lisinopril HCT was discontinued, she was discharged home on lisinopril 20 mg daily  Review of Systems   Review of Systems   Constitutional: Positive for fatigue  Negative for appetite change, fever and unexpected weight change  HENT: Negative for sore throat and trouble swallowing  Respiratory: Positive for cough (Chronic mild) and shortness of breath (Chronic mild)  Negative for chest tightness  Cardiovascular: Negative for chest pain, palpitations and leg swelling  Gastrointestinal: Negative for abdominal pain, blood in stool, nausea and vomiting  No acid reflux     Had some loose stools at admission, bowels are back to baseline, no melena or bleeding   Genitourinary: Negative for dysuria and hematuria  Musculoskeletal:        Chronic low back pain, no worse than baseline  No new radicular complaints into legs  No new neck pain  Skin: Negative for wound  Neurological: Positive for dizziness (Occasional long-term) and light-headedness (Occasional long-term)  Negative for syncope and headaches (No worse than baseline)  Hematological: Bruises/bleeds easily (No bleeding)  Psychiatric/Behavioral:        Occasionally agitated with daughter regarding smoke cessation, otherwise no increased agitation  No new change with mental status, known dementia           Active Problem List     Patient Active Problem List   Diagnosis    Sinus bradycardia    Postoperative hypothyroidism    Essential hypertension    Urinary incontinence    Chronic kidney disease, stage III (moderate) (HCC)    Umbilical hernia    Trigger middle finger of right hand    Current smoker    Thyroid nodule    Pernicious anemia    Osteoarthritis    Neuropathy of both feet    Low back pain radiating to left lower extremity    Leukocytosis    Lacunar stroke (HCC)    Hyperglycemia    Hypercholesterolemia    Graves' disease    Gait disturbance  Dizziness    Disc degeneration, lumbar    Late onset Alzheimer's disease with behavioral disturbance    Cough, chronic    Hearing loss    Major depressive disorder with single episode    Visual impairment    Full dentures    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HCC)    Weakness    Fall    LAYLA [facility]acute [facility]) [facility] )    Anemia       Past Medical History:  Reviewed    Past Surgical History:  Reviewed    Family History:  Reviewed    Social History:  Reviewed    Objective     Vitals:    09/12/19 1114   BP: 104/62   BP Location: Left arm   Patient Position: Sitting   Cuff Size: Large   Pulse: 77   SpO2: 97%   Weight: 56 2 kg (124 lb)   Height: 5' 2" (1 575 m)     Body mass index is 22 68 kg/m²  BP Readings from Last 3 Encounters:   09/12/19 104/62   09/04/19 117/65   07/06/19 126/59       Wt Readings from Last 3 Encounters:   09/12/19 56 2 kg (124 lb)   09/02/19 54 1 kg (119 lb 4 3 oz)   07/06/19 56 8 kg (125 lb 3 5 oz)       Physical Exam   Constitutional:   Frail 74year-old, somewhat more withdrawn here today, ambulating with walker  Does arise from chair in place self on table without assistance  Is alert  Oriented to person and place   Eyes: No scleral icterus  Cardiovascular: Normal rate and regular rhythm  Murmur (1/6 systolic ejection murmur left sternal border) heard  Pulmonary/Chest:   Few faint scattered rhonchi, otherwise clear   Abdominal: Soft  There is no tenderness  There is no guarding  Musculoskeletal: She exhibits no edema  Lymphadenopathy:     She has no cervical adenopathy  Neurological: She is alert  Skin:   No wound       ALLERGIES:  Allergies   Allergen Reactions    Naproxen Itching    Nsaids Other (See Comments) and Swelling    Azithromycin Nausea Only and Rash    Cephalexin Rash       Current Medications     Current Outpatient Medications   Medication Sig Dispense Refill    aspirin 81 MG tablet Take 81 mg by mouth daily        atorvastatin (LIPITOR) 20 mg tablet Take 1 tablet (20 mg total) by mouth daily      calcium-vitamin D (OSCAL) 250-125 MG-UNIT per tablet Take 1 tablet by mouth daily        citalopram (CeleXA) 10 mg tablet Take 1 tablet (10 mg total) by mouth daily 90 tablet 1    cyanocobalamin 1,000 mcg/mL Injection Q 6-8 weeks re PA 1 mL     donepezil (ARICEPT) 10 mg tablet Take 1 tablet (10 mg total) by mouth daily at bedtime 30 tablet 5    ergocalciferol (VITAMIN D2) 50,000 units   0    levothyroxine 88 mcg tablet 1 tablet daily  0    lisinopril (ZESTRIL) 20 mg tablet Take 1 tablet (20 mg total) by mouth daily 30 tablet 0    niacin (NIASPAN) 1000 MG CR tablet Take 2 tablets (2,000 mg total) by mouth daily  0     Current Facility-Administered Medications   Medication Dose Route Frequency Provider Last Rate Last Dose    cyanocobalamin injection 1,000 mcg  1,000 mcg Intramuscular Q30 Days Berenice Houston DO   1,000 mcg at 08/29/19 1403            Orders Placed This Encounter   Procedures    influenza vaccine, 1307-3204, high-dose, PF 0 5 mL (FLUZONE HIGH-DOSE)         Berenice Houston DO

## 2019-10-07 DIAGNOSIS — F32.A DEPRESSION, UNSPECIFIED DEPRESSION TYPE: ICD-10-CM

## 2019-10-07 RX ORDER — CITALOPRAM 10 MG/1
10 TABLET ORAL DAILY
Qty: 90 TABLET | Refills: 1 | Status: SHIPPED | OUTPATIENT
Start: 2019-10-07 | End: 2020-07-11

## 2019-10-16 ENCOUNTER — OFFICE VISIT (OUTPATIENT)
Dept: GERIATRICS | Age: 74
End: 2019-10-16
Payer: MEDICARE

## 2019-10-16 VITALS
OXYGEN SATURATION: 95 % | BODY MASS INDEX: 23.55 KG/M2 | DIASTOLIC BLOOD PRESSURE: 70 MMHG | HEART RATE: 87 BPM | WEIGHT: 128 LBS | TEMPERATURE: 97.9 F | RESPIRATION RATE: 18 BRPM | SYSTOLIC BLOOD PRESSURE: 144 MMHG | HEIGHT: 62 IN

## 2019-10-16 DIAGNOSIS — I10 ESSENTIAL HYPERTENSION: ICD-10-CM

## 2019-10-16 DIAGNOSIS — F02.81 LATE ONSET ALZHEIMER'S DISEASE WITH BEHAVIORAL DISTURBANCE (HCC): Primary | ICD-10-CM

## 2019-10-16 DIAGNOSIS — E78.00 HYPERCHOLESTEROLEMIA: ICD-10-CM

## 2019-10-16 DIAGNOSIS — G30.1 LATE ONSET ALZHEIMER'S DISEASE WITH BEHAVIORAL DISTURBANCE (HCC): Primary | ICD-10-CM

## 2019-10-16 DIAGNOSIS — N18.30 CHRONIC KIDNEY DISEASE, STAGE III (MODERATE) (HCC): Chronic | ICD-10-CM

## 2019-10-16 DIAGNOSIS — E89.0 POSTOPERATIVE HYPOTHYROIDISM: Chronic | ICD-10-CM

## 2019-10-16 PROCEDURE — 99215 OFFICE O/P EST HI 40 MIN: CPT | Performed by: FAMILY MEDICINE

## 2019-10-16 NOTE — PROGRESS NOTES
Assessment & Plan:   Angela Alejandra was seen today for follow-up  Diagnoses and all orders for this visit:    Late onset Alzheimer's disease with behavioral disturbance ([facility] )  Her for 6 month follow up visit  MoCA 14/30 GDS 3/15  Has decline in memory / no behavior issues  On Donepezil 10 mg /On Levothyroxine 88 mcg T4: 1 64 / B12 normal  continue to stay active physically, socially and mentally  Avoid OTC medication which has PM/ Tramadol, benadryl, anticholinergic  Follow up with primary care for chronic medical conditions    Essential hypertension      Chronic kidney disease , stage III (moderate) (HCC)    Postoperative hypothyroidism    Hypercholesterolemia        HPI:  We had the pleasure of evaluating Tucker Rooney who is a 66 y o  female is here for 6 month follow up visit  Patient has known medical hx of HTN, postoperative hypothyroidism, hx Lacunar infarct, hearing loss, CKD stage 3, urinary incontinence, and depression  Patient is present to the clinic with her daughter for follow up  Patient is currently on Donepezil 10 mg  Daughter states that the patient spends most of the time at home  Her memory has been declining  Daughter helps her with the medication and driving  She is able to take care of herself and dependent for other ADLs, and IADLS  Denies any fall or hospitalization since last visit  She has regular visit with her primary care for her other chronic medical conditions  Patient uses cane for his ambulation  She denies any hearing issues and uses glasses for her vision  Today her MoCa score is 14/30 which is 2 points lesser than last one  Her GDS is 3/15  Patient was encouraged to be active with puzzle game, and other social activities  She has problems operating household appliance such as TV remote, kitchen appliances, computer      She has difficulty finding the right word while speaking: Yes  Patient requires repeat information or ask the same question repeatedly: No  Do you drive: No       Have you had any recent accidents, citations or getting lost in familiar places :No  Do you handle your own financial affairs such as balancing your checkbook, paying bills, investments: No  Do have any difficulties with handling your financial affairs: Yes  Have you or your family noted any change in your mood or personality:No  Are you currently or have you been treated in the past for depression or anxiety: No  Have you noticed any gait or balance disorder: Yes  Uses :Cane: yes  Any hallucination or delusion: No  Fluctuation in alertness: No  Sleep Issues: No  Urinary/Stool Incontinence: No  Hearing and vision issue: No  Do you have POA:Yes  Do you have a Living will Yes  Past Medical, surgical, social, medication and allergy history and patients previous records reviewed  Family Review of Behavior [facility]:    pacing  No    agressive/combative behavior  No    agitated  No   wandering  No   resistance to care  No   hoarding/hiding objects  No    suspicious  No  withdrawn No  inappropriate sexual behaviorNo  rummaging/pillaging  No    misplacing/losing objects Yes  personal hygiene problems  No  forgetfulness of actions Yes   temper outbursts  No     throwing items No      Family member with dementia and what type/ father  Have you had any head trauma No  Does patient have history of alcohol abuse No      ROS: Review of Systems   Constitutional: Negative for activity change, appetite change, fever and unexpected weight change  HENT: Negative for congestion, hearing loss, sore throat and trouble swallowing  Eyes: Negative for photophobia and visual disturbance  Respiratory: Negative for cough, shortness of breath and wheezing  Cardiovascular: Negative for chest pain, palpitations and leg swelling  Gastrointestinal: Negative for abdominal distention, constipation, diarrhea and nausea  Genitourinary: Negative for difficulty urinating, frequency and urgency     Musculoskeletal: Negative for arthralgias, gait problem, myalgias and neck pain  Skin: Negative for color change, pallor and rash  Neurological: Negative for speech difficulty, weakness, numbness and headaches  Psychiatric/Behavioral: Negative for agitation, confusion, decreased concentration, hallucinations and sleep disturbance  The patient is not nervous/anxious  All other systems reviewed and are negative  Allergies: Allergies   Allergen Reactions    Naproxen Itching    Nsaids Other (See Comments) and Swelling    Azithromycin Nausea Only and Rash    Cephalexin Rash       Medications:      Current Outpatient Medications:     aspirin 81 MG tablet, Take 81 mg by mouth daily  , Disp: , Rfl:     atorvastatin (LIPITOR) 20 mg tablet, Take 1 tablet (20 mg total) by mouth daily, Disp: , Rfl:     calcium-vitamin D (OSCAL) 250-125 MG-UNIT per tablet, Take 1 tablet by mouth daily  , Disp: , Rfl:     citalopram (CeleXA) 10 mg tablet, take 1 tablet (10 MG TOTAL) by mouth daily, Disp: 90 tablet, Rfl: 1    cyanocobalamin 1,000 mcg/mL, Injection Q 6-8 weeks re PA, Disp: 1 mL, Rfl:     donepezil (ARICEPT) 10 mg tablet, Take 1 tablet (10 mg total) by mouth daily at bedtime, Disp: 30 tablet, Rfl: 5    ergocalciferol (VITAMIN D2) 50,000 units, , Disp: , Rfl: 0    levothyroxine 88 mcg tablet, 1 tablet daily, Disp: , Rfl: 0    lisinopril (ZESTRIL) 20 mg tablet, Take 1 tablet (20 mg total) by mouth daily, Disp: 30 tablet, Rfl: 0    niacin (NIASPAN) 1000 MG CR tablet, Take 2 tablets (2,000 mg total) by mouth daily, Disp: , Rfl: 0    Current Facility-Administered Medications:     cyanocobalamin injection 1,000 mcg, 1,000 mcg, Intramuscular, Q30 Days, [place], DO, 1,000 mcg at 08/29/19 1403    Vitals:  Vitals:    10/16/19 1517   BP: 144/70   Pulse: 87   Resp: 18   Temp: 97 9 °F (36 6 °C)   SpO2: 95%       History:  Past Medical History:   Diagnosis Date    Bed bug bite     LAST ASSESSED [id number]    Disease of thyroid gland     Graves' disease     Hyperlipidemia     Hypertension     LLL pneumonia ([facility] )     LAST ASSESSED [id number]    Migraine 2001    OCULAR    Scabies     LAST ASSESSED [id number]    Syncope and collapse     LAST ASSESSED [id number]    Vertigo     RESOLVED      Past Surgical History:   Procedure Laterality Date    CHOLECYSTECTOMY      HYSTERECTOMY      TOTAL THYROIDECTOMY      TUBAL LIGATION Bilateral      Family History   Problem Relation Age of Onset    Breast cancer Sister     Diabetes Family      Social History     Socioeconomic History    Marital status:       Spouse name: Not on file    Number of children: Not on file    Years of education: Not on file    Highest education level: Not on file   Occupational History    Not on file   Social Needs    Financial resource strain: Not on file    Food insecurity:     Worry: Not on file     Inability: Not on file    Transportation needs:     Medical: Not on file     Non-medical: Not on file   Tobacco Use    Smoking status: Current Every Day Smoker     Packs/day: 0 50    Smokeless tobacco: Never Used   Substance and Sexual Activity    Alcohol use: Never     Binge frequency: Never    Drug use: No    Sexual activity: Not on file   Lifestyle    Physical activity:     Days per week: Not on file     Minutes per session: Not on file    Stress: Not on file   Relationships    Social connections:     Talks on phone: Not on file     Gets together: Not on file     Attends Synagogue service: Not on file     Active member of club or organization: Not on file     Attends meetings of clubs or organizations: Not on file     Relationship status: Not on file    Intimate partner violence:     Fear of current or ex partner: Not on file     Emotionally abused: Not on file     Physically abused: Not on file     Forced sexual activity: Not on file   Other Topics Concern    Not on file   Social History Narrative    COPY OF ADVANCED DIRECTIVE OBTAINED FROM PATIENT Past Surgical History:   Procedure Laterality Date    CHOLECYSTECTOMY      HYSTERECTOMY      TOTAL THYROIDECTOMY      TUBAL LIGATION Bilateral          Physical Exam:   Physical Exam   Constitutional: She is oriented to person, place, and time  She appears well-developed and well-nourished  No distress  HENT:   Head: Normocephalic and atraumatic  Mouth/Throat: Oropharynx is clear and moist    Eyes: Pupils are equal, round, and reactive to light  Conjunctivae and EOM are normal  Right eye exhibits no discharge  Left eye exhibits no discharge  Neck: Normal range of motion  Neck supple  No tracheal deviation present  No thyromegaly present  Cardiovascular: Normal rate, regular rhythm and normal heart sounds  Exam reveals no friction rub  No murmur heard  Pulmonary/Chest: Effort normal and breath sounds normal  No respiratory distress  She has no wheezes  She has no rales  Abdominal: Soft  Bowel sounds are normal  She exhibits no distension  There is no tenderness  No hernia  Musculoskeletal: Normal range of motion  She exhibits no edema or deformity  Neurological: She is alert and oriented to person, place, and time  No cranial nerve deficit or sensory deficit  Skin: Skin is warm and dry  No erythema  No pallor  Psychiatric: She has a normal mood and affect  Her behavior is normal  Judgment and thought content normal    Nursing note and vitals reviewed

## 2019-10-17 DIAGNOSIS — I10 ESSENTIAL HYPERTENSION: ICD-10-CM

## 2019-10-17 RX ORDER — LISINOPRIL 20 MG/1
20 TABLET ORAL DAILY
Qty: 90 TABLET | Refills: 3 | Status: SHIPPED | OUTPATIENT
Start: 2019-10-17 | End: 2020-07-06 | Stop reason: SDUPTHER

## 2019-10-17 RX ORDER — ERGOCALCIFEROL 1.25 MG/1
CAPSULE ORAL
Refills: 0 | Status: CANCELLED | OUTPATIENT
Start: 2019-10-17

## 2019-10-17 NOTE — TELEPHONE ENCOUNTER
I sent in prescription for lisinopril 20 mg once daily, 90 tablets  Her Endocrinologist should be to 1 to provide prescription for vitamin-D    ( Dr Maegan Henry)

## 2019-10-17 NOTE — TELEPHONE ENCOUNTER
Pt called requesting Rx line requesting Vit d 50,000 units and Lisinopril  Do you want pt to continue high dose Vit d weekly or daily dose of OTC vit d 2000 units qd  Please advise

## 2019-10-29 ENCOUNTER — CLINICAL SUPPORT (OUTPATIENT)
Dept: FAMILY MEDICINE CLINIC | Facility: CLINIC | Age: 74
End: 2019-10-29
Payer: MEDICARE

## 2019-10-29 DIAGNOSIS — E53.8 VITAMIN B12 DEFICIENCY: Primary | ICD-10-CM

## 2019-10-29 PROCEDURE — 96372 THER/PROPH/DIAG INJ SC/IM: CPT

## 2019-10-29 RX ADMIN — CYANOCOBALAMIN 1000 MCG: 1000 INJECTION INTRAMUSCULAR; SUBCUTANEOUS at 13:12

## 2019-12-02 ENCOUNTER — PATIENT OUTREACH (OUTPATIENT)
Dept: FAMILY MEDICINE CLINIC | Facility: CLINIC | Age: 74
End: 2019-12-02

## 2019-12-30 ENCOUNTER — OFFICE VISIT (OUTPATIENT)
Dept: FAMILY MEDICINE CLINIC | Facility: CLINIC | Age: 74
End: 2019-12-30
Payer: MEDICARE

## 2019-12-30 VITALS
SYSTOLIC BLOOD PRESSURE: 128 MMHG | TEMPERATURE: 98.5 F | HEART RATE: 84 BPM | OXYGEN SATURATION: 94 % | BODY MASS INDEX: 23.74 KG/M2 | DIASTOLIC BLOOD PRESSURE: 86 MMHG | HEIGHT: 62 IN | WEIGHT: 129 LBS | RESPIRATION RATE: 18 BRPM

## 2019-12-30 DIAGNOSIS — L70.9 SAPHO SYNDROME (HCC): ICD-10-CM

## 2019-12-30 DIAGNOSIS — F02.81 LATE ONSET ALZHEIMER'S DISEASE WITH BEHAVIORAL DISTURBANCE (HCC): ICD-10-CM

## 2019-12-30 DIAGNOSIS — M85.80 SAPHO SYNDROME (HCC): ICD-10-CM

## 2019-12-30 DIAGNOSIS — G30.1 LATE ONSET ALZHEIMER'S DISEASE WITH BEHAVIORAL DISTURBANCE (HCC): ICD-10-CM

## 2019-12-30 DIAGNOSIS — L40.3 SAPHO SYNDROME (HCC): ICD-10-CM

## 2019-12-30 DIAGNOSIS — F17.200 CURRENT SMOKER: ICD-10-CM

## 2019-12-30 DIAGNOSIS — N18.30 CHRONIC KIDNEY DISEASE, STAGE III (MODERATE) (HCC): Primary | Chronic | ICD-10-CM

## 2019-12-30 DIAGNOSIS — I10 ESSENTIAL HYPERTENSION: ICD-10-CM

## 2019-12-30 DIAGNOSIS — M65.9 SAPHO SYNDROME (HCC): ICD-10-CM

## 2019-12-30 DIAGNOSIS — M86.9 SAPHO SYNDROME (HCC): ICD-10-CM

## 2019-12-30 DIAGNOSIS — R04.2 COUGH WITH HEMOPTYSIS: ICD-10-CM

## 2019-12-30 DIAGNOSIS — R05.9 COUGH: ICD-10-CM

## 2019-12-30 PROBLEM — N17.9 AKI (ACUTE KIDNEY INJURY) (HCC): Status: RESOLVED | Noted: 2019-09-02 | Resolved: 2019-12-30

## 2019-12-30 PROCEDURE — 99214 OFFICE O/P EST MOD 30 MIN: CPT | Performed by: FAMILY MEDICINE

## 2019-12-30 RX ORDER — ERGOCALCIFEROL 1.25 MG/1
CAPSULE ORAL
Refills: 0 | Status: CANCELLED | OUTPATIENT
Start: 2019-12-30

## 2019-12-30 RX ORDER — LISINOPRIL 20 MG/1
20 TABLET ORAL DAILY
Qty: 30 TABLET | Refills: 5 | Status: CANCELLED | OUTPATIENT
Start: 2019-12-30

## 2019-12-30 RX ORDER — DOXYCYCLINE HYCLATE 100 MG/1
100 CAPSULE ORAL EVERY 12 HOURS SCHEDULED
Qty: 14 CAPSULE | Refills: 0 | Status: SHIPPED | OUTPATIENT
Start: 2019-12-30 | End: 2020-01-06

## 2019-12-30 NOTE — PROGRESS NOTES
FAMILY PRACTICE OFFICE VISIT  Fito Nik العلي O  [facility]  [address] [address] [address] [place], [place], [facility]      NAME: Daryl Echevarria  AGE: 66 y o  SEX: female  : 1945   MRN: [id number]    DATE: 2019  TIME: 3:00 PM    Assessment and Plan     Problem List Items Addressed This Visit        Cardiovascular and Mediastinum    Essential hypertension       Nervous and Auditory    Late onset Alzheimer's disease with behavioral disturbance (HCC)       Musculoskeletal and Integument    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HCC)    Relevant Medications    doxycycline hyclate (VIBRAMYCIN) 100 mg capsule       Genitourinary    Chronic kidney disease, stage III (moderate) (HCC) - Primary (Chronic)       Other    Cough, chronic    Current smoker    Relevant Orders    CT chest wo contrast      Other Visit Diagnoses     Cough with hemoptysis        Relevant Medications    doxycycline hyclate (VIBRAMYCIN) 100 mg capsule    Other Relevant Orders    CT chest wo contrast          Patient Instructions   Recent hemoptysis with increased cough, long-term smoker, check CT ( w CKD, hold off on dye for now ) had chest x-ray which was clear back in September  Will not stop smoking -      Cover w doxycycline -    Use nasal saline every few hrs, can use Robitussin DM as needed  Call if worsens      Chief Complaint     Chief Complaint   Patient presents with    Cough       History of Present Illness   Daryl Echevarria is a 66y o -year-old female who Was in her usual health, daughter notes increased cough for the past few days, has been coughing up blood mixed with mucus in sync at times, no increased shortness of breath, no fever  No bloody noses, mild upper respiratory symptoms  Still smokes the same  Otherwise is using medication as directed  Review of Systems   Review of Systems   Constitutional: Positive for fatigue   Negative for appetite change, fever and unexpected weight change (Gained few lb)  HENT: Negative for sore throat and trouble swallowing  Respiratory: Positive for cough, shortness of breath and wheezing  Negative for chest tightness  Cardiovascular: Negative for chest pain, palpitations and leg swelling  Gastrointestinal: Negative for abdominal pain, blood in stool, diarrhea, nausea and vomiting  No acid reflux     No change in bowel   Genitourinary: Negative for dysuria and hematuria  Neurological: Negative for dizziness, syncope, light-headedness and headaches  Psychiatric/Behavioral: Positive for confusion  Active Problem List     Patient Active Problem List   Diagnosis    Sinus bradycardia    Postoperative hypothyroidism    Essential hypertension    Urinary incontinence    Chronic [facility] disease, stage III (moderate) ([facility])    Umbilical hernia    Trigger middle finger of right hand    Current smoker    Thyroid nodule    Pernicious anemia    Osteoarthritis    Neuropathy of both feet    Low back pain radiating to left lower extremity    Leukocytosis    Lacunar stroke ([facility])    Hyperglycemia    Hypercholesterolemia    Graves' disease    Gait disturbance    Dizziness    Disc degeneration, lumbar    Late onset Alzheimer's disease with behavioral disturbance ([facility])    Cough, chronic    Hearing loss    Major depressive disorder with single episode    Visual impairment    Full dentures    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[facility])    Weakness    Fall    Anemia       Past Medical History:  Reviewed    Past Surgical History:  Reviewed    Family History:  Reviewed    Social History:  Reviewed    Objective     Vitals:    12/30/19 1435   BP: 128/86   Pulse: 84   Resp: 18   Temp: 98 5 °F (36 9 °C)   SpO2: 94%   Weight: 58 5 kg (129 lb)   Height: 5' 2" (1 575 m)     Body mass index is 23 59 kg/m²      BP Readings from Last 3 Encounters:   12/30/19 128/86   10/16/19 144/70   09/12/19 104/62       Wt Readings from Last 3 Encounters:   12/30/19 58 5 kg (129 lb)   10/16/19 58 1 kg (128 lb)   09/12/19 56 2 kg (124 lb)       Physical Exam   Constitutional:   Pleasant 66year-old seated no acute distress   HENT:   Mouth/Throat: Oropharynx is clear and moist  No oropharyngeal exudate  Eyes: Conjunctivae are normal  No scleral icterus  Cardiovascular: Normal rate, regular rhythm and normal heart sounds  Pulmonary/Chest:   Decreased breath sounds but clear, no rhonchi, rales, wheeze   Musculoskeletal: She exhibits no edema  Lymphadenopathy:     She has no cervical adenopathy  Neurological: She is alert  ALLERGIES:  Allergies   Allergen Reactions    Naproxen Itching    Nsaids Other (See Comments) and Swelling    Azithromycin Nausea Only and Rash    Cephalexin Rash       Current Medications     Current Outpatient Medications   Medication Sig Dispense Refill    atorvastatin (LIPITOR) 20 mg tablet Take 1 tablet (20 mg total) by mouth daily      calcium-vitamin D (OSCAL) 250-125 MG-UNIT per tablet Take 1 tablet by mouth daily   citalopram (CeleXA) 10 mg tablet take 1 tablet (10 MG TOTAL) by mouth daily 90 tablet 1    donepezil (ARICEPT) 10 mg tablet Take 1 tablet (10 mg total) by mouth daily at bedtime 30 tablet 5    levothyroxine 88 mcg tablet 1 tablet daily  0    lisinopril (ZESTRIL) 20 mg tablet Take 1 tablet (20 mg total) by mouth daily 90 tablet 3    niacin (NIASPAN) 1000 MG CR tablet Take 2 tablets (2,000 mg total) by mouth daily  0    aspirin 81 MG tablet Take 81 mg by mouth daily        cyanocobalamin 1,000 mcg/mL Injection Q 6-8 weeks re PA (Patient not taking: Reported on 12/30/2019) 1 mL     doxycycline hyclate (VIBRAMYCIN) 100 mg capsule Take 1 capsule (100 mg total) by mouth every 12 (twelve) hours for 7 days 14 capsule 0    ergocalciferol (VITAMIN D2) 50,000 units   0     Current Facility-Administered Medications   Medication Dose Route Frequency Provider Last Rate Last Dose    cyanocobalamin injection 1,000 mcg  1,000 mcg Intramuscular Q30 Days Vianey Deep, DO   1,000 mcg at 10/29/19 1312            Orders Placed This Encounter   Procedures    CT chest wo contrast         Vianey Deep, DO

## 2019-12-30 NOTE — PATIENT INSTRUCTIONS
Recent hemoptysis with increased cough, long-term smoker, check CT ( w CKD, hold off on dye for now ) had chest x-ray which was clear back in September  Will not stop smoking -      Cover w doxycycline -    Use nasal saline every few hrs, can use Robitussin DM as needed    Call if worsens

## 2020-01-07 ENCOUNTER — HOSPITAL ENCOUNTER (OUTPATIENT)
Dept: CT IMAGING | Facility: HOSPITAL | Age: 75
Discharge: HOME | End: 2020-01-07
Payer: MEDICARE

## 2020-01-07 DIAGNOSIS — F17.200 CURRENT SMOKER: ICD-10-CM

## 2020-01-07 DIAGNOSIS — R04.2 COUGH WITH HEMOPTYSIS: ICD-10-CM

## 2020-01-07 PROCEDURE — 71250 CT THORAX DX C-: CPT

## 2020-01-08 LAB
LEFT EYE DIABETIC RETINOPATHY: NORMAL
RIGHT EYE DIABETIC RETINOPATHY: NORMAL

## 2020-02-13 ENCOUNTER — APPOINTMENT (EMERGENCY)
Dept: CT IMAGING | Facility: HOSPITAL | Age: 75
DRG: 871 | End: 2020-02-13
Payer: MEDICARE

## 2020-02-13 ENCOUNTER — APPOINTMENT (EMERGENCY)
Dept: RADIOLOGY | Facility: HOSPITAL | Age: 75
DRG: 871 | End: 2020-02-13
Payer: MEDICARE

## 2020-02-13 ENCOUNTER — OFFICE VISIT (OUTPATIENT)
Dept: FAMILY MEDICINE CLINIC | Facility: CLINIC | Age: 75
End: 2020-02-13
Payer: MEDICARE

## 2020-02-13 ENCOUNTER — HOSPITAL ENCOUNTER (INPATIENT)
Facility: HOSPITAL | Age: 75
LOS: 6 days | Discharge: HOME HEALTH | DRG: 871 | End: 2020-02-19
Attending: EMERGENCY MEDICINE | Admitting: INTERNAL MEDICINE
Payer: MEDICARE

## 2020-02-13 VITALS
HEART RATE: 54 BPM | TEMPERATURE: 95.3 F | DIASTOLIC BLOOD PRESSURE: 56 MMHG | SYSTOLIC BLOOD PRESSURE: 92 MMHG | OXYGEN SATURATION: 98 % | HEIGHT: 62 IN | WEIGHT: 130.5 LBS | BODY MASS INDEX: 24.01 KG/M2

## 2020-02-13 DIAGNOSIS — R53.1 GENERALIZED WEAKNESS: ICD-10-CM

## 2020-02-13 DIAGNOSIS — E04.1 THYROID NODULE: ICD-10-CM

## 2020-02-13 DIAGNOSIS — R42 DIZZINESS: ICD-10-CM

## 2020-02-13 DIAGNOSIS — I95.9 HYPOTENSION: Primary | ICD-10-CM

## 2020-02-13 DIAGNOSIS — K59.00 CONSTIPATION: ICD-10-CM

## 2020-02-13 DIAGNOSIS — G30.1 LATE ONSET ALZHEIMER'S DISEASE WITH BEHAVIORAL DISTURBANCE (HCC): ICD-10-CM

## 2020-02-13 DIAGNOSIS — E03.9 HYPOTHYROID: ICD-10-CM

## 2020-02-13 DIAGNOSIS — I95.9 HYPOTENSION, UNSPECIFIED HYPOTENSION TYPE: ICD-10-CM

## 2020-02-13 DIAGNOSIS — I95.9 HYPOTENSION, UNSPECIFIED: ICD-10-CM

## 2020-02-13 DIAGNOSIS — K52.9 GASTROENTERITIS: ICD-10-CM

## 2020-02-13 DIAGNOSIS — F02.81 LATE ONSET ALZHEIMER'S DISEASE WITH BEHAVIORAL DISTURBANCE (HCC): ICD-10-CM

## 2020-02-13 DIAGNOSIS — F17.200 CURRENT SMOKER: ICD-10-CM

## 2020-02-13 DIAGNOSIS — E89.0 POSTOPERATIVE HYPOTHYROIDISM: Chronic | ICD-10-CM

## 2020-02-13 DIAGNOSIS — A41.9 SEPTIC SHOCK (HCC): ICD-10-CM

## 2020-02-13 DIAGNOSIS — N28.9 ACUTE RENAL INSUFFICIENCY: ICD-10-CM

## 2020-02-13 DIAGNOSIS — R65.21 SEPTIC SHOCK (HCC): ICD-10-CM

## 2020-02-13 DIAGNOSIS — R11.2 NON-INTRACTABLE VOMITING WITH NAUSEA, UNSPECIFIED VOMITING TYPE: Primary | ICD-10-CM

## 2020-02-13 DIAGNOSIS — R74.01 TRANSAMINITIS: ICD-10-CM

## 2020-02-13 DIAGNOSIS — E86.0 DEHYDRATION: ICD-10-CM

## 2020-02-13 PROBLEM — N17.0 ACUTE KIDNEY INJURY (AKI) WITH ACUTE TUBULAR NECROSIS (ATN) (HCC): Status: RESOLVED | Noted: 2020-02-13 | Resolved: 2020-02-13

## 2020-02-13 PROBLEM — E87.5 HYPERKALEMIA: Status: ACTIVE | Noted: 2020-02-13

## 2020-02-13 PROBLEM — N17.0 ACUTE KIDNEY INJURY (AKI) WITH ACUTE TUBULAR NECROSIS (ATN) (HCC): Status: ACTIVE | Noted: 2020-02-13

## 2020-02-13 PROBLEM — R65.10 SIRS (SYSTEMIC INFLAMMATORY RESPONSE SYNDROME) (HCC): Status: ACTIVE | Noted: 2020-02-13

## 2020-02-13 PROBLEM — E87.29 HIGH ANION GAP METABOLIC ACIDOSIS: Status: ACTIVE | Noted: 2020-02-13

## 2020-02-13 PROBLEM — N17.9 ACUTE KIDNEY INJURY (HCC): Status: ACTIVE | Noted: 2020-02-13

## 2020-02-13 PROBLEM — E87.2 HIGH ANION GAP METABOLIC ACIDOSIS: Status: ACTIVE | Noted: 2020-02-13

## 2020-02-13 LAB
ALBUMIN SERPL BCP-MCNC: 3.3 G/DL (ref 3.5–5)
ALP SERPL-CCNC: 97 U/L (ref 46–116)
ALT SERPL W P-5'-P-CCNC: 315 U/L (ref 12–78)
ANION GAP SERPL CALCULATED.3IONS-SCNC: 18 MMOL/L (ref 4–13)
APTT PPP: 33 SECONDS (ref 23–37)
AST SERPL W P-5'-P-CCNC: 765 U/L (ref 5–45)
BACTERIA UR QL AUTO: ABNORMAL /HPF
BASOPHILS # BLD AUTO: 0.05 THOUSANDS/ΜL (ref 0–0.1)
BASOPHILS NFR BLD AUTO: 0 % (ref 0–1)
BILIRUB SERPL-MCNC: 0.64 MG/DL (ref 0.2–1)
BILIRUB UR QL STRIP: NEGATIVE
BUN SERPL-MCNC: 31 MG/DL (ref 5–25)
CALCIUM SERPL-MCNC: 8.7 MG/DL (ref 8.3–10.1)
CHLORIDE SERPL-SCNC: 98 MMOL/L (ref 100–108)
CLARITY UR: CLEAR
CO2 SERPL-SCNC: 22 MMOL/L (ref 21–32)
COLOR UR: YELLOW
COLOR, POC: YELLOW
CREAT SERPL-MCNC: 1.7 MG/DL (ref 0.6–1.3)
EOSINOPHIL # BLD AUTO: 0.01 THOUSAND/ΜL (ref 0–0.61)
EOSINOPHIL NFR BLD AUTO: 0 % (ref 0–6)
ERYTHROCYTE [DISTWIDTH] IN BLOOD BY AUTOMATED COUNT: 16.9 % (ref 11.6–15.1)
FLUAV RNA NPH QL NAA+PROBE: NORMAL
FLUBV RNA NPH QL NAA+PROBE: NORMAL
GFR SERPL CREATININE-BSD FRML MDRD: 29 ML/MIN/1.73SQ M
GLUCOSE SERPL-MCNC: 205 MG/DL (ref 65–140)
GLUCOSE SERPL-MCNC: 45 MG/DL (ref 65–140)
GLUCOSE SERPL-MCNC: 55 MG/DL (ref 65–140)
GLUCOSE UR STRIP-MCNC: NEGATIVE MG/DL
HCT VFR BLD AUTO: 48.4 % (ref 34.8–46.1)
HGB BLD-MCNC: 14.7 G/DL (ref 11.5–15.4)
HGB UR QL STRIP.AUTO: NEGATIVE
HYALINE CASTS #/AREA URNS LPF: ABNORMAL /LPF
IMM GRANULOCYTES # BLD AUTO: 0.26 THOUSAND/UL (ref 0–0.2)
IMM GRANULOCYTES NFR BLD AUTO: 1 % (ref 0–2)
INR PPP: 1.91 (ref 0.84–1.19)
KETONES UR STRIP-MCNC: ABNORMAL MG/DL
LACTATE SERPL-SCNC: 6.5 MMOL/L (ref 0.5–2)
LEUKOCYTE ESTERASE UR QL STRIP: ABNORMAL
LIPASE SERPL-CCNC: 125 U/L (ref 73–393)
LYMPHOCYTES # BLD AUTO: 1.17 THOUSANDS/ΜL (ref 0.6–4.47)
LYMPHOCYTES NFR BLD AUTO: 5 % (ref 14–44)
MCH RBC QN AUTO: 28.8 PG (ref 26.8–34.3)
MCHC RBC AUTO-ENTMCNC: 30.4 G/DL (ref 31.4–37.4)
MCV RBC AUTO: 95 FL (ref 82–98)
MONOCYTES # BLD AUTO: 2.38 THOUSAND/ΜL (ref 0.17–1.22)
MONOCYTES NFR BLD AUTO: 11 % (ref 4–12)
NEUTROPHILS # BLD AUTO: 17.65 THOUSANDS/ΜL (ref 1.85–7.62)
NEUTS SEG NFR BLD AUTO: 83 % (ref 43–75)
NITRITE UR QL STRIP: NEGATIVE
NON-SQ EPI CELLS URNS QL MICRO: ABNORMAL /HPF
NRBC BLD AUTO-RTO: 0 /100 WBCS
PH UR STRIP.AUTO: 5.5 [PH] (ref 4.5–8)
PLATELET # BLD AUTO: 299 THOUSANDS/UL (ref 149–390)
PMV BLD AUTO: 10 FL (ref 8.9–12.7)
POTASSIUM SERPL-SCNC: 5.4 MMOL/L (ref 3.5–5.3)
PROT SERPL-MCNC: 6.6 G/DL (ref 6.4–8.2)
PROT UR STRIP-MCNC: ABNORMAL MG/DL
PROTHROMBIN TIME: 22.2 SECONDS (ref 11.6–14.5)
RBC # BLD AUTO: 5.11 MILLION/UL (ref 3.81–5.12)
RBC #/AREA URNS AUTO: ABNORMAL /HPF
RSV RNA NPH QL NAA+PROBE: NORMAL
SODIUM SERPL-SCNC: 138 MMOL/L (ref 136–145)
SP GR UR STRIP.AUTO: 1.02 (ref 1–1.03)
TROPONIN I SERPL-MCNC: 0.02 NG/ML
UROBILINOGEN UR QL STRIP.AUTO: 0.2 E.U./DL
WBC # BLD AUTO: 21.52 THOUSAND/UL (ref 4.31–10.16)
WBC #/AREA URNS AUTO: ABNORMAL /HPF

## 2020-02-13 PROCEDURE — 85730 THROMBOPLASTIN TIME PARTIAL: CPT | Performed by: EMERGENCY MEDICINE

## 2020-02-13 PROCEDURE — 96374 THER/PROPH/DIAG INJ IV PUSH: CPT

## 2020-02-13 PROCEDURE — 99291 CRITICAL CARE FIRST HOUR: CPT | Performed by: EMERGENCY MEDICINE

## 2020-02-13 PROCEDURE — 87040 BLOOD CULTURE FOR BACTERIA: CPT | Performed by: EMERGENCY MEDICINE

## 2020-02-13 PROCEDURE — 84145 PROCALCITONIN (PCT): CPT | Performed by: NURSE PRACTITIONER

## 2020-02-13 PROCEDURE — 4040F PNEUMOC VAC/ADMIN/RCVD: CPT | Performed by: FAMILY MEDICINE

## 2020-02-13 PROCEDURE — 87505 NFCT AGENT DETECTION GI: CPT | Performed by: NURSE PRACTITIONER

## 2020-02-13 PROCEDURE — 71045 X-RAY EXAM CHEST 1 VIEW: CPT

## 2020-02-13 PROCEDURE — 85610 PROTHROMBIN TIME: CPT | Performed by: EMERGENCY MEDICINE

## 2020-02-13 PROCEDURE — 3008F BODY MASS INDEX DOCD: CPT | Performed by: FAMILY MEDICINE

## 2020-02-13 PROCEDURE — 80053 COMPREHEN METABOLIC PANEL: CPT | Performed by: EMERGENCY MEDICINE

## 2020-02-13 PROCEDURE — 99292 CRITICAL CARE ADDL 30 MIN: CPT | Performed by: EMERGENCY MEDICINE

## 2020-02-13 PROCEDURE — 1160F RVW MEDS BY RX/DR IN RCRD: CPT | Performed by: FAMILY MEDICINE

## 2020-02-13 PROCEDURE — 84484 ASSAY OF TROPONIN QUANT: CPT | Performed by: EMERGENCY MEDICINE

## 2020-02-13 PROCEDURE — 3078F DIAST BP <80 MM HG: CPT | Performed by: FAMILY MEDICINE

## 2020-02-13 PROCEDURE — 93005 ELECTROCARDIOGRAM TRACING: CPT

## 2020-02-13 PROCEDURE — 87493 C DIFF AMPLIFIED PROBE: CPT | Performed by: NURSE PRACTITIONER

## 2020-02-13 PROCEDURE — 81001 URINALYSIS AUTO W/SCOPE: CPT

## 2020-02-13 PROCEDURE — 82948 REAGENT STRIP/BLOOD GLUCOSE: CPT

## 2020-02-13 PROCEDURE — 3074F SYST BP LT 130 MM HG: CPT | Performed by: FAMILY MEDICINE

## 2020-02-13 PROCEDURE — 74176 CT ABD & PELVIS W/O CONTRAST: CPT

## 2020-02-13 PROCEDURE — 96361 HYDRATE IV INFUSION ADD-ON: CPT

## 2020-02-13 PROCEDURE — 87631 RESP VIRUS 3-5 TARGETS: CPT | Performed by: EMERGENCY MEDICINE

## 2020-02-13 PROCEDURE — 83690 ASSAY OF LIPASE: CPT | Performed by: EMERGENCY MEDICINE

## 2020-02-13 PROCEDURE — 85025 COMPLETE CBC W/AUTO DIFF WBC: CPT | Performed by: EMERGENCY MEDICINE

## 2020-02-13 PROCEDURE — 99214 OFFICE O/P EST MOD 30 MIN: CPT | Performed by: FAMILY MEDICINE

## 2020-02-13 PROCEDURE — 99285 EMERGENCY DEPT VISIT HI MDM: CPT

## 2020-02-13 PROCEDURE — 36415 COLL VENOUS BLD VENIPUNCTURE: CPT | Performed by: EMERGENCY MEDICINE

## 2020-02-13 PROCEDURE — 83605 ASSAY OF LACTIC ACID: CPT | Performed by: EMERGENCY MEDICINE

## 2020-02-13 RX ORDER — SODIUM CHLORIDE 9 MG/ML
125 INJECTION, SOLUTION INTRAVENOUS CONTINUOUS
Status: DISCONTINUED | OUTPATIENT
Start: 2020-02-13 | End: 2020-02-14

## 2020-02-13 RX ORDER — DEXTROSE MONOHYDRATE 25 G/50ML
25 INJECTION, SOLUTION INTRAVENOUS ONCE
Status: COMPLETED | OUTPATIENT
Start: 2020-02-13 | End: 2020-02-13

## 2020-02-13 RX ORDER — ALENDRONATE SODIUM 70 MG/1
TABLET ORAL
COMMUNITY
Start: 2020-01-03 | End: 2020-11-09 | Stop reason: ALTCHOICE

## 2020-02-13 RX ORDER — LEVOFLOXACIN 5 MG/ML
500 INJECTION, SOLUTION INTRAVENOUS ONCE
Status: COMPLETED | OUTPATIENT
Start: 2020-02-13 | End: 2020-02-13

## 2020-02-13 RX ORDER — METOPROLOL SUCCINATE 50 MG/1
50 TABLET, EXTENDED RELEASE ORAL DAILY
COMMUNITY
Start: 2020-01-03 | End: 2022-06-21

## 2020-02-13 RX ORDER — ONDANSETRON 2 MG/ML
4 INJECTION INTRAMUSCULAR; INTRAVENOUS ONCE
Status: COMPLETED | OUTPATIENT
Start: 2020-02-13 | End: 2020-02-13

## 2020-02-13 RX ORDER — METOCLOPRAMIDE HYDROCHLORIDE 5 MG/ML
10 INJECTION INTRAMUSCULAR; INTRAVENOUS EVERY 6 HOURS PRN
Status: DISCONTINUED | OUTPATIENT
Start: 2020-02-13 | End: 2020-02-14

## 2020-02-13 RX ORDER — DEXTROSE AND SODIUM CHLORIDE 5; .9 G/100ML; G/100ML
100 INJECTION, SOLUTION INTRAVENOUS CONTINUOUS
Status: DISCONTINUED | OUTPATIENT
Start: 2020-02-13 | End: 2020-02-13

## 2020-02-13 RX ADMIN — ONDANSETRON 4 MG: 2 INJECTION INTRAMUSCULAR; INTRAVENOUS at 17:39

## 2020-02-13 RX ADMIN — SODIUM CHLORIDE 1000 ML: 0.9 INJECTION, SOLUTION INTRAVENOUS at 23:14

## 2020-02-13 RX ADMIN — SODIUM CHLORIDE 1000 ML: 0.9 INJECTION, SOLUTION INTRAVENOUS at 21:07

## 2020-02-13 RX ADMIN — DEXTROSE MONOHYDRATE 25 ML: 25 INJECTION, SOLUTION INTRAVENOUS at 22:13

## 2020-02-13 RX ADMIN — SODIUM CHLORIDE 1000 ML: 0.9 INJECTION, SOLUTION INTRAVENOUS at 17:37

## 2020-02-13 RX ADMIN — ONDANSETRON 4 MG: 2 INJECTION INTRAMUSCULAR; INTRAVENOUS at 21:59

## 2020-02-13 RX ADMIN — LEVOFLOXACIN 500 MG: 5 INJECTION, SOLUTION INTRAVENOUS at 21:46

## 2020-02-13 NOTE — ED PROVIDER NOTES
History  Chief Complaint   Patient presents with    Hypotension     Pt  sent from PCP due to hypotension  Pt  reports feeling dizzy and weak  Pt  reports its started today  Pt  Started feeling dizzy and weak today  She lives at home with her family and has some visiting nurses check on her twice a week  She vomited 3 times today and had diarrhea twice  No definite fevers, +cough, no cp, no sob  Prior to Admission Medications   Prescriptions Last Dose Informant Patient Reported? Taking? alendronate (FOSAMAX) 70 mg tablet   Yes Yes   aspirin 81 MG tablet  Self Yes No   Sig: Take 81 mg by mouth daily  atorvastatin (LIPITOR) 20 mg tablet  Self No Yes   Sig: Take 1 tablet (20 mg total) by mouth daily   calcium-vitamin D (OSCAL) 250-125 MG-UNIT per tablet  Self Yes Yes   Sig: Take 1 tablet by mouth daily     citalopram (CeleXA) 10 mg tablet Not Taking at Unknown time Self No No   Sig: take 1 tablet (10 MG TOTAL) by mouth daily   Patient not taking: Reported on 2020   cyanocobalamin 1,000 mcg/mL Not Taking at Unknown time Self No No   Sig: Injection Q 6-8 weeks re PA   Patient not taking: Reported on 2019   donepezil (ARICEPT) 10 mg tablet  Self No Yes   Sig: Take 1 tablet (10 mg total) by mouth daily at bedtime   ergocalciferol (VITAMIN D2) 50,000 units Not Taking at Unknown time Self Yes No   levothyroxine 88 mcg tablet  Self Yes Yes   Si tablet daily   lisinopril (ZESTRIL) 20 mg tablet  Self No Yes   Sig: Take 1 tablet (20 mg total) by mouth daily   metoprolol succinate (TOPROL-XL) 50 mg 24 hr tablet   Yes Yes   Sig: Take 50 mg by mouth daily   niacin (NIASPAN) 1000 MG CR tablet  Self No Yes   Sig: Take 2 tablets (2,000 mg total) by mouth daily      Facility-Administered Medications Last Administration Doses Remaining   cyanocobalamin injection 1,000 mcg 10/29/2019  1:12 PM           Past Medical History:   Diagnosis Date    Acute kidney injury (LAYLA) with acute tubular necrosis (ATN) ([facility] ) 2/13/2020    Bed bug bite     LAST ASSESSED [id number]    Disease of thyroid gland     Graves' disease     Hyperlipidemia     Hypertension     LLL pneumonia ([facility] )     LAST ASSESSED [id number]    Migraine 2001    OCULAR    Scabies     LAST ASSESSED [id number]    Syncope and collapse     LAST ASSESSED [id number]    Vertigo     RESOLVED        Past Surgical History:   Procedure Laterality Date    CHOLECYSTECTOMY      HYSTERECTOMY      TOTAL THYROIDECTOMY      TUBAL LIGATION Bilateral        Family History   Problem Relation Age of Onset    Breast cancer Sister     Diabetes Family      I have reviewed and agree with the history as documented  Social History     Tobacco Use    Smoking status: Current Every Day Smoker     Packs/day: 0 50    Smokeless tobacco: Never Used   Substance Use Topics    Alcohol use: Never     Binge frequency: Never    Drug use: No       Review of Systems   Constitutional: Positive for appetite change and fatigue  Negative for fever  HENT: Negative for rhinorrhea and sore throat  Respiratory: Positive for cough  Negative for shortness of breath and wheezing  Cardiovascular: Negative for chest pain and leg swelling  Gastrointestinal: Positive for diarrhea, nausea and vomiting  Negative for abdominal pain  Genitourinary: Negative for dysuria and flank pain  Musculoskeletal: Negative for back pain and neck pain  Skin: Negative for rash  Neurological: Positive for dizziness and weakness  Negative for syncope and headaches  Psychiatric/Behavioral:        Mood normal       Physical Exam  Physical Exam   Constitutional: She is oriented to person, place, and time  She appears well-developed and well-nourished  HENT:   Head: Normocephalic and atraumatic  Dry MM   Eyes: Pupils are equal, round, and reactive to light  Neck: Normal range of motion  Neck supple  Cardiovascular: Normal rate and regular rhythm     Pulmonary/Chest: Effort normal and breath sounds normal  No respiratory distress  Abdominal: Soft  There is no tenderness  Musculoskeletal: Normal range of motion  Neurological: She is alert and oriented to person, place, and time  No cranial nerve deficit  Skin: Skin is warm and dry  Nursing note and vitals reviewed        Vital Signs  ED Triage Vitals   Temperature Pulse Respirations Blood Pressure SpO2   02/13/20 1654 02/13/20 1652 02/13/20 1652 02/13/20 1652 02/13/20 1652   (!) 97 °F (36 1 °C) (!) 54 16 105/52 99 %      Temp Source Heart Rate Source Patient Position - Orthostatic VS BP Location FiO2 (%)   02/13/20 1654 02/13/20 1652 02/13/20 1652 02/13/20 1652 --   Temporal Monitor Sitting Right arm       Pain Score       02/13/20 1652       No Pain           Vitals:    02/13/20 1840 02/13/20 1908 02/13/20 2048 02/13/20 2132   BP: 106/54 (!) 90/42 (!) 96/49 108/68   Pulse: 64 (!) 50 63    Patient Position - Orthostatic VS: Standing - Orthostatic VS  Lying          Visual Acuity      ED Medications  Medications   sodium chloride 0 9 % bolus 1,000 mL (1,000 mL Intravenous New Bag 2/13/20 2107)   sodium chloride 0 9 % infusion (has no administration in time range)   levofloxacin (LEVAQUIN) IVPB (premix) 500 mg (500 mg Intravenous New Bag 2/13/20 2146)   sodium chloride 0 9 % bolus 1,000 mL (0 mL Intravenous Stopped 2/13/20 2108)   ondansetron (ZOFRAN) injection 4 mg (4 mg Intravenous Given 2/13/20 1739)       Diagnostic Studies  Results Reviewed     Procedure Component Value Units Date/Time    Blood culture #1 [[id number]] Collected:  02/13/20 2140    Lab Status:  No result Specimen:  Blood from Hand, Right     Procalcitonin [[id number]] Collected:  02/13/20 2140    Lab Status:  No result Specimen:  Blood from Arm, Right     Hepatitis panel, acute [[id number]] Collected:  02/13/20 2140    Lab Status:  No result Specimen:  Blood from Arm, Right     Lactic acid, plasma x2 [[id number]]  (Abnormal) Collected:  02/13/20 2104    Lab Status:  Final result Specimen:  Blood from Arm, Right Updated:  02/13/20 2132     LACTIC ACID 6 5 mmol/L     Narrative:       Result may be elevated if tourniquet was used during collection  Influenza A/B and RSV PCR [[id number]]  (Normal) Collected:  02/13/20 2030    Lab Status:  Final result Specimen:  Nasopharyngeal Swab Updated:  02/13/20 2117     INFLUENZA A PCR None Detected     INFLUENZA B PCR None Detected     RSV PCR None Detected    Blood culture #2 [[id number]] Collected:  02/13/20 2103    Lab Status:   In process Specimen:  Blood from Arm, Right Updated:  02/13/20 2108    Urine Microscopic [[id number]]  (Abnormal) Collected:  02/13/20 1832    Lab Status:  Final result Specimen:  Urine, Clean Catch Updated:  02/13/20 1910     RBC, UA None Seen /hpf      WBC, UA 2-4 /hpf      Epithelial Cells Occasional /hpf      Bacteria, UA Occasional /hpf      Hyaline Casts, UA 0-1 /lpf     POCT urinalysis dipstick [[id number]]  (Abnormal) Resulted:  02/13/20 1834    Lab Status:  Final result Specimen:  Urine Updated:  02/13/20 1834     Color, UA yellow    Urine Macroscopic, POC [[id number]]  (Abnormal) Collected:  02/13/20 1832    Lab Status:  Final result Specimen:  Urine Updated:  02/13/20 1833     Color, UA Yellow     Clarity, UA Clear     pH, UA 5 5     Leukocytes, UA Trace     Nitrite, UA Negative     Protein, UA 30 (1+) mg/dl      Glucose, UA Negative mg/dl      Ketones, UA 15 (1+) mg/dl      Urobilinogen, UA 0 2 E U /dl      Bilirubin, UA Negative     Blood, UA Negative     Specific Gravity, UA 1 025    Narrative:       CLINITEK RESULT    Comprehensive metabolic panel [[id number]]  (Abnormal) Collected:  02/13/20 1754    Lab Status:  Final result Specimen:  Blood from Arm, Right Updated:  02/13/20 1824     Sodium 138 mmol/L      Potassium 5 4 mmol/L      Chloride 98 mmol/L      CO2 22 mmol/L      ANION GAP 18 mmol/L      BUN 31 mg/dL      Creatinine 1 70 mg/dL      Glucose 55 mg/dL      Calcium 8 7 mg/dL       U/L       U/L      Alkaline Phosphatase 97 U/L      Total Protein 6 6 g/dL      Albumin 3 3 g/dL      Total Bilirubin 0 64 mg/dL      eGFR 29 ml/min/1 73sq m     Narrative:       [place] guidelines for Chronic Kidney Disease (CKD):     Stage 1 with normal or high GFR (GFR > 90 mL/min/1 73 square meters)    Stage 2 Mild CKD (GFR = 60-89 mL/min/1 73 square meters)    Stage 3A Moderate CKD (GFR = 45-59 mL/min/1 73 square meters)    Stage 3B Moderate CKD (GFR = 30-44 mL/min/1 73 square meters)    Stage 4 Severe CKD (GFR = 15-29 mL/min/1 73 square meters)    Stage 5 End Stage CKD (GFR <15 mL/min/1 73 square meters)  Note: GFR calculation is accurate only with a steady state creatinine    Lipase [[id number]]  (Normal) Collected:  02/13/20 1754    Lab Status:  Final result Specimen:  Blood from Arm, Right Updated:  02/13/20 1824     Lipase 125 u/L     Protime-INR [[id number]]  (Abnormal) Collected:  02/13/20 1727    Lab Status:  Final result Specimen:  Blood from Arm, Right Updated:  02/13/20 1821     Protime 22 2 seconds      INR 1 91    APTT [[id number]]  (Normal) Collected:  02/13/20 1727    Lab Status:  Final result Specimen:  Blood from Arm, Right Updated:  02/13/20 1821     PTT 33 seconds     Troponin I [[id number]]  (Normal) Collected:  02/13/20 1727    Lab Status:  Final result Specimen:  Blood from Arm, Right Updated:  02/13/20 1759     Troponin I 0 02 ng/mL     CBC and differential [[id number]]  (Abnormal) Collected:  02/13/20 1727    Lab Status:  Final result Specimen:  Blood from Arm, Right Updated:  02/13/20 1740     WBC 21 52 Thousand/uL      RBC 5 11 Million/uL      Hemoglobin 14 7 g/dL      Hematocrit 48 4 %      MCV 95 fL      MCH 28 8 pg      MCHC 30 4 g/dL      RDW 16 9 %      MPV 10 0 fL      Platelets 241 Thousands/uL      nRBC 0 /100 WBCs      Neutrophils Relative 83 %      Immat GRANS % 1 %      Lymphocytes Relative 5 %      Monocytes Relative 11 %      Eosinophils Relative 0 % Basophils Relative 0 %      Neutrophils Absolute 17 65 Thousands/µL      Immature Grans Absolute 0 26 Thousand/uL      Lymphocytes Absolute 1 17 Thousands/µL      Monocytes Absolute 2 38 Thousand/µL      Eosinophils Absolute 0 01 Thousand/µL      Basophils Absolute 0 05 Thousands/µL                  CT abdomen pelvis wo contrast   Final Result by Mariaelena Mcneal MD (02/13 1945)      No evidence of bowel obstruction, colitis or diverticulitis                 Workstation performed: [id number]         XR chest 1 view portable    (Results Pending)              Procedures  ECG 12 Lead Documentation Only  Date/Time: 2/13/2020 5:04 PM  Performed by: Jodi Bernard MD  Authorized by: Jodi Bernard MD     Rate:     ECG rate:  57    ECG rate assessment: bradycardic    Rhythm:     Rhythm: sinus bradycardia    ST segments:     ST segments:  Non-specific    CriticalCare Time  Performed by: Jodi Bernard MD  Authorized by: Jodi Bernard MD     Critical care provider statement:     Critical care time (minutes):  120    Critical care was necessary to treat or prevent imminent or life-threatening deterioration of the following conditions:  Circulatory failure, dehydration, metabolic crisis, sepsis, cardiac failure and shock    Critical care was time spent personally by me on the following activities:  Obtaining history from patient or surrogate, development of treatment plan with patient or surrogate, discussions with consultants, evaluation of patient's response to treatment, examination of patient, ventilator management, re-evaluation of patient's condition, interpretation of cardiac output measurements, ordering and performing treatments and interventions, ordering and review of laboratory studies and ordering and review of radiographic studies             ED Course                   Initial Sepsis Screening     Row Name 02/13/20 2050                Is the patient's history suggestive of a new or worsening infection? (!) Yes (Proceed)  -ML        Suspected source of infection  pneumonia  -ML        Are two or more of the following signs & symptoms of infection both present and new to the patient? No  -ML        Indicate SIRS criteria  Leukocytosis (WBC > 52766 IJL)  -ML        If the answer is yes to both questions, suspicion of sepsis is present          If severe sepsis is present AND tissue hypoperfusion perists in the hour after fluid resuscitation or lactate > 4, the patient meets criteria for SEPTIC SHOCK          Are any of the following organ dysfunction criteria present within 6 hours of suspected infection and SIRS criteria that are NOT considered to be chronic conditions? No  -ML        Organ dysfunction  Creatinine > 0 5 mg/dl ABOVE BASELINE  -ML        Date of presentation of severe sepsis          Time of presentation of severe sepsis          Tissue hypoperfusion persists in the hour after crystalloid fluid administration, evidenced, by either:          Was hypotension present within one hour of the conclusion of crystalloid fluid administration?         Date of presentation of septic shock          Time of presentation of septic shock            User Key  (r) = Recorded By, (t) = Taken By, (c) = Cosigned By    [facility] [address] Name Provider Type    Dwayne Huitron MD Physician                  MDM  Number of Diagnoses or Management Options  Acute renal insufficiency:   Dehydration:   Gastroenteritis:   Generalized weakness:   Hypotension:      Amount and/or Complexity of Data Reviewed  Clinical lab tests: ordered and reviewed  Tests in the radiology section of CPT®: ordered and reviewed    Risk of Complications, Morbidity, and/or Mortality  Presenting problems: moderate  General comments:  Sepsis alert was not called because pt   Did not have 2 SIRS criteria    Most likely has gastroenteritis, but has an elevated wbc count , elevated lactic acid, cough CXR ? R sided infiltrate    Will cover for pneumonia  Pt  Is allergic to ceftriaxone, so I had to give levaquin  After 2 liters of iv fluids and maintence fluids - her bp is 108/68 - she is stable for admission to the floor  hospitalist agrees  Disposition  Final diagnoses:   Hypotension   Acute renal insufficiency   Dehydration   Generalized weakness   Gastroenteritis   Transaminitis     Time reflects when diagnosis was documented in both MDM as applicable and the Disposition within this note     Time User Action Codes Description Comment    2/13/2020  9:05 PM Thelda Later R Add [I95 9] Hypotension     2/13/2020  9:05 PM Jaun Brar R Add [N28 9] Acute renal insufficiency     2/13/2020  9:05 PM Thelda Later Add [E86 0] Dehydration     2/13/2020  9:06 PM Jaun Brar R Add [R53 1] Generalized weakness     2/13/2020  9:06 PM Thelda Later Add [K52 9] Gastroenteritis     2/13/2020  9:46 PM Junior Chen Brar Add [R74 0] Transaminitis       ED Disposition     ED Disposition Condition Date/Time Comment    Admit Stable Thu Feb 13, 2020  9:05 PM Case was discussed with PREM and the patient's admission status was agreed to be inpt /tele      Follow-up Information    None         Patient's Medications   Discharge Prescriptions    No medications on file     No discharge procedures on file      PDMP Review     None          ED Provider  Electronically Signed by           Anita Hernández MD  02/13/20 3810       Anita Hernández MD  02/13/20 6522

## 2020-02-13 NOTE — PROGRESS NOTES
Assessment/Plan:    She has a variety of symptoms which began just today  She has had dizziness,  vomiting and diarrhea and very little oral intake  In addition she is lethargic with hypotension and low body temperature  Her [facility]  Postoperative hypothyroidism  She sees Endocrine for postop hypothyroidism  Most recent thyroid function tests were done last month and were normal   She has been stable on 88 mcg of levothyroxine  Diagnoses and all orders for this visit:    Non-intractable vomiting with nausea, unspecified vomiting type    Dizziness    Hypotension, unspecified hypotension type    Postoperative hypothyroidism    Other orders  -     alendronate (FOSAMAX) 70 mg tablet  -     metoprolol succinate (TOPROL-XL) 50 mg 24 hr tablet; Take 50 mg by mouth daily          Subjective:      Patient ID: Jade Segovia is a 66 y o  female  She is here with her daughter with complaint of dizziness and nausea and vomiting that started today  She also c/o feeling chills  She has some pain in the center of her abdomen  She has been drinking water in holding this down but not eating food today  She denies URI symptoms  No cough or chest pain  She has not tried any medication  She lives with her daughter and she denies sick exposures  She has occasional dysuria  She felt fine yesterday  The following portions of the patient's history were reviewed and updated as appropriate: allergies, current medications, past family history, past medical history, past social history, past surgical history and problem list     Review of Systems   Constitutional: Positive for chills  Negative for fever  HENT: Positive for hearing loss  Negative for congestion and sore throat  Respiratory: Negative for cough  Cardiovascular: Negative for chest pain  Gastrointestinal: Positive for abdominal pain, diarrhea, nausea and vomiting  Genitourinary: Positive for dysuria  Neurological: Positive for dizziness  Negative for headaches  Psychiatric/Behavioral: The patient is not nervous/anxious  Objective:      BP 92/56 (BP Location: Left arm, Patient Position: Sitting, Cuff Size: Standard)   Pulse (!) 54   Temp (!) 95 3 °F (35 2 °C)   Ht 5' 2" (1 575 m)   Wt 59 2 kg (130 lb 8 oz)   SpO2 98%   BMI 23 87 kg/m²          Physical Exam   Constitutional: She appears well-developed  Drowsy  Sleeping in chair but arousable   HENT:   Head: Normocephalic and atraumatic  TMs obscured by cerumen   Neck: Normal range of motion  Neck supple  Cardiovascular: Normal rate, regular rhythm and normal heart sounds  Pulmonary/Chest: Effort normal and breath sounds normal    Musculoskeletal: She exhibits no edema or deformity  Neurological:   arousable   Nursing note and vitals reviewed

## 2020-02-14 ENCOUNTER — APPOINTMENT (INPATIENT)
Dept: NON INVASIVE DIAGNOSTICS | Facility: HOSPITAL | Age: 75
DRG: 871 | End: 2020-02-14
Payer: MEDICARE

## 2020-02-14 ENCOUNTER — APPOINTMENT (INPATIENT)
Dept: RADIOLOGY | Facility: HOSPITAL | Age: 75
DRG: 871 | End: 2020-02-14
Payer: MEDICARE

## 2020-02-14 PROBLEM — E87.2 LACTIC ACIDOSIS: Status: ACTIVE | Noted: 2020-02-14

## 2020-02-14 PROBLEM — E16.2 HYPOGLYCEMIA: Status: ACTIVE | Noted: 2020-02-14

## 2020-02-14 PROBLEM — A41.9 SEPTIC SHOCK (HCC): Status: ACTIVE | Noted: 2020-02-14

## 2020-02-14 PROBLEM — R65.21 SEPTIC SHOCK (HCC): Status: ACTIVE | Noted: 2020-02-14

## 2020-02-14 PROBLEM — E83.51 HYPOCALCEMIA: Status: ACTIVE | Noted: 2020-02-14

## 2020-02-14 PROBLEM — E87.20 LACTIC ACIDOSIS: Status: ACTIVE | Noted: 2020-02-14

## 2020-02-14 PROBLEM — J18.9 COMMUNITY ACQUIRED PNEUMONIA OF RIGHT LOWER LOBE OF LUNG: Status: ACTIVE | Noted: 2020-02-14

## 2020-02-14 LAB
ALBUMIN SERPL BCP-MCNC: 2.4 G/DL (ref 3.5–5)
ALP SERPL-CCNC: 85 U/L (ref 46–116)
ALT SERPL W P-5'-P-CCNC: 370 U/L (ref 12–78)
AMORPH PHOS CRY URNS QL MICRO: ABNORMAL /HPF
ANION GAP SERPL CALCULATED.3IONS-SCNC: 14 MMOL/L (ref 4–13)
AST SERPL W P-5'-P-CCNC: 843 U/L (ref 5–45)
ATRIAL RATE: 57 BPM
BACTERIA UR QL AUTO: ABNORMAL /HPF
BASOPHILS # BLD AUTO: 0.04 THOUSANDS/ΜL (ref 0–0.1)
BASOPHILS NFR BLD AUTO: 0 % (ref 0–1)
BILIRUB SERPL-MCNC: 0.44 MG/DL (ref 0.2–1)
BILIRUB UR QL STRIP: NEGATIVE
BUN SERPL-MCNC: 38 MG/DL (ref 5–25)
C DIFF TOX GENS STL QL NAA+PROBE: NEGATIVE
CA-I BLD-SCNC: 0.92 MMOL/L (ref 1.12–1.32)
CALCIUM SERPL-MCNC: 6.9 MG/DL (ref 8.3–10.1)
CAMPYLOBACTER DNA SPEC NAA+PROBE: NORMAL
CHLORIDE SERPL-SCNC: 105 MMOL/L (ref 100–108)
CLARITY UR: CLEAR
CO2 SERPL-SCNC: 21 MMOL/L (ref 21–32)
COLOR UR: YELLOW
CORTIS SERPL-MCNC: 25.6 UG/DL
CREAT SERPL-MCNC: 1.92 MG/DL (ref 0.6–1.3)
EOSINOPHIL # BLD AUTO: 0 THOUSAND/ΜL (ref 0–0.61)
EOSINOPHIL NFR BLD AUTO: 0 % (ref 0–6)
ERYTHROCYTE [DISTWIDTH] IN BLOOD BY AUTOMATED COUNT: 17.2 % (ref 11.6–15.1)
GFR SERPL CREATININE-BSD FRML MDRD: 25 ML/MIN/1.73SQ M
GLUCOSE SERPL-MCNC: 137 MG/DL (ref 65–140)
GLUCOSE SERPL-MCNC: 163 MG/DL (ref 65–140)
GLUCOSE SERPL-MCNC: 164 MG/DL (ref 65–140)
GLUCOSE SERPL-MCNC: 170 MG/DL (ref 65–140)
GLUCOSE UR STRIP-MCNC: NEGATIVE MG/DL
HAV IGM SER QL: NORMAL
HBV CORE IGM SER QL: NORMAL
HBV SURFACE AG SER QL: NORMAL
HCT VFR BLD AUTO: 35.8 % (ref 34.8–46.1)
HCV AB SER QL: NORMAL
HGB BLD-MCNC: 11.2 G/DL (ref 11.5–15.4)
HGB UR QL STRIP.AUTO: NEGATIVE
IMM GRANULOCYTES # BLD AUTO: 0.18 THOUSAND/UL (ref 0–0.2)
IMM GRANULOCYTES NFR BLD AUTO: 1 % (ref 0–2)
KETONES UR STRIP-MCNC: ABNORMAL MG/DL
L PNEUMO1 AG UR QL IA.RAPID: NEGATIVE
LACTATE SERPL-SCNC: 3.4 MMOL/L (ref 0.5–2)
LACTATE SERPL-SCNC: 6.8 MMOL/L (ref 0.5–2)
LEUKOCYTE ESTERASE UR QL STRIP: ABNORMAL
LYMPHOCYTES # BLD AUTO: 2.4 THOUSANDS/ΜL (ref 0.6–4.47)
LYMPHOCYTES NFR BLD AUTO: 13 % (ref 14–44)
MAGNESIUM SERPL-MCNC: 1 MG/DL (ref 1.6–2.6)
MCH RBC QN AUTO: 29.2 PG (ref 26.8–34.3)
MCHC RBC AUTO-ENTMCNC: 31.3 G/DL (ref 31.4–37.4)
MCV RBC AUTO: 93 FL (ref 82–98)
MONOCYTES # BLD AUTO: 1.72 THOUSAND/ΜL (ref 0.17–1.22)
MONOCYTES NFR BLD AUTO: 9 % (ref 4–12)
NEUTROPHILS # BLD AUTO: 14.74 THOUSANDS/ΜL (ref 1.85–7.62)
NEUTS SEG NFR BLD AUTO: 77 % (ref 43–75)
NITRITE UR QL STRIP: NEGATIVE
NON-SQ EPI CELLS URNS QL MICRO: ABNORMAL /HPF
NRBC BLD AUTO-RTO: 0 /100 WBCS
P AXIS: 74 DEGREES
PH UR STRIP.AUTO: 5 [PH]
PHOSPHATE SERPL-MCNC: 2.9 MG/DL (ref 2.3–4.1)
PLATELET # BLD AUTO: 242 THOUSANDS/UL (ref 149–390)
PMV BLD AUTO: 10.5 FL (ref 8.9–12.7)
POTASSIUM SERPL-SCNC: 4.3 MMOL/L (ref 3.5–5.3)
PR INTERVAL: 156 MS
PROCALCITONIN SERPL-MCNC: 0.18 NG/ML
PROCALCITONIN SERPL-MCNC: 0.62 NG/ML
PROT SERPL-MCNC: 5 G/DL (ref 6.4–8.2)
PROT UR STRIP-MCNC: ABNORMAL MG/DL
QRS AXIS: 55 DEGREES
QRSD INTERVAL: 86 MS
QT INTERVAL: 442 MS
QTC INTERVAL: 430 MS
RBC # BLD AUTO: 3.84 MILLION/UL (ref 3.81–5.12)
RBC #/AREA URNS AUTO: ABNORMAL /HPF
S PNEUM AG UR QL: NEGATIVE
SALMONELLA DNA SPEC QL NAA+PROBE: NORMAL
SHIGA TOXIN STX GENE SPEC NAA+PROBE: NORMAL
SHIGELLA DNA SPEC QL NAA+PROBE: NORMAL
SODIUM SERPL-SCNC: 140 MMOL/L (ref 136–145)
SP GR UR STRIP.AUTO: >=1.03 (ref 1–1.03)
T WAVE AXIS: 45 DEGREES
TSH SERPL DL<=0.05 MIU/L-ACNC: 7.46 UIU/ML (ref 0.36–3.74)
UROBILINOGEN UR QL STRIP.AUTO: 1 E.U./DL
VENTRICULAR RATE: 57 BPM
WBC # BLD AUTO: 19.08 THOUSAND/UL (ref 4.31–10.16)
WBC #/AREA URNS AUTO: ABNORMAL /HPF

## 2020-02-14 PROCEDURE — 84145 PROCALCITONIN (PCT): CPT | Performed by: NURSE PRACTITIONER

## 2020-02-14 PROCEDURE — 85025 COMPLETE CBC W/AUTO DIFF WBC: CPT | Performed by: NURSE PRACTITIONER

## 2020-02-14 PROCEDURE — 84443 ASSAY THYROID STIM HORMONE: CPT | Performed by: NURSE PRACTITIONER

## 2020-02-14 PROCEDURE — 02HV33Z INSERTION OF INFUSION DEVICE INTO SUPERIOR VENA CAVA, PERCUTANEOUS APPROACH: ICD-10-PCS | Performed by: FAMILY MEDICINE

## 2020-02-14 PROCEDURE — 80074 ACUTE HEPATITIS PANEL: CPT | Performed by: NURSE PRACTITIONER

## 2020-02-14 PROCEDURE — NC001 PR NO CHARGE: Performed by: NURSE PRACTITIONER

## 2020-02-14 PROCEDURE — 74018 RADEX ABDOMEN 1 VIEW: CPT

## 2020-02-14 PROCEDURE — 93010 ELECTROCARDIOGRAM REPORT: CPT

## 2020-02-14 PROCEDURE — 36620 INSERTION CATHETER ARTERY: CPT | Performed by: NURSE PRACTITIONER

## 2020-02-14 PROCEDURE — 81001 URINALYSIS AUTO W/SCOPE: CPT | Performed by: NURSE PRACTITIONER

## 2020-02-14 PROCEDURE — 83605 ASSAY OF LACTIC ACID: CPT | Performed by: NURSE PRACTITIONER

## 2020-02-14 PROCEDURE — 87449 NOS EACH ORGANISM AG IA: CPT | Performed by: NURSE PRACTITIONER

## 2020-02-14 PROCEDURE — 36556 INSERT NON-TUNNEL CV CATH: CPT | Performed by: NURSE PRACTITIONER

## 2020-02-14 PROCEDURE — 4A133B1 MONITORING OF ARTERIAL PRESSURE, PERIPHERAL, PERCUTANEOUS APPROACH: ICD-10-PCS | Performed by: FAMILY MEDICINE

## 2020-02-14 PROCEDURE — 80053 COMPREHEN METABOLIC PANEL: CPT | Performed by: NURSE PRACTITIONER

## 2020-02-14 PROCEDURE — 82948 REAGENT STRIP/BLOOD GLUCOSE: CPT

## 2020-02-14 PROCEDURE — 82330 ASSAY OF CALCIUM: CPT | Performed by: NURSE PRACTITIONER

## 2020-02-14 PROCEDURE — 82533 TOTAL CORTISOL: CPT | Performed by: NURSE PRACTITIONER

## 2020-02-14 PROCEDURE — 93306 TTE W/DOPPLER COMPLETE: CPT

## 2020-02-14 PROCEDURE — 93306 TTE W/DOPPLER COMPLETE: CPT | Performed by: INTERNAL MEDICINE

## 2020-02-14 PROCEDURE — 71045 X-RAY EXAM CHEST 1 VIEW: CPT

## 2020-02-14 PROCEDURE — 4A133J1 MONITORING OF ARTERIAL PULSE, PERIPHERAL, PERCUTANEOUS APPROACH: ICD-10-PCS | Performed by: FAMILY MEDICINE

## 2020-02-14 PROCEDURE — 83735 ASSAY OF MAGNESIUM: CPT | Performed by: NURSE PRACTITIONER

## 2020-02-14 PROCEDURE — 03HY32Z INSERTION OF MONITORING DEVICE INTO UPPER ARTERY, PERCUTANEOUS APPROACH: ICD-10-PCS | Performed by: FAMILY MEDICINE

## 2020-02-14 PROCEDURE — 99291 CRITICAL CARE FIRST HOUR: CPT | Performed by: INTERNAL MEDICINE

## 2020-02-14 PROCEDURE — 84100 ASSAY OF PHOSPHORUS: CPT | Performed by: NURSE PRACTITIONER

## 2020-02-14 RX ORDER — DONEPEZIL HYDROCHLORIDE 10 MG/1
10 TABLET, FILM COATED ORAL
Status: DISCONTINUED | OUTPATIENT
Start: 2020-02-14 | End: 2020-02-19 | Stop reason: HOSPADM

## 2020-02-14 RX ORDER — NICOTINE 21 MG/24HR
21 PATCH, TRANSDERMAL 24 HOURS TRANSDERMAL DAILY
Status: DISCONTINUED | OUTPATIENT
Start: 2020-02-14 | End: 2020-02-19 | Stop reason: HOSPADM

## 2020-02-14 RX ORDER — HEPARIN SODIUM 5000 [USP'U]/ML
5000 INJECTION, SOLUTION INTRAVENOUS; SUBCUTANEOUS EVERY 8 HOURS SCHEDULED
Status: DISCONTINUED | OUTPATIENT
Start: 2020-02-14 | End: 2020-02-19 | Stop reason: HOSPADM

## 2020-02-14 RX ORDER — SODIUM CHLORIDE, SODIUM LACTATE, POTASSIUM CHLORIDE, CALCIUM CHLORIDE 600; 310; 30; 20 MG/100ML; MG/100ML; MG/100ML; MG/100ML
125 INJECTION, SOLUTION INTRAVENOUS CONTINUOUS
Status: DISCONTINUED | OUTPATIENT
Start: 2020-02-14 | End: 2020-02-16

## 2020-02-14 RX ORDER — LIDOCAINE HYDROCHLORIDE 10 MG/ML
INJECTION, SOLUTION EPIDURAL; INFILTRATION; INTRACAUDAL; PERINEURAL
Status: DISPENSED
Start: 2020-02-14 | End: 2020-02-14

## 2020-02-14 RX ORDER — FUROSEMIDE 10 MG/ML
40 INJECTION INTRAMUSCULAR; INTRAVENOUS ONCE
Status: COMPLETED | OUTPATIENT
Start: 2020-02-14 | End: 2020-02-14

## 2020-02-14 RX ORDER — CALCIUM GLUCONATE 20 MG/ML
1 INJECTION, SOLUTION INTRAVENOUS ONCE
Status: COMPLETED | OUTPATIENT
Start: 2020-02-14 | End: 2020-02-14

## 2020-02-14 RX ORDER — CITALOPRAM 20 MG/1
10 TABLET ORAL DAILY
Status: DISCONTINUED | OUTPATIENT
Start: 2020-02-14 | End: 2020-02-19 | Stop reason: HOSPADM

## 2020-02-14 RX ORDER — LEVALBUTEROL 1.25 MG/.5ML
1.25 SOLUTION, CONCENTRATE RESPIRATORY (INHALATION) EVERY 8 HOURS PRN
Status: DISCONTINUED | OUTPATIENT
Start: 2020-02-14 | End: 2020-02-16

## 2020-02-14 RX ORDER — LEVOTHYROXINE SODIUM 88 UG/1
88 TABLET ORAL
Status: DISCONTINUED | OUTPATIENT
Start: 2020-02-14 | End: 2020-02-15

## 2020-02-14 RX ADMIN — NOREPINEPHRINE BITARTRATE 20 MCG/MIN: 1 INJECTION INTRAVENOUS at 11:41

## 2020-02-14 RX ADMIN — METRONIDAZOLE 500 MG: 500 INJECTION, SOLUTION INTRAVENOUS at 14:40

## 2020-02-14 RX ADMIN — HEPARIN SODIUM 5000 UNITS: 5000 INJECTION INTRAVENOUS; SUBCUTANEOUS at 06:13

## 2020-02-14 RX ADMIN — HEPARIN SODIUM 5000 UNITS: 5000 INJECTION INTRAVENOUS; SUBCUTANEOUS at 22:45

## 2020-02-14 RX ADMIN — SODIUM CHLORIDE, SODIUM LACTATE, POTASSIUM CHLORIDE, AND CALCIUM CHLORIDE 125 ML/HR: .6; .31; .03; .02 INJECTION, SOLUTION INTRAVENOUS at 17:54

## 2020-02-14 RX ADMIN — SODIUM BICARBONATE 50 MEQ: 84 INJECTION INTRAVENOUS at 01:03

## 2020-02-14 RX ADMIN — LEVOTHYROXINE SODIUM 88 MCG: 88 TABLET ORAL at 06:22

## 2020-02-14 RX ADMIN — SODIUM CHLORIDE 1000 ML: 0.9 INJECTION, SOLUTION INTRAVENOUS at 00:00

## 2020-02-14 RX ADMIN — HEPARIN SODIUM 5000 UNITS: 5000 INJECTION INTRAVENOUS; SUBCUTANEOUS at 14:42

## 2020-02-14 RX ADMIN — HYDROCORTISONE SODIUM SUCCINATE 50 MG: 100 INJECTION, POWDER, FOR SOLUTION INTRAMUSCULAR; INTRAVENOUS at 06:13

## 2020-02-14 RX ADMIN — HEPARIN SODIUM 5000 UNITS: 5000 INJECTION INTRAVENOUS; SUBCUTANEOUS at 00:30

## 2020-02-14 RX ADMIN — METRONIDAZOLE 500 MG: 500 INJECTION, SOLUTION INTRAVENOUS at 06:28

## 2020-02-14 RX ADMIN — DONEPEZIL HYDROCHLORIDE 10 MG: 10 TABLET, FILM COATED ORAL at 22:44

## 2020-02-14 RX ADMIN — NOREPINEPHRINE BITARTRATE 18 MCG/MIN: 1 INJECTION INTRAVENOUS at 14:51

## 2020-02-14 RX ADMIN — CITALOPRAM HYDROBROMIDE 10 MG: 20 TABLET ORAL at 12:42

## 2020-02-14 RX ADMIN — CEFTRIAXONE 2000 MG: 2 INJECTION, POWDER, FOR SOLUTION INTRAMUSCULAR; INTRAVENOUS at 00:30

## 2020-02-14 RX ADMIN — SODIUM CHLORIDE, SODIUM LACTATE, POTASSIUM CHLORIDE, AND CALCIUM CHLORIDE 125 ML/HR: .6; .31; .03; .02 INJECTION, SOLUTION INTRAVENOUS at 05:30

## 2020-02-14 RX ADMIN — HYDROCORTISONE SODIUM SUCCINATE 50 MG: 100 INJECTION, POWDER, FOR SOLUTION INTRAMUSCULAR; INTRAVENOUS at 03:07

## 2020-02-14 RX ADMIN — Medication 50 MEQ: at 01:03

## 2020-02-14 RX ADMIN — SODIUM CHLORIDE, SODIUM LACTATE, POTASSIUM CHLORIDE, AND CALCIUM CHLORIDE 125 ML/HR: .6; .31; .03; .02 INJECTION, SOLUTION INTRAVENOUS at 09:51

## 2020-02-14 RX ADMIN — NOREPINEPHRINE BITARTRATE 2 MCG/MIN: 1 INJECTION INTRAVENOUS at 03:19

## 2020-02-14 RX ADMIN — FUROSEMIDE 40 MG: 10 INJECTION, SOLUTION INTRAMUSCULAR; INTRAVENOUS at 12:43

## 2020-02-14 RX ADMIN — METRONIDAZOLE 500 MG: 500 INJECTION, SOLUTION INTRAVENOUS at 22:44

## 2020-02-14 RX ADMIN — CALCIUM GLUCONATE 1 G: 20 INJECTION, SOLUTION INTRAVENOUS at 06:12

## 2020-02-14 RX ADMIN — SODIUM CHLORIDE, SODIUM LACTATE, POTASSIUM CHLORIDE, AND CALCIUM CHLORIDE 1000 ML: .6; .31; .03; .02 INJECTION, SOLUTION INTRAVENOUS at 02:00

## 2020-02-14 RX ADMIN — NOREPINEPHRINE BITARTRATE 16 MCG/MIN: 1 INJECTION INTRAVENOUS at 07:07

## 2020-02-14 NOTE — ASSESSMENT & PLAN NOTE
· Anion gap 18 on admission; now 14  · Likely in setting of dehydration  · Received 5L IVF; receiving additional 1L bolus now  · Amp of bicarb given x1  · Will continue to trend

## 2020-02-14 NOTE — ASSESSMENT & PLAN NOTE
WBC 21, chronically elevated, seen by Hematology in 2018 due to leukocytosis and bilateral pulmonary nodules    Leukocytosis thought to be secondary to infection/inflammation  See above plan SIRS  Monitor CBC

## 2020-02-14 NOTE — ED NOTES
Pt became confused diaphoretic  Glucose tested and pt hypoglycemic  Gave pt 50ml dextrose  Pt became more responsive and began to answer questions        Sharlotte Goodell, RN  80/[id number]/[phone]

## 2020-02-14 NOTE — ASSESSMENT & PLAN NOTE
· Glucose 55 on admission; 164 on AM labs  · Received 1 amp D50 with improvement  · Continue to trend BS q2hrs

## 2020-02-14 NOTE — SEPSIS NOTE
Sepsis Note   Maribell Iniguez 66 y o  female MRN: [id number]  Unit/Bed#: ICU 13 Encounter: [id number]      qSOFA     [address] Name 02/14/20 0300 02/14/20 0200 02/14/20 0100 02/14/20 0000 02/13/20 2337    Altered mental status GCS < 15              Respiratory Rate > / =22  0  0  0  1      Systolic BP < / =664      1  1  1    Q Sofa Score  1  1  1  2  1    Row Name 02/13/20 2331 02/13/20 2325 02/13/20 2312 02/13/20 2310 02/13/20 2250    Altered mental status GCS < 15              Respiratory Rate > / =22  0  0    0  0    Systolic BP < / =043  1  1  1  1  0    Q Sofa Score  1  1  1  1  0    Row Name 02/13/20 2226 02/13/20 2132 02/13/20 2048 02/13/20 1908 02/13/20 1840    Altered mental status GCS < 15              Respiratory Rate > / =22  0    0  0  0    Systolic BP < / =733  0  0  1  1  0    Q Sofa Score  0  0  1  1  0    Row Name 02/13/20 1838 02/13/20 1837 02/13/20 1817 02/13/20 1652       Altered mental status GCS < 15             Respiratory Rate > / =22  0  0  0  0     Systolic BP < / =714  1  1  1  0     Q Sofa Score  1  1  1  0         Initial Sepsis Screening     Row Name 02/13/20 2050                Is the patient's history suggestive of a new or worsening infection? (!) Yes (Proceed)  -ML        Suspected source of infection  pneumonia  -ML        Are two or more of the following signs & symptoms of infection both present and new to the patient? No  -ML        Indicate SIRS criteria  Leukocytosis (WBC > [address] IJL)  -ML        If the answer is yes to both questions, suspicion of sepsis is present          If severe sepsis is present AND tissue hypoperfusion perists in the hour after fluid resuscitation or lactate > 4, the patient meets criteria for SEPTIC SHOCK          Are any of the following organ dysfunction criteria present within 6 hours of suspected infection and SIRS criteria that are NOT considered to be chronic conditions?   No  -ML        Organ dysfunction  Creatinine > 0 5 mg/dl ABOVE BASELINE  -ML        Date of presentation of severe sepsis          Time of presentation of severe sepsis          Tissue hypoperfusion persists in the hour after crystalloid fluid administration, evidenced, by either:          Was hypotension present within one hour of the conclusion of crystalloid fluid administration?         Date of presentation of septic shock          Time of presentation of septic shock            User Key  (r) = Recorded By, (t) = Taken By, (c) = Cosigned By    [address] Name Provider Quinton Sorensen MD Physician               Default Flowsheet Data (last 720 hours)      Sepsis Reassess     Row Name 02/14/20 0302                   Repeat Volume Status and Tissue Perfusion Assessment Performed    Repeat Volume Status and Tissue Perfusion Assessment Performed  Yes  -EJ           Volume Status and Tissue Perfusion Post Fluid Resuscitation * Must Document All *    Vital Signs Reviewed (HR, RR, BP, T)  Yes  -EJ        Shock Index Reviewed  Yes  -EJ        Arterial Oxygen Saturation Reviewed (POx, SaO2 or SpO2)          Cardio  (!) Normal S1/S2; Bradycardia  -EJ        Pulmonary  Normal effort;Clear to auscultation  -EJ        Capillary Refill  Brisk  -EJ        Peripheral Pulses  Radial  -EJ        Peripheral Pulse  +2  -EJ        Skin  Warm;Dry  -EJ        Urine output assessed  Decreased  -EJ           *OR*   Intensive Monitoring- Must Document One of the Following Four *:    Vital Signs Reviewed          * Central Venous Pressure (CVP or RAP)          * Central Venous Oxygen (SVO2, ScvO2 or Oxygen saturation via central catheter)          * Bedside Cardiovascular US in IVC diameter and % collapse          * Passive Leg Raise OR Crystalloid Challenge            User Key  (r) = Recorded By, (t) = Taken By, (c) = Cosigned By    Initials Name Provider Type    EJ Darvin Herron  Nurse Practitioner          Patient initially responsive to fluid bolus, however BP decreased after bolus ended, with each bolus  Patient is severely dehydrated and a difficult IV stick  She lost a peripheral IV for a period of time requiring multiple nurses to search for access  Levophed low dose restarted once IV access obtained

## 2020-02-14 NOTE — ASSESSMENT & PLAN NOTE
· Presented today with 1 day history of dizziness, weakness, vomiting x2, diarrhea x2, and abdominal pain  · Hypotensive with BP 92/56, hypothermic 95 3, and disoriented to time and situation, however pt has baseline dementia  · Lactic acid 6 5->6 8  · Continued to be hypotensive requiring a-line placement  · Currently, remains hypotensive after 5L  · Peripheral Levo started  · Right fem TLC placed and Levo now running centrally  · Additional 1L bolus given  · Lactic acid now 3 4  · Received 1 dose of Levaquin in ED  · Started on Ceftriaxone  · Started on Solu-Cortef q6hrs

## 2020-02-14 NOTE — H&P
H&P- [address]ree [address] 1945, 66 y o  female MRN: [id number]    Unit/Bed#: ICU 13 Encounter: [id number]    Primary Care Provider: Vianey Dave DO   Date and time admitted to hospital: 2/13/2020  4:54 PM      * Septic shock ([facility] [address] )  Assessment & Plan  · Presented today with 1 day history of dizziness, weakness, vomiting x2, diarrhea x2, and abdominal pain  · Hypotensive with BP 92/56, hypothermic 95 3, and disoriented to time and situation, however pt has baseline dementia  · Lactic acid 6 5->6 8  · Continued to be hypotensive requiring a-line placement  · Currently, remains hypotensive after 5L  · Peripheral Levo started  · Right fem TLC placed and Levo now running centrally  · Additional 1L bolus given  · Lactic acid now 3 4  · Received 1 dose of Levaquin in ED  · Started on Ceftriaxone  · Started on Solu-Cortef q6hrs    Lactic acidosis  Assessment & Plan  · Lactic acid 6 5 to 6 8 to 3 4  · Received 5L IVF total; additional 1L being given  · Continue to check Lactic acid q2hrs until resolved      Hypotension, unspecified  Assessment & Plan  · Hypotensive in setting of septic shock and dehydration  · A-line placed for accurate monitoring  · Initially, BP 90s/50s; dropped to 60s/40s despite 5L IVF bolus  · Will give additional 1L IVF bolus and start Levo peripherally  · Right fem TLC placed;  Levo now running centrally  · Pt DNR/DNI, however daughters agreeable to central line placement    Leukocytosis  Assessment & Plan  · WBC 21 52 on admission; now 19 08  · Sepsis protocol initiated  · Urine culture pending  · Blood cultures x2 pending  · Stool culture pending  · CDIF pending  · Received 1 dose of Levaquin in ED  · Started on Ceftriaxone   · Continue to trend WBC    High anion gap metabolic acidosis  Assessment & Plan  · Anion gap 18 on admission; now 14  · Likely in setting of dehydration  · Received 5L IVF; receiving additional 1L bolus now  · Amp of bicarb given x1  · Will continue to trend    [employer] acquired pneumonia of right lower lobe of lung ([facility] [address] )  Assessment & Plan  · CXR with right opacity in lower lobe and haziness in left lung field   · Pt with chronic cough, no new sputum production  · Hypothermic on admission  · Urine antigens pending  · Antibiotics as above  · Currently requiring 2L NC with goal SpO2 >88%    [facility]  Assessment & Plan  · Baseline Creat 1 1-1 2; 1 7 on admission with low UO  · Now 1 92  · Received 6L IVF thus far  · Lawrence placed for accurate monitoring; UO continues to be low  · Avoid nephrotoxic agents      Hypoglycemia  Assessment & Plan  · Glucose 55 on admission; 164 on AM labs  · Received 1 amp D50 with improvement  · Continue to trend BS q2hrs    Hypocalcemia  Assessment & Plan  · Ionized Ca 0 92 on AM labs  · Will give calcium gluc  · Continue to trend    Transaminitis  Assessment & Plan  · Elevated AST/ALT on admission; continues to be elevated on AM labs  · Likely in setting of dehydration and sepsis  · Will continue to trend    Hyperkalemia  Assessment & Plan  · K 5 4 on admission with LAYLA; now 4 3  · Continue to trend     Gastroenteritis  Assessment & Plan  · Presented with 1 day history of dizziness, weakness, cough, vomiting x2, diarrhea x2, and abdominal pain  · Likely gastroenteritis causing dehydration  · CT abdomen/pelvis negative for obstruction, colitis, or diverticulitis    Late onset Alzheimer's disease with behavioral disturbance ([facility] )  Assessment & Plan  · Pt with baseline dementia  · Per daughters, pt normally with better mentation when in home environment  · Currently, alert and oriented to person and place only  · Continue CAM ICU    Current smoker  Assessment & Plan  · Pt current 1 ppd smoker  · Nicotine patch  · Encourage cessation       -------------------------------------------------------------------------------------------------------------  Chief Complaint: generalized weakness, dizziness    History of Present Illness   HX and PE limited by: pt with baseline dementia    Angelo Kim is a 66 y o  female who presents with PMH of HTN, HLD, Grave's disease s/p thyroidectomy, post-op hypothyroidism, and tobacco use who presented yesterday for nausea with 2 episodes of vomiting, 2 episodes of diarrhea, cough with no sputum production, and abdominal pain  Later in the day, the daughters state that the patient began to feel dizzy and was very weak  Prior to yesterday, the patient's daughters state that she was "completely normal and did not look sick at all " Of note, however, the patient does have a chronic cough to which the patient states that she has had "for a long time " The daughters report that she has a history of dementia and at baseline, when in her home or a familiar environment, has improved mentation  The daughters deny any headaches, chest pain, palpitations, SOB, or constipation  They do say that she is incontinent of her urine at baseline and has a history of frequent UTIs  In terms of nutrition, the daughters agree that it is difficult to get her to eat and drink, especially when she is sick  They deny any swallowing difficulty or coughing with eating/drinking, however do note that she does better with certain food textures  Her daughter states that the patient amublates without any assistance in her home and lives with her  Per the daughters, the patient smokes 1 ppd of cigarettes  Otherwise they deny any alcohol or street drug use  The daughters state that the patient was not around any sick contacts and has had no recent travel  History obtained from child   -------------------------------------------------------------------------------------------------------------  Dispo:  Admit to Stepdown Level 1    Code Status: Level 3 - DNAR and DNI  --------------------------------------------------------------------------------------------------------------  Review of Systems   Constitutional: Positive for appetite change and fatigue  Negative for chills and fever  HENT: Negative for congestion and sore throat  Eyes: Negative for visual disturbance  Respiratory: Positive for cough  Negative for chest tightness, shortness of breath, wheezing and stridor  Cardiovascular: Negative for chest pain, palpitations and leg swelling  Gastrointestinal: Positive for abdominal pain, diarrhea, nausea and vomiting  Negative for abdominal distention and constipation  Genitourinary: Positive for difficulty urinating (incontinent at baseline)  Negative for dysuria, flank pain and hematuria  Skin: Positive for pallor  Neurological: Positive for dizziness and weakness  Negative for speech difficulty, light-headedness and headaches  Psychiatric/Behavioral: Positive for confusion (baseline dementia)  All other systems reviewed and are negative  Physical Exam   Constitutional: She appears well-developed  No distress  Nasal cannula in place  HENT:   Head: Normocephalic and atraumatic  Right Ear: Decreased hearing (baseline) is noted  Left Ear: Decreased hearing (baseline) is noted  Mouth/Throat: Mucous membranes are dry  Eyes: Pupils are equal, round, and reactive to light  Conjunctivae and EOM are normal  No scleral icterus  Neck: Normal range of motion  Neck supple  Cardiovascular: Normal rate, regular rhythm, normal heart sounds and intact distal pulses  Exam reveals no friction rub  No murmur heard  Pulmonary/Chest: Effort normal  No respiratory distress  She has decreased breath sounds in the right lower field and the left lower field  She has wheezes  She has no rales  She exhibits no tenderness  Abdominal: Soft  Bowel sounds are normal  She exhibits no distension  There is tenderness  There is guarding  There is no rebound  Musculoskeletal: Normal range of motion  She exhibits no edema  Neurological: She is alert  She is disoriented (to time and situation)  GCS eye subscore is 4  GCS verbal subscore is 4  GCS motor subscore is 6  Skin: Skin is dry  Capillary refill takes more than 3 seconds  There is pallor  Psychiatric: She has a normal mood and affect  Nursing note and vitals reviewed  --------------------------------------------------------------------------------------------------------------  Vitals:   Vitals:    02/14/20 0400 02/14/20 0500 02/14/20 0600 02/14/20 0700   BP:       BP Location:       Pulse: 62 (!) 50 56 60   Resp: (!) 26 21 17 19   Temp:  98 5 °F (36 9 °C)     TempSrc:  Temporal     SpO2: 100% (!) 87% 98% 96%   Weight:         Temp  Min: 95 3 °F (35 2 °C)  Max: 98 5 °F (36 9 °C)  IBW: -92 5 kg     Body mass index is 22 06 kg/m²  Laboratory and Diagnostics:  Results from last 7 days   Lab Units 02/14/20  0535 02/13/20  1727   WBC Thousand/uL 19 08* 21 52*   HEMOGLOBIN g/dL 11 2* 14 7   HEMATOCRIT % 35 8 48 4*   PLATELETS Thousands/uL 242 299   NEUTROS PCT % 77* 83*   MONOS PCT % 9 11     Results from last 7 days   Lab Units 02/14/20  0502 02/13/20  1754   SODIUM mmol/L 140 138   POTASSIUM mmol/L 4 3 5 4*   CHLORIDE mmol/L 105 98*   CO2 mmol/L 21 22   ANION GAP mmol/L 14* 18*   BUN mg/dL 38* 31*   CREATININE mg/dL 1 92* 1 70*   CALCIUM mg/dL 6 9* 8 7   GLUCOSE RANDOM mg/dL 164* 55*   ALT U/L 370* 315*   AST U/L 843* 765*   ALK PHOS U/L 85 97   ALBUMIN g/dL 2 4* 3 3*   TOTAL BILIRUBIN mg/dL 0 44 0 64     Results from last 7 days   Lab Units 02/14/20  0502   PHOSPHORUS mg/dL 2 9      Results from last 7 days   Lab Units 02/13/20  1727   INR  1 91*   PTT seconds 33      Results from last 7 days   Lab Units 02/13/20  1727   TROPONIN I ng/mL 0 02     Results from last 7 days   Lab Units 02/14/20  0518 02/14/20  0017 02/13/20  2104   LACTIC ACID mmol/L 3 4* 6 8* 6 5*       Micro:  Results from last 7 days   Lab Units 02/13/20  2103   BLOOD CULTURE  Received in Microbiology Lab  Culture in Progress  EKG: sinus bradycardia  Imaging: I have personally reviewed pertinent reports  Historical Information   Past Medical History:   Diagnosis Date    [facility]LAYLA[facility]    Bed bug bite     LAST ASSESSED [id number]    Disease of thyroid gland     Graves' disease     Hyperlipidemia     Hypertension     LLL pneumonia [facility] )     LAST ASSESSED [id number]    Migraine 2001    OCULAR    Scabies     LAST ASSESSED [id number]    Syncope and collapse     LAST ASSESSED [id number]    Vertigo     RESOLVED      Past Surgical History:   Procedure Laterality Date    CHOLECYSTECTOMY      HYSTERECTOMY      TOTAL THYROIDECTOMY      TUBAL LIGATION Bilateral      Social History   Social History     Substance and Sexual Activity   Alcohol Use Never    Binge frequency: Never     Social History     Substance and Sexual Activity   Drug Use No     Social History     Tobacco Use   Smoking Status Current Every Day Smoker    Packs/day: 0 50   Smokeless Tobacco Never Used     Exercise History: NA  Family History:   Family History   Problem Relation Age of Onset    Breast cancer Sister     Diabetes Family      I have reviewed this patient's family history and commented on sigificant items within the HPI      Medications:  Current Facility-Administered Medications   Medication Dose Route Frequency    cefTRIAXone (ROCEPHIN) 2,000 mg in dextrose 5 % 50 mL IVPB  2,000 mg Intravenous Q24H    heparin (porcine) subcutaneous injection 5,000 Units  5,000 Units Subcutaneous Q8H [address]    hydrocortisone sodium succinate (PF) (Solu-CORTEF) injection 50 mg  50 mg Intravenous Q6H [address]    lactated ringers bolus 1,000 mL  1,000 mL Intravenous Once    lactated ringers infusion  125 mL/hr Intravenous Continuous    levothyroxine tablet 88 mcg  88 mcg Oral Early Morning    lidocaine (PF) (XYLOCAINE-MPF) 1 % injection **ADS Override Pull**        metroNIDAZOLE (FLAGYL) IVPB (premix) 500 mg  500 mg Intravenous Q8H    nicotine (NICODERM CQ) 21 mg/24 hr TD 24 hr patch 21 mg  21 mg Transdermal Daily    norepinephrine (LEVOPHED) 4 mg (STANDARD CONCENTRATION) IV in sodium chloride 0 9% 250 mL  1-30 mcg/min Intravenous Titrated     Home medications:  Prior to Admission Medications   Prescriptions Last Dose Informant Patient Reported? Taking? alendronate (FOSAMAX) 70 mg tablet   Yes Yes   aspirin 81 MG tablet  Self Yes No   Sig: Take 81 mg by mouth daily  atorvastatin (LIPITOR) 20 mg tablet  Self No Yes   Sig: Take 1 tablet (20 mg total) by mouth daily   calcium-vitamin D (OSCAL) 250-125 MG-UNIT per tablet  Self Yes Yes   Sig: Take 1 tablet by mouth daily  citalopram (CeleXA) 10 mg tablet Not Taking at Unknown time Self No No   Sig: take 1 tablet (10 MG TOTAL) by mouth daily   Patient not taking: Reported on 2020   cyanocobalamin 1,000 mcg/mL Not Taking at Unknown time Self No No   Sig: Injection Q 6-8 weeks re PA   Patient not taking: Reported on 2019   donepezil (ARICEPT) 10 mg tablet  Self No Yes   Sig: Take 1 tablet (10 mg total) by mouth daily at bedtime   ergocalciferol (VITAMIN D2) 50,000 units Not Taking at Unknown time Self Yes No   levothyroxine 88 mcg tablet  Self Yes Yes   Si tablet daily   lisinopril (ZESTRIL) 20 mg tablet  Self No Yes   Sig: Take 1 tablet (20 mg total) by mouth daily   metoprolol succinate (TOPROL-XL) 50 mg 24 hr tablet   Yes Yes   Sig: Take 50 mg by mouth daily   niacin (NIASPAN) 1000 MG CR tablet  Self No Yes   Sig: Take 2 tablets (2,000 mg total) by mouth daily      Facility-Administered Medications Last Administration Doses Remaining   cyanocobalamin injection 1,000 mcg 10/29/2019  1:12 PM         Allergies:   Allergies   Allergen Reactions    Naproxen Itching    Niacin     Nsaids Other (See Comments) and Swelling    Azithromycin Nausea Only and Rash    Cephalexin Rash       ------------------------------------------------------------------------------------------------------------  Advance Directive and Living Will: Yes Power of : Yes  POLST:    ------------------------------------------------------------------------------------------------------------  Anticipated Length of Stay is > 2 midnights    Counseling / Coordination of Care  Total Critical Care time spent 46 minutes excluding procedures, teaching and family updates  KANDI Delaney        Portions of the record may have been created with voice recognition software  Occasional wrong word or "sound a like" substitutions may have occurred due to the inherent limitations of voice recognition software    Read the chart carefully and recognize, using context, where substitutions have occurred

## 2020-02-14 NOTE — ASSESSMENT & PLAN NOTE
Meets SIRS criteria with hypothermia and leukocytosis  Presented with diarrhea, will obtain stool studies  CT negative for colitis  CXR  Flu RSV negative  UA negative  Procalcitonin ordered  Blood culture in process  Will hold off antibiotics pending result of procalcitonin    Patient seen by PMD today with complaints of dizziness, vomiting and diarrhea, noted to be hypothermic T 95 3 and hypotensive BP 92/56, referred to ER

## 2020-02-14 NOTE — SEPSIS NOTE
Sepsis Note   Ivan Mcintosh 66 y o  female MRN: [id number]  Unit/Bed#: ED 16 Encounter: [id number]      qSOFA     [address] Name 02/13/20 2048 02/13/20 1908 02/13/20 1840 02/13/20 1838 02/13/20 1837    Altered mental status GCS < 15              Respiratory Rate > / =22  0  0  0  0  0    Systolic BP < / =823  1  1  0  1  1    Q Sofa Score  1  1  0  1  1    Row Name 02/13/20 1817 02/13/20 1652             Altered mental status GCS < 15           Respiratory Rate > / =22  0  0       Systolic BP < / =514  1  0       Q Sofa Score  1  0           Initial Sepsis Screening     Row Name 02/13/20 2050                Is the patient's history suggestive of a new or worsening infection? (!) Yes (Proceed)  -ML        Suspected source of infection  pneumonia  -ML        Are two or more of the following signs & symptoms of infection both present and new to the patient? No  -ML        Indicate SIRS criteria  Leukocytosis (WBC > 11032 IJL)  -ML        If the answer is yes to both questions, suspicion of sepsis is present          If severe sepsis is present AND tissue hypoperfusion perists in the hour after fluid resuscitation or lactate > 4, the patient meets criteria for SEPTIC SHOCK          Are any of the following organ dysfunction criteria present within 6 hours of suspected infection and SIRS criteria that are NOT considered to be chronic conditions? No  -ML        Organ dysfunction  Creatinine > 0 5 mg/dl ABOVE BASELINE  -ML        Date of presentation of severe sepsis          Time of presentation of severe sepsis          Tissue hypoperfusion persists in the hour after crystalloid fluid administration, evidenced, by either:          Was hypotension present within one hour of the conclusion of crystalloid fluid administration?           Date of presentation of septic shock          Time of presentation of septic shock            User Key  (r) = Recorded By, (t) = Taken By, (c) = Cosigned By    Initials Name Provider Type    Francoise Christiansen MD Physician          Sepsis alert was not called because pt   Did not have 2 SIRS criteria

## 2020-02-14 NOTE — ASSESSMENT & PLAN NOTE
· WBC 21 52 on admission; now 19 08  · Sepsis protocol initiated  · Urine culture pending  · Blood cultures x2 pending  · Stool culture pending  · CDIF pending  · Received 1 dose of Levaquin in ED  · Started on Ceftriaxone   · Continue to trend WBC

## 2020-02-14 NOTE — ASSESSMENT & PLAN NOTE
Initially presenting with vomiting and diarrhea now resolved  Found to be in septic shock and admitted to ICU - source presumed to be a UTI with otherwise negative imaging  Patient transferred to floor and remains hemodynamically stable  Tolerating diet

## 2020-02-14 NOTE — ASSESSMENT & PLAN NOTE
· Lactic acid 6 5 to 6 8 to 3 4  · Received 5L IVF total; additional 1L being given  · Continue to check Lactic acid q2hrs until resolved

## 2020-02-14 NOTE — ASSESSMENT & PLAN NOTE
· Hypotensive in setting of septic shock and dehydration  · A-line placed for accurate monitoring  · Initially, BP 90s/50s; dropped to 60s/40s despite 5L IVF bolus  · Will give additional 1L IVF bolus and start Levo peripherally  · Right fem TLC placed;  Levo now running centrally  · Pt DNR/DNI, however daughters agreeable to central line placement

## 2020-02-14 NOTE — ASSESSMENT & PLAN NOTE
Significantly improved  Was likely related to hypotension/septic shock  Acute Hepatitis panel negative  Continue to trend CMP

## 2020-02-14 NOTE — ASSESSMENT & PLAN NOTE
· Baseline Creat 1 1-1 2; 1 7 on admission with low UO  · Now 1 92  · Received 6L IVF thus far  · Lawrence placed for accurate monitoring; UO continues to be low  · Avoid nephrotoxic agents

## 2020-02-14 NOTE — PROCEDURES
Arterial Line Insertion  Date/Time: 2/14/2020 3:10 AM  Performed by: KANDI Jones  Authorized by: KANDI Jones     Patient location:  Bedside  Consent:     Consent obtained:  Emergent situation    Consent given by:  Patient  Universal protocol:     Immediately prior to procedure a time out was called: yes      Patient identity confirmed:  Verbally with patient  Indications:     Indications: hemodynamic monitoring, continuous blood pressure monitoring and frequent labs / infusion    Pre-procedure details:     Skin preparation:  Chlorhexidine    Preparation: Patient was prepped and draped in sterile fashion    Anesthesia (see MAR for exact dosages): Anesthesia method:  Local infiltration    Local anesthetic:  Lidocaine 1% w/o epi  Procedure details:     Location / Tip of Catheter:  Radial    Laterality:  Right    Ru's test performed: yes      Needle gauge:  18 G    Placement technique:  Ultrasound guided    Number of attempts:  2    Successful placement: yes      Transducer: waveform confirmed    Post-procedure details:     Post-procedure:  Secured with tape, sterile dressing applied, sutured and wrist guard applied    CMS:  Normal    Patient tolerance of procedure:   Tolerated well, no immediate complications

## 2020-02-14 NOTE — ASSESSMENT & PLAN NOTE
· Pt with baseline dementia  · Per daughters, pt normally with better mentation when in home environment  · Currently, alert and oriented to person and place only  · Continue CAM ICU

## 2020-02-14 NOTE — PROGRESS NOTES
02/14/20 1100   Spiritual Beliefs/Perceptions   Support Systems Family members   Plan of Care   Comments Connected with family who declined  support for now     Assessment Completed by: Unit visit

## 2020-02-14 NOTE — ASSESSMENT & PLAN NOTE
· Elevated AST/ALT on admission; continues to be elevated on AM labs  · Likely in setting of dehydration and sepsis  · Will continue to trend

## 2020-02-14 NOTE — ED NOTES
Attempted obtaining vitals on portal monitor for comparison w/ similar findings  Pt currently has fluid bolus running in L AC  Continues to deny symptoms at this time       Farrah Scott RN  87/30/29 [id number]

## 2020-02-14 NOTE — ASSESSMENT & PLAN NOTE
· CXR with right opacity in lower lobe and haziness in left lung field   · Pt with chronic cough, no new sputum production  · Hypothermic on admission  · Urine antigens pending  · Antibiotics as above  · Currently requiring 2L NC with goal SpO2 >88%

## 2020-02-14 NOTE — ASSESSMENT & PLAN NOTE
Creatinine 1 9 on admission, baseline 0 9-1 2, likely prerenal azotemia in setting of sepsis   Resolved   Continue to hold ACEI and diuretic for now   Monitor intake and output  Avoid NSAIDs, nephrotoxins and hypotension  Monitor serum creatinine

## 2020-02-14 NOTE — ED NOTES
Automatic BP vitals went off with BP 65/32  Obtained manual BP with similar findings  Critical Care CRNP made aware with verbal orders to administer 1L fluid bolus at this time  Pt is A&O x 4 and denies any symptoms at this time  Will continue to monitor       Earl Prather RN  62/28/93 [id number]

## 2020-02-14 NOTE — ASSESSMENT & PLAN NOTE
BP 96/49, 90/42; s/p 1L NS  Continue IV hydration  Maintained on Toprol, will hold for now  Close BP monitoring

## 2020-02-14 NOTE — PROCEDURES
Central Line Insertion  Date/Time: 2/14/2020 5:02 AM  Performed by: KANDI Pennington  Authorized by: KANDI Pennington     Patient location:  Bedside  Consent:     Consent obtained:  Emergent situation    Consent given by:  Patient    Risks discussed:  Arterial puncture, bleeding, incorrect placement, infection and nerve damage  Universal protocol:     Immediately prior to procedure, a time out was called: yes      Patient identity confirmed:  Verbally with patient  Pre-procedure details:     Hand hygiene: Hand hygiene performed prior to insertion      Sterile barrier technique: All elements of maximal sterile technique followed      Skin preparation:  2% chlorhexidine    Skin preparation agent: Skin preparation agent completely dried prior to procedure    Indications:     Central line indications: medications requiring central line and no peripheral vascular access    Anesthesia (see MAR for exact dosages): Anesthesia method:  Local infiltration    Local anesthetic:  Lidocaine 1% w/o epi  Procedure details:     Location:  Right femoral    Vessel type: vein      Laterality:  Right    Patient position:  Flat    Catheter type:  Triple lumen 20cm    Catheter size:  7 Fr    Landmarks identified: yes      Ultrasound guidance: yes      Sterile ultrasound techniques: Sterile gel and sterile probe covers were used      Number of attempts:  1    Successful placement: yes    Post-procedure details:     Post-procedure:  Dressing applied and line sutured    Assessment:  Blood return through all ports, free fluid flow and placement verified by x-ray    Patient tolerance of procedure:   Tolerated well, no immediate complications

## 2020-02-14 NOTE — ASSESSMENT & PLAN NOTE
· Presented with 1 day history of dizziness, weakness, cough, vomiting x2, diarrhea x2, and abdominal pain  · Likely gastroenteritis causing dehydration  · CT abdomen/pelvis negative for obstruction, colitis, or diverticulitis

## 2020-02-15 PROBLEM — R00.1 BRADYCARDIA: Status: ACTIVE | Noted: 2020-02-15

## 2020-02-15 LAB
ANION GAP SERPL CALCULATED.3IONS-SCNC: 13 MMOL/L (ref 4–13)
ATRIAL RATE: 44 BPM
ATRIAL RATE: 45 BPM
BUN SERPL-MCNC: 36 MG/DL (ref 5–25)
CA-I BLD-SCNC: 0.96 MMOL/L (ref 1.12–1.32)
CALCIUM SERPL-MCNC: 6.7 MG/DL (ref 8.3–10.1)
CHLORIDE SERPL-SCNC: 105 MMOL/L (ref 100–108)
CO2 SERPL-SCNC: 23 MMOL/L (ref 21–32)
CREAT SERPL-MCNC: 1.53 MG/DL (ref 0.6–1.3)
ERYTHROCYTE [DISTWIDTH] IN BLOOD BY AUTOMATED COUNT: 17.1 % (ref 11.6–15.1)
GFR SERPL CREATININE-BSD FRML MDRD: 33 ML/MIN/1.73SQ M
GLUCOSE SERPL-MCNC: 105 MG/DL (ref 65–140)
GLUCOSE SERPL-MCNC: 94 MG/DL (ref 65–140)
HCT VFR BLD AUTO: 34.6 % (ref 34.8–46.1)
HGB BLD-MCNC: 11 G/DL (ref 11.5–15.4)
MAGNESIUM SERPL-MCNC: 1 MG/DL (ref 1.6–2.6)
MCH RBC QN AUTO: 28.9 PG (ref 26.8–34.3)
MCHC RBC AUTO-ENTMCNC: 31.8 G/DL (ref 31.4–37.4)
MCV RBC AUTO: 91 FL (ref 82–98)
P AXIS: 56 DEGREES
P AXIS: 68 DEGREES
PHOSPHATE SERPL-MCNC: 2.6 MG/DL (ref 2.3–4.1)
PLATELET # BLD AUTO: 226 THOUSANDS/UL (ref 149–390)
PMV BLD AUTO: 10.7 FL (ref 8.9–12.7)
POTASSIUM SERPL-SCNC: 3.5 MMOL/L (ref 3.5–5.3)
PR INTERVAL: 163 MS
PR INTERVAL: 171 MS
QRS AXIS: 63 DEGREES
QRS AXIS: 70 DEGREES
QRSD INTERVAL: 67 MS
QRSD INTERVAL: 71 MS
QT INTERVAL: 513 MS
QT INTERVAL: 542 MS
QTC INTERVAL: 439 MS
QTC INTERVAL: 469 MS
RBC # BLD AUTO: 3.8 MILLION/UL (ref 3.81–5.12)
SODIUM SERPL-SCNC: 141 MMOL/L (ref 136–145)
T WAVE AXIS: 107 DEGREES
T WAVE AXIS: 111 DEGREES
T4 FREE SERPL-MCNC: 0.99 NG/DL (ref 0.76–1.46)
VENTRICULAR RATE: 44 BPM
VENTRICULAR RATE: 45 BPM
WBC # BLD AUTO: 13.71 THOUSAND/UL (ref 4.31–10.16)

## 2020-02-15 PROCEDURE — 83735 ASSAY OF MAGNESIUM: CPT | Performed by: NURSE PRACTITIONER

## 2020-02-15 PROCEDURE — 84100 ASSAY OF PHOSPHORUS: CPT | Performed by: NURSE PRACTITIONER

## 2020-02-15 PROCEDURE — 93005 ELECTROCARDIOGRAM TRACING: CPT

## 2020-02-15 PROCEDURE — 84439 ASSAY OF FREE THYROXINE: CPT | Performed by: NURSE PRACTITIONER

## 2020-02-15 PROCEDURE — 82948 REAGENT STRIP/BLOOD GLUCOSE: CPT

## 2020-02-15 PROCEDURE — 80048 BASIC METABOLIC PNL TOTAL CA: CPT | Performed by: NURSE PRACTITIONER

## 2020-02-15 PROCEDURE — 87086 URINE CULTURE/COLONY COUNT: CPT | Performed by: NURSE PRACTITIONER

## 2020-02-15 PROCEDURE — 82330 ASSAY OF CALCIUM: CPT | Performed by: NURSE PRACTITIONER

## 2020-02-15 PROCEDURE — 99291 CRITICAL CARE FIRST HOUR: CPT | Performed by: INTERNAL MEDICINE

## 2020-02-15 PROCEDURE — 85027 COMPLETE CBC AUTOMATED: CPT | Performed by: NURSE PRACTITIONER

## 2020-02-15 PROCEDURE — 93010 ELECTROCARDIOGRAM REPORT: CPT | Performed by: INTERNAL MEDICINE

## 2020-02-15 RX ORDER — LEVOTHYROXINE SODIUM 112 UG/1
112 TABLET ORAL
Status: DISCONTINUED | OUTPATIENT
Start: 2020-02-15 | End: 2020-02-19 | Stop reason: HOSPADM

## 2020-02-15 RX ORDER — MAGNESIUM SULFATE HEPTAHYDRATE 40 MG/ML
2 INJECTION, SOLUTION INTRAVENOUS ONCE
Status: COMPLETED | OUTPATIENT
Start: 2020-02-15 | End: 2020-02-15

## 2020-02-15 RX ORDER — CALCIUM GLUCONATE 20 MG/ML
2 INJECTION, SOLUTION INTRAVENOUS ONCE
Status: COMPLETED | OUTPATIENT
Start: 2020-02-15 | End: 2020-02-15

## 2020-02-15 RX ADMIN — DONEPEZIL HYDROCHLORIDE 10 MG: 10 TABLET, FILM COATED ORAL at 21:51

## 2020-02-15 RX ADMIN — MAGNESIUM SULFATE HEPTAHYDRATE 2 G: 40 INJECTION, SOLUTION INTRAVENOUS at 09:26

## 2020-02-15 RX ADMIN — HEPARIN SODIUM 5000 UNITS: 5000 INJECTION INTRAVENOUS; SUBCUTANEOUS at 21:51

## 2020-02-15 RX ADMIN — METRONIDAZOLE 500 MG: 500 INJECTION, SOLUTION INTRAVENOUS at 21:56

## 2020-02-15 RX ADMIN — METRONIDAZOLE 500 MG: 500 INJECTION, SOLUTION INTRAVENOUS at 06:38

## 2020-02-15 RX ADMIN — HEPARIN SODIUM 5000 UNITS: 5000 INJECTION INTRAVENOUS; SUBCUTANEOUS at 14:39

## 2020-02-15 RX ADMIN — CALCIUM GLUCONATE 2 G: 20 INJECTION, SOLUTION INTRAVENOUS at 15:41

## 2020-02-15 RX ADMIN — CITALOPRAM HYDROBROMIDE 10 MG: 20 TABLET ORAL at 08:21

## 2020-02-15 RX ADMIN — HEPARIN SODIUM 5000 UNITS: 5000 INJECTION INTRAVENOUS; SUBCUTANEOUS at 06:38

## 2020-02-15 RX ADMIN — CEFTRIAXONE 2000 MG: 2 INJECTION, POWDER, FOR SOLUTION INTRAMUSCULAR; INTRAVENOUS at 01:00

## 2020-02-15 RX ADMIN — LEVOTHYROXINE SODIUM 112 MCG: 112 TABLET ORAL at 06:38

## 2020-02-15 RX ADMIN — METRONIDAZOLE 500 MG: 500 INJECTION, SOLUTION INTRAVENOUS at 14:39

## 2020-02-15 NOTE — ASSESSMENT & PLAN NOTE
· WBC 21 52, improving  · Sepsis protocol initiated  · Urine culture pending  · Blood cultures x2 pending  · Stool culture negative  · C diff negative  · Continue Ceftriaxone, flagyl, day # 3

## 2020-02-15 NOTE — ASSESSMENT & PLAN NOTE
· Previous presumed CAP, does not appear to be infectious, possible atelectasis  · CXR with right opacity in lower lobe and haziness in left lung field   · Pt with chronic cough, no new sputum production  · Hypothermic on admission  · Urine antigens negative  · Antibiotics as above  · Currently requiring 2L NC with goal SpO2 >88%

## 2020-02-15 NOTE — PROGRESS NOTES
Progress Note - Katey Given 1945, 66 y o  female MRN: [id number]    Unit/Bed#: ICU 13 Encounter: [id number]    Primary Care Provider: Jazmyn Davis DO   Date and time admitted to hospital: 2/13/2020  4:54 PM        * [facility] )  Assessment & Plan  · Presented with 1 day history of dizziness, weakness, vomiting x2, diarrhea x2, and abdominal pain, on admission hypotensive and hypothermic 95 3, Lactic acid 6 5->6 8>3 4, initially resuscitated with 6L IVF,   · Etiology is unknown currently   U/A negative, urine culture pending, patient has history of UTIs, Respiratory panel negative, no evidence of pulmonary etiology on imaging, patient did have diarrhea on admission, stool pcr and cdiff negative  · Requiring low dose Levophed to keep MAP>65mmhg  · Follow cultures, wbc, temps, procal  · Continue ceftriaxone, flagyl    Lactic acidosis  Assessment & Plan  · Improved, trended 6 5 to 6 8 to 3 4    Hypotension, unspecified  Assessment & Plan  · Hypotensive in setting of septic shock and severe dehydration  · A-line in place, Right fem TLC placed under sterile conditions for levo administration and poor IV access    Leukocytosis  Assessment & Plan  · WBC 21 52, improving  · Sepsis protocol initiated  · Urine culture pending  · Blood cultures x2 pending  · Stool culture negative  · C diff negative  · Continue Ceftriaxone, flagyl, day # 3    High anion gap metabolic acidosis  Assessment & Plan  · Anion gap 18 on admission, now improved  · Likely in setting of dehydration, lactic acidosis      Community acquired pneumonia of right lower lobe of lung ([facility] )  Assessment & Plan  · Previous presumed CAP, does not appear to be infectious, possible atelectasis  · CXR with right opacity in lower lobe and haziness in left lung field   · Pt with chronic cough, no new sputum production  · Hypothermic on admission  · Urine antigens negative  · Antibiotics as above  · Currently requiring 2L NC with goal SpO2 >88%    Acute kidney injury ([facility] )  Assessment & Plan  · Baseline Creat 1 1-1 2; 1 7 on admission with low UO  · U/O is picked up, now sufficient  · Will discuss stanley discontinuation  · Avoid nephrotoxic agents      Bradycardia  Assessment & Plan  Sinus villa on admission  Bradycardia persists with rate drop to 39 when sleeping  Will check EKG, electrolytes  Hold on toprol xl home dose      Postoperative hypothyroidism  Assessment & Plan  Admission symptoms of weakness, bradycardia, hypotension, hypoglycemia  Unclear if patient is compliant with meds  TSH on admission over 7  Will check free t4  Cortisol-25; hydrocortisone discontinued  Will increase synthroid dose to 112mcg  Will consult endocrinology    Hypocalcemia  Assessment & Plan  · Calcium replaced yesterday  · Recheck pending  · Continue to trend    Transaminitis  Assessment & Plan  · Elevated AST/ALT on admission; continues to be elevated on AM labs  · Likely in setting of dehydration and sepsis  · Will continue to trend    Hyperkalemia  Assessment & Plan  · K 5 4 on admission with LAYLA; now 4 3  · Continue to trend     Gastroenteritis  Assessment & Plan  · Presented with 1 day history of dizziness, weakness, cough, vomiting x2, diarrhea x2, and abdominal pain  · Likely gastroenteritis causing dehydration  · CT abdomen/pelvis negative for obstruction, colitis, or diverticulitis    Late onset Alzheimer's disease with behavioral disturbance (HCC)  Assessment & Plan  · Pt with baseline dementia  · Per daughters, pt normally with better mentation when in home environment  · Currently, alert and oriented to person and place only  · Continue CAM ICU    Current smoker  Assessment & Plan  · Pt current 1 ppd smoker  · Nicotine patch  · Encourage cessation    ----------------------------------------------------------------------------------------  HPI/24hr events: Bradycardic overnight, rate down to 39 bpm while sleeping      Disposition: Continue Stepdown Level 1 level of care   Code Status: Level 3 - DNAR and DNI  ---------------------------------------------------------------------------------------  SUBJECTIVE  Offers no complaints  Review of Systems  Review of systems was reviewed and negative unless stated above in HPI/24-hour events   ---------------------------------------------------------------------------------------  OBJECTIVE    Vitals   Vitals:    02/15/20 0300 02/15/20 0400 02/15/20 0500 02/15/20 0600   BP:       Pulse: 56 (!) 46 (!) 54 (!) 48   Resp: (!) 10 18 (!) 39 19   Temp:  97 5 °F (36 4 °C)     TempSrc:  Temporal     SpO2: 95% 92% 95% 96%   Weight:    54 7 kg (120 lb 9 5 oz)     Temp (24hrs), Av °F (36 7 °C), Min:97 5 °F (36 4 °C), Max:98 5 °F (36 9 °C)  Current: Temperature: 97 5 °F (36 4 °C)  Arterial Line BP: 128/54  Arterial Line MAP (mmHg): 78 mmHg  SpO2: SpO2: 96 %  O2 Flow Rate (L/min): 4 L/min    Physical Exam   Constitutional: She is oriented to person, place, and time  She appears well-developed and well-nourished  No distress  HENT:   Head: Normocephalic and atraumatic  Right Ear: External ear normal    Left Ear: External ear normal    Nose: Nose normal    Mouth/Throat: Oropharynx is clear and moist    Eyes: Pupils are equal, round, and reactive to light  Conjunctivae and EOM are normal    Neck: Normal range of motion  Neck supple  Cardiovascular: Regular rhythm, normal heart sounds and intact distal pulses  Slow rate   Pulmonary/Chest: Effort normal and breath sounds normal  No respiratory distress  Abdominal: Soft  Bowel sounds are normal    No stool/diarrhea, vomiting   Musculoskeletal: Normal range of motion  Neurological: She is alert and oriented to person, place, and time  Skin: Skin is warm and dry  Capillary refill takes less than 2 seconds  Psychiatric: She has a normal mood and affect  Her behavior is normal  Judgment and thought content normal    Nursing note and vitals reviewed        Invasive/non-invasive ventilation settings   Respiratory    Lab Data (Last 4 hours)    None         O2/Vent Data (Last 4 hours)    None                Laboratory and Diagnostics:  Results from last 7 days   Lab Units 02/14/20  0535 02/13/20  1727   WBC Thousand/uL 19 08* 21 52*   HEMOGLOBIN g/dL 11 2* 14 7   HEMATOCRIT % 35 8 48 4*   PLATELETS Thousands/uL 242 299   NEUTROS PCT % 77* 83*   MONOS PCT % 9 11     Results from last 7 days   Lab Units 02/15/20  0626 02/14/20  0502 02/13/20  1754   SODIUM mmol/L 141 140 138   POTASSIUM mmol/L 3 5 4 3 5 4*   CHLORIDE mmol/L 105 105 98*   CO2 mmol/L 23 21 22   ANION GAP mmol/L 13 14* 18*   BUN mg/dL 36* 38* 31*   CREATININE mg/dL 1 53* 1 92* 1 70*   CALCIUM mg/dL 6 7* 6 9* 8 7   GLUCOSE RANDOM mg/dL 105 164* 55*   ALT U/L  --  370* 315*   AST U/L  --  843* 765*   ALK PHOS U/L  --  85 97   ALBUMIN g/dL  --  2 4* 3 3*   TOTAL BILIRUBIN mg/dL  --  0 44 0 64     Results from last 7 days   Lab Units 02/14/20  0502   MAGNESIUM mg/dL 1 0*   PHOSPHORUS mg/dL 2 9      Results from last 7 days   Lab Units 02/13/20  1727   INR  1 91*   PTT seconds 33      Results from last 7 days   Lab Units 02/13/20  1727   TROPONIN I ng/mL 0 02     Results from last 7 days   Lab Units 02/14/20  0518 02/14/20  0017 02/13/20  2104   LACTIC ACID mmol/L 3 4* 6 8* 6 5*     ABG:    VBG:    Results from last 7 days   Lab Units 02/14/20  0205 02/13/20  2140   PROCALCITONIN ng/ml 0 62* 0 18       Micro  Results from last 7 days   Lab Units 02/14/20  0138 02/13/20  2235 02/13/20  2140 02/13/20  2103   BLOOD CULTURE   --   --  Received in Microbiology Lab  Culture in Progress  No Growth at 24 hrs  LEGIONELLA URINARY ANTIGEN  Negative  --   --   --    STREP PNEUMONIAE ANTIGEN, URINE  Negative  --   --   --    C DIFF TOXIN B   --  Negative  --   --        EKG: Sinus bradycardia  Imaging: I have personally reviewed pertinent reports        Intake and Output  I/O       02/13 0701 - 02/14 0700 02/14 0701 - 02/15 0700    I V  (mL/kg) 4115 (75 2)    IV Piggyback 2200     Total Intake(mL/kg) 2200 (40 2) 4115 (75 2)    Urine (mL/kg/hr) 70 1420 (1 1)    Total Output 70 1420    Net +2130 +2695                Height and Weights      IBW: -92 5 kg  Body mass index is 22 06 kg/m²  Weight (last 2 days)     Date/Time   Weight    02/15/20 0600   54 7 (120 59)    02/13/20 1652   54 7 (120 59)                Nutrition       Diet Orders   (From admission, onward)             Start     Ordered    02/14/20 0015  Diet NPO; Sips with meds  Diet effective now     Question Answer Comment   Diet Type NPO    NPO Except: Sips with meds    RD to adjust diet per protocol?  Yes        02/14/20 0014                  Active Medications  Scheduled Meds:    Current Facility-Administered Medications:  cefTRIAXone 2,000 mg Intravenous Q24H KANDI Gonzalez Last Rate: 2,000 mg (02/15/20 0100)   citalopram 10 mg Oral Daily KANDI Mari    donepezil 10 mg Oral HS KANDI Mari    heparin (porcine) 5,000 Units Subcutaneous [facility] KANDI Gonzalez    lactated ringers 1,000 mL Intravenous Once KANDI Gonzalez    lactated ringers 125 mL/hr Intravenous Continuous KANDI Gonzalez Last Rate: 125 mL/hr (02/14/20 1754)   levalbuterol 1 25 mg Nebulization Q8H PRN KANDI Mari    levothyroxine 112 mcg Oral Early Morning KANDI Gonzalez    metroNIDAZOLE 500 mg Intravenous Q8H KANDI Gonzalez Last Rate: 500 mg (02/15/20 1459)   nicotine 21 mg Transdermal Daily KANDI Gonzalez    norepinephrine 1-30 mcg/min Intravenous Titrated KANDI Gonzalez Last Rate: 6 mcg/min (02/15/20 0000)     Continuous Infusions:    lactated ringers 125 mL/hr Last Rate: 125 mL/hr (02/14/20 1754)   norepinephrine 1-30 mcg/min Last Rate: 6 mcg/min (02/15/20 0000)     PRN Meds:     levalbuterol 1 25 mg Q8H PRN     ---------------------------------------------------------------------------------------  Advance Directive and Living Will: Yes    Power of : Yes  POLST: ---------------------------------------------------------------------------------------  Counseling / Coordination of Care  Total Critical Care time spent 34 minutes excluding procedures, teaching and family updates  KANDI Fitzpatrick      Portions of the record may have been created with voice recognition software  Occasional wrong word or "sound a like" substitutions may have occurred due to the inherent limitations of voice recognition software    Read the chart carefully and recognize, using context, where substitutions have occurred

## 2020-02-15 NOTE — ASSESSMENT & PLAN NOTE
· Baseline Creat 1 1-1 2; 1 7 on admission with low UO  · U/O is picked up, now sufficient  · Will discuss stanley discontinuation  · Avoid nephrotoxic agents

## 2020-02-15 NOTE — ASSESSMENT & PLAN NOTE
Admission symptoms of weakness, bradycardia, hypotension, hypoglycemia  Unclear if patient is compliant with meds  TSH on admission over 7  Will check free t4  Cortisol-25; hydrocortisone discontinued  Will increase synthroid dose to 112mcg  Will consult endocrinology

## 2020-02-15 NOTE — PLAN OF CARE
Problem: Prexisting or High Potential for Compromised Skin Integrity  Goal: Skin integrity is maintained or improved  Description  INTERVENTIONS:  - Identify patients at risk for skin breakdown  - Assess and monitor skin integrity  - Assess and monitor nutrition and hydration status  - Monitor labs   - Assess for incontinence   - Turn and reposition patient  - Assist with mobility/ambulation  - Relieve pressure over bony prominences  - Avoid friction and shearing  - Provide appropriate hygiene as needed including keeping skin clean and dry  - Evaluate need for skin moisturizer/barrier cream  - Collaborate with interdisciplinary team   - Patient/family teaching  - Consider wound care consult   Outcome: Progressing     Problem: PAIN - ADULT  Goal: Verbalizes/displays adequate comfort level or baseline comfort level  Description  Interventions:  - Encourage patient to monitor pain and request assistance  - Assess pain using appropriate pain scale  - Administer analgesics based on type and severity of pain and evaluate response  - Implement non-pharmacological measures as appropriate and evaluate response  - Consider cultural and social influences on pain and pain management  - Notify physician/advanced practitioner if interventions unsuccessful or patient reports new pain  Outcome: Progressing     Problem: INFECTION - ADULT  Goal: Absence or prevention of progression during hospitalization  Description  INTERVENTIONS:  - Assess and monitor for signs and symptoms of infection  - Monitor lab/diagnostic results  - Monitor all insertion sites, i e  indwelling lines, tubes, and drains  - Monitor endotracheal if appropriate and nasal secretions for changes in amount and color  - [place] appropriate cooling/warming therapies per order  - Administer medications as ordered  - Instruct and encourage patient and family to use good hand hygiene technique  - Identify and instruct in appropriate isolation precautions for identified infection/condition  Outcome: Progressing     Problem: SAFETY ADULT  Goal: Patient will remain free of falls  Description  INTERVENTIONS:  - Assess patient frequently for physical needs  -  Identify cognitive and physical deficits and behaviors that affect risk of falls    -  [place] fall precautions as indicated by assessment   - Educate patient/family on patient safety including physical limitations  - Instruct patient to call for assistance with activity based on assessment  - Modify environment to reduce risk of injury  - Consider OT/PT consult to assist with strengthening/mobility  Outcome: Progressing  Goal: Maintain or return to baseline ADL function  Description  INTERVENTIONS:  -  Assess patient's ability to carry out ADLs; assess patient's baseline for ADL function and identify physical deficits which impact ability to perform ADLs (bathing, care of mouth/teeth, toileting, grooming, dressing, etc )  - Assess/evaluate cause of self-care deficits   - Assess range of motion  - Assess patient's mobility; develop plan if impaired  - Assess patient's need for assistive devices and provide as appropriate  - Encourage maximum independence but intervene and supervise when necessary  - Involve family in performance of ADLs  - Assess for home care needs following discharge   - Consider OT consult to assist with ADL evaluation and planning for discharge  - Provide patient education as appropriate  Outcome: Progressing  Goal: Maintain or return mobility status to optimal level  Description  INTERVENTIONS:  - Assess patient's baseline mobility status (ambulation, transfers, stairs, etc )    - Identify cognitive and physical deficits and behaviors that affect mobility  - Identify mobility aids required to assist with transfers and/or ambulation (gait belt, sit-to-stand, lift, walker, cane, etc )  - [place] fall precautions as indicated by assessment  - Record patient progress and toleration of activity level on Mobility SBAR; progress patient to next Phase/Stage  - Instruct patient to call for assistance with activity based on assessment  - Consider rehabilitation consult to assist with strengthening/weightbearing, etc   Outcome: Progressing     Problem: DISCHARGE PLANNING  Goal: Discharge to home or other facility with appropriate resources  Description  INTERVENTIONS:  - Identify barriers to discharge w/patient and caregiver  - Arrange for needed discharge resources and transportation as appropriate  - Identify discharge learning needs (meds, wound care, etc )  - Arrange for interpretive services to assist at discharge as needed  - Refer to Case Management Department for coordinating discharge planning if the patient needs post-hospital services based on physician/advanced practitioner order or complex needs related to functional status, cognitive ability, or social support system  Outcome: Progressing     Problem: Knowledge Deficit  Goal: Patient/family/caregiver demonstrates understanding of disease process, treatment plan, medications, and discharge instructions  Description  Complete learning assessment and assess knowledge base    Interventions:  - Provide teaching at level of understanding  - Provide teaching via preferred learning methods  Outcome: Progressing     Problem: CARDIOVASCULAR - ADULT  Goal: Maintains optimal cardiac output and hemodynamic stability  Description  INTERVENTIONS:  - Monitor I/O, vital signs and rhythm  - Monitor for S/S and trends of decreased cardiac output  - Administer and titrate ordered vasoactive medications to optimize hemodynamic stability  - Assess quality of pulses, skin color and temperature  - Assess for signs of decreased coronary artery perfusion  - Instruct patient to report change in severity of symptoms  Outcome: Progressing  Goal: Absence of cardiac dysrhythmias or at baseline rhythm  Description  INTERVENTIONS:  - Continuous cardiac monitoring, vital signs, obtain 12 lead EKG if ordered  - Administer antiarrhythmic and heart rate control medications as ordered  - Monitor electrolytes and administer replacement therapy as ordered  Outcome: Progressing     Problem: RESPIRATORY - ADULT  Goal: Achieves optimal ventilation and oxygenation  Description  INTERVENTIONS:  - Assess for changes in respiratory status  - Assess for changes in mentation and behavior  - Position to facilitate oxygenation and minimize respiratory effort  - Oxygen administered by appropriate delivery if ordered  - Initiate smoking cessation education as indicated  - Encourage broncho-pulmonary hygiene including cough, deep breathe, Incentive Spirometry  - Assess the need for suctioning and aspirate as needed  - Assess and instruct to report SOB or any respiratory difficulty  - Respiratory Therapy support as indicated  Outcome: Progressing     Problem: GASTROINTESTINAL - ADULT  Goal: Minimal or absence of nausea and/or vomiting  Description  INTERVENTIONS:  - Administer IV fluids if ordered to ensure adequate hydration  - Maintain NPO status until nausea and vomiting are resolved  - Nasogastric tube if ordered  - Administer ordered antiemetic medications as needed  - Provide nonpharmacologic comfort measures as appropriate  - Advance diet as tolerated, if ordered  - Consider nutrition services referral to assist patient with adequate nutrition and appropriate food choices  Outcome: Progressing  Goal: Maintains adequate nutritional intake  Description  INTERVENTIONS:  - Monitor percentage of each meal consumed  - Identify factors contributing to decreased intake, treat as appropriate  - Assist with meals as needed  - Monitor I&O, weight, and lab values if indicated  - Obtain nutrition services referral as needed  Outcome: Progressing     Problem: GENITOURINARY - ADULT  Goal: Urinary catheter remains patent  Description  INTERVENTIONS:  - Assess patency of urinary catheter  - If patient has a chronic stanley, consider changing catheter if non-functioning  - Follow guidelines for intermittent irrigation of non-functioning urinary catheter  Outcome: Progressing     Problem: METABOLIC, FLUID AND ELECTROLYTES - ADULT  Goal: Electrolytes maintained within normal limits  Description  INTERVENTIONS:  - Monitor labs and assess patient for signs and symptoms of electrolyte imbalances  - Administer electrolyte replacement as ordered  - Monitor response to electrolyte replacements, including repeat lab results as appropriate  - Instruct patient on fluid and nutrition as appropriate  Outcome: Progressing  Goal: Fluid balance maintained  Description  INTERVENTIONS:  - Monitor labs   - Monitor I/O and WT  - Instruct patient on fluid and nutrition as appropriate  - Assess for signs & symptoms of volume excess or deficit  Outcome: Progressing  Goal: Glucose maintained within target range  Description  INTERVENTIONS:  - Monitor Blood Glucose as ordered  - Assess for signs and symptoms of hyperglycemia and hypoglycemia  - Administer ordered medications to maintain glucose within target range  - Assess nutritional intake and initiate nutrition service referral as needed  Outcome: Progressing

## 2020-02-15 NOTE — ASSESSMENT & PLAN NOTE
· Presented with 1 day history of dizziness, weakness, vomiting x2, diarrhea x2, and abdominal pain, on admission hypotensive and hypothermic 95 3, Lactic acid 6 5->6 8>3 4, initially resuscitated with 6L IVF,   · Etiology is unknown currently   U/A negative, urine culture pending, patient has history of UTIs, Respiratory panel negative, no evidence of pulmonary etiology on imaging, patient did have diarrhea on admission, stool pcr and cdiff negative  · Requiring low dose Levophed to keep MAP>65mmhg  · Follow cultures, wbc, temps, procal  · Continue ceftriaxone, flagyl

## 2020-02-15 NOTE — ASSESSMENT & PLAN NOTE
Sinus villa on admission  Bradycardia persists with rate drop to 39 when sleeping  Will check EKG, electrolytes  Hold on toprol xl home dose

## 2020-02-16 PROBLEM — E16.2 HYPOGLYCEMIA: Status: RESOLVED | Noted: 2020-02-14 | Resolved: 2020-02-16

## 2020-02-16 PROBLEM — J18.9 COMMUNITY ACQUIRED PNEUMONIA OF RIGHT LOWER LOBE OF LUNG: Status: RESOLVED | Noted: 2020-02-14 | Resolved: 2020-02-16

## 2020-02-16 PROBLEM — E87.5 HYPERKALEMIA: Status: RESOLVED | Noted: 2020-02-13 | Resolved: 2020-02-16

## 2020-02-16 LAB
ALBUMIN SERPL BCP-MCNC: 2.4 G/DL (ref 3.5–5)
ALP SERPL-CCNC: 98 U/L (ref 46–116)
ALT SERPL W P-5'-P-CCNC: 477 U/L (ref 12–78)
ANION GAP SERPL CALCULATED.3IONS-SCNC: 9 MMOL/L (ref 4–13)
AST SERPL W P-5'-P-CCNC: 571 U/L (ref 5–45)
BACTERIA UR CULT: NORMAL
BASOPHILS # BLD AUTO: 0.02 THOUSANDS/ΜL (ref 0–0.1)
BASOPHILS NFR BLD AUTO: 0 % (ref 0–1)
BILIRUB SERPL-MCNC: 0.29 MG/DL (ref 0.2–1)
BUN SERPL-MCNC: 24 MG/DL (ref 5–25)
CA-I BLD-SCNC: 1.08 MMOL/L (ref 1.12–1.32)
CALCIUM SERPL-MCNC: 7.6 MG/DL (ref 8.3–10.1)
CHLORIDE SERPL-SCNC: 106 MMOL/L (ref 100–108)
CO2 SERPL-SCNC: 27 MMOL/L (ref 21–32)
CREAT SERPL-MCNC: 1.15 MG/DL (ref 0.6–1.3)
EOSINOPHIL # BLD AUTO: 0.07 THOUSAND/ΜL (ref 0–0.61)
EOSINOPHIL NFR BLD AUTO: 1 % (ref 0–6)
ERYTHROCYTE [DISTWIDTH] IN BLOOD BY AUTOMATED COUNT: 17.2 % (ref 11.6–15.1)
GFR SERPL CREATININE-BSD FRML MDRD: 46 ML/MIN/1.73SQ M
GLUCOSE SERPL-MCNC: 113 MG/DL (ref 65–140)
GLUCOSE SERPL-MCNC: 119 MG/DL (ref 65–140)
GLUCOSE SERPL-MCNC: 157 MG/DL (ref 65–140)
GLUCOSE SERPL-MCNC: 74 MG/DL (ref 65–140)
GLUCOSE SERPL-MCNC: 80 MG/DL (ref 65–140)
HCT VFR BLD AUTO: 34.1 % (ref 34.8–46.1)
HGB BLD-MCNC: 10.9 G/DL (ref 11.5–15.4)
IMM GRANULOCYTES # BLD AUTO: 0.01 THOUSAND/UL (ref 0–0.2)
IMM GRANULOCYTES NFR BLD AUTO: 0 % (ref 0–2)
LYMPHOCYTES # BLD AUTO: 1.65 THOUSANDS/ΜL (ref 0.6–4.47)
LYMPHOCYTES NFR BLD AUTO: 24 % (ref 14–44)
MAGNESIUM SERPL-MCNC: 1.5 MG/DL (ref 1.6–2.6)
MCH RBC QN AUTO: 28.9 PG (ref 26.8–34.3)
MCHC RBC AUTO-ENTMCNC: 32 G/DL (ref 31.4–37.4)
MCV RBC AUTO: 91 FL (ref 82–98)
MONOCYTES # BLD AUTO: 0.64 THOUSAND/ΜL (ref 0.17–1.22)
MONOCYTES NFR BLD AUTO: 9 % (ref 4–12)
NEUTROPHILS # BLD AUTO: 4.61 THOUSANDS/ΜL (ref 1.85–7.62)
NEUTS SEG NFR BLD AUTO: 66 % (ref 43–75)
NRBC BLD AUTO-RTO: 0 /100 WBCS
PHOSPHATE SERPL-MCNC: 2.5 MG/DL (ref 2.3–4.1)
PLATELET # BLD AUTO: 147 THOUSANDS/UL (ref 149–390)
PMV BLD AUTO: 9.6 FL (ref 8.9–12.7)
POTASSIUM SERPL-SCNC: 3.3 MMOL/L (ref 3.5–5.3)
PROCALCITONIN SERPL-MCNC: 0.28 NG/ML
PROT SERPL-MCNC: 5 G/DL (ref 6.4–8.2)
RBC # BLD AUTO: 3.77 MILLION/UL (ref 3.81–5.12)
SODIUM SERPL-SCNC: 142 MMOL/L (ref 136–145)
WBC # BLD AUTO: 7 THOUSAND/UL (ref 4.31–10.16)

## 2020-02-16 PROCEDURE — 84145 PROCALCITONIN (PCT): CPT | Performed by: NURSE PRACTITIONER

## 2020-02-16 PROCEDURE — 85025 COMPLETE CBC W/AUTO DIFF WBC: CPT | Performed by: NURSE PRACTITIONER

## 2020-02-16 PROCEDURE — 82330 ASSAY OF CALCIUM: CPT | Performed by: NURSE PRACTITIONER

## 2020-02-16 PROCEDURE — 94640 AIRWAY INHALATION TREATMENT: CPT

## 2020-02-16 PROCEDURE — ND001 PR NO DOCUMENTATION: Performed by: INTERNAL MEDICINE

## 2020-02-16 PROCEDURE — 82948 REAGENT STRIP/BLOOD GLUCOSE: CPT

## 2020-02-16 PROCEDURE — 80053 COMPREHEN METABOLIC PANEL: CPT | Performed by: NURSE PRACTITIONER

## 2020-02-16 PROCEDURE — 99291 CRITICAL CARE FIRST HOUR: CPT | Performed by: NURSE PRACTITIONER

## 2020-02-16 PROCEDURE — 83735 ASSAY OF MAGNESIUM: CPT | Performed by: NURSE PRACTITIONER

## 2020-02-16 PROCEDURE — 84100 ASSAY OF PHOSPHORUS: CPT | Performed by: NURSE PRACTITIONER

## 2020-02-16 RX ORDER — POTASSIUM CHLORIDE 14.9 MG/ML
20 INJECTION INTRAVENOUS
Status: COMPLETED | OUTPATIENT
Start: 2020-02-16 | End: 2020-02-16

## 2020-02-16 RX ORDER — LEVALBUTEROL 1.25 MG/.5ML
1.25 SOLUTION, CONCENTRATE RESPIRATORY (INHALATION) EVERY 6 HOURS
Status: DISCONTINUED | OUTPATIENT
Start: 2020-02-16 | End: 2020-02-17

## 2020-02-16 RX ORDER — MAGNESIUM CHLORIDE 64 MG
64 TABLET, DELAYED RELEASE (ENTERIC COATED) ORAL ONCE
Status: COMPLETED | OUTPATIENT
Start: 2020-02-16 | End: 2020-02-16

## 2020-02-16 RX ORDER — POTASSIUM CHLORIDE 20 MEQ/1
40 TABLET, EXTENDED RELEASE ORAL ONCE
Status: COMPLETED | OUTPATIENT
Start: 2020-02-16 | End: 2020-02-16

## 2020-02-16 RX ORDER — PROPOFOL 10 MG/ML
INJECTION, EMULSION INTRAVENOUS
Status: COMPLETED
Start: 2020-02-16 | End: 2020-02-16

## 2020-02-16 RX ORDER — MAGNESIUM SULFATE 1 G/100ML
1 INJECTION INTRAVENOUS ONCE
Status: COMPLETED | OUTPATIENT
Start: 2020-02-16 | End: 2020-02-16

## 2020-02-16 RX ADMIN — DONEPEZIL HYDROCHLORIDE 10 MG: 10 TABLET, FILM COATED ORAL at 22:54

## 2020-02-16 RX ADMIN — MAGNESIUM SULFATE HEPTAHYDRATE 1 G: 1 INJECTION, SOLUTION INTRAVENOUS at 17:15

## 2020-02-16 RX ADMIN — HEPARIN SODIUM 5000 UNITS: 5000 INJECTION INTRAVENOUS; SUBCUTANEOUS at 22:53

## 2020-02-16 RX ADMIN — IPRATROPIUM BROMIDE 0.5 MG: 0.5 SOLUTION RESPIRATORY (INHALATION) at 19:23

## 2020-02-16 RX ADMIN — LEVOTHYROXINE SODIUM 112 MCG: 112 TABLET ORAL at 06:25

## 2020-02-16 RX ADMIN — CITALOPRAM HYDROBROMIDE 10 MG: 20 TABLET ORAL at 08:38

## 2020-02-16 RX ADMIN — HEPARIN SODIUM 5000 UNITS: 5000 INJECTION INTRAVENOUS; SUBCUTANEOUS at 14:28

## 2020-02-16 RX ADMIN — METRONIDAZOLE 500 MG: 500 INJECTION, SOLUTION INTRAVENOUS at 14:28

## 2020-02-16 RX ADMIN — POTASSIUM CHLORIDE 20 MEQ: 14.9 INJECTION, SOLUTION INTRAVENOUS at 19:05

## 2020-02-16 RX ADMIN — CEFTRIAXONE 2000 MG: 2 INJECTION, POWDER, FOR SOLUTION INTRAMUSCULAR; INTRAVENOUS at 00:48

## 2020-02-16 RX ADMIN — LEVALBUTEROL HYDROCHLORIDE 1.25 MG: 1.25 SOLUTION, CONCENTRATE RESPIRATORY (INHALATION) at 19:23

## 2020-02-16 RX ADMIN — MAGNESIUM 64 MG (MAGNESIUM CHLORIDE) TABLET,DELAYED RELEASE 64 MG: at 06:33

## 2020-02-16 RX ADMIN — METRONIDAZOLE 500 MG: 500 INJECTION, SOLUTION INTRAVENOUS at 06:18

## 2020-02-16 RX ADMIN — HEPARIN SODIUM 5000 UNITS: 5000 INJECTION INTRAVENOUS; SUBCUTANEOUS at 06:25

## 2020-02-16 RX ADMIN — METRONIDAZOLE 500 MG: 500 INJECTION, SOLUTION INTRAVENOUS at 22:50

## 2020-02-16 RX ADMIN — POTASSIUM CHLORIDE 40 MEQ: 1500 TABLET, EXTENDED RELEASE ORAL at 06:24

## 2020-02-16 RX ADMIN — POTASSIUM CHLORIDE 20 MEQ: 14.9 INJECTION, SOLUTION INTRAVENOUS at 17:26

## 2020-02-16 NOTE — PLAN OF CARE
Problem: Prexisting or High Potential for Compromised Skin Integrity  Goal: Skin integrity is maintained or improved  Description  INTERVENTIONS:  - Identify patients at risk for skin breakdown  - Assess and monitor skin integrity  - Assess and monitor nutrition and hydration status  - Monitor labs   - Assess for incontinence   - Turn and reposition patient  - Assist with mobility/ambulation  - Relieve pressure over bony prominences  - Avoid friction and shearing  - Provide appropriate hygiene as needed including keeping skin clean and dry  - Evaluate need for skin moisturizer/barrier cream  - Collaborate with interdisciplinary team   - Patient/family teaching  - Consider wound care consult   Outcome: Progressing     Problem: PAIN - ADULT  Goal: Verbalizes/displays adequate comfort level or baseline comfort level  Description  Interventions:  - Encourage patient to monitor pain and request assistance  - Assess pain using appropriate pain scale  - Administer analgesics based on type and severity of pain and evaluate response  - Implement non-pharmacological measures as appropriate and evaluate response  - Consider cultural and social influences on pain and pain management  - Notify physician/advanced practitioner if interventions unsuccessful or patient reports new pain  Outcome: Progressing     Problem: INFECTION - ADULT  Goal: Absence or prevention of progression during hospitalization  Description  INTERVENTIONS:  - Assess and monitor for signs and symptoms of infection  - Monitor lab/diagnostic results  - Monitor all insertion sites, i e  indwelling lines, tubes, and drains  - Monitor endotracheal if appropriate and nasal secretions for changes in amount and color  - [place] appropriate cooling/warming therapies per order  - Administer medications as ordered  - Instruct and encourage patient and family to use good hand hygiene technique  - Identify and instruct in appropriate isolation precautions for identified infection/condition  Outcome: Progressing     Problem: SAFETY ADULT  Goal: Patient will remain free of falls  Description  INTERVENTIONS:  - Assess patient frequently for physical needs  -  Identify cognitive and physical deficits and behaviors that affect risk of falls    -  [place] fall precautions as indicated by assessment   - Educate patient/family on patient safety including physical limitations  - Instruct patient to call for assistance with activity based on assessment  - Modify environment to reduce risk of injury  - Consider OT/PT consult to assist with strengthening/mobility  Outcome: Progressing  Goal: Maintain or return to baseline ADL function  Description  INTERVENTIONS:  -  Assess patient's ability to carry out ADLs; assess patient's baseline for ADL function and identify physical deficits which impact ability to perform ADLs (bathing, care of mouth/teeth, toileting, grooming, dressing, etc )  - Assess/evaluate cause of self-care deficits   - Assess range of motion  - Assess patient's mobility; develop plan if impaired  - Assess patient's need for assistive devices and provide as appropriate  - Encourage maximum independence but intervene and supervise when necessary  - Involve family in performance of ADLs  - Assess for home care needs following discharge   - Consider OT consult to assist with ADL evaluation and planning for discharge  - Provide patient education as appropriate  Outcome: Progressing  Goal: Maintain or return mobility status to optimal level  Description  INTERVENTIONS:  - Assess patient's baseline mobility status (ambulation, transfers, stairs, etc )    - Identify cognitive and physical deficits and behaviors that affect mobility  - Identify mobility aids required to assist with transfers and/or ambulation (gait belt, sit-to-stand, lift, walker, cane, etc )  - [place] fall precautions as indicated by assessment  - Record patient progress and toleration of activity level on Mobility SBAR; progress patient to next Phase/Stage  - Instruct patient to call for assistance with activity based on assessment  - Consider rehabilitation consult to assist with strengthening/weightbearing, etc   Outcome: Progressing     Problem: DISCHARGE PLANNING  Goal: Discharge to home or other facility with appropriate resources  Description  INTERVENTIONS:  - Identify barriers to discharge w/patient and caregiver  - Arrange for needed discharge resources and transportation as appropriate  - Identify discharge learning needs (meds, wound care, etc )  - Arrange for interpretive services to assist at discharge as needed  - Refer to Case Management Department for coordinating discharge planning if the patient needs post-hospital services based on physician/advanced practitioner order or complex needs related to functional status, cognitive ability, or social support system  Outcome: Progressing     Problem: Knowledge Deficit  Goal: Patient/family/caregiver demonstrates understanding of disease process, treatment plan, medications, and discharge instructions  Description  Complete learning assessment and assess knowledge base    Interventions:  - Provide teaching at level of understanding  - Provide teaching via preferred learning methods  Outcome: Progressing     Problem: CARDIOVASCULAR - ADULT  Goal: Absence of cardiac dysrhythmias or at baseline rhythm  Description  INTERVENTIONS:  - Continuous cardiac monitoring, vital signs, obtain 12 lead EKG if ordered  - Administer antiarrhythmic and heart rate control medications as ordered  - Monitor electrolytes and administer replacement therapy as ordered  Outcome: Progressing     Problem: GASTROINTESTINAL - ADULT  Goal: Minimal or absence of nausea and/or vomiting  Description  INTERVENTIONS:  - Administer IV fluids if ordered to ensure adequate hydration  - Maintain NPO status until nausea and vomiting are resolved  - Nasogastric tube if ordered  - Administer ordered antiemetic medications as needed  - Provide nonpharmacologic comfort measures as appropriate  - Advance diet as tolerated, if ordered  - Consider nutrition services referral to assist patient with adequate nutrition and appropriate food choices  Outcome: Progressing  Goal: Maintains adequate nutritional intake  Description  INTERVENTIONS:  - Monitor percentage of each meal consumed  - Identify factors contributing to decreased intake, treat as appropriate  - Assist with meals as needed  - Monitor I&O, weight, and lab values if indicated  - Obtain nutrition services referral as needed  Outcome: Progressing     Problem: GENITOURINARY - ADULT  Goal: Urinary catheter remains patent  Description  INTERVENTIONS:  - Assess patency of urinary catheter  - If patient has a chronic stanley, consider changing catheter if non-functioning  - Follow guidelines for intermittent irrigation of non-functioning urinary catheter  Outcome: Progressing     Problem: METABOLIC, FLUID AND ELECTROLYTES - ADULT  Goal: Electrolytes maintained within normal limits  Description  INTERVENTIONS:  - Monitor labs and assess patient for signs and symptoms of electrolyte imbalances  - Administer electrolyte replacement as ordered  - Monitor response to electrolyte replacements, including repeat lab results as appropriate  - Instruct patient on fluid and nutrition as appropriate  Outcome: Progressing  Goal: Fluid balance maintained  Description  INTERVENTIONS:  - Monitor labs   - Monitor I/O and WT  - Instruct patient on fluid and nutrition as appropriate  - Assess for signs & symptoms of volume excess or deficit  Outcome: Progressing  Goal: Glucose maintained within target range  Description  INTERVENTIONS:  - Monitor Blood Glucose as ordered  - Assess for signs and symptoms of hyperglycemia and hypoglycemia  - Administer ordered medications to maintain glucose within target range  - Assess nutritional intake and initiate nutrition service referral as needed  Outcome: Progressing     Problem: Potential for Falls  Goal: Patient will remain free of falls  Description  INTERVENTIONS:  - Assess patient frequently for physical needs  -  Identify cognitive and physical deficits and behaviors that affect risk of falls    -  [place] fall precautions as indicated by assessment   - Educate patient/family on patient safety including physical limitations  - Instruct patient to call for assistance with activity based on assessment  - Modify environment to reduce risk of injury  - Consider OT/PT consult to assist with strengthening/mobility  Outcome: Progressing     Problem: CARDIOVASCULAR - ADULT  Goal: Maintains optimal cardiac output and hemodynamic stability  Description  INTERVENTIONS:  - Monitor I/O, vital signs and rhythm  - Monitor for S/S and trends of decreased cardiac output  - Administer and titrate ordered vasoactive medications to optimize hemodynamic stability  - Assess quality of pulses, skin color and temperature  - Assess for signs of decreased coronary artery perfusion  - Instruct patient to report change in severity of symptoms  Outcome: Completed     Problem: RESPIRATORY - ADULT  Goal: Achieves optimal ventilation and oxygenation  Description  INTERVENTIONS:  - Assess for changes in respiratory status  - Assess for changes in mentation and behavior  - Position to facilitate oxygenation and minimize respiratory effort  - Oxygen administered by appropriate delivery if ordered  - Initiate smoking cessation education as indicated  - Encourage broncho-pulmonary hygiene including cough, deep breathe, Incentive Spirometry  - Assess the need for suctioning and aspirate as needed  - Assess and instruct to report SOB or any respiratory difficulty  - Respiratory Therapy support as indicated  Outcome: Completed

## 2020-02-16 NOTE — PROGRESS NOTES
Progress Note - Rosina Tony 1945, 66 y o  female MRN: [id number]    Unit/Bed#: ICU 13 Encounter: [id number]    Primary Care Provider: Woodrow Koyanagi, DO   Date and time admitted to hospital: 2/13/2020  4:54 PM        * Sept[facility] shock ([facility] )  Assessment & Plan  · Presented with 1 day history of dizziness, weakness, vomiting x2, diarrhea x2, and abdominal pain, on admission hypotensive and hypothermic 95 3, Lactic acid 6 5->6 8>3 4, initially resuscitated with 6L IVF,   · Etiology is unknown currently   U/A negative, urine culture unable to process, patient has history of UTIs, Respiratory panel negative, no evidence of pulmonary etiology on imaging, patient did have diarrhea on admission, stool pcr and cdiff negative  · Required low dose Levophed to keep MAP>65mmhg, now weaned off  · Follow cultures, wbc, temps, procal  · Continue ceftriaxone, flagyl    Lactic acidosis  Assessment & Plan  · Improved, trended 6 5 to 6 8 to 3 4    Hypotension, unspecified  Assessment & Plan  · Resolved    Leukocytosis  Assessment & Plan  · WBC 21 52, improving  · Sepsis protocol initiated  · Urine culture not able to be processed  · Blood cultures x2 no growth at 24 hours  · Stool culture negative  · C diff negative  · Continue Ceftriaxone, flagyl, day # 4  · Will discuss de-escalation of antibiotics    High anion gap metabolic acidosis  Assessment & Plan  · Anion gap 18 on admission, now improved  · Likely in setting of dehydration, lactic acidosis      Community acquired pneumonia of right lower lobe of lung (HCC)resolved as of 2/16/2020  Assessment & Plan  · Previous presumed CAP, does not appear to be infectious, possible atelectasis  · CXR with right opacity in lower lobe and haziness in left lung field   · Pt with chronic cough, no new sputum production  · Hypothermic on admission  · Urine antigens negative  · Antibiotics as above  · Currently requiring 2L NC with goal SpO2 >88%    Acute kidney injury ([facility] 75 )  Assessment & Plan  · Baseline Creat 1 1-1 2; 1 7 on admission with low UO  · U/O is adequate  · Avoid nephrotoxic agents      Bradycardia  Assessment & Plan  Sinus villa on admission  Bradycardia persists with rate drop to 39 when sleeping  Will check EKG, electrolytes  Hold on toprol xl home dose      Postoperative hypothyroidism  Assessment & Plan  Admission symptoms of weakness, bradycardia, hypotension, hypoglycemia  TSH on admission over 7  T4 normal-0 99  Cortisol-25; hydrocortisone discontinued  Continue synthroid dose at 112mcg (increased from outpatient dose of 88)  Will await consult from endocrinology    Hypocalcemia  Assessment & Plan  · Calcium replaced yesterday  · Recheck pending  · Continue to trend    Transaminitis  Assessment & Plan  · Elevated AST/ALT on admission; continues to be elevated on AM labs  · Likely in setting of dehydration and sepsis  · Will continue to trend    Gastroenteritis  Assessment & Plan  · Presented with 1 day history of dizziness, weakness, cough, vomiting x2, diarrhea x2, and abdominal pain  · Likely gastroenteritis causing dehydration  · CT abdomen/pelvis negative for obstruction, colitis, or diverticulitis  · No episodes of diarrhea since admission to ED    Late onset Alzheimer's disease with behavioral disturbance (HCC)  Assessment & Plan  · Pt with baseline dementia  · Per daughters, pt normally with better mentation when in home environment  · Currently, alert and oriented to person and place only  · Continue CAM ICU    Current smoker  Assessment & Plan  · Pt current 1 ppd smoker  · Nicotine patch  · Encourage cessation    Hypoglycemiaresolved as of 2/16/2020  Assessment & Plan  · Glucose 55 on admission; 164 on AM labs  · Received 1 amp D50 with improvement  · Continue to trend BS q2hrs    Hyperkalemiaresolved as of 2/16/2020  Assessment & Plan  · K 5 4 on admission with LAYLA; now 4 3  · Continue to trend ----------------------------------------------------------------------------------------  HPI/24hr events: One reported episode of diarrhea following dinner last evening  Disposition: Transfer to [facility]   Code Status: Level 3 - DNAR and DNI  ---------------------------------------------------------------------------------------  SUBJECTIVE  Offers no complaints  Review of Systems  Review of systems was reviewed and negative unless stated above in HPI/24-hour events   ---------------------------------------------------------------------------------------  OBJECTIVE    Vitals   Vitals:    02/15/20 2138 02/15/20 2300 20 0100 20 0200   BP: (!) 103/48  113/58 136/61   Pulse: (!) 46  (!) 44 64   Resp: 21  14 19   Temp: 98 3 °F (36 8 °C) 97 7 °F (36 5 °C)     TempSrc: Temporal Temporal     SpO2: 96%  97% 95%   Weight:         Temp (24hrs), Av °F (36 7 °C), Min:97 7 °F (36 5 °C), Max:98 5 °F (36 9 °C)  Current: Temperature: 97 7 °F (36 5 °C)  Arterial Line BP: 114/86  Arterial Line MAP (mmHg): 98 mmHg  SpO2: SpO2: 95 %  O2 Flow Rate (L/min): 2 L/min    Physical Exam   Constitutional: She appears well-developed and well-nourished  HENT:   Head: Normocephalic and atraumatic  Right Ear: External ear normal    Left Ear: External ear normal    Nose: Nose normal    Mouth/Throat: Oropharynx is clear and moist    Eyes: Pupils are equal, round, and reactive to light  Conjunctivae and EOM are normal    Neck: Normal range of motion  Neck supple  No JVD present  No tracheal deviation present  No thyromegaly present  Cardiovascular: Regular rhythm, normal heart sounds and intact distal pulses  Sinus bradycardia   Pulmonary/Chest: Effort normal and breath sounds normal  No respiratory distress  Abdominal: Soft  Bowel sounds are normal    Musculoskeletal: Normal range of motion  She exhibits no edema  Lymphadenopathy:     She has no cervical adenopathy  Neurological: She is alert     Disoriented to time   Skin: Skin is warm and dry  Capillary refill takes less than 2 seconds  Psychiatric: She has a normal mood and affect  Her behavior is normal  Judgment and thought content normal    Nursing note and vitals reviewed        Invasive/non-invasive ventilation settings   Respiratory    Lab Data (Last 4 hours)    None         O2/Vent Data (Last 4 hours)    None                Laboratory and Diagnostics:  Results from last 7 days   Lab Units 02/16/20  0454 02/15/20  0626 02/14/20  0535 02/13/20  1727   WBC Thousand/uL 7 00 13 71* 19 08* 21 52*   HEMOGLOBIN g/dL 10 9* 11 0* 11 2* 14 7   HEMATOCRIT % 34 1* 34 6* 35 8 48 4*   PLATELETS Thousands/uL 147* 226 242 299   NEUTROS PCT % 66  --  77* 83*   MONOS PCT % 9  --  9 11     Results from last 7 days   Lab Units 02/16/20  0454 02/15/20  0626 02/14/20  0502 02/13/20  1754   SODIUM mmol/L 142 141 140 138   POTASSIUM mmol/L 3 3* 3 5 4 3 5 4*   CHLORIDE mmol/L 106 105 105 98*   CO2 mmol/L 27 23 21 22   ANION GAP mmol/L 9 13 14* 18*   BUN mg/dL 24 36* 38* 31*   CREATININE mg/dL 1 15 1 53* 1 92* 1 70*   CALCIUM mg/dL 7 6* 6 7* 6 9* 8 7   GLUCOSE RANDOM mg/dL 80 105 164* 55*   ALT U/L 477*  --  370* 315*   AST U/L 571*  --  843* 765*   ALK PHOS U/L 98  --  85 97   ALBUMIN g/dL 2 4*  --  2 4* 3 3*   TOTAL BILIRUBIN mg/dL 0 29  --  0 44 0 64     Results from last 7 days   Lab Units 02/16/20  0454 02/15/20  1109 02/15/20  0626 02/14/20  0502   MAGNESIUM mg/dL 1 5*  --  1 0* 1 0*   PHOSPHORUS mg/dL 2 5 2 6  --  2 9      Results from last 7 days   Lab Units 02/13/20  1727   INR  1 91*   PTT seconds 33      Results from last 7 days   Lab Units 02/13/20  1727   TROPONIN I ng/mL 0 02     Results from last 7 days   Lab Units 02/14/20  0518 02/14/20  0017 02/13/20  2104   LACTIC ACID mmol/L 3 4* 6 8* 6 5*     ABG:    VBG:    Results from last 7 days   Lab Units 02/14/20  0205 02/13/20  2140   PROCALCITONIN ng/ml 0 62* 0 18       Micro  Results from last 7 days   Lab Units 02/14/20  0138 02/13/20  2235 02/13/20  2140 02/13/20  2103   BLOOD CULTURE   --   --  No Growth at 24 hrs  No Growth at 48 hrs  LEGIONELLA URINARY ANTIGEN  Negative  --   --   --    STREP PNEUMONIAE ANTIGEN, URINE  Negative  --   --   --    C DIFF TOXIN B   --  Negative  --   --        EKG: sinus bradycardia  Imaging: I have personally reviewed pertinent reports  Intake and Output  I/O       02/14 0701 - 02/15 0700 02/15 0701 - 02/16 0700    P  O   240    I V  (mL/kg) 4115 (75 2) 1382 8 (25 3)    IV Piggyback  250    Total Intake(mL/kg) 4115 (75 2) 1872 8 (34 2)    Urine (mL/kg/hr) 1420 (1 1) 1185 (0 9)    Stool  0    Total Output 1420 1185    Net +2695 +687 8          Unmeasured Stool Occurrence  1 x          Height and Weights      IBW: -92 5 kg  Body mass index is 22 06 kg/m²  Weight (last 2 days)     Date/Time   Weight    02/15/20 0600   54 7 (120 59)                Nutrition       Diet Orders   (From admission, onward)             Start     Ordered    02/15/20 1515  Diet Regular; Regular House  Diet effective now     Question Answer Comment   Diet Type Regular    Regular Regular House    RD to adjust diet per protocol?  Yes        02/15/20 1515                  Active Medications  Scheduled Meds:    Current Facility-Administered Medications:  cefTRIAXone 2,000 mg Intravenous Q24H Austin Infield, CRNP Last Rate: Stopped (02/16/20 0118)   citalopram 10 mg Oral Daily Charkarlee Silviano, CRNP    donepezil 10 mg Oral HS Charley Silviano, CRNP    heparin (porcine) 5,000 Units Subcutaneous [facility] Austin Infield, CRNP    lactated ringers 1,000 mL Intravenous Once Austin Infield, CRNP    lactated ringers 125 mL/hr Intravenous Continuous Austin Infield, CRNP Last Rate: Stopped (02/15/20 1515)   levalbuterol 1 25 mg Nebulization Q8H PRN Katieley Silviano, CRNP    levothyroxine 112 mcg Oral Early Morning Austin Infield, CRNP    magnesium chloride 64 mg Oral Once Austin Infield, CRNP    metroNIDAZOLE 500 mg Intravenous Q8H Austin Infield, CRNP Last Rate: Stopped (02/15/20 2216)   nicotine 21 mg Transdermal Daily KANDI Monte    norepinephrine 1-30 mcg/min Intravenous Titrated KANDI Monte Last Rate: Stopped (02/15/20 1515)   potassium chloride 40 mEq Oral Once KANDI Monte      Continuous Infusions:    lactated ringers 125 mL/hr Last Rate: Stopped (02/15/20 1515)   norepinephrine 1-30 mcg/min Last Rate: Stopped (02/15/20 1515)     PRN Meds:     levalbuterol 1 25 mg Q8H PRN     ---------------------------------------------------------------------------------------  Advance Directive and Living Will: Yes    Power of : Yes  POLST:    ---------------------------------------------------------------------------------------  Counseling / Coordination of Care  Total Critical Care time spent 36 minutes excluding procedures, teaching and family updates  KANDI Monte      Portions of the record may have been created with voice recognition software  Occasional wrong word or "sound a like" substitutions may have occurred due to the inherent limitations of voice recognition software    Read the chart carefully and recognize, using context, where substitutions have occurred

## 2020-02-16 NOTE — ASSESSMENT & PLAN NOTE
· WBC 21 52, improving  · Sepsis protocol initiated  · Urine culture not able to be processed  · Blood cultures x2 no growth at 24 hours  · Stool culture negative  · C diff negative  · Continue Ceftriaxone, flagyl, day # 4  · Will discuss de-escalation of antibiotics

## 2020-02-16 NOTE — ASSESSMENT & PLAN NOTE
· Baseline Creat 1 1-1 2; 1 7 on admission with low UO  · U/O is adequate  · Avoid nephrotoxic agents

## 2020-02-16 NOTE — ASSESSMENT & PLAN NOTE
· Presented with 1 day history of dizziness, weakness, cough, vomiting x2, diarrhea x2, and abdominal pain  · Likely gastroenteritis causing dehydration  · CT abdomen/pelvis negative for obstruction, colitis, or diverticulitis  · No episodes of diarrhea since admission to ED

## 2020-02-16 NOTE — ASSESSMENT & PLAN NOTE
· Presented with 1 day history of dizziness, weakness, vomiting x2, diarrhea x2, and abdominal pain, on admission hypotensive and hypothermic 95 3, Lactic acid 6 5->6 8>3 4, initially resuscitated with 6L IVF,   · Etiology is unknown currently   U/A negative, urine culture unable to process, patient has history of UTIs, Respiratory panel negative, no evidence of pulmonary etiology on imaging, patient did have diarrhea on admission, stool pcr and cdiff negative  · Required low dose Levophed to keep MAP>65mmhg, now weaned off  · Follow cultures, wbc, temps, procal  · Continue ceftriaxone, flagyl

## 2020-02-16 NOTE — ASSESSMENT & PLAN NOTE
Admission symptoms of weakness, bradycardia, hypotension, hypoglycemia  TSH on admission over 7  T4 normal-0 99  Cortisol-25; hydrocortisone discontinued  Continue synthroid dose at 112mcg (increased from outpatient dose of 88)  Will await consult from endocrinology

## 2020-02-16 NOTE — PROGRESS NOTES
Transfer Note - ICU/Stepdown Transfer to [facility]/MS leonardo Diop 66 y o  female MRN: [id number]  [facility]   Unit/Bed#: ICU 09 Encounter: [id number]    Code Status: Level 3 - DNAR and DNI    Reason for ICU/Stepdown admission: septic shock     Active problems:     * Septic shock ([facility] 75 )  Assessment & Plan  · Presented with 1 day history of dizziness, weakness, vomiting x2, diarrhea x2, and abdominal pain, on admission hypotensive and hypothermic 95 3, Lactic acid 6 5->6 8>3 4, initially resuscitated with 6L IVF,   · Etiology is unknown currently   U/A negative, urine culture unable to process, patient has history of UTIs, Respiratory panel negative, no evidence of pulmonary etiology on imaging, patient did have diarrhea on admission, stool pcr and cdiff negative  · Required low dose Levophed to keep MAP>65mmhg, now weaned off  · Follow cultures, wbc, temps, procal  · Continue ceftriaxone, flagyl    Lactic acidosis  Assessment & Plan  · Improved, trended 6 5 to 6 8 to 3 4    Hypotension, unspecified  Assessment & Plan  · Resolved    Leukocytosis  Assessment & Plan  · WBC 21 52, improving  · Sepsis protocol initiated  · Urine culture not able to be processed  · Blood cultures x2 no growth at 24 hours  · Stool culture negative  · C diff negative  · Continue Ceftriaxone, flagyl, day # 4  · Will discuss de-escalation of antibiotics    High anion gap metabolic acidosis  Assessment & Plan  · Anion gap 18 on admission, now improved  · Likely in setting of dehydration, lactic acidosis      Acute kidney injury ([facility] 75 )  Assessment & Plan  · Baseline Creat 1 1-1 2; 1 7 on admission with low UO  · U/O is adequate  · Avoid nephrotoxic agents      Bradycardia  Assessment & Plan  Sinus villa on admission  Bradycardia persists with rate drop to 39 when sleeping  Will check EKG, electrolytes  Hold on toprol xl home dose      Postoperative hypothyroidism  Assessment & Plan  Admission symptoms of weakness, bradycardia, hypotension, hypoglycemia  TSH on admission over 7  T4 normal-0 99  Cortisol-25; hydrocortisone discontinued  Continue synthroid dose at 112mcg (increased from outpatient dose of 88)  Will await consult from endocrinology    Hypocalcemia  Assessment & Plan  · Calcium replaced yesterday  · Recheck pending  · Continue to trend    Transaminitis  Assessment & Plan  · Elevated AST/ALT on admission; continues to be elevated on AM labs  · Likely in setting of dehydration and sepsis  · Will continue to trend    Gastroenteritis  Assessment & Plan  · Presented with 1 day history of dizziness, weakness, cough, vomiting x2, diarrhea x2, and abdominal pain  · Likely gastroenteritis causing dehydration  · CT abdomen/pelvis negative for obstruction, colitis, or diverticulitis  · No episodes of diarrhea since admission to ED    Late onset Alzheimer's disease with behavioral disturbance ([facility] )  Assessment & Plan  · Pt with baseline dementia  · Per daughters, pt normally with better mentation when in home environment  · Currently, alert and oriented to person and place only  · Continue CAM ICU    Current smoker  Assessment & Plan  · Pt current 1 ppd smoker  · Nicotine patch  · Encourage cessation        Consultants:   · Endocrinology     History of Present Illness/Summary of clinical course: Claudetta Ganong is a 67 yo female who presented on 2/13 in the setting of septic shock  She had diarrhea, SOB potentially related to a UTI  Her PMH includes alzheimers and she is a current smoke  She received IVF in the ER and was started on broad spectrum antibioitcs and vasopressors  Her flu and RSV were negative  Pressors were weaned off and central line was removed  Patient has recovered well and is OOB to the chair  Her septic shock is an unclear etiology possibly related to her UTI  CXR and CT abdomen were unremarkable  Will follow up LFTs in the am and as well follow up on cultures    Will continue antibiotics until cultures come back  She is also waiting for a consult from endocrinology since her TSH 7 4  Will continue antibiotics and await culture results  Please refer to today's progress note for further clinical details  Recent or scheduled procedures: follow up on cultures     Outstanding/pending diagnostics:        Mobilization Plan: OOB as able     Nutrition Plan: regular diet           [  ] Family aware of transfer out of critical care: yes   [  ] Home medications that are not reordered and reason why: HTN meds      Spoke with Dr Jerel Butler regarding transfer @ 1600  Patient accepted to their service      [address]

## 2020-02-16 NOTE — PLAN OF CARE
Problem: Prexisting or High Potential for Compromised Skin Integrity  Goal: Skin integrity is maintained or improved  Description  INTERVENTIONS:  - Identify patients at risk for skin breakdown  - Assess and monitor skin integrity  - Assess and monitor nutrition and hydration status  - Monitor labs   - Assess for incontinence   - Turn and reposition patient  - Assist with mobility/ambulation  - Relieve pressure over bony prominences  - Avoid friction and shearing  - Provide appropriate hygiene as needed including keeping skin clean and dry  - Evaluate need for skin moisturizer/barrier cream  - Collaborate with interdisciplinary team   - Patient/family teaching  - Consider wound care consult   Outcome: Progressing     Problem: PAIN - ADULT  Goal: Verbalizes/displays adequate comfort level or baseline comfort level  Description  Interventions:  - Encourage patient to monitor pain and request assistance  - Assess pain using appropriate pain scale  - Administer analgesics based on type and severity of pain and evaluate response  - Implement non-pharmacological measures as appropriate and evaluate response  - Consider cultural and social influences on pain and pain management  - Notify physician/advanced practitioner if interventions unsuccessful or patient reports new pain  Outcome: Progressing     Problem: INFECTION - ADULT  Goal: Absence or prevention of progression during hospitalization  Description  INTERVENTIONS:  - Assess and monitor for signs and symptoms of infection  - Monitor lab/diagnostic results  - Monitor all insertion sites, i e  indwelling lines, tubes, and drains  - Monitor endotracheal if appropriate and nasal secretions for changes in amount and color  - [place] appropriate cooling/warming therapies per order  - Administer medications as ordered  - Instruct and encourage patient and family to use good hand hygiene technique  - Identify and instruct in appropriate isolation precautions for identified infection/condition  Outcome: Not Progressing     Problem: SAFETY ADULT  Goal: Patient will remain free of falls  Description  INTERVENTIONS:  - Assess patient frequently for physical needs  -  Identify cognitive and physical deficits and behaviors that affect risk of falls    -  [place] fall precautions as indicated by assessment   - Educate patient/family on patient safety including physical limitations  - Instruct patient to call for assistance with activity based on assessment  - Modify environment to reduce risk of injury  - Consider OT/PT consult to assist with strengthening/mobility  Outcome: Progressing  Goal: Maintain or return to baseline ADL function  Description  INTERVENTIONS:  -  Assess patient's ability to carry out ADLs; assess patient's baseline for ADL function and identify physical deficits which impact ability to perform ADLs (bathing, care of mouth/teeth, toileting, grooming, dressing, etc )  - Assess/evaluate cause of self-care deficits   - Assess range of motion  - Assess patient's mobility; develop plan if impaired  - Assess patient's need for assistive devices and provide as appropriate  - Encourage maximum independence but intervene and supervise when necessary  - Involve family in performance of ADLs  - Assess for home care needs following discharge   - Consider OT consult to assist with ADL evaluation and planning for discharge  - Provide patient education as appropriate  Outcome: Progressing  Goal: Maintain or return mobility status to optimal level  Description  INTERVENTIONS:  - Assess patient's baseline mobility status (ambulation, transfers, stairs, etc )    - Identify cognitive and physical deficits and behaviors that affect mobility  - Identify mobility aids required to assist with transfers and/or ambulation (gait belt, sit-to-stand, lift, walker, cane, etc )  - [place] fall precautions as indicated by assessment  - Record patient progress and toleration of activity level on Mobility SBAR; progress patient to next Phase/Stage  - Instruct patient to call for assistance with activity based on assessment  - Consider rehabilitation consult to assist with strengthening/weightbearing, etc   Outcome: Progressing     Problem: DISCHARGE PLANNING  Goal: Discharge to home or other facility with appropriate resources  Description  INTERVENTIONS:  - Identify barriers to discharge w/patient and caregiver  - Arrange for needed discharge resources and transportation as appropriate  - Identify discharge learning needs (meds, wound care, etc )  - Arrange for interpretive services to assist at discharge as needed  - Refer to Case Management Department for coordinating discharge planning if the patient needs post-hospital services based on physician/advanced practitioner order or complex needs related to functional status, cognitive ability, or social support system  Outcome: Progressing     Problem: Knowledge Deficit  Goal: Patient/family/caregiver demonstrates understanding of disease process, treatment plan, medications, and discharge instructions  Description  Complete learning assessment and assess knowledge base    Interventions:  - Provide teaching at level of understanding  - Provide teaching via preferred learning methods  Outcome: Progressing     Problem: CARDIOVASCULAR - ADULT  Goal: Maintains optimal cardiac output and hemodynamic stability  Description  INTERVENTIONS:  - Monitor I/O, vital signs and rhythm  - Monitor for S/S and trends of decreased cardiac output  - Administer and titrate ordered vasoactive medications to optimize hemodynamic stability  - Assess quality of pulses, skin color and temperature  - Assess for signs of decreased coronary artery perfusion  - Instruct patient to report change in severity of symptoms  Outcome: Progressing  Goal: Absence of cardiac dysrhythmias or at baseline rhythm  Description  INTERVENTIONS:  - Continuous cardiac monitoring, vital signs, obtain 12 lead EKG if ordered  - Administer antiarrhythmic and heart rate control medications as ordered  - Monitor electrolytes and administer replacement therapy as ordered  Outcome: Progressing     Problem: RESPIRATORY - ADULT  Goal: Achieves optimal ventilation and oxygenation  Description  INTERVENTIONS:  - Assess for changes in respiratory status  - Assess for changes in mentation and behavior  - Position to facilitate oxygenation and minimize respiratory effort  - Oxygen administered by appropriate delivery if ordered  - Initiate smoking cessation education as indicated  - Encourage broncho-pulmonary hygiene including cough, deep breathe, Incentive Spirometry  - Assess the need for suctioning and aspirate as needed  - Assess and instruct to report SOB or any respiratory difficulty  - Respiratory Therapy support as indicated  Outcome: Progressing     Problem: GASTROINTESTINAL - ADULT  Goal: Minimal or absence of nausea and/or vomiting  Description  INTERVENTIONS:  - Administer IV fluids if ordered to ensure adequate hydration  - Maintain NPO status until nausea and vomiting are resolved  - Nasogastric tube if ordered  - Administer ordered antiemetic medications as needed  - Provide nonpharmacologic comfort measures as appropriate  - Advance diet as tolerated, if ordered  - Consider nutrition services referral to assist patient with adequate nutrition and appropriate food choices  Outcome: Progressing  Goal: Maintains adequate nutritional intake  Description  INTERVENTIONS:  - Monitor percentage of each meal consumed  - Identify factors contributing to decreased intake, treat as appropriate  - Assist with meals as needed  - Monitor I&O, weight, and lab values if indicated  - Obtain nutrition services referral as needed  Outcome: Progressing     Problem: GENITOURINARY - ADULT  Goal: Urinary catheter remains patent  Description  INTERVENTIONS:  - Assess patency of urinary catheter  - If patient has a chronic stanley, consider changing catheter if non-functioning  - Follow guidelines for intermittent irrigation of non-functioning urinary catheter  Outcome: Progressing     Problem: METABOLIC, FLUID AND ELECTROLYTES - ADULT  Goal: Electrolytes maintained within normal limits  Description  INTERVENTIONS:  - Monitor labs and assess patient for signs and symptoms of electrolyte imbalances  - Administer electrolyte replacement as ordered  - Monitor response to electrolyte replacements, including repeat lab results as appropriate  - Instruct patient on fluid and nutrition as appropriate  Outcome: Progressing  Goal: Fluid balance maintained  Description  INTERVENTIONS:  - Monitor labs   - Monitor I/O and WT  - Instruct patient on fluid and nutrition as appropriate  - Assess for signs & symptoms of volume excess or deficit  Outcome: Progressing  Goal: Glucose maintained within target range  Description  INTERVENTIONS:  - Monitor Blood Glucose as ordered  - Assess for signs and symptoms of hyperglycemia and hypoglycemia  - Administer ordered medications to maintain glucose within target range  - Assess nutritional intake and initiate nutrition service referral as needed  Outcome: Progressing     Problem: Potential for Falls  Goal: Patient will remain free of falls  Description  INTERVENTIONS:  - Assess patient frequently for physical needs  -  Identify cognitive and physical deficits and behaviors that affect risk of falls    -  [place] fall precautions as indicated by assessment   - Educate patient/family on patient safety including physical limitations  - Instruct patient to call for assistance with activity based on assessment  - Modify environment to reduce risk of injury  - Consider OT/PT consult to assist with strengthening/mobility  Outcome: Progressing

## 2020-02-17 PROBLEM — R11.10 VOMITING AND DIARRHEA: Status: ACTIVE | Noted: 2020-02-13

## 2020-02-17 PROBLEM — R19.7 VOMITING AND DIARRHEA: Status: ACTIVE | Noted: 2020-02-13

## 2020-02-17 LAB
ALBUMIN SERPL BCP-MCNC: 2.3 G/DL (ref 3.5–5)
ALP SERPL-CCNC: 94 U/L (ref 46–116)
ALT SERPL W P-5'-P-CCNC: 346 U/L (ref 12–78)
ANION GAP SERPL CALCULATED.3IONS-SCNC: 7 MMOL/L (ref 4–13)
AST SERPL W P-5'-P-CCNC: 247 U/L (ref 5–45)
BASOPHILS # BLD AUTO: 0.02 THOUSANDS/ΜL (ref 0–0.1)
BASOPHILS NFR BLD AUTO: 0 % (ref 0–1)
BILIRUB DIRECT SERPL-MCNC: 0.1 MG/DL (ref 0–0.2)
BILIRUB SERPL-MCNC: 0.25 MG/DL (ref 0.2–1)
BUN SERPL-MCNC: 13 MG/DL (ref 5–25)
CALCIUM SERPL-MCNC: 8.2 MG/DL (ref 8.3–10.1)
CHLORIDE SERPL-SCNC: 106 MMOL/L (ref 100–108)
CO2 SERPL-SCNC: 29 MMOL/L (ref 21–32)
CREAT SERPL-MCNC: 0.95 MG/DL (ref 0.6–1.3)
EOSINOPHIL # BLD AUTO: 0.09 THOUSAND/ΜL (ref 0–0.61)
EOSINOPHIL NFR BLD AUTO: 1 % (ref 0–6)
ERYTHROCYTE [DISTWIDTH] IN BLOOD BY AUTOMATED COUNT: 17.3 % (ref 11.6–15.1)
GFR SERPL CREATININE-BSD FRML MDRD: 58 ML/MIN/1.73SQ M
GLUCOSE SERPL-MCNC: 105 MG/DL (ref 65–140)
HCT VFR BLD AUTO: 33.2 % (ref 34.8–46.1)
HGB BLD-MCNC: 10.6 G/DL (ref 11.5–15.4)
IMM GRANULOCYTES # BLD AUTO: 0.05 THOUSAND/UL (ref 0–0.2)
IMM GRANULOCYTES NFR BLD AUTO: 1 % (ref 0–2)
LYMPHOCYTES # BLD AUTO: 1.95 THOUSANDS/ΜL (ref 0.6–4.47)
LYMPHOCYTES NFR BLD AUTO: 25 % (ref 14–44)
MCH RBC QN AUTO: 29 PG (ref 26.8–34.3)
MCHC RBC AUTO-ENTMCNC: 31.9 G/DL (ref 31.4–37.4)
MCV RBC AUTO: 91 FL (ref 82–98)
MONOCYTES # BLD AUTO: 0.81 THOUSAND/ΜL (ref 0.17–1.22)
MONOCYTES NFR BLD AUTO: 11 % (ref 4–12)
NEUTROPHILS # BLD AUTO: 4.82 THOUSANDS/ΜL (ref 1.85–7.62)
NEUTS SEG NFR BLD AUTO: 62 % (ref 43–75)
NRBC BLD AUTO-RTO: 0 /100 WBCS
PLATELET # BLD AUTO: 149 THOUSANDS/UL (ref 149–390)
PMV BLD AUTO: 11.1 FL (ref 8.9–12.7)
POTASSIUM SERPL-SCNC: 4.1 MMOL/L (ref 3.5–5.3)
PROT SERPL-MCNC: 5.2 G/DL (ref 6.4–8.2)
RBC # BLD AUTO: 3.66 MILLION/UL (ref 3.81–5.12)
SODIUM SERPL-SCNC: 142 MMOL/L (ref 136–145)
WBC # BLD AUTO: 7.74 THOUSAND/UL (ref 4.31–10.16)

## 2020-02-17 PROCEDURE — 85025 COMPLETE CBC W/AUTO DIFF WBC: CPT | Performed by: NURSE PRACTITIONER

## 2020-02-17 PROCEDURE — 80076 HEPATIC FUNCTION PANEL: CPT | Performed by: NURSE PRACTITIONER

## 2020-02-17 PROCEDURE — 94640 AIRWAY INHALATION TREATMENT: CPT

## 2020-02-17 PROCEDURE — 99232 SBSQ HOSP IP/OBS MODERATE 35: CPT | Performed by: FAMILY MEDICINE

## 2020-02-17 PROCEDURE — 80048 BASIC METABOLIC PNL TOTAL CA: CPT | Performed by: NURSE PRACTITIONER

## 2020-02-17 RX ORDER — LEVALBUTEROL 1.25 MG/.5ML
1.25 SOLUTION, CONCENTRATE RESPIRATORY (INHALATION)
Status: DISCONTINUED | OUTPATIENT
Start: 2020-02-17 | End: 2020-02-19

## 2020-02-17 RX ADMIN — METRONIDAZOLE 500 MG: 500 INJECTION, SOLUTION INTRAVENOUS at 22:58

## 2020-02-17 RX ADMIN — METRONIDAZOLE 500 MG: 500 INJECTION, SOLUTION INTRAVENOUS at 14:02

## 2020-02-17 RX ADMIN — HEPARIN SODIUM 5000 UNITS: 5000 INJECTION INTRAVENOUS; SUBCUTANEOUS at 05:20

## 2020-02-17 RX ADMIN — LEVALBUTEROL HYDROCHLORIDE 1.25 MG: 1.25 SOLUTION, CONCENTRATE RESPIRATORY (INHALATION) at 13:37

## 2020-02-17 RX ADMIN — IPRATROPIUM BROMIDE 0.5 MG: 0.5 SOLUTION RESPIRATORY (INHALATION) at 07:34

## 2020-02-17 RX ADMIN — HEPARIN SODIUM 5000 UNITS: 5000 INJECTION INTRAVENOUS; SUBCUTANEOUS at 22:46

## 2020-02-17 RX ADMIN — HEPARIN SODIUM 5000 UNITS: 5000 INJECTION INTRAVENOUS; SUBCUTANEOUS at 14:02

## 2020-02-17 RX ADMIN — IPRATROPIUM BROMIDE 0.5 MG: 0.5 SOLUTION RESPIRATORY (INHALATION) at 13:37

## 2020-02-17 RX ADMIN — CEFTRIAXONE 2000 MG: 2 INJECTION, POWDER, FOR SOLUTION INTRAMUSCULAR; INTRAVENOUS at 01:30

## 2020-02-17 RX ADMIN — LEVALBUTEROL HYDROCHLORIDE 1.25 MG: 1.25 SOLUTION, CONCENTRATE RESPIRATORY (INHALATION) at 07:34

## 2020-02-17 RX ADMIN — METRONIDAZOLE 500 MG: 500 INJECTION, SOLUTION INTRAVENOUS at 06:22

## 2020-02-17 RX ADMIN — CITALOPRAM HYDROBROMIDE 10 MG: 20 TABLET ORAL at 08:25

## 2020-02-17 RX ADMIN — LEVALBUTEROL HYDROCHLORIDE 1.25 MG: 1.25 SOLUTION, CONCENTRATE RESPIRATORY (INHALATION) at 19:29

## 2020-02-17 RX ADMIN — LEVOTHYROXINE SODIUM 112 MCG: 112 TABLET ORAL at 05:20

## 2020-02-17 RX ADMIN — IPRATROPIUM BROMIDE 0.5 MG: 0.5 SOLUTION RESPIRATORY (INHALATION) at 19:29

## 2020-02-17 RX ADMIN — DONEPEZIL HYDROCHLORIDE 10 MG: 10 TABLET, FILM COATED ORAL at 22:46

## 2020-02-17 NOTE — PLAN OF CARE
Problem: Prexisting or High Potential for Compromised Skin Integrity  Goal: Skin integrity is maintained or improved  Description  INTERVENTIONS:  - Identify patients at risk for skin breakdown  - Assess and monitor skin integrity  - Assess and monitor nutrition and hydration status  - Monitor labs   - Assess for incontinence   - Turn and reposition patient  - Assist with mobility/ambulation  - Relieve pressure over bony prominences  - Avoid friction and shearing  - Provide appropriate hygiene as needed including keeping skin clean and dry  - Evaluate need for skin moisturizer/barrier cream  - Collaborate with interdisciplinary team   - Patient/family teaching  - Consider wound care consult   Outcome: Progressing     Problem: PAIN - ADULT  Goal: Verbalizes/displays adequate comfort level or baseline comfort level  Description  Interventions:  - Encourage patient to monitor pain and request assistance  - Assess pain using appropriate pain scale  - Administer analgesics based on type and severity of pain and evaluate response  - Implement non-pharmacological measures as appropriate and evaluate response  - Consider cultural and social influences on pain and pain management  - Notify physician/advanced practitioner if interventions unsuccessful or patient reports new pain  Outcome: Progressing     Problem: INFECTION - ADULT  Goal: Absence or prevention of progression during hospitalization  Description  INTERVENTIONS:  - Assess and monitor for signs and symptoms of infection  - Monitor lab/diagnostic results  - Monitor all insertion sites, i e  indwelling lines, tubes, and drains  - Monitor endotracheal if appropriate and nasal secretions for changes in amount and color  - [place] appropriate cooling/warming therapies per order  - Administer medications as ordered  - Instruct and encourage patient and family to use good hand hygiene technique  - Identify and instruct in appropriate isolation precautions for identified infection/condition  Outcome: Progressing     Problem: SAFETY ADULT  Goal: Patient will remain free of falls  Description  INTERVENTIONS:  - Assess patient frequently for physical needs  -  Identify cognitive and physical deficits and behaviors that affect risk of falls    -  [place] fall precautions as indicated by assessment   - Educate patient/family on patient safety including physical limitations  - Instruct patient to call for assistance with activity based on assessment  - Modify environment to reduce risk of injury  - Consider OT/PT consult to assist with strengthening/mobility  Outcome: Progressing  Goal: Maintain or return to baseline ADL function  Description  INTERVENTIONS:  -  Assess patient's ability to carry out ADLs; assess patient's baseline for ADL function and identify physical deficits which impact ability to perform ADLs (bathing, care of mouth/teeth, toileting, grooming, dressing, etc )  - Assess/evaluate cause of self-care deficits   - Assess range of motion  - Assess patient's mobility; develop plan if impaired  - Assess patient's need for assistive devices and provide as appropriate  - Encourage maximum independence but intervene and supervise when necessary  - Involve family in performance of ADLs  - Assess for home care needs following discharge   - Consider OT consult to assist with ADL evaluation and planning for discharge  - Provide patient education as appropriate  Outcome: Progressing  Goal: Maintain or return mobility status to optimal level  Description  INTERVENTIONS:  - Assess patient's baseline mobility status (ambulation, transfers, stairs, etc )    - Identify cognitive and physical deficits and behaviors that affect mobility  - Identify mobility aids required to assist with transfers and/or ambulation (gait belt, sit-to-stand, lift, walker, cane, etc )  - [place] fall precautions as indicated by assessment  - Record patient progress and toleration of activity level on Mobility SBAR; progress patient to next Phase/Stage  - Instruct patient to call for assistance with activity based on assessment  - Consider rehabilitation consult to assist with strengthening/weightbearing, etc   Outcome: Progressing     Problem: DISCHARGE PLANNING  Goal: Discharge to home or other facility with appropriate resources  Description  INTERVENTIONS:  - Identify barriers to discharge w/patient and caregiver  - Arrange for needed discharge resources and transportation as appropriate  - Identify discharge learning needs (meds, wound care, etc )  - Arrange for interpretive services to assist at discharge as needed  - Refer to Case Management Department for coordinating discharge planning if the patient needs post-hospital services based on physician/advanced practitioner order or complex needs related to functional status, cognitive ability, or social support system  Outcome: Progressing     Problem: Knowledge Deficit  Goal: Patient/family/caregiver demonstrates understanding of disease process, treatment plan, medications, and discharge instructions  Description  Complete learning assessment and assess knowledge base    Interventions:  - Provide teaching at level of understanding  - Provide teaching via preferred learning methods  Outcome: Progressing     Problem: CARDIOVASCULAR - ADULT  Goal: Absence of cardiac dysrhythmias or at baseline rhythm  Description  INTERVENTIONS:  - Continuous cardiac monitoring, vital signs, obtain 12 lead EKG if ordered  - Administer antiarrhythmic and heart rate control medications as ordered  - Monitor electrolytes and administer replacement therapy as ordered  Outcome: Progressing     Problem: GASTROINTESTINAL - ADULT  Goal: Minimal or absence of nausea and/or vomiting  Description  INTERVENTIONS:  - Administer IV fluids if ordered to ensure adequate hydration  - Maintain NPO status until nausea and vomiting are resolved  - Nasogastric tube if ordered  - Administer ordered antiemetic medications as needed  - Provide nonpharmacologic comfort measures as appropriate  - Advance diet as tolerated, if ordered  - Consider nutrition services referral to assist patient with adequate nutrition and appropriate food choices  Outcome: Progressing  Goal: Maintains adequate nutritional intake  Description  INTERVENTIONS:  - Monitor percentage of each meal consumed  - Identify factors contributing to decreased intake, treat as appropriate  - Assist with meals as needed  - Monitor I&O, weight, and lab values if indicated  - Obtain nutrition services referral as needed  Outcome: Progressing     Problem: GENITOURINARY - ADULT  Goal: Urinary catheter remains patent  Description  INTERVENTIONS:  - Assess patency of urinary catheter  - If patient has a chronic stanley, consider changing catheter if non-functioning  - Follow guidelines for intermittent irrigation of non-functioning urinary catheter  Outcome: Progressing     Problem: METABOLIC, FLUID AND ELECTROLYTES - ADULT  Goal: Electrolytes maintained within normal limits  Description  INTERVENTIONS:  - Monitor labs and assess patient for signs and symptoms of electrolyte imbalances  - Administer electrolyte replacement as ordered  - Monitor response to electrolyte replacements, including repeat lab results as appropriate  - Instruct patient on fluid and nutrition as appropriate  Outcome: Progressing  Goal: Fluid balance maintained  Description  INTERVENTIONS:  - Monitor labs   - Monitor I/O and WT  - Instruct patient on fluid and nutrition as appropriate  - Assess for signs & symptoms of volume excess or deficit  Outcome: Progressing  Goal: Glucose maintained within target range  Description  INTERVENTIONS:  - Monitor Blood Glucose as ordered  - Assess for signs and symptoms of hyperglycemia and hypoglycemia  - Administer ordered medications to maintain glucose within target range  - Assess nutritional intake and initiate nutrition service referral as needed  Outcome: Progressing     Problem: Potential for Falls  Goal: Patient will remain free of falls  Description  INTERVENTIONS:  - Assess patient frequently for physical needs  -  Identify cognitive and physical deficits and behaviors that affect risk of falls    -  [place] fall precautions as indicated by assessment   - Educate patient/family on patient safety including physical limitations  - Instruct patient to call for assistance with activity based on assessment  - Modify environment to reduce risk of injury  - Consider OT/PT consult to assist with strengthening/mobility  Outcome: Progressing

## 2020-02-17 NOTE — PLAN OF CARE
Problem: Prexisting or High Potential for Compromised Skin Integrity  Goal: Skin integrity is maintained or improved  Description  INTERVENTIONS:  - Identify patients at risk for skin breakdown  - Assess and monitor skin integrity  - Assess and monitor nutrition and hydration status  - Monitor labs   - Assess for incontinence   - Turn and reposition patient  - Assist with mobility/ambulation  - Relieve pressure over bony prominences  - Avoid friction and shearing  - Provide appropriate hygiene as needed including keeping skin clean and dry  - Evaluate need for skin moisturizer/barrier cream  - Collaborate with interdisciplinary team   - Patient/family teaching  - Consider wound care consult   Outcome: Progressing     Problem: PAIN - ADULT  Goal: Verbalizes/displays adequate comfort level or baseline comfort level  Description  Interventions:  - Encourage patient to monitor pain and request assistance  - Assess pain using appropriate pain scale  - Administer analgesics based on type and severity of pain and evaluate response  - Implement non-pharmacological measures as appropriate and evaluate response  - Consider cultural and social influences on pain and pain management  - Notify physician/advanced practitioner if interventions unsuccessful or patient reports new pain  Outcome: Progressing     Problem: INFECTION - ADULT  Goal: Absence or prevention of progression during hospitalization  Description  INTERVENTIONS:  - Assess and monitor for signs and symptoms of infection  - Monitor lab/diagnostic results  - Monitor all insertion sites, i e  indwelling lines, tubes, and drains  - Monitor endotracheal if appropriate and nasal secretions for changes in amount and color  - [place] appropriate cooling/warming therapies per order  - Administer medications as ordered  - Instruct and encourage patient and family to use good hand hygiene technique  - Identify and instruct in appropriate isolation precautions for identified infection/condition  Outcome: Progressing     Problem: SAFETY ADULT  Goal: Patient will remain free of falls  Description  INTERVENTIONS:  - Assess patient frequently for physical needs  -  Identify cognitive and physical deficits and behaviors that affect risk of falls    -  [place] fall precautions as indicated by assessment   - Educate patient/family on patient safety including physical limitations  - Instruct patient to call for assistance with activity based on assessment  - Modify environment to reduce risk of injury  - Consider OT/PT consult to assist with strengthening/mobility  Outcome: Progressing  Goal: Maintain or return to baseline ADL function  Description  INTERVENTIONS:  -  Assess patient's ability to carry out ADLs; assess patient's baseline for ADL function and identify physical deficits which impact ability to perform ADLs (bathing, care of mouth/teeth, toileting, grooming, dressing, etc )  - Assess/evaluate cause of self-care deficits   - Assess range of motion  - Assess patient's mobility; develop plan if impaired  - Assess patient's need for assistive devices and provide as appropriate  - Encourage maximum independence but intervene and supervise when necessary  - Involve family in performance of ADLs  - Assess for home care needs following discharge   - Consider OT consult to assist with ADL evaluation and planning for discharge  - Provide patient education as appropriate  Outcome: Progressing  Goal: Maintain or return mobility status to optimal level  Description  INTERVENTIONS:  - Assess patient's baseline mobility status (ambulation, transfers, stairs, etc )    - Identify cognitive and physical deficits and behaviors that affect mobility  - Identify mobility aids required to assist with transfers and/or ambulation (gait belt, sit-to-stand, lift, walker, cane, etc )  - [place] fall precautions as indicated by assessment  - Record patient progress and toleration of activity level on Mobility SBAR; progress patient to next Phase/Stage  - Instruct patient to call for assistance with activity based on assessment  - Consider rehabilitation consult to assist with strengthening/weightbearing, etc   Outcome: Progressing     Problem: DISCHARGE PLANNING  Goal: Discharge to home or other facility with appropriate resources  Description  INTERVENTIONS:  - Identify barriers to discharge w/patient and caregiver  - Arrange for needed discharge resources and transportation as appropriate  - Identify discharge learning needs (meds, wound care, etc )  - Arrange for interpretive services to assist at discharge as needed  - Refer to Case Management Department for coordinating discharge planning if the patient needs post-hospital services based on physician/advanced practitioner order or complex needs related to functional status, cognitive ability, or social support system  Outcome: Progressing     Problem: Knowledge Deficit  Goal: Patient/family/caregiver demonstrates understanding of disease process, treatment plan, medications, and discharge instructions  Description  Complete learning assessment and assess knowledge base    Interventions:  - Provide teaching at level of understanding  - Provide teaching via preferred learning methods  Outcome: Progressing     Problem: CARDIOVASCULAR - ADULT  Goal: Absence of cardiac dysrhythmias or at baseline rhythm  Description  INTERVENTIONS:  - Continuous cardiac monitoring, vital signs, obtain 12 lead EKG if ordered  - Administer antiarrhythmic and heart rate control medications as ordered  - Monitor electrolytes and administer replacement therapy as ordered  Outcome: Progressing     Problem: GASTROINTESTINAL - ADULT  Goal: Minimal or absence of nausea and/or vomiting  Description  INTERVENTIONS:  - Administer IV fluids if ordered to ensure adequate hydration  - Maintain NPO status until nausea and vomiting are resolved  - Nasogastric tube if ordered  - Administer ordered antiemetic medications as needed  - Provide nonpharmacologic comfort measures as appropriate  - Advance diet as tolerated, if ordered  - Consider nutrition services referral to assist patient with adequate nutrition and appropriate food choices  Outcome: Progressing  Goal: Maintains adequate nutritional intake  Description  INTERVENTIONS:  - Monitor percentage of each meal consumed  - Identify factors contributing to decreased intake, treat as appropriate  - Assist with meals as needed  - Monitor I&O, weight, and lab values if indicated  - Obtain nutrition services referral as needed  Outcome: Progressing     Problem: GENITOURINARY - ADULT  Goal: Urinary catheter remains patent  Description  INTERVENTIONS:  - Assess patency of urinary catheter  - If patient has a chronic stalney, consider changing catheter if non-functioning  - Follow guidelines for intermittent irrigation of non-functioning urinary catheter  Outcome: Progressing     Problem: METABOLIC, FLUID AND ELECTROLYTES - ADULT  Goal: Electrolytes maintained within normal limits  Description  INTERVENTIONS:  - Monitor labs and assess patient for signs and symptoms of electrolyte imbalances  - Administer electrolyte replacement as ordered  - Monitor response to electrolyte replacements, including repeat lab results as appropriate  - Instruct patient on fluid and nutrition as appropriate  Outcome: Progressing  Goal: Fluid balance maintained  Description  INTERVENTIONS:  - Monitor labs   - Monitor I/O and WT  - Instruct patient on fluid and nutrition as appropriate  - Assess for signs & symptoms of volume excess or deficit  Outcome: Progressing  Goal: Glucose maintained within target range  Description  INTERVENTIONS:  - Monitor Blood Glucose as ordered  - Assess for signs and symptoms of hyperglycemia and hypoglycemia  - Administer ordered medications to maintain glucose within target range  - Assess nutritional intake and initiate nutrition service referral as needed  Outcome: Progressing     Problem: Potential for Falls  Goal: Patient will remain free of falls  Description  INTERVENTIONS:  - Assess patient frequently for physical needs  -  Identify cognitive and physical deficits and behaviors that affect risk of falls    -  [place] fall precautions as indicated by assessment   - Educate patient/family on patient safety including physical limitations  - Instruct patient to call for assistance with activity based on assessment  - Modify environment to reduce risk of injury  - Consider OT/PT consult to assist with strengthening/mobility  Outcome: Progressing

## 2020-02-17 NOTE — ASSESSMENT & PLAN NOTE
Septic vs hypovolemic shock due to GI losses - patient initially presenting with vomiting/diarrhea  Potential sepsis source due to UTI - no growth on urine cultures but looks like urine sample was re-collected  Patient is an ICU transfer and remains stable  Continue Rocephin/Flagyl  Monitor CBC, VS, procalcitonin

## 2020-02-17 NOTE — PROGRESS NOTES
Progress Note - Nikki Smith 1945, 66 y o  female MRN: [id number]    [facility] [address] 218-02 Encounter: [id number]    Primary Care Provider: Vik Espinoza DO   Date and time admitted to hospital: 2/13/2020  4:54 PM        * Septic shock [facility])  Assessment & Plan  Septic vs hypovolemic shock due to GI losses - patient initially presenting with vomiting/diarrhea  Potential sepsis source due to UTI - no growth on urine cultures but looks like urine sample was re-collected  Patient is an ICU transfer and remains stable  Continue Rocephin/Flagyl  Monitor CBC, VS, procalcitonin     Vomiting and diarrhea  Assessment & Plan  Initially presenting with vomiting and diarrhea now resolved  Found to be in septic shock and admitted to ICU - source presumed to be a UTI with otherwise negative imaging  Patient transferred to floor and remains hemodynamically stable  Tolerating diet       Acute kidney injury ([facility] [address] )  Assessment & Plan  Creatinine 1 9 on admission, baseline 0 9-1 2, likely prerenal azotemia in setting of sepsis   Resolved   Continue to hold ACEI and diuretic for now   Monitor intake and output  Avoid NSAIDs, nephrotoxins and hypotension  Monitor serum creatinine    Transaminitis  Assessment & Plan  Significantly improved  Was likely related to hypotension/septic shock  Acute Hepatitis panel negative  Continue to trend CMP       Lactic acidosis  Assessment & Plan  Resolved     High anion gap metabolic acidosis  Assessment & Plan  Due to lactic acidosis/septic shock   Resolved     Hypotension, unspecified  Assessment & Plan  BP 96/49, 90/42; s/p 1L NS  Continue IV hydration  Maintained on Toprol, will hold for now  Close BP monitoring    Late onset Alzheimer's disease with behavioral disturbance (HCC)  Assessment & Plan  On Donepezil  Supportive care  Followed outpatient by Geriatric Medicine    Leukocytosis  Assessment & Plan  WBC 21, chronically elevated, seen by Hematology in 2018 due to leukocytosis and bilateral pulmonary nodules  Leukocytosis thought to be secondary to infection/inflammation  See above plan SIRS  Monitor CBC     Current smoker  Assessment & Plan  Cessation counseling  Declined nicotine replacement     Postoperative hypothyroidism  Assessment & Plan  Levothyroxine dose adjusted  TSH in 4-6 weeks     Hyperkalemiaresolved as of 2020  Assessment & Plan  K 5 4, hemolyzed, will repeat serum potassium    VTE Pharmacologic Prophylaxis:   Pharmacologic: Heparin  Mechanical VTE Prophylaxis in Place: Yes    Patient Centered Rounds: I have performed bedside rounds with nursing staff today  Discussions with Specialists or Other Care Team Provider: LILIANE    Education and Discussions with Family / Patient: Patient's daughter at bedside    Time Spent for Care: 45 minutes  More than 50% of total time spent on counseling and coordination of care as described above  Current Length of Stay: 4 day(s)    Current Patient Status: Inpatient   Certification Statement: The patient will continue to require additional inpatient hospital stay due to need for close monitoring     Discharge Plan: 1-2 days    Code Status: Level 3 - DNAR and DNI      Subjective:   Patient seen and examined  She is alert and oriented to self, place, month but not year  She voices no complaints  She remains afebrile  She is tolerating diet  Objective:     Vitals:   Temp (24hrs), Av 4 °F (36 9 °C), Min:97 9 °F (36 6 °C), Max:98 8 °F (37 1 °C)    Temp:  [97 9 °F (36 6 °C)-98 8 °F (37 1 °C)] 98 4 °F (36 9 °C)  HR:  [52-82] 67  Resp:  [18-22] 18  BP: (103-178)/(52-78) 121/60  SpO2:  [92 %-100 %] 92 %  Body mass index is 22 06 kg/m²  Input and Output Summary (last 24 hours): Intake/Output Summary (Last 24 hours) at 2020 1451  Last data filed at 2020 1909  Gross per 24 hour   Intake 200 ml   Output    Net 200 ml       Physical Exam:     Physical Exam   Constitutional: No distress     HENT:   Head: Normocephalic and atraumatic  Eyes: Conjunctivae are normal    Neck: No JVD present  Cardiovascular: Normal rate and regular rhythm  No murmur heard  Pulmonary/Chest: Effort normal  No respiratory distress  She has no wheezes  She has rales (mild scattered )  Abdominal: Soft  She exhibits no distension  There is no guarding  Musculoskeletal: She exhibits no edema  Neurological: She is alert  Skin: Skin is warm and dry  Psychiatric: Her speech is delayed  She is slowed  Additional Data:     Labs:    Results from last 7 days   Lab Units 02/17/20  0524   WBC Thousand/uL 7 74   HEMOGLOBIN g/dL 10 6*   HEMATOCRIT % 33 2*   PLATELETS Thousands/uL 149   NEUTROS PCT % 62   LYMPHS PCT % 25   MONOS PCT % 11   EOS PCT % 1     Results from last 7 days   Lab Units 02/17/20  0524   SODIUM mmol/L 142   POTASSIUM mmol/L 4 1   CHLORIDE mmol/L 106   CO2 mmol/L 29   BUN mg/dL 13   CREATININE mg/dL 0 95   ANION GAP mmol/L 7   CALCIUM mg/dL 8 2*   ALBUMIN g/dL 2 3*   TOTAL BILIRUBIN mg/dL 0 25   ALK PHOS U/L 94   ALT U/L 346*   AST U/L 247*   GLUCOSE RANDOM mg/dL 105     Results from last 7 days   Lab Units 02/13/20  1727   INR  1 91*     Results from last 7 days   Lab Units 02/16/20  1144 02/16/20  0606 02/15/20  2125 02/15/20  1832 02/15/20  1155 02/14/20  1909 02/14/20  0807 02/14/20  0002 02/13/20  2214 02/13/20  2153   POC GLUCOSE mg/dl 157* 74 113 119 94 137 163* 170* 205* 45*         Results from last 7 days   Lab Units 02/16/20  0454 02/14/20  0518 02/14/20  0205 02/14/20  0017 02/13/20  2140 02/13/20  2104   LACTIC ACID mmol/L  --  3 4*  --  6 8*  --  6 5*   PROCALCITONIN ng/ml 0 28*  --  0 62*  --  0 18  --            * I Have Reviewed All Lab Data Listed Above  * Additional Pertinent Lab Tests Reviewed:  [address] Admission Reviewed    Imaging:    Imaging/Reports Reviewed Today Include: CT abd 2/13, CXR 2/14 - no new     Recent Cultures (last 7 days):     Results from last 7 days Lab Units 02/15/20  1111 02/14/20  0138 02/13/20  2235 02/13/20  2140 02/13/20  2103   BLOOD CULTURE   --   --   --  No Growth at 72 hrs  No Growth at 72 hrs  URINE CULTURE  No Growth <1000 cfu/mL  --   --   --   --    LEGIONELLA URINARY ANTIGEN   --  Negative  --   --   --    C DIFF TOXIN B   --   --  Negative  --   --        Last 24 Hours Medication List:     Current Facility-Administered Medications:  cefTRIAXone 2,000 mg Intravenous Q24H KANDI Maradiaga Last Rate: 2,000 mg (02/17/20 0130)   citalopram 10 mg Oral Daily KANDI Maradiaga    donepezil 10 mg Oral HS KANDI Maradiaga    heparin (porcine) 5,000 Units Subcutaneous [facility] KANDI Maradiaga    ipratropium 0 5 mg Nebulization TID [address],     levalbuterol 1 25 mg Nebulization TID [address],     levothyroxine 112 mcg Oral Early Morning KANDI Maradiaga    metroNIDAZOLE 500 mg Intravenous Q8H KANDI Maradiaga Last Rate: 500 mg (02/17/20 1402)   nicotine 21 mg Transdermal Daily KANDI Maradiaga         Today, Patient Was Seen By: Lino Myers MD    ** Please Note: Dictation voice to text software may have been used in the creation of this document   **

## 2020-02-17 NOTE — SOCIAL WORK
CM obtained all of the following info about the pt  HOME: Pt lives in a home with 4 JOE; pt denies difficulty with steps  : Vivian Mitchell (Daughter), [phone] Nam Danieln), and, Nikki Baker (Daughter), [phone] Nam Danieln)  INDEPENDENT: Yes   DME: [facility]  VNA: Yes, pt was open with [facility]  No to STR, HHC, MH, or D&A issues  PCP: Jazmyn Davis DO, [phone]  PHARMACY: [facility] in [place]: SSI/Retired  INSURANCE: Medicare & AARP  MEDICAL POA: Jerrell Ricci, [address] AT D/C: Amber Rosales dtr     CM reviewed d/c planning process including the following: identifying help at home, patient preference for d/c planning needs, [facility] Meds to Bed program, availability of treatment team to discuss questions or concerns patient and/or family may have regarding understanding medications and recognizing signs and symptoms once discharged  CM also encouraged patient to follow up with all recommended appointments after discharge  Patient advised of importance for patient and family to participate in managing patients medical well being  Patient/caregiver received discharge checklist  Content reviewed  Patient/caregiver encouraged to participate in discharge plan of care prior to discharge home      ALISSON Esparza  2/17/2020  6031

## 2020-02-18 LAB
ALBUMIN SERPL BCP-MCNC: 2.3 G/DL (ref 3.5–5)
ALP SERPL-CCNC: 88 U/L (ref 46–116)
ALT SERPL W P-5'-P-CCNC: 247 U/L (ref 12–78)
ANION GAP SERPL CALCULATED.3IONS-SCNC: 9 MMOL/L (ref 4–13)
AST SERPL W P-5'-P-CCNC: 113 U/L (ref 5–45)
BASOPHILS # BLD AUTO: 0.03 THOUSANDS/ΜL (ref 0–0.1)
BASOPHILS NFR BLD AUTO: 0 % (ref 0–1)
BILIRUB SERPL-MCNC: 0.29 MG/DL (ref 0.2–1)
BUN SERPL-MCNC: 10 MG/DL (ref 5–25)
CALCIUM SERPL-MCNC: 8.2 MG/DL (ref 8.3–10.1)
CHLORIDE SERPL-SCNC: 105 MMOL/L (ref 100–108)
CO2 SERPL-SCNC: 29 MMOL/L (ref 21–32)
CREAT SERPL-MCNC: 0.89 MG/DL (ref 0.6–1.3)
EOSINOPHIL # BLD AUTO: 0.21 THOUSAND/ΜL (ref 0–0.61)
EOSINOPHIL NFR BLD AUTO: 2 % (ref 0–6)
ERYTHROCYTE [DISTWIDTH] IN BLOOD BY AUTOMATED COUNT: 17.8 % (ref 11.6–15.1)
GFR SERPL CREATININE-BSD FRML MDRD: 63 ML/MIN/1.73SQ M
GLUCOSE SERPL-MCNC: 76 MG/DL (ref 65–140)
HCT VFR BLD AUTO: 32.3 % (ref 34.8–46.1)
HGB BLD-MCNC: 10.4 G/DL (ref 11.5–15.4)
IMM GRANULOCYTES # BLD AUTO: 0.05 THOUSAND/UL (ref 0–0.2)
IMM GRANULOCYTES NFR BLD AUTO: 1 % (ref 0–2)
INR PPP: 0.99 (ref 0.84–1.19)
LYMPHOCYTES # BLD AUTO: 2.79 THOUSANDS/ΜL (ref 0.6–4.47)
LYMPHOCYTES NFR BLD AUTO: 30 % (ref 14–44)
MAGNESIUM SERPL-MCNC: 1.4 MG/DL (ref 1.6–2.6)
MCH RBC QN AUTO: 29.1 PG (ref 26.8–34.3)
MCHC RBC AUTO-ENTMCNC: 32.2 G/DL (ref 31.4–37.4)
MCV RBC AUTO: 90 FL (ref 82–98)
MONOCYTES # BLD AUTO: 0.89 THOUSAND/ΜL (ref 0.17–1.22)
MONOCYTES NFR BLD AUTO: 10 % (ref 4–12)
NEUTROPHILS # BLD AUTO: 5.39 THOUSANDS/ΜL (ref 1.85–7.62)
NEUTS SEG NFR BLD AUTO: 57 % (ref 43–75)
NRBC BLD AUTO-RTO: 0 /100 WBCS
PLATELET # BLD AUTO: 137 THOUSANDS/UL (ref 149–390)
PMV BLD AUTO: 11.2 FL (ref 8.9–12.7)
POTASSIUM SERPL-SCNC: 3.8 MMOL/L (ref 3.5–5.3)
PROT SERPL-MCNC: 5.3 G/DL (ref 6.4–8.2)
PROTHROMBIN TIME: 13.2 SECONDS (ref 11.6–14.5)
RBC # BLD AUTO: 3.58 MILLION/UL (ref 3.81–5.12)
SODIUM SERPL-SCNC: 143 MMOL/L (ref 136–145)
WBC # BLD AUTO: 9.36 THOUSAND/UL (ref 4.31–10.16)

## 2020-02-18 PROCEDURE — 85025 COMPLETE CBC W/AUTO DIFF WBC: CPT | Performed by: FAMILY MEDICINE

## 2020-02-18 PROCEDURE — 99232 SBSQ HOSP IP/OBS MODERATE 35: CPT | Performed by: FAMILY MEDICINE

## 2020-02-18 PROCEDURE — 85610 PROTHROMBIN TIME: CPT | Performed by: FAMILY MEDICINE

## 2020-02-18 PROCEDURE — 94640 AIRWAY INHALATION TREATMENT: CPT

## 2020-02-18 PROCEDURE — 83735 ASSAY OF MAGNESIUM: CPT | Performed by: FAMILY MEDICINE

## 2020-02-18 PROCEDURE — 97163 PT EVAL HIGH COMPLEX 45 MIN: CPT

## 2020-02-18 PROCEDURE — 97166 OT EVAL MOD COMPLEX 45 MIN: CPT

## 2020-02-18 PROCEDURE — 80053 COMPREHEN METABOLIC PANEL: CPT | Performed by: FAMILY MEDICINE

## 2020-02-18 RX ORDER — AMOXICILLIN 250 MG
1 CAPSULE ORAL
Status: DISCONTINUED | OUTPATIENT
Start: 2020-02-18 | End: 2020-02-19 | Stop reason: HOSPADM

## 2020-02-18 RX ADMIN — METRONIDAZOLE 500 MG: 500 INJECTION, SOLUTION INTRAVENOUS at 17:15

## 2020-02-18 RX ADMIN — HEPARIN SODIUM 5000 UNITS: 5000 INJECTION INTRAVENOUS; SUBCUTANEOUS at 22:16

## 2020-02-18 RX ADMIN — IPRATROPIUM BROMIDE 0.5 MG: 0.5 SOLUTION RESPIRATORY (INHALATION) at 13:24

## 2020-02-18 RX ADMIN — LEVALBUTEROL HYDROCHLORIDE 1.25 MG: 1.25 SOLUTION, CONCENTRATE RESPIRATORY (INHALATION) at 19:32

## 2020-02-18 RX ADMIN — SENNOSIDES AND DOCUSATE SODIUM 1 TABLET: 8.6; 5 TABLET ORAL at 22:16

## 2020-02-18 RX ADMIN — IPRATROPIUM BROMIDE 0.5 MG: 0.5 SOLUTION RESPIRATORY (INHALATION) at 19:32

## 2020-02-18 RX ADMIN — LEVOTHYROXINE SODIUM 112 MCG: 112 TABLET ORAL at 05:04

## 2020-02-18 RX ADMIN — LEVALBUTEROL HYDROCHLORIDE 1.25 MG: 1.25 SOLUTION, CONCENTRATE RESPIRATORY (INHALATION) at 13:24

## 2020-02-18 RX ADMIN — HEPARIN SODIUM 5000 UNITS: 5000 INJECTION INTRAVENOUS; SUBCUTANEOUS at 13:51

## 2020-02-18 RX ADMIN — HEPARIN SODIUM 5000 UNITS: 5000 INJECTION INTRAVENOUS; SUBCUTANEOUS at 05:04

## 2020-02-18 RX ADMIN — IPRATROPIUM BROMIDE 0.5 MG: 0.5 SOLUTION RESPIRATORY (INHALATION) at 08:00

## 2020-02-18 RX ADMIN — CITALOPRAM HYDROBROMIDE 10 MG: 20 TABLET ORAL at 08:42

## 2020-02-18 RX ADMIN — METRONIDAZOLE 500 MG: 500 INJECTION, SOLUTION INTRAVENOUS at 08:42

## 2020-02-18 RX ADMIN — LEVALBUTEROL HYDROCHLORIDE 1.25 MG: 1.25 SOLUTION, CONCENTRATE RESPIRATORY (INHALATION) at 08:00

## 2020-02-18 RX ADMIN — DONEPEZIL HYDROCHLORIDE 10 MG: 10 TABLET, FILM COATED ORAL at 22:16

## 2020-02-18 RX ADMIN — CEFTRIAXONE 2000 MG: 2 INJECTION, POWDER, FOR SOLUTION INTRAMUSCULAR; INTRAVENOUS at 01:00

## 2020-02-18 NOTE — OCCUPATIONAL THERAPY NOTE
Occupational Therapy Evaluation     Patient Name: Ellen Pimentel  CIYWZ'U Date: 2/18/2020  Problem List  Principal Problem:    Septic shock ([facility] )  Active Problems:    Postoperative hypothyroidism    Current smoker    Late onset Alzheimer's disease with behavioral disturbance ([facility] )    Acute kidney injury ([facility] )    Vomiting and diarrhea    Transaminitis    High anion gap metabolic acidosis    Lactic acidosis    Hypocalcemia    Bradycardia    Past Medical History  Past Medical History:   Diagnosis Date    Acute kidney injury (LAYLA) with acute tubular necrosis (ATN) ([facility] ) 2/13/2020    Bed bug bite     LAST ASSESSED [id number]    Disease of thyroid gland     Graves' disease     Hyperlipidemia     Hypertension     LLL pneumonia ([facility] )     LAST ASSESSED [id number]    Migraine 2001    OCULAR    Scabies     LAST ASSESSED [id number]    Syncope and collapse     LAST ASSESSED [id number]    Vertigo     RESOLVED      Past Surgical History  Past Surgical History:   Procedure Laterality Date    CHOLECYSTECTOMY      HYSTERECTOMY      TOTAL THYROIDECTOMY      TUBAL LIGATION Bilateral            02/18/20 1223   Note Type   Note type Eval only   Restrictions/Precautions   Weight Bearing Precautions Per Order No   Other Precautions Cognitive; Chair Alarm; Bed Alarm; Fall Risk  ([place])   Pain Assessment   Pain Assessment No/denies pain   Pain Score No Pain   Home Living   Type of Home House   Home Layout Two level;Bed/bath upstairs;Stairs to enter with rails  (4 JOE; FOS to 2nd floor bed/bath, no 1/2 bath on 1st)   Bathroom Shower/Tub Tub/shower unit   Bathroom Toilet Standard   Bathroom Equipment Grab bars in shower; Shower chair;Commode   Bathroom Accessibility Accessible  (2nd floor only)   Home Equipment Walker;Cane;Wheelchair-manual   Additional Comments Pt lives w/ dtr in a two level house with 4 JOE and FOS to 2nd floor bed/bath   Pt (-) home alone per dtr report   Prior Function   Level of [place] Independent with ADLs and functional mobility; Needs assistance with IADLs   Lives With Daughter  ((-) home alone)   Receives Help From Family   ADL Assistance Independent   IADLs Needs assistance   Falls in the last 6 months 1 to 4  (pt denies; Admitted in 09/2019 w/ fall )   Vocational Retired   Comments At baseline, pt was I w/ ADLs and functional mobility/transfers w/o use of AD, required assist w/ IADLs, (-) , and hx of 1 fall PTA  Lifestyle   Autonomy At baseline, pt was I w/ ADLs and functional mobility/transfers w/o use of AD, required assist w/ IADLs, (-) , and hx of 1 fall PTA  Reciprocal Relationships Dtr   Service to Others Retired   Intrinsic Gratification "A little bit of everything"   Psychosocial   Psychosocial (WDL) WDL   ADL   Where Assessed Chair   Eating Assistance 5  Supervision/Setup   Grooming Assistance 5  Supervision/Setup   UB Bathing Assistance 5  Supervision/Setup   LB Bathing Assistance 4  Minimal Assistance   UB Dressing Assistance 5  Supervision/Setup   LB Dressing Assistance 4  [address] 4  Minimal Assistance   Bed Mobility   Supine to Sit Unable to assess   Sit to Supine Unable to assess   Additional Comments N/A  Pt seated OOB in chair at start/end of session  Chair alarm activated  Dtr present  Call bell and phone within reach  All needs met and pt reports no further questions for OT at this time   Transfers   Sit to Stand 5  Supervision   Additional items Assist x 1; Armrests; Increased time required;Verbal cues   Stand to Sit 5  Supervision   Additional items Assist x 1; Armrests; Increased time required;Verbal cues   Additional Comments Cues for safe technique and hand placement   Functional Mobility   Functional Mobility 4  Minimal assistance   Additional Comments Assist x1; O2 sats post-mobility: 93% on RA   Additional items Rolling walker   Balance   Static Sitting Good   Dynamic Sitting Fair + Static Standing Fair -   Dynamic Standing Fair -   Ambulatory Poor +   Activity Tolerance   Activity Tolerance Patient limited by fatigue;Patient tolerated treatment well   Medical Staff Made Aware SUSANA Nolan   Nurse Made Aware yes; Holley Restrepo RN   RUE Assessment   RUE Assessment WFL   RUE Strength   RUE Overall Strength Within Functional Limits - able to perform ADL tasks with strength  (4/5 throughout)   LUE Assessment   LUE Assessment WFL   LUE Strength   LUE Overall Strength Within Functional Limits - able to perform ADL tasks with strength  (4/5 throughout)   Hand Function   Gross Motor Coordination Functional   Fine Motor Coordination Functional   Sensation   Light Touch No apparent deficits   Proprioception   Proprioception No apparent deficits   Vision-Basic Assessment   Current Vision Wears glasses all the time   Vision - Complex Assessment   Ocular Range of Motion WFL   Acuity Able to read employee name badge without difficulty   Perception   Inattention/Neglect Appears intact   Cognition   Overall Cognitive Status Impaired   Arousal/Participation Alert; Cooperative   Attention Attends with cues to redirect   Orientation Level Oriented to person;Oriented to place;Oriented to time  (general to time and place, "[facility]")   Memory Decreased recall of precautions;Decreased recall of recent events;Decreased short term memory   Following Commands Follows one step commands with increased time or repetition   Assessment   Limitation Decreased ADL status; Decreased UE strength;Decreased Safe judgement during ADL;Decreased cognition;Decreased endurance;Decreased self-care trans;Decreased high-level ADLs   Prognosis Fair   Assessment Pt is a 66 y o  female seen for OT evaluation s/p adm to [facility] on 2/13/2020 from PCP w/ hypotension and c/o vomiting and diarrhea and dx'd w/ septic shock and LAYLA   Comorbidities affecting pts functional performance include a significant PMH of LAYLA with ATN, HLD, HTN, Migraine, Scabies, syncope and collapse, and vertigo  Pt with active OT orders and activity orders for Up with assistance  Pt lives w/ dtr in a two level house with 4 JOE and FOS to 2nd floor bed/bath  Pt (-) home alone per dtr report  At baseline, pt was I w/ ADLs and functional mobility/transfers w/o use of AD, required assist w/ IADLs, (-) , and hx of 1 fall PTA  Upon evaluation, pt currently requires Supervision for UB ADLs, Min A for LB ADLs, Min A for toileting, Supervision for transfers, and Min A for functional mobility 2* the following deficits impacting occupational performance: weakness, decreased strength, decreased balance, decreased tolerance, impaired memory, impaired problem solving and decreased safety awareness  These impairments, as well at pts steps to enter environment, difficulty performing ADLS and limited insight into deficits limit pts ability to safely engage in all baseline areas of occupation  Pt scored overall 60/100 on the Barthel Index  Based on the aforementioned OT evaluation, functional performance deficits, and assessments, pt has been identified as a Moderate complexity evaluation  Pt to continue to benefit from continued acute OT services during hospital stay to address defined deficits and to maximize level of functional independence in the following Occupational Performance areas: grooming, bathing/shower, toilet hygiene, dressing, medication management, health maintenance, functional mobility, community mobility and clothing management  From OT standpoint, recommend Home OT upon D/C  OT will continue to follow pt 3-5x/wk to address the following goals to  w/in 10-14 days:   Goals   Patient Goals None stated 2* cognitive deficits   LTG Time Frame 10-14   Long Term Goal Please refer to LTGs listed below   Plan   Treatment Interventions ADL retraining;Functional transfer training;UE strengthening/ROM; Endurance training;Patient/family training;Equipment evaluation/education; Compensatory technique education; Energy conservation; Activityengagement   Goal Expiration Date 03/03/20   OT Treatment Day 0   OT Frequency 3-5x/wk   Recommendation   OT Discharge Recommendation Home OT   OT - OK to Discharge Yes  (when medically cleared)   Barthel Index   Feeding 10   Bathing 0   Grooming Score 0   Dressing Score 5   Bladder Score 5   Bowels Score 10   Toilet Use Score 5   Transfers (Bed/Chair) Score 10   Mobility (Level Surface) Score 10   Stairs Score 5   Barthel Index Score 60   Modified [place] Scale   Modified [place] Scale 4          GOALS    1) Pt will improve activity tolerance to G for min 30 min txment sessions for increase engagement in functional tasks    2) Pt will complete bed mobility at a Mod I level w/ G balance/safety demonstrated to decrease caregiver assistance required     3) Pt will complete UB/LB dressing/self care w/ mod I using adaptive device and DME as needed    4) Pt will complete bathing w/ Mod I w/ use of AE and DME as needed    5) Pt will complete toileting w/ mod I w/ G hygiene/thoroughness using DME as needed    6) Pt will improve functional transfers to Mod I on/off all surfaces using DME as needed w/ G balance/safety     7) Pt will improve functional mobility during ADL/IADL/leisure tasks to Mod I using DME as needed w/ G balance/safety     8) Pt will be attentive 100% of the time during ongoing cognitive assessment w/ G participation to assist w/ safe d/c planning/recommendations    9) Pt will demonstrate G carryover of pt/caregiver education and training as appropriate w/o cues w/ good tolerance to increase safety during functional tasks    10) Pt will demonstrate 100% carryover of energy conservation techniques t/o functional I/ADL/leisure tasks w/o cues s/p skilled education to increase endurance during functional tasks    11) Pt will increase BUE strength by 1MM grade via AROM/AAROM exercises to increase independence in ADLs and transfers    12) Pt will verbalize 3 potential fall hazards and identify appropriate compensatory techniques to decrease fall risk in home environment       Cassidy Escalera, OTR/L

## 2020-02-18 NOTE — PLAN OF CARE
Problem: Prexisting or High Potential for Compromised Skin Integrity  Goal: Skin integrity is maintained or improved  Description  INTERVENTIONS:  - Identify patients at risk for skin breakdown  - Assess and monitor skin integrity  - Assess and monitor nutrition and hydration status  - Monitor labs   - Assess for incontinence   - Turn and reposition patient  - Assist with mobility/ambulation  - Relieve pressure over bony prominences  - Avoid friction and shearing  - Provide appropriate hygiene as needed including keeping skin clean and dry  - Evaluate need for skin moisturizer/barrier cream  - Collaborate with interdisciplinary team   - Patient/family teaching  - Consider wound care consult   Outcome: Progressing     Problem: PAIN - ADULT  Goal: Verbalizes/displays adequate comfort level or baseline comfort level  Description  Interventions:  - Encourage patient to monitor pain and request assistance  - Assess pain using appropriate pain scale  - Administer analgesics based on type and severity of pain and evaluate response  - Implement non-pharmacological measures as appropriate and evaluate response  - Consider cultural and social influences on pain and pain management  - Notify physician/advanced practitioner if interventions unsuccessful or patient reports new pain  Outcome: Progressing     Problem: INFECTION - ADULT  Goal: Absence or prevention of progression during hospitalization  Description  INTERVENTIONS:  - Assess and monitor for signs and symptoms of infection  - Monitor lab/diagnostic results  - Monitor all insertion sites, i e  indwelling lines, tubes, and drains  - Monitor endotracheal if appropriate and nasal secretions for changes in amount and color  - [place] appropriate cooling/warming therapies per order  - Administer medications as ordered  - Instruct and encourage patient and family to use good hand hygiene technique  - Identify and instruct in appropriate isolation precautions for identified infection/condition  Outcome: Progressing     Problem: SAFETY ADULT  Goal: Patient will remain free of falls  Description  INTERVENTIONS:  - Assess patient frequently for physical needs  -  Identify cognitive and physical deficits and behaviors that affect risk of falls    -  [place] fall precautions as indicated by assessment   - Educate patient/family on patient safety including physical limitations  - Instruct patient to call for assistance with activity based on assessment  - Modify environment to reduce risk of injury  - Consider OT/PT consult to assist with strengthening/mobility  Outcome: Progressing  Goal: Maintain or return to baseline ADL function  Description  INTERVENTIONS:  -  Assess patient's ability to carry out ADLs; assess patient's baseline for ADL function and identify physical deficits which impact ability to perform ADLs (bathing, care of mouth/teeth, toileting, grooming, dressing, etc )  - Assess/evaluate cause of self-care deficits   - Assess range of motion  - Assess patient's mobility; develop plan if impaired  - Assess patient's need for assistive devices and provide as appropriate  - Encourage maximum independence but intervene and supervise when necessary  - Involve family in performance of ADLs  - Assess for home care needs following discharge   - Consider OT consult to assist with ADL evaluation and planning for discharge  - Provide patient education as appropriate  Outcome: Progressing  Goal: Maintain or return mobility status to optimal level  Description  INTERVENTIONS:  - Assess patient's baseline mobility status (ambulation, transfers, stairs, etc )    - Identify cognitive and physical deficits and behaviors that affect mobility  - Identify mobility aids required to assist with transfers and/or ambulation (gait belt, sit-to-stand, lift, walker, cane, etc )  - [place] fall precautions as indicated by assessment  - Record patient progress and toleration of activity level on Mobility SBAR; progress patient to next Phase/Stage  - Instruct patient to call for assistance with activity based on assessment  - Consider rehabilitation consult to assist with strengthening/weightbearing, etc   Outcome: Progressing     Problem: DISCHARGE PLANNING  Goal: Discharge to home or other facility with appropriate resources  Description  INTERVENTIONS:  - Identify barriers to discharge w/patient and caregiver  - Arrange for needed discharge resources and transportation as appropriate  - Identify discharge learning needs (meds, wound care, etc )  - Arrange for interpretive services to assist at discharge as needed  - Refer to Case Management Department for coordinating discharge planning if the patient needs post-hospital services based on physician/advanced practitioner order or complex needs related to functional status, cognitive ability, or social support system  Outcome: Progressing     Problem: Knowledge Deficit  Goal: Patient/family/caregiver demonstrates understanding of disease process, treatment plan, medications, and discharge instructions  Description  Complete learning assessment and assess knowledge base    Interventions:  - Provide teaching at level of understanding  - Provide teaching via preferred learning methods  Outcome: Progressing     Problem: CARDIOVASCULAR - ADULT  Goal: Absence of cardiac dysrhythmias or at baseline rhythm  Description  INTERVENTIONS:  - Continuous cardiac monitoring, vital signs, obtain 12 lead EKG if ordered  - Administer antiarrhythmic and heart rate control medications as ordered  - Monitor electrolytes and administer replacement therapy as ordered  Outcome: Progressing     Problem: GASTROINTESTINAL - ADULT  Goal: Minimal or absence of nausea and/or vomiting  Description  INTERVENTIONS:  - Administer IV fluids if ordered to ensure adequate hydration  - Maintain NPO status until nausea and vomiting are resolved  - Nasogastric tube if ordered  - Administer ordered antiemetic medications as needed  - Provide nonpharmacologic comfort measures as appropriate  - Advance diet as tolerated, if ordered  - Consider nutrition services referral to assist patient with adequate nutrition and appropriate food choices  Outcome: Progressing  Goal: Maintains adequate nutritional intake  Description  INTERVENTIONS:  - Monitor percentage of each meal consumed  - Identify factors contributing to decreased intake, treat as appropriate  - Assist with meals as needed  - Monitor I&O, weight, and lab values if indicated  - Obtain nutrition services referral as needed  Outcome: Progressing     Problem: GENITOURINARY - ADULT  Goal: Urinary catheter remains patent  Description  INTERVENTIONS:  - Assess patency of urinary catheter  - If patient has a chronic stanley, consider changing catheter if non-functioning  - Follow guidelines for intermittent irrigation of non-functioning urinary catheter  Outcome: Progressing     Problem: METABOLIC, FLUID AND ELECTROLYTES - ADULT  Goal: Electrolytes maintained within normal limits  Description  INTERVENTIONS:  - Monitor labs and assess patient for signs and symptoms of electrolyte imbalances  - Administer electrolyte replacement as ordered  - Monitor response to electrolyte replacements, including repeat lab results as appropriate  - Instruct patient on fluid and nutrition as appropriate  Outcome: Progressing  Goal: Fluid balance maintained  Description  INTERVENTIONS:  - Monitor labs   - Monitor I/O and WT  - Instruct patient on fluid and nutrition as appropriate  - Assess for signs & symptoms of volume excess or deficit  Outcome: Progressing  Goal: Glucose maintained within target range  Description  INTERVENTIONS:  - Monitor Blood Glucose as ordered  - Assess for signs and symptoms of hyperglycemia and hypoglycemia  - Administer ordered medications to maintain glucose within target range  - Assess nutritional intake and initiate nutrition service referral as needed  Outcome: Progressing     Problem: Potential for Falls  Goal: Patient will remain free of falls  Description  INTERVENTIONS:  - Assess patient frequently for physical needs  -  Identify cognitive and physical deficits and behaviors that affect risk of falls    -  [place] fall precautions as indicated by assessment   - Educate patient/family on patient safety including physical limitations  - Instruct patient to call for assistance with activity based on assessment  - Modify environment to reduce risk of injury  - Consider OT/PT consult to assist with strengthening/mobility  Outcome: Progressing

## 2020-02-18 NOTE — PLAN OF CARE
Problem: Prexisting or High Potential for Compromised Skin Integrity  Goal: Skin integrity is maintained or improved  Description  INTERVENTIONS:  - Identify patients at risk for skin breakdown  - Assess and monitor skin integrity  - Assess and monitor nutrition and hydration status  - Monitor labs   - Assess for incontinence   - Turn and reposition patient  - Assist with mobility/ambulation  - Relieve pressure over bony prominences  - Avoid friction and shearing  - Provide appropriate hygiene as needed including keeping skin clean and dry  - Evaluate need for skin moisturizer/barrier cream  - Collaborate with interdisciplinary team   - Patient/family teaching  - Consider wound care consult   Outcome: Progressing     Problem: PAIN - ADULT  Goal: Verbalizes/displays adequate comfort level or baseline comfort level  Description  Interventions:  - Encourage patient to monitor pain and request assistance  - Assess pain using appropriate pain scale  - Administer analgesics based on type and severity of pain and evaluate response  - Implement non-pharmacological measures as appropriate and evaluate response  - Consider cultural and social influences on pain and pain management  - Notify physician/advanced practitioner if interventions unsuccessful or patient reports new pain  Outcome: Progressing     Problem: INFECTION - ADULT  Goal: Absence or prevention of progression during hospitalization  Description  INTERVENTIONS:  - Assess and monitor for signs and symptoms of infection  - Monitor lab/diagnostic results  - Monitor all insertion sites, i e  indwelling lines, tubes, and drains  - Monitor endotracheal if appropriate and nasal secretions for changes in amount and color  - [place] appropriate cooling/warming therapies per order  - Administer medications as ordered  - Instruct and encourage patient and family to use good hand hygiene technique  - Identify and instruct in appropriate isolation precautions for identified infection/condition  Outcome: Progressing     Problem: SAFETY ADULT  Goal: Patient will remain free of falls  Description  INTERVENTIONS:  - Assess patient frequently for physical needs  -  Identify cognitive and physical deficits and behaviors that affect risk of falls    -  [place] fall precautions as indicated by assessment   - Educate patient/family on patient safety including physical limitations  - Instruct patient to call for assistance with activity based on assessment  - Modify environment to reduce risk of injury  - Consider OT/PT consult to assist with strengthening/mobility  Outcome: Progressing  Goal: Maintain or return to baseline ADL function  Description  INTERVENTIONS:  -  Assess patient's ability to carry out ADLs; assess patient's baseline for ADL function and identify physical deficits which impact ability to perform ADLs (bathing, care of mouth/teeth, toileting, grooming, dressing, etc )  - Assess/evaluate cause of self-care deficits   - Assess range of motion  - Assess patient's mobility; develop plan if impaired  - Assess patient's need for assistive devices and provide as appropriate  - Encourage maximum independence but intervene and supervise when necessary  - Involve family in performance of ADLs  - Assess for home care needs following discharge   - Consider OT consult to assist with ADL evaluation and planning for discharge  - Provide patient education as appropriate  Outcome: Progressing  Goal: Maintain or return mobility status to optimal level  Description  INTERVENTIONS:  - Assess patient's baseline mobility status (ambulation, transfers, stairs, etc )    - Identify cognitive and physical deficits and behaviors that affect mobility  - Identify mobility aids required to assist with transfers and/or ambulation (gait belt, sit-to-stand, lift, walker, cane, etc )  - [place] fall precautions as indicated by assessment  - Record patient progress and toleration of activity level on Mobility SBAR; progress patient to next Phase/Stage  - Instruct patient to call for assistance with activity based on assessment  - Consider rehabilitation consult to assist with strengthening/weightbearing, etc   Outcome: Progressing     Problem: DISCHARGE PLANNING  Goal: Discharge to home or other facility with appropriate resources  Description  INTERVENTIONS:  - Identify barriers to discharge w/patient and caregiver  - Arrange for needed discharge resources and transportation as appropriate  - Identify discharge learning needs (meds, wound care, etc )  - Arrange for interpretive services to assist at discharge as needed  - Refer to Case Management Department for coordinating discharge planning if the patient needs post-hospital services based on physician/advanced practitioner order or complex needs related to functional status, cognitive ability, or social support system  Outcome: Progressing     Problem: Knowledge Deficit  Goal: Patient/family/caregiver demonstrates understanding of disease process, treatment plan, medications, and discharge instructions  Description  Complete learning assessment and assess knowledge base    Interventions:  - Provide teaching at level of understanding  - Provide teaching via preferred learning methods  Outcome: Progressing     Problem: CARDIOVASCULAR - ADULT  Goal: Absence of cardiac dysrhythmias or at baseline rhythm  Description  INTERVENTIONS:  - Continuous cardiac monitoring, vital signs, obtain 12 lead EKG if ordered  - Administer antiarrhythmic and heart rate control medications as ordered  - Monitor electrolytes and administer replacement therapy as ordered  Outcome: Progressing     Problem: GASTROINTESTINAL - ADULT  Goal: Minimal or absence of nausea and/or vomiting  Description  INTERVENTIONS:  - Administer IV fluids if ordered to ensure adequate hydration  - Maintain NPO status until nausea and vomiting are resolved  - Nasogastric tube if ordered  - Administer ordered antiemetic medications as needed  - Provide nonpharmacologic comfort measures as appropriate  - Advance diet as tolerated, if ordered  - Consider nutrition services referral to assist patient with adequate nutrition and appropriate food choices  Outcome: Progressing  Goal: Maintains adequate nutritional intake  Description  INTERVENTIONS:  - Monitor percentage of each meal consumed  - Identify factors contributing to decreased intake, treat as appropriate  - Assist with meals as needed  - Monitor I&O, weight, and lab values if indicated  - Obtain nutrition services referral as needed  Outcome: Progressing     Problem: GENITOURINARY - ADULT  Goal: Urinary catheter remains patent  Description  INTERVENTIONS:  - Assess patency of urinary catheter  - If patient has a chronic stanley, consider changing catheter if non-functioning  - Follow guidelines for intermittent irrigation of non-functioning urinary catheter  Outcome: Progressing     Problem: METABOLIC, FLUID AND ELECTROLYTES - ADULT  Goal: Electrolytes maintained within normal limits  Description  INTERVENTIONS:  - Monitor labs and assess patient for signs and symptoms of electrolyte imbalances  - Administer electrolyte replacement as ordered  - Monitor response to electrolyte replacements, including repeat lab results as appropriate  - Instruct patient on fluid and nutrition as appropriate  Outcome: Progressing  Goal: Fluid balance maintained  Description  INTERVENTIONS:  - Monitor labs   - Monitor I/O and WT  - Instruct patient on fluid and nutrition as appropriate  - Assess for signs & symptoms of volume excess or deficit  Outcome: Progressing  Goal: Glucose maintained within target range  Description  INTERVENTIONS:  - Monitor Blood Glucose as ordered  - Assess for signs and symptoms of hyperglycemia and hypoglycemia  - Administer ordered medications to maintain glucose within target range  - Assess nutritional intake and initiate nutrition service referral as needed  Outcome: Progressing     Problem: Potential for Falls  Goal: Patient will remain free of falls  Description  INTERVENTIONS:  - Assess patient frequently for physical needs  -  Identify cognitive and physical deficits and behaviors that affect risk of falls    -  [place] fall precautions as indicated by assessment   - Educate patient/family on patient safety including physical limitations  - Instruct patient to call for assistance with activity based on assessment  - Modify environment to reduce risk of injury  - Consider OT/PT consult to assist with strengthening/mobility  Outcome: Progressing

## 2020-02-18 NOTE — PLAN OF CARE
Problem: OCCUPATIONAL THERAPY ADULT  Goal: Performs self-care activities at highest level of function for planned discharge setting  See evaluation for individualized goals  Description  Treatment Interventions: ADL retraining, Functional transfer training, UE strengthening/ROM, Endurance training, Patient/family training, Equipment evaluation/education, Compensatory technique education, Energy conservation, Activityengagement          See flowsheet documentation for full assessment, interventions and recommendations  Note:   Limitation: Decreased ADL status, Decreased UE strength, Decreased Safe judgement during ADL, Decreased cognition, Decreased endurance, Decreased self-care trans, Decreased high-level ADLs  Prognosis: Fair  Assessment: Pt is a 66 y o  female seen for OT evaluation s/p adm to [facility] on [facility]/13/2020 from PCP w/ hypotension and c/o vomiting and diarrhea and dx'd w/ septic shock and LAYLA  Comorbidities affecting pts functional performance include a significant PMH of LAYLA with ATN, HLD, HTN, Migraine, Scabies, syncope and collapse, and vertigo  Pt with active OT orders and activity orders for Up with assistance  Pt lives w/ dtr in a two level house with 4 JOE and FOS to 2nd floor bed/bath  Pt (-) home alone per dtr report  At baseline, pt was I w/ ADLs and functional mobility/transfers w/o use of AD, required assist w/ IADLs, (-) , and hx of 1 fall PTA  Upon evaluation, pt currently requires Supervision for UB ADLs, Min A for LB ADLs, Min A for toileting, Supervision for transfers, and Min A for functional mobility 2* the following deficits impacting occupational performance: weakness, decreased strength, decreased balance, decreased tolerance, impaired memory, impaired problem solving and decreased safety awareness   These impairments, as well at pts steps to enter environment, difficulty performing ADLS and limited insight into deficits limit pts ability to safely engage in all baseline areas of occupation  Pt scored overall 60/100 on the Barthel Index  Based on the aforementioned OT evaluation, functional performance deficits, and assessments, pt has been identified as a Moderate complexity evaluation  Pt to continue to benefit from continued acute OT services during hospital stay to address defined deficits and to maximize level of functional independence in the following Occupational Performance areas: grooming, bathing/shower, toilet hygiene, dressing, medication management, health maintenance, functional mobility, community mobility and clothing management  From OT standpoint, recommend Home OT upon D/C   OT will continue to follow pt 3-5x/wk to address the following goals to  w/in 10-14 days:     OT Discharge Recommendation: Home OT  OT - OK to Discharge: Yes(when medically cleared)

## 2020-02-18 NOTE — SOCIAL WORK
CM met at bedside with pt & dtr, Tasneem Oliver, to determine if pt was agreeable to home PT & OT, as was the recommendation  Pt is in agreement  CM inquired for [facility]; dtr reported they had [facility] in the past and would prefer them  Referral sent  ALISSON Rey  2/18/2020  [email or web address]: [facility] accepted pt for home PT & OT  AVS updated  Will need to fax DCI & F2F to

## 2020-02-18 NOTE — PHYSICAL THERAPY NOTE
PHYSICAL THERAPY EVALUATION          Patient Name: Merlyn Huang  HSCFJ'F Date: 2/18/2020   PT EVALUATION    66 y o     [id number]    Dehydration [E86 0]  Gastroenteritis [K52 9]  Hypotension [I95 9]  Transaminitis [R74 0]  Acute renal insufficiency [N28 9]  Generalized weakness [R53 1]    Past Medical History:   Diagnosis Date    Acute kidney injury (LAYLA) with acute tubular necrosis (ATN) ([facility] ) 2/13/2020    Bed bug bite     LAST ASSESSED [id number]    Disease of thyroid gland     Graves' disease     Hyperlipidemia     Hypertension     LLL pneumonia ([facility] )     LAST ASSESSED [id number]    Migraine 2001    OCULAR    Scabies     LAST ASSESSED [id number]    Syncope and collapse     LAST ASSESSED [id number]    Vertigo     RESOLVED      Past Surgical History:   Procedure Laterality Date    CHOLECYSTECTOMY      HYSTERECTOMY      TOTAL THYROIDECTOMY      TUBAL LIGATION Bilateral         02/18/20 1222   Note Type   Note type Eval only   Pain Assessment   Pain Assessment No/denies pain   Pain Score No Pain   Home Living   Type of Home House   Home Layout Two level;Bed/bath upstairs;Stairs to enter with rails   Bathroom Shower/Tub Tub/shower unit   Bathroom Toilet Standard   Bathroom Equipment Grab bars in shower; Shower chair;Commode   Bathroom Accessibility Accessible   Home Equipment Walker;Cane;Wheelchair-manual   Additional Comments pt resides in [address] [place] [address], 4 JOE w one rail, FOS to second story bed/bath  pt dtr denies pt use of DME for amb pta   Prior Function   Level of [place] Independent with ADLs and functional mobility; Needs assistance with IADLs   Lives With Daughter   Receives Help From Family   ADL Assistance Independent   IADLs Needs assistance   Falls in the last 6 months 1 to 4  (per chart review, 1 leading to admission in Sept 2019)   Vocational Retired   Comments pta pt was indep, amb w/o an AD, dtr denies issues w mobility at baseline  dtr provides 24/7 supervision, transportation  pt has hx of HHPT/OT   Restrictions/Precautions   Other Precautions Cognitive; Chair Alarm; Bed Alarm;Telemetry; Fall Risk   General   Additional Pertinent History pt admitted 2/13/20 for septic shock  hx of Alzheimer's w dementia   Family/Caregiver Present Yes  (dtr, assist in social hx)   Cognition   Overall Cognitive Status Impaired   Arousal/Participation Cooperative   Orientation Level Oriented to person;Oriented to place;Oriented to time  (general to time and place)   Memory Decreased recall of precautions;Decreased recall of recent events;Decreased short term memory   Following Commands Follows one step commands with increased time or repetition   RUE Assessment   RUE Assessment   (refer to OT)   LUE Assessment   LUE Assessment   (refer to OT)   RLE Assessment   RLE Assessment WFL  (grossly 5/5)   LLE Assessment   LLE Assessment WFL  (grossly 5/5)   Coordination   Sensation WFL   Light Touch   RLE Light Touch Grossly intact   LLE Light Touch Grossly intact   Transfers   Sit to Stand 5  Supervision   Additional items Increased time required;Verbal cues; Other  (RW)   Stand to Sit 5  Supervision   Additional items Armrests; Increased time required;Verbal cues   Stand pivot 4  Minimal assistance   Additional items Assist x 1; Increased time required;Verbal cues; Other  (RW)   Additional Comments cues for hand placement and safety; pt w inconsistent follow through   Ambulation/Elevation   Gait pattern Wide SARAHI; Forward Flexion;Decreased foot clearance; Short stride; Excessively slow   Gait Assistance 4  Minimal assist   Additional items Assist x 1;Verbal cues   Assistive Device Rolling walker   Distance about 125', brief standing rest break FPC   Stair Management Assistance 4  Minimal assist   Additional items Assist x 1;Verbal cues; Increased time required   Stair Management Technique Two rails; Step to pattern; Foreward;Backward  (ascend FW, descend BW)   Number of Stairs 2  (limited due to JONES)   Balance   Static Sitting Good   Dynamic Sitting Fair +   Static Standing Fair -   Dynamic Standing Fair -   Ambulatory Poor +   Endurance Deficit   Endurance Deficit Yes   Endurance Deficit Description JONES worse w stair negotiation, O2 sats recorded as 93% on RA post amb   Activity Tolerance   Activity Tolerance Patient tolerated treatment well;Patient limited by fatigue   Medical Staff [address] Chari Mari OT   Nurse Made Aware [facility] RN   Assessment   Prognosis Good   Problem List Decreased endurance; Impaired balance;Decreased mobility; Decreased cognition   Assessment Pam Key is a 66 y o  female admitted to [id number] on 2/13/2020 for Septic shock ([facility] )  Pt  has a past medical history of Acute kidney injury (LAYLA) with acute tubular necrosis (ATN) ([facility] ) (2/13/2020), Disease of thyroid gland, Graves' disease, Hyperlipidemia, Hypertension, Syncope and collapse, and Vertigo  PT was consulted and pt was seen on 2/18/2020 for mobility assessment and d/c planning  Pt presents high fall risk precautions, monitoring abn labs, PIV  Pt with previous hospital admission in Sept 2019 for a fall, was d/c home w HHPT and recommend use of RW  Pt lives with her dtr in a two story home with 4 JOE and FOS to second story bed and bath  Prior to admission pt dtr reports she was indep for ADLs, A for IADLs and relies on dtr for transport  Pt has 24/7 supervision at home, dtr states she did not need supervision for mobility tasks and was ambulating w/o an AD  Pt is currently functioning at a  supervision assistance x1 level for transfers, minimum assistance x1 level for ambulation with Rolling Walker, and minimum assistance x1 for elevations  Pt demonstrated JONES worse w stair negotiation, limiting activity tolerance    Pt will benefit from continued skilled IP PT to address the above mentioned impairments  in order to maximize recovery and increase functional independence when completing mobility and ADLs  At this time PT recommendations for d/c are home w family support, 24/7 supervision and HHPT  Barriers to Discharge Inaccessible home environment   Goals   Patient Goals none stated 2* to cognition   STG Expiration Date 03/03/20   Short Term Goal #1 1)  Pt will perform bed mobility with Arina demonstrating appropriate technique 100% of the time in order to improve function  2)  Perform all transfers with Arina demonstrating safe and appropriate technique 100% of the time in order to improve ability to negotiate safely in home environment  3) Amb with least restrictive AD > 150'x1 supervision in order to demonstrate ability to negotiate in home environment  4)  Improve overall strength and balance 1/2 grade in order to optimize ability to perform functional tasks and reduce fall risk  5) Increase activity tolerance to 45 minutes in order to improve endurance to functional tasks  6)  Negotiate stairs using most appropriate technique and S in order to be able to negotiate safely in home environment  7) PT for ongoing patient and family/caregiver education, DME needs and d/c planning in order to promote highest level of function in least restrictive environment  PT Treatment Day 0   Plan   Treatment/Interventions Functional transfer training;LE strengthening/ROM; Elevations; Therapeutic exercise; Endurance training;Cognitive reorientation;Patient/family training;Equipment eval/education;Gait training;Spoke to nursing;OT;Family   PT Frequency Other (Comment)  (3-5/wk)   Recommendation   Recommendation Home with family support;24 hour supervision/assist;Home PT   PT - OK to Discharge Yes   Additional Comments when medically stable   Modified [place] Scale   Modified [place] Scale 4   Barthel Index   Feeding 10   Bathing 0   Grooming Score 5   Dressing Score 5   Bladder Score 10   Bowels Score 10   Toilet Use Score 5   Transfers (Bed/Chair) Score 10   Mobility (Level Surface) Score 10   Stairs Score 5 Barthel Index Score 70   History: co - morbidities, age, social background ([facility], 24/7 supervision), past experience (hx of HHPT s/p fall), fall risk, use of assistive device, cognition (baseline dementia, decreased safety awareness), multiple lines  Exam: impairments in systems including musculoskeletal (ROM, strength, posture), neuromuscular (balance, stair negotiation, gait, transfers, motor function), cardiopulmonary (JONES, O2 sats), cognition  Clinical: unstable/unpredictable  Complexity:high      Tammie Epley, PT

## 2020-02-18 NOTE — PLAN OF CARE
Problem: PHYSICAL THERAPY ADULT  Goal: Performs mobility at highest level of function for planned discharge setting  See evaluation for individualized goals  Description  Treatment/Interventions: Functional transfer training, LE strengthening/ROM, Elevations, Therapeutic exercise, Endurance training, Cognitive reorientation, Patient/family training, Equipment eval/education, Gait training, Spoke to nursing, OT, Family          See flowsheet documentation for full assessment, interventions and recommendations  Note:   Prognosis: Good  Problem List: Decreased endurance, Impaired balance, Decreased mobility, Decreased cognition  Assessment: Rosangela Plata is a 66 y o  female admitted to [facility] on 2/13/2020 for Septic shock ([facility] )  Pt  has a past medical history of Acute kidney injury (LAYLA) with acute tubular necrosis ([facility]N) ([facility] ) (2/13/2020), Disease of thyroid gland, Graves' disease, Hyperlipidemia, Hypertension, Syncope and collapse, and Vertigo  PT was consulted and pt was seen on 2/18/2020 for mobility assessment and d/c planning  Pt presents high fall risk precautions, monitoring abn labs, PIV  Pt with previous hospital admission in Sept 2019 for a fall, was d/c home w HHPT and recommend use of RW  Pt lives with her dtr in a two story home with 4 JOE and FOS to second story bed and bath  Prior to admission pt dtr reports she was indep for ADLs, A for IADLs and relies on dtr for transport  Pt has 24/7 supervision at home, dtr states she did not need supervision for mobility tasks and was ambulating w/o an AD  Pt is currently functioning at a  supervision assistance x1 level for transfers, minimum assistance x1 level for ambulation with Rolling Walker, and minimum assistance x1 for elevations  Pt demonstrated JONES worse w stair negotiation, limiting activity tolerance    Pt will benefit from continued skilled IP PT to address the above mentioned impairments  in order to maximize recovery and increase functional independence when completing mobility and ADLs  At this time PT recommendations for d/c are home w family support, 24/7 supervision and HHPT  Barriers to Discharge: Inaccessible home environment     Recommendation: Home with family support, 24 hour supervision/assist, Home PT     PT - OK to Discharge: Yes    See flowsheet documentation for full assessment

## 2020-02-18 NOTE — PROGRESS NOTES
Progress Note - Bipin Choi 1945, 66 y o  female MRN: [id number]    Unit/Bed#: [address] [facility] Encounter: [id number]    Primary Care Provider: Isela Ceballos DO   Date and time admitted to hospital: 2/13/2020  4:54 PM        * Septic shock [facility])  Assessment & Plan  Septic vs hypovolemic shock due to GI losses - patient initially presenting with vomiting/diarrhea  Potential sepsis source due to UTI - no growth on urine cultures but looks like urine sample was re-collected  Patient is an ICU transfer and remains stable  Completed course with Rocephin/Flagyl, will discontinue  Monitor CBC, VS    Vomiting and diarrhea  Assessment & Plan  Initially presenting with vomiting and diarrhea now resolved  Found to be in septic shock and admitted to ICU - source presumed to be a UTI with otherwise negative imaging  Patient transferred to floor and remains hemodynamically stable  Tolerating diet       Acute kidney injury ([facility] [address] )  Assessment & Plan  Creatinine 1 9 on admission, baseline 0 9-1 2, likely prerenal azotemia in setting of sepsis   Resolved   Continue to hold ACEI and diuretic for now   Monitor intake and output  Avoid NSAIDs, nephrotoxins and hypotension  Monitor serum creatinine    Transaminitis  Assessment & Plan  Significantly improved  Was likely related to hypotension/septic shock  Acute Hepatitis panel negative  Continue to trend CMP       Lactic acidosis  Assessment & Plan  Resolved     High anion gap metabolic acidosis  Assessment & Plan  Due to lactic acidosis/septic shock   Resolved     Late onset Alzheimer's disease with behavioral disturbance (HCC)  Assessment & Plan  On Donepezil  Supportive care  Followed outpatient by Geriatric Medicine    Current smoker  Assessment & Plan  Cessation counseling  Declined nicotine replacement     Postoperative hypothyroidism  Assessment & Plan  Levothyroxine dose adjusted  TSH in 4-6 weeks     VTE Pharmacologic Prophylaxis:   Pharmacologic: Heparin  Mechanical VTE Prophylaxis in Place: Yes    Patient Centered Rounds: I have performed bedside rounds with nursing staff today  Discussions with Specialists or Other Care Team Provider: LILIANE    Education and Discussions with Family / Patient: Patient's daughter at bedside    Time Spent for Care: 30 minutes  More than 50% of total time spent on counseling and coordination of care as described above  Current Length of Stay: 5 day(s)    Current Patient Status: Inpatient   Certification Statement: The patient will continue to require additional inpatient hospital stay due to Need for close monitoring    Discharge Plan:  Tomorrow home with family support    Code Status: Level 3 - DNAR and DNI      Subjective:   Patient seen and examined  She reports feeling well  She is afebrile  Tolerating diet  No overnight events  Objective:     Vitals:   Temp (24hrs), Av 3 °F (36 8 °C), Min:97 8 °F (36 6 °C), Max:99 °F (37 2 °C)    Temp:  [97 8 °F (36 6 °C)-99 °F (37 2 °C)] 97 8 °F (36 6 °C)  HR:  [69-85] 85  Resp:  [18] 18  BP: (124-163)/(67-91) 149/82  SpO2:  [89 %-96 %] 92 %  Body mass index is 22 86 kg/m²  Input and Output Summary (last 24 hours):     No intake or output data in the 24 hours ending 20 205    Physical Exam:     Physical Exam   Constitutional: She is oriented to person, place, and time  No distress  HENT:   Head: Normocephalic and atraumatic  Eyes: Conjunctivae are normal    Neck: No JVD present  Cardiovascular: Normal rate and regular rhythm  No murmur heard  Pulmonary/Chest: Effort normal  No respiratory distress  She has no wheezes  She has no rales  Abdominal: She exhibits no distension  There is no tenderness  There is no guarding  Musculoskeletal: She exhibits no edema  Neurological: She is alert and oriented to person, place, and time  Skin: Skin is warm and dry  Psychiatric: She is slowed         Additional Data:     Labs:    Results from last 7 days Lab Units 02/18/20  0453   WBC Thousand/uL 9 36   HEMOGLOBIN g/dL 10 4*   HEMATOCRIT % 32 3*   PLATELETS Thousands/uL 137*   NEUTROS PCT % 57   LYMPHS PCT % 30   MONOS PCT % 10   EOS PCT % 2     Results from last 7 days   Lab Units 02/18/20  0453   SODIUM mmol/L 143   POTASSIUM mmol/L 3 8   CHLORIDE mmol/L 105   CO2 mmol/L 29   BUN mg/dL 10   CREATININE mg/dL 0 89   ANION GAP mmol/L 9   CALCIUM mg/dL 8 2*   ALBUMIN g/dL 2 3*   TOTAL BILIRUBIN mg/dL 0 29   ALK PHOS U/L 88   ALT U/L 247*   AST U/L 113*   GLUCOSE RANDOM mg/dL 76     Results from last 7 days   Lab Units 02/18/20  0453   INR  0 99     Results from last 7 days   Lab Units 02/16/20  1144 02/16/20  0606 02/15/20  2125 02/15/20  1832 02/15/20  1155 02/14/20  1909 02/14/20  0807 02/14/20  0002 02/13/20  2214 02/13/20  2153   POC GLUCOSE mg/dl 157* 74 113 119 94 137 163* 170* 205* 45*         Results from last 7 days   Lab Units 02/16/20  0454 02/14/20  0518 02/14/20  0205 02/14/20  0017 02/13/20  2140 02/13/20  2104   LACTIC ACID mmol/L  --  3 4*  --  6 8*  --  6 5*   PROCALCITONIN ng/ml 0 28*  --  0 62*  --  0 18  --            * I Have Reviewed All Lab Data Listed Above  * Additional Pertinent Lab Tests Reviewed: [address] 64 Admission Reviewed    Imaging:    Imaging Reports Reviewed Today Include: No new      Recent Cultures (last 7 days):     Results from last 7 days   Lab Units 02/15/20  1111 02/14/20  0138 02/13/20  2235 02/13/20  2140 02/13/20  2103   BLOOD CULTURE   --   --   --  No Growth After 4 Days  No Growth After 4 Days     URINE CULTURE  No Growth <1000 cfu/mL  --   --   --   --    LEGIONELLA URINARY ANTIGEN   --  Negative  --   --   --    C DIFF TOXIN B   --   --  Negative  --   --        Last 24 Hours Medication List:     Current Facility-Administered Medications:  bisacodyl 5 mg Oral Daily PRN Heath Abdi MD   citalopram 10 mg Oral Daily KANDI Allen   donepezil 10 mg Oral HS KANDI Allen   heparin (porcine) 5,000 Units Subcutaneous [facility] KANDI Amezquita   ipratropium 0 5 mg Nebulization TID Elihue Moritz, DO   levalbuterol 1 25 mg Nebulization TID Elihue Moritz, DO   levothyroxine 112 mcg Oral Early Morning KANDI Amezquita   nicotine 21 mg Transdermal Daily KANDI Amezquita   senna-docusate sodium 1 tablet Oral HS Bentley Woods MD        Today, Patient Was Seen By: Jae Conway MD    ** Please Note: Dictation voice to text software may have been used in the creation of this document   **

## 2020-02-18 NOTE — ASSESSMENT & PLAN NOTE
Septic vs hypovolemic shock due to GI losses - patient initially presenting with vomiting/diarrhea  Potential sepsis source due to UTI - no growth on urine cultures but looks like urine sample was re-collected  Patient is an ICU transfer and remains stable  Completed course with Rocephin/Flagyl, will discontinue  Monitor CBC, VS

## 2020-02-19 ENCOUNTER — TRANSITIONAL CARE MANAGEMENT (OUTPATIENT)
Dept: FAMILY MEDICINE CLINIC | Facility: CLINIC | Age: 75
End: 2020-02-19

## 2020-02-19 VITALS
TEMPERATURE: 98 F | DIASTOLIC BLOOD PRESSURE: 73 MMHG | HEART RATE: 75 BPM | SYSTOLIC BLOOD PRESSURE: 144 MMHG | WEIGHT: 158.29 LBS | RESPIRATION RATE: 18 BRPM | OXYGEN SATURATION: 93 % | BODY MASS INDEX: 28.95 KG/M2

## 2020-02-19 LAB
ALBUMIN SERPL BCP-MCNC: 2.3 G/DL (ref 3.5–5)
ALP SERPL-CCNC: 78 U/L (ref 46–116)
ALT SERPL W P-5'-P-CCNC: 173 U/L (ref 12–78)
ANION GAP SERPL CALCULATED.3IONS-SCNC: 6 MMOL/L (ref 4–13)
AST SERPL W P-5'-P-CCNC: 61 U/L (ref 5–45)
BACTERIA BLD CULT: NORMAL
BACTERIA BLD CULT: NORMAL
BASOPHILS # BLD AUTO: 0.03 THOUSANDS/ΜL (ref 0–0.1)
BASOPHILS NFR BLD AUTO: 0 % (ref 0–1)
BILIRUB SERPL-MCNC: 0.42 MG/DL (ref 0.2–1)
BUN SERPL-MCNC: 9 MG/DL (ref 5–25)
CALCIUM SERPL-MCNC: 8.4 MG/DL (ref 8.3–10.1)
CHLORIDE SERPL-SCNC: 104 MMOL/L (ref 100–108)
CO2 SERPL-SCNC: 31 MMOL/L (ref 21–32)
CREAT SERPL-MCNC: 0.82 MG/DL (ref 0.6–1.3)
EOSINOPHIL # BLD AUTO: 0.39 THOUSAND/ΜL (ref 0–0.61)
EOSINOPHIL NFR BLD AUTO: 4 % (ref 0–6)
ERYTHROCYTE [DISTWIDTH] IN BLOOD BY AUTOMATED COUNT: 17.8 % (ref 11.6–15.1)
GFR SERPL CREATININE-BSD FRML MDRD: 70 ML/MIN/1.73SQ M
GLUCOSE SERPL-MCNC: 80 MG/DL (ref 65–140)
HCT VFR BLD AUTO: 32.6 % (ref 34.8–46.1)
HGB BLD-MCNC: 10.2 G/DL (ref 11.5–15.4)
IMM GRANULOCYTES # BLD AUTO: 0.05 THOUSAND/UL (ref 0–0.2)
IMM GRANULOCYTES NFR BLD AUTO: 1 % (ref 0–2)
LYMPHOCYTES # BLD AUTO: 2.7 THOUSANDS/ΜL (ref 0.6–4.47)
LYMPHOCYTES NFR BLD AUTO: 27 % (ref 14–44)
MCH RBC QN AUTO: 28.3 PG (ref 26.8–34.3)
MCHC RBC AUTO-ENTMCNC: 31.3 G/DL (ref 31.4–37.4)
MCV RBC AUTO: 91 FL (ref 82–98)
MONOCYTES # BLD AUTO: 1.05 THOUSAND/ΜL (ref 0.17–1.22)
MONOCYTES NFR BLD AUTO: 10 % (ref 4–12)
NEUTROPHILS # BLD AUTO: 5.85 THOUSANDS/ΜL (ref 1.85–7.62)
NEUTS SEG NFR BLD AUTO: 58 % (ref 43–75)
NRBC BLD AUTO-RTO: 0 /100 WBCS
PLATELET # BLD AUTO: 133 THOUSANDS/UL (ref 149–390)
PMV BLD AUTO: 11.2 FL (ref 8.9–12.7)
POTASSIUM SERPL-SCNC: 3.6 MMOL/L (ref 3.5–5.3)
PROT SERPL-MCNC: 5.2 G/DL (ref 6.4–8.2)
RBC # BLD AUTO: 3.6 MILLION/UL (ref 3.81–5.12)
SODIUM SERPL-SCNC: 141 MMOL/L (ref 136–145)
WBC # BLD AUTO: 10.07 THOUSAND/UL (ref 4.31–10.16)

## 2020-02-19 PROCEDURE — 80053 COMPREHEN METABOLIC PANEL: CPT | Performed by: FAMILY MEDICINE

## 2020-02-19 PROCEDURE — 85025 COMPLETE CBC W/AUTO DIFF WBC: CPT | Performed by: FAMILY MEDICINE

## 2020-02-19 PROCEDURE — 99239 HOSP IP/OBS DSCHRG MGMT >30: CPT | Performed by: FAMILY MEDICINE

## 2020-02-19 RX ORDER — LEVOTHYROXINE SODIUM 112 UG/1
112 TABLET ORAL
Qty: 30 TABLET | Refills: 0 | Status: SHIPPED | OUTPATIENT
Start: 2020-02-20 | End: 2020-11-09 | Stop reason: ALTCHOICE

## 2020-02-19 RX ORDER — NICOTINE 21 MG/24HR
1 PATCH, TRANSDERMAL 24 HOURS TRANSDERMAL DAILY
Qty: 28 PATCH | Refills: 0 | Status: SHIPPED | OUTPATIENT
Start: 2020-02-20 | End: 2020-03-03 | Stop reason: ALTCHOICE

## 2020-02-19 RX ORDER — LEVALBUTEROL 1.25 MG/.5ML
1.25 SOLUTION, CONCENTRATE RESPIRATORY (INHALATION) EVERY 6 HOURS PRN
Status: DISCONTINUED | OUTPATIENT
Start: 2020-02-19 | End: 2020-02-19 | Stop reason: HOSPADM

## 2020-02-19 RX ADMIN — LEVOTHYROXINE SODIUM 112 MCG: 112 TABLET ORAL at 06:02

## 2020-02-19 RX ADMIN — HEPARIN SODIUM 5000 UNITS: 5000 INJECTION INTRAVENOUS; SUBCUTANEOUS at 06:02

## 2020-02-19 RX ADMIN — CITALOPRAM HYDROBROMIDE 10 MG: 20 TABLET ORAL at 08:21

## 2020-02-19 NOTE — PLAN OF CARE
Problem: Prexisting or High Potential for Compromised Skin Integrity  Goal: Skin integrity is maintained or improved  Description  INTERVENTIONS:  - Identify patients at risk for skin breakdown  - Assess and monitor skin integrity  - Assess and monitor nutrition and hydration status  - Monitor labs   - Assess for incontinence   - Turn and reposition patient  - Assist with mobility/ambulation  - Relieve pressure over bony prominences  - Avoid friction and shearing  - Provide appropriate hygiene as needed including keeping skin clean and dry  - Evaluate need for skin moisturizer/barrier cream  - Collaborate with interdisciplinary team   - Patient/family teaching  - Consider wound care consult   Outcome: Progressing     Problem: PAIN - ADULT  Goal: Verbalizes/displays adequate comfort level or baseline comfort level  Description  Interventions:  - Encourage patient to monitor pain and request assistance  - Assess pain using appropriate pain scale  - Administer analgesics based on type and severity of pain and evaluate response  - Implement non-pharmacological measures as appropriate and evaluate response  - Consider cultural and social influences on pain and pain management  - Notify physician/advanced practitioner if interventions unsuccessful or patient reports new pain  Outcome: Progressing     Problem: INFECTION - ADULT  Goal: Absence or prevention of progression during hospitalization  Description  INTERVENTIONS:  - Assess and monitor for signs and symptoms of infection  - Monitor lab/diagnostic results  - Monitor all insertion sites, i e  indwelling lines, tubes, and drains  - Monitor endotracheal if appropriate and nasal secretions for changes in amount and color  - [place] appropriate cooling/warming therapies per order  - Administer medications as ordered  - Instruct and encourage patient and family to use good hand hygiene technique  - Identify and instruct in appropriate isolation precautions for identified infection/condition  Outcome: Progressing     Problem: SAFETY ADULT  Goal: Patient will remain free of falls  Description  INTERVENTIONS:  - Assess patient frequently for physical needs  -  Identify cognitive and physical deficits and behaviors that affect risk of falls    -  [place] fall precautions as indicated by assessment   - Educate patient/family on patient safety including physical limitations  - Instruct patient to call for assistance with activity based on assessment  - Modify environment to reduce risk of injury  - Consider OT/PT consult to assist with strengthening/mobility  Outcome: Progressing  Goal: Maintain or return to baseline ADL function  Description  INTERVENTIONS:  -  Assess patient's ability to carry out ADLs; assess patient's baseline for ADL function and identify physical deficits which impact ability to perform ADLs (bathing, care of mouth/teeth, toileting, grooming, dressing, etc )  - Assess/evaluate cause of self-care deficits   - Assess range of motion  - Assess patient's mobility; develop plan if impaired  - Assess patient's need for assistive devices and provide as appropriate  - Encourage maximum independence but intervene and supervise when necessary  - Involve family in performance of ADLs  - Assess for home care needs following discharge   - Consider OT consult to assist with ADL evaluation and planning for discharge  - Provide patient education as appropriate  Outcome: Progressing  Goal: Maintain or return mobility status to optimal level  Description  INTERVENTIONS:  - Assess patient's baseline mobility status (ambulation, transfers, stairs, etc )    - Identify cognitive and physical deficits and behaviors that affect mobility  - Identify mobility aids required to assist with transfers and/or ambulation (gait belt, sit-to-stand, lift, walker, cane, etc )  - [place] fall precautions as indicated by assessment  - Record patient progress and toleration of activity level on Mobility SBAR; progress patient to next Phase/Stage  - Instruct patient to call for assistance with activity based on assessment  - Consider rehabilitation consult to assist with strengthening/weightbearing, etc   Outcome: Progressing     Problem: DISCHARGE PLANNING  Goal: Discharge to home or other facility with appropriate resources  Description  INTERVENTIONS:  - Identify barriers to discharge w/patient and caregiver  - Arrange for needed discharge resources and transportation as appropriate  - Identify discharge learning needs (meds, wound care, etc )  - Arrange for interpretive services to assist at discharge as needed  - Refer to Case Management Department for coordinating discharge planning if the patient needs post-hospital services based on physician/advanced practitioner order or complex needs related to functional status, cognitive ability, or social support system  Outcome: Progressing     Problem: Knowledge Deficit  Goal: Patient/family/caregiver demonstrates understanding of disease process, treatment plan, medications, and discharge instructions  Description  Complete learning assessment and assess knowledge base    Interventions:  - Provide teaching at level of understanding  - Provide teaching via preferred learning methods  Outcome: Progressing     Problem: CARDIOVASCULAR - ADULT  Goal: Absence of cardiac dysrhythmias or at baseline rhythm  Description  INTERVENTIONS:  - Continuous cardiac monitoring, vital signs, obtain 12 lead EKG if ordered  - Administer antiarrhythmic and heart rate control medications as ordered  - Monitor electrolytes and administer replacement therapy as ordered  Outcome: Progressing     Problem: GASTROINTESTINAL - ADULT  Goal: Minimal or absence of nausea and/or vomiting  Description  INTERVENTIONS:  - Administer IV fluids if ordered to ensure adequate hydration  - Maintain NPO status until nausea and vomiting are resolved  - Nasogastric tube if ordered  - Administer ordered antiemetic medications as needed  - Provide nonpharmacologic comfort measures as appropriate  - Advance diet as tolerated, if ordered  - Consider nutrition services referral to assist patient with adequate nutrition and appropriate food choices  Outcome: Progressing  Goal: Maintains adequate nutritional intake  Description  INTERVENTIONS:  - Monitor percentage of each meal consumed  - Identify factors contributing to decreased intake, treat as appropriate  - Assist with meals as needed  - Monitor I&O, weight, and lab values if indicated  - Obtain nutrition services referral as needed  Outcome: Progressing     Problem: GENITOURINARY - ADULT  Goal: Urinary catheter remains patent  Description  INTERVENTIONS:  - Assess patency of urinary catheter  - If patient has a chronic stanley, consider changing catheter if non-functioning  - Follow guidelines for intermittent irrigation of non-functioning urinary catheter  Outcome: Progressing     Problem: METABOLIC, FLUID AND ELECTROLYTES - ADULT  Goal: Electrolytes maintained within normal limits  Description  INTERVENTIONS:  - Monitor labs and assess patient for signs and symptoms of electrolyte imbalances  - Administer electrolyte replacement as ordered  - Monitor response to electrolyte replacements, including repeat lab results as appropriate  - Instruct patient on fluid and nutrition as appropriate  Outcome: Progressing  Goal: Fluid balance maintained  Description  INTERVENTIONS:  - Monitor labs   - Monitor I/O and WT  - Instruct patient on fluid and nutrition as appropriate  - Assess for signs & symptoms of volume excess or deficit  Outcome: Progressing  Goal: Glucose maintained within target range  Description  INTERVENTIONS:  - Monitor Blood Glucose as ordered  - Assess for signs and symptoms of hyperglycemia and hypoglycemia  - Administer ordered medications to maintain glucose within target range  - Assess nutritional intake and initiate nutrition service referral as needed  Outcome: Progressing     Problem: Potential for Falls  Goal: Patient will remain free of falls  Description  INTERVENTIONS:  - Assess patient frequently for physical needs  -  Identify cognitive and physical deficits and behaviors that affect risk of falls    -  [place] fall precautions as indicated by assessment   - Educate patient/family on patient safety including physical limitations  - Instruct patient to call for assistance with activity based on assessment  - Modify environment to reduce risk of injury  - Consider OT/PT consult to assist with strengthening/mobility  Outcome: Progressing

## 2020-02-19 NOTE — PLAN OF CARE
Problem: Prexisting or High Potential for Compromised Skin Integrity  Goal: Skin integrity is maintained or improved  Description  INTERVENTIONS:  - Identify patients at risk for skin breakdown  - Assess and monitor skin integrity  - Assess and monitor nutrition and hydration status  - Monitor labs   - Assess for incontinence   - Turn and reposition patient  - Assist with mobility/ambulation  - Relieve pressure over bony prominences  - Avoid friction and shearing  - Provide appropriate hygiene as needed including keeping skin clean and dry  - Evaluate need for skin moisturizer/barrier cream  - Collaborate with interdisciplinary team   - Patient/family teaching  - Consider wound care consult   Outcome: Progressing     Problem: PAIN - ADULT  Goal: Verbalizes/displays adequate comfort level or baseline comfort level  Description  Interventions:  - Encourage patient to monitor pain and request assistance  - Assess pain using appropriate pain scale  - Administer analgesics based on type and severity of pain and evaluate response  - Implement non-pharmacological measures as appropriate and evaluate response  - Consider cultural and social influences on pain and pain management  - Notify physician/advanced practitioner if interventions unsuccessful or patient reports new pain  Outcome: Progressing     Problem: INFECTION - ADULT  Goal: Absence or prevention of progression during hospitalization  Description  INTERVENTIONS:  - Assess and monitor for signs and symptoms of infection  - Monitor lab/diagnostic results  - Monitor all insertion sites, i e  indwelling lines, tubes, and drains  - Monitor endotracheal if appropriate and nasal secretions for changes in amount and color  - [place] appropriate cooling/warming therapies per order  - Administer medications as ordered  - Instruct and encourage patient and family to use good hand hygiene technique  - Identify and instruct in appropriate isolation precautions for identified infection/condition  Outcome: Progressing     Problem: SAFETY ADULT  Goal: Patient will remain free of falls  Description  INTERVENTIONS:  - Assess patient frequently for physical needs  -  Identify cognitive and physical deficits and behaviors that affect risk of falls    -  [place] fall precautions as indicated by assessment   - Educate patient/family on patient safety including physical limitations  - Instruct patient to call for assistance with activity based on assessment  - Modify environment to reduce risk of injury  - Consider OT/PT consult to assist with strengthening/mobility  Outcome: Progressing  Goal: Maintain or return to baseline ADL function  Description  INTERVENTIONS:  -  Assess patient's ability to carry out ADLs; assess patient's baseline for ADL function and identify physical deficits which impact ability to perform ADLs (bathing, care of mouth/teeth, toileting, grooming, dressing, etc )  - Assess/evaluate cause of self-care deficits   - Assess range of motion  - Assess patient's mobility; develop plan if impaired  - Assess patient's need for assistive devices and provide as appropriate  - Encourage maximum independence but intervene and supervise when necessary  - Involve family in performance of ADLs  - Assess for home care needs following discharge   - Consider OT consult to assist with ADL evaluation and planning for discharge  - Provide patient education as appropriate  Outcome: Progressing  Goal: Maintain or return mobility status to optimal level  Description  INTERVENTIONS:  - Assess patient's baseline mobility status (ambulation, transfers, stairs, etc )    - Identify cognitive and physical deficits and behaviors that affect mobility  - Identify mobility aids required to assist with transfers and/or ambulation (gait belt, sit-to-stand, lift, walker, cane, etc )  - [place] fall precautions as indicated by assessment  - Record patient progress and toleration of activity level on Mobility SBAR; progress patient to next Phase/Stage  - Instruct patient to call for assistance with activity based on assessment  - Consider rehabilitation consult to assist with strengthening/weightbearing, etc   Outcome: Progressing     Problem: DISCHARGE PLANNING  Goal: Discharge to home or other facility with appropriate resources  Description  INTERVENTIONS:  - Identify barriers to discharge w/patient and caregiver  - Arrange for needed discharge resources and transportation as appropriate  - Identify discharge learning needs (meds, wound care, etc )  - Arrange for interpretive services to assist at discharge as needed  - Refer to Case Management Department for coordinating discharge planning if the patient needs post-hospital services based on physician/advanced practitioner order or complex needs related to functional status, cognitive ability, or social support system  Outcome: Progressing     Problem: Knowledge Deficit  Goal: Patient/family/caregiver demonstrates understanding of disease process, treatment plan, medications, and discharge instructions  Description  Complete learning assessment and assess knowledge base    Interventions:  - Provide teaching at level of understanding  - Provide teaching via preferred learning methods  Outcome: Progressing     Problem: CARDIOVASCULAR - ADULT  Goal: Absence of cardiac dysrhythmias or at baseline rhythm  Description  INTERVENTIONS:  - Continuous cardiac monitoring, vital signs, obtain 12 lead EKG if ordered  - Administer antiarrhythmic and heart rate control medications as ordered  - Monitor electrolytes and administer replacement therapy as ordered  Outcome: Progressing     Problem: GASTROINTESTINAL - ADULT  Goal: Minimal or absence of nausea and/or vomiting  Description  INTERVENTIONS:  - Administer IV fluids if ordered to ensure adequate hydration  - Maintain NPO status until nausea and vomiting are resolved  - Nasogastric tube if ordered  - Administer ordered antiemetic medications as needed  - Provide nonpharmacologic comfort measures as appropriate  - Advance diet as tolerated, if ordered  - Consider nutrition services referral to assist patient with adequate nutrition and appropriate food choices  Outcome: Progressing  Goal: Maintains adequate nutritional intake  Description  INTERVENTIONS:  - Monitor percentage of each meal consumed  - Identify factors contributing to decreased intake, treat as appropriate  - Assist with meals as needed  - Monitor I&O, weight, and lab values if indicated  - Obtain nutrition services referral as needed  Outcome: Progressing     Problem: GENITOURINARY - ADULT  Goal: Urinary catheter remains patent  Description  INTERVENTIONS:  - Assess patency of urinary catheter  - If patient has a chronic stanley, consider changing catheter if non-functioning  - Follow guidelines for intermittent irrigation of non-functioning urinary catheter  Outcome: Progressing     Problem: METABOLIC, FLUID AND ELECTROLYTES - ADULT  Goal: Electrolytes maintained within normal limits  Description  INTERVENTIONS:  - Monitor labs and assess patient for signs and symptoms of electrolyte imbalances  - Administer electrolyte replacement as ordered  - Monitor response to electrolyte replacements, including repeat lab results as appropriate  - Instruct patient on fluid and nutrition as appropriate  Outcome: Progressing  Goal: Fluid balance maintained  Description  INTERVENTIONS:  - Monitor labs   - Monitor I/O and WT  - Instruct patient on fluid and nutrition as appropriate  - Assess for signs & symptoms of volume excess or deficit  Outcome: Progressing  Goal: Glucose maintained within target range  Description  INTERVENTIONS:  - Monitor Blood Glucose as ordered  - Assess for signs and symptoms of hyperglycemia and hypoglycemia  - Administer ordered medications to maintain glucose within target range  - Assess nutritional intake and initiate nutrition service referral as needed  Outcome: Progressing     Problem: Potential for Falls  Goal: Patient will remain free of falls  Description  INTERVENTIONS:  - Assess patient frequently for physical needs  -  Identify cognitive and physical deficits and behaviors that affect risk of falls    -  [place] fall precautions as indicated by assessment   - Educate patient/family on patient safety including physical limitations  - Instruct patient to call for assistance with activity based on assessment  - Modify environment to reduce risk of injury  - Consider OT/PT consult to assist with strengthening/mobility  Outcome: Progressing

## 2020-02-19 NOTE — NURSING NOTE
Discharge paperwork and new medications reviewed with daughter  IV removed  Answered all questions and concerns  Daughter bedside to provide transport home  Patient left unit via wheelchair with PCA

## 2020-02-19 NOTE — PLAN OF CARE
Problem: Prexisting or High Potential for Compromised Skin Integrity  Goal: Skin integrity is maintained or improved  Description  INTERVENTIONS:  - Identify patients at risk for skin breakdown  - Assess and monitor skin integrity  - Assess and monitor nutrition and hydration status  - Monitor labs   - Assess for incontinence   - Turn and reposition patient  - Assist with mobility/ambulation  - Relieve pressure over bony prominences  - Avoid friction and shearing  - Provide appropriate hygiene as needed including keeping skin clean and dry  - Evaluate need for skin moisturizer/barrier cream  - Collaborate with interdisciplinary team   - Patient/family teaching  - Consider wound care consult   2/19/2020 1059 by Bill Veras RN  Outcome: Adequate for Discharge  2/19/2020 0748 by Bill Veras RN  Outcome: Progressing     Problem: PAIN - ADULT  Goal: Verbalizes/displays adequate comfort level or baseline comfort level  Description  Interventions:  - Encourage patient to monitor pain and request assistance  - Assess pain using appropriate pain scale  - Administer analgesics based on type and severity of pain and evaluate response  - Implement non-pharmacological measures as appropriate and evaluate response  - Consider cultural and social influences on pain and pain management  - Notify physician/advanced practitioner if interventions unsuccessful or patient reports new pain  2/19/2020 1059 by Bill Veras RN  Outcome: Adequate for Discharge  2/19/2020 0748 by Bill Veras RN  Outcome: Progressing     Problem: INFECTION - ADULT  Goal: Absence or prevention of progression during hospitalization  Description  INTERVENTIONS:  - Assess and monitor for signs and symptoms of infection  - Monitor lab/diagnostic results  - Monitor all insertion sites, i e  indwelling lines, tubes, and drains  - Monitor endotracheal if appropriate and nasal secretions for changes in amount and color  - [place] appropriate cooling/warming therapies per order  - Administer medications as ordered  - Instruct and encourage patient and family to use good hand hygiene technique  - Identify and instruct in appropriate isolation precautions for identified infection/condition  2/19/2020 1059 by Demarcus Koch RN  Outcome: Adequate for Discharge  2/19/2020 0748 by Demarcus Koch RN  Outcome: Progressing     Problem: SAFETY ADULT  Goal: Patient will remain free of falls  Description  INTERVENTIONS:  - Assess patient frequently for physical needs  -  Identify cognitive and physical deficits and behaviors that affect risk of falls    -  [place] fall precautions as indicated by assessment   - Educate patient/family on patient safety including physical limitations  - Instruct patient to call for assistance with activity based on assessment  - Modify environment to reduce risk of injury  - Consider OT/PT consult to assist with strengthening/mobility  2/19/2020 1059 by Demarcus Koch RN  Outcome: Adequate for Discharge  2/19/2020 0748 by Demarcus Koch RN  Outcome: Progressing  Goal: Maintain or return to baseline ADL function  Description  INTERVENTIONS:  -  Assess patient's ability to carry out ADLs; assess patient's baseline for ADL function and identify physical deficits which impact ability to perform ADLs (bathing, care of mouth/teeth, toileting, grooming, dressing, etc )  - Assess/evaluate cause of self-care deficits   - Assess range of motion  - Assess patient's mobility; develop plan if impaired  - Assess patient's need for assistive devices and provide as appropriate  - Encourage maximum independence but intervene and supervise when necessary  - Involve family in performance of ADLs  - Assess for home care needs following discharge   - Consider OT consult to assist with ADL evaluation and planning for discharge  - Provide patient education as appropriate  2/19/2020 1059 by Demarcus Koch RN  Outcome: Adequate for Discharge  2/19/2020 2093 by Keith Moulton RN  Outcome: Progressing  Goal: Maintain or return mobility status to optimal level  Description  INTERVENTIONS:  - Assess patient's baseline mobility status (ambulation, transfers, stairs, etc )    - Identify cognitive and physical deficits and behaviors that affect mobility  - Identify mobility aids required to assist with transfers and/or ambulation (gait belt, sit-to-stand, lift, walker, cane, etc )  - [place] fall precautions as indicated by assessment  - Record patient progress and toleration of activity level on Mobility SBAR; progress patient to next Phase/Stage  - Instruct patient to call for assistance with activity based on assessment  - Consider rehabilitation consult to assist with strengthening/weightbearing, etc   2/19/2020 1059 by Keith Moulton RN  Outcome: Adequate for Discharge  2/19/2020 0748 by Keith Moulton RN  Outcome: Progressing     Problem: DISCHARGE PLANNING  Goal: Discharge to home or other facility with appropriate resources  Description  INTERVENTIONS:  - Identify barriers to discharge w/patient and caregiver  - Arrange for needed discharge resources and transportation as appropriate  - Identify discharge learning needs (meds, wound care, etc )  - Arrange for interpretive services to assist at discharge as needed  - Refer to Case Management Department for coordinating discharge planning if the patient needs post-hospital services based on physician/advanced practitioner order or complex needs related to functional status, cognitive ability, or social support system  2/19/2020 1059 by Keith Moulton RN  Outcome: Adequate for Discharge  2/19/2020 0748 by Keith Moulton RN  Outcome: Progressing     Problem: Knowledge Deficit  Goal: Patient/family/caregiver demonstrates understanding of disease process, treatment plan, medications, and discharge instructions  Description  Complete learning assessment and assess knowledge base    Interventions:  - Provide teaching at level of understanding  - Provide teaching via preferred learning methods  2/19/2020 1059 by Mau Cruz RN  Outcome: Adequate for Discharge  2/19/2020 0748 by Mau Cruz RN  Outcome: Progressing     Problem: CARDIOVASCULAR - ADULT  Goal: Absence of cardiac dysrhythmias or at baseline rhythm  Description  INTERVENTIONS:  - Continuous cardiac monitoring, vital signs, obtain 12 lead EKG if ordered  - Administer antiarrhythmic and heart rate control medications as ordered  - Monitor electrolytes and administer replacement therapy as ordered  2/19/2020 1059 by Mau Curz RN  Outcome: Adequate for Discharge  2/19/2020 0748 by Mau Cruz RN  Outcome: Progressing     Problem: GASTROINTESTINAL - ADULT  Goal: Minimal or absence of nausea and/or vomiting  Description  INTERVENTIONS:  - Administer IV fluids if ordered to ensure adequate hydration  - Maintain NPO status until nausea and vomiting are resolved  - Nasogastric tube if ordered  - Administer ordered antiemetic medications as needed  - Provide nonpharmacologic comfort measures as appropriate  - Advance diet as tolerated, if ordered  - Consider nutrition services referral to assist patient with adequate nutrition and appropriate food choices  2/19/2020 1059 by Mau Cruz RN  Outcome: Adequate for Discharge  2/19/2020 0748 by Mau Cruz RN  Outcome: Progressing  Goal: Maintains adequate nutritional intake  Description  INTERVENTIONS:  - Monitor percentage of each meal consumed  - Identify factors contributing to decreased intake, treat as appropriate  - Assist with meals as needed  - Monitor I&O, weight, and lab values if indicated  - Obtain nutrition services referral as needed  2/19/2020 1059 by Mau Cruz RN  Outcome: Adequate for Discharge  2/19/2020 0748 by Mau Cruz RN  Outcome: Progressing     Problem: GENITOURINARY - ADULT  Goal: Urinary catheter remains patent  Description  INTERVENTIONS:  - Assess patency of urinary catheter  - If patient has a chronic stanley, consider changing catheter if non-functioning  - Follow guidelines for intermittent irrigation of non-functioning urinary catheter  2/19/2020 1059 by Gilda Wolfe RN  Outcome: Adequate for Discharge  2/19/2020 0748 by Gilda Wolfe RN  Outcome: Progressing     Problem: METABOLIC, FLUID AND ELECTROLYTES - ADULT  Goal: Electrolytes maintained within normal limits  Description  INTERVENTIONS:  - Monitor labs and assess patient for signs and symptoms of electrolyte imbalances  - Administer electrolyte replacement as ordered  - Monitor response to electrolyte replacements, including repeat lab results as appropriate  - Instruct patient on fluid and nutrition as appropriate  2/19/2020 1059 by Gilda Wolfe RN  Outcome: Adequate for Discharge  2/19/2020 0748 by Gilda Wolfe RN  Outcome: Progressing  Goal: Fluid balance maintained  Description  INTERVENTIONS:  - Monitor labs   - Monitor I/O and WT  - Instruct patient on fluid and nutrition as appropriate  - Assess for signs & symptoms of volume excess or deficit  2/19/2020 1059 by Gilda Wolfe RN  Outcome: Adequate for Discharge  2/19/2020 0748 by Gilda Wolfe RN  Outcome: Progressing  Goal: Glucose maintained within target range  Description  INTERVENTIONS:  - Monitor Blood Glucose as ordered  - Assess for signs and symptoms of hyperglycemia and hypoglycemia  - Administer ordered medications to maintain glucose within target range  - Assess nutritional intake and initiate nutrition service referral as needed  2/19/2020 1059 by Gilda Wolfe RN  Outcome: Adequate for Discharge  2/19/2020 0748 by Gilda Wolfe RN  Outcome: Progressing     Problem: Potential for Falls  Goal: Patient will remain free of falls  Description  INTERVENTIONS:  - Assess patient frequently for physical needs  -  Identify cognitive and physical deficits and behaviors that affect risk of falls  -  [place] fall precautions as indicated by assessment    - Educate patient/family on patient safety including physical limitations  - Instruct patient to call for assistance with activity based on assessment  - Modify environment to reduce risk of injury  - Consider OT/PT consult to assist with strengthening/mobility  2/19/2020 1059 by Steve Diana RN  Outcome: Adequate for Discharge  2/19/2020 0748 by Steve Diana RN  Outcome: Progressing

## 2020-02-20 PROBLEM — E87.20 LACTIC ACIDOSIS: Status: RESOLVED | Noted: 2020-02-14 | Resolved: 2020-02-20

## 2020-02-20 PROBLEM — E87.2 LACTIC ACIDOSIS: Status: RESOLVED | Noted: 2020-02-14 | Resolved: 2020-02-20

## 2020-02-20 NOTE — ASSESSMENT & PLAN NOTE
Significantly improved  Was likely related to hypotension/septic shock  Acute Hepatitis panel negative  CMP in 1 week

## 2020-02-20 NOTE — ASSESSMENT & PLAN NOTE
Septic vs hypovolemic shock due to GI losses - patient initially presenting with vomiting/diarrhea  Potential sepsis source due to UTI - no growth on urine cultures but looks like urine sample was re-collected  Patient is an ICU transfer and remains stable  Completed course with Rocephin/Flagyl, discontinued  Discharge home with home VNA

## 2020-02-20 NOTE — DISCHARGE SUMMARY
Discharge- Nikki Phoenix 1945, 66 y o  female MRN: [id number]    Unit/Bed#: [address] 218-02 Encounter: [id number]    Primary Care Provider: Vik Espinoza DO   Date and time admitted to hospital: 2/13/2020  4:54 PM        * Septic shock ([facility] [address] )  Assessment & Plan  Septic vs hypovolemic shock due to GI losses - patient initially presenting with vomiting/diarrhea  Potential sepsis source due to UTI - no growth on urine cultures but looks like urine sample was re-collected  Patient is an ICU transfer and remains stable  Completed course with Rocephin/Flagyl, discontinued  Discharge home with home VNA    Vomiting and diarrhea  Assessment & Plan  Initially presenting with vomiting and diarrhea now resolved  Found to be in septic shock and admitted to ICU - source presumed to be a UTI with otherwise negative imaging  Patient transferred to floor and remains hemodynamically stable  Tolerating diet       Acute kidney injury ([facility] [address] )  Assessment & Plan  Creatinine 1 9 on admission, baseline 0 9-1 2, likely prerenal azotemia in setting of sepsis   Resolved   Continue to hold ACEI and diuretic for now   Monitor intake and output  Avoid NSAIDs, nephrotoxins and hypotension  Monitor serum creatinine    Transaminitis  Assessment & Plan  Significantly improved  Was likely related to hypotension/septic shock  Acute Hepatitis panel negative  CMP in 1 week       Late onset Alzheimer's disease with behavioral disturbance ([facility] [address] )  Assessment & Plan  On Donepezil  Supportive care  Followed outpatient by Geriatric Medicine    Current smoker  Assessment & Plan  Cessation counseling  Nicotine patch sent to pharmacy    Postoperative hypothyroidism  Assessment & Plan  Levothyroxine dose adjusted  TSH in 4-6 weeks     Lactic acidosisresolved as of 2/20/2020  Assessment & Plan  Resolved       Discharging Physician / Practitioner: Adina Lynn MD  PCP: Vik Espinoza DO  Admission Date:   Admission Orders (From admission, onward) Ordered        02/13/20 2212  Inpatient Admission (expected length of stay for this patient Order details is greater than two midnights)  Once         02/13/20 2100  Inpatient Admission (expected length of stay for this patient Order details is greater than two midnights)  Once                   Discharge Date: 02/19/20    Resolved Problems  Date Reviewed: 2/20/2020          Resolved    Hyperkalemia 2/16/2020     Resolved by  KANDI Rodriguez    Lactic acidosis 2/20/2020     Resolved by  Lino Myers MD    Community acquired pneumonia of right lower lobe of lung ([address] ) 2/16/2020     Resolved by  KANDI Rodriguez    Hypoglycemia 2/16/2020     Resolved by  KANDI Rodriguez    Overview Deleted 2/14/2020  5:59 AM by Valentina Rodriguez Stay:  · PT, OT, CM    Procedures Performed:   XR abdomen 1 view kub   Final Result by Mahi Whitt MD (02/14 2298)      No acute findings  Workstation performed: [id number]         XR chest portable   Final Result by Mahi Whitt MD (02/14 0818)      Minimal central vascular prominence  No consolidation  Workstation performed: [id number]         XR chest 1 view portable   Final Result by Salma Horn MD (02/14 4589)      No acute cardiopulmonary disease  Workstation performed: [id number]         CT abdomen pelvis wo contrast   Final Result by Kristy Tristan MD (02/13 1945)      No evidence of bowel obstruction, colitis or diverticulitis                 Workstation performed: [id number]               Significant Findings / Test Results:   Results from last 7 days   Lab Units 02/19/20  0441   WBC Thousand/uL 10 07   HEMOGLOBIN g/dL 10 2*   HEMATOCRIT % 32 6*   PLATELETS Thousands/uL 133*     Results from last 7 days   Lab Units 02/19/20  0441   SODIUM mmol/L 141   CHLORIDE mmol/L 104   CO2 mmol/L 31   BUN mg/dL 9   CREATININE mg/dL 0 82   CALCIUM mg/dL 8 4   ALK PHOS U/L 78   ALT U/L 173*   AST U/L 61*         Incidental Findings:   · None     Test Results Pending at Discharge (will require follow up): · None     Outpatient Tests Requested:  · CMP    Complications:  None    Reason for Admission: nausea, vomiting    Hospital Course:     Tucker Rooney is a 66 y o  female patient with history of smoking and dementia who originally presented to the hospital on 2/13/2020 due to nausea, vomiting and diarrhea  The patient was found to be in circulatory shock, due to infection/septic versus due to hypovolemia  She initially required critical care admission and pressors as well as broad-spectrum antibiotics  The patient showed significant improvement and was transferred to floor in a stable condition with her SIRS criteria and hypotension resolving  She completed a 7 day course of antibiotics  Her nausea, vomiting and diarrhea have resolved and she was able to tolerate her diet  She was at baseline mental status  Physical and Occupational therapy evaluated the patient and recommended discharge to home with home physical and occupational therapy  The patient was counseled on smoking cessation  The daughter was at the bedside and will assist the patient in quitting smoking  Discharge planning was discussed with the patient and her daughter, both verbalized understanding  Please see above list of diagnoses and related plan for additional information  Condition at Discharge: good     Discharge Day Visit / Exam:     Subjective:  Patient seen and examined  She feels well and voices no complaints  She is tolerating her diet  She is afebrile  She denies chest pain, shortness of breath palpitations  She denies nausea, vomiting, diarrhea constipation  She is at her baseline mental status per daughter  She is alert and oriented x3      Vitals: Blood Pressure: 144/73 (02/19/20 0642)  Pulse: 75 (02/19/20 0642)  Temperature: 98 °F (36 7 °C) (02/19/20 0642)  Temp Source: Oral (02/18/20 1500)  Respirations: 18 (02/19/20 0642)  Weight - Scale: 71 8 kg (158 lb 4 6 oz)(with pump on the bed) (02/19/20 0535)  SpO2: 93 % (02/19/20 0487)  Exam:     Physical Exam   Constitutional: She is oriented to person, place, and time  No distress  HENT:   Head: Normocephalic and atraumatic  Eyes: Conjunctivae are normal    Neck: No JVD present  Cardiovascular: Normal rate and regular rhythm  No murmur heard  Pulmonary/Chest: Effort normal  No respiratory distress  She has no wheezes  She has no rales  Abdominal: Soft  She exhibits no distension  There is no tenderness  There is no guarding  Musculoskeletal: She exhibits no edema  Neurological: She is alert and oriented to person, place, and time  Skin: Skin is warm and dry  Psychiatric: She has a normal mood and affect  Discussion with Family: Daughter at bedside    Discharge instructions/Information to patient and family:   See after visit summary for information provided to patient and family  Provisions for Follow-Up Care:  See after visit summary for information related to follow-up care and any pertinent home health orders  Disposition:     Home with VNA Services (Reminder: Complete face to face encounter)    For Discharges to [address] [facility]:   · Not Applicable to this Patient - Not Applicable to this Patient    Planned Readmission: No     Discharge Statement:  I spent 40 minutes discharging the patient  This time was spent on the day of discharge  I had direct contact with the patient on the day of discharge  Greater than 50% of the total time was spent examining patient, answering all patient questions, arranging and discussing plan of care with patient as well as directly providing post-discharge instructions  Additional time then spent on discharge activities  Discharge Medications:  See after visit summary for reconciled discharge medications provided to patient and family        ** Please Note: This note has been constructed using a voice recognition system **

## 2020-02-25 ENCOUNTER — TELEPHONE (OUTPATIENT)
Dept: FAMILY MEDICINE CLINIC | Facility: CLINIC | Age: 75
End: 2020-02-25

## 2020-03-03 ENCOUNTER — OFFICE VISIT (OUTPATIENT)
Dept: FAMILY MEDICINE CLINIC | Facility: CLINIC | Age: 75
End: 2020-03-03
Payer: MEDICARE

## 2020-03-03 VITALS
WEIGHT: 128 LBS | RESPIRATION RATE: 16 BRPM | OXYGEN SATURATION: 92 % | DIASTOLIC BLOOD PRESSURE: 64 MMHG | HEIGHT: 62 IN | BODY MASS INDEX: 23.55 KG/M2 | HEART RATE: 56 BPM | SYSTOLIC BLOOD PRESSURE: 110 MMHG

## 2020-03-03 DIAGNOSIS — G30.1 LATE ONSET ALZHEIMER'S DISEASE WITH BEHAVIORAL DISTURBANCE (HCC): ICD-10-CM

## 2020-03-03 DIAGNOSIS — F02.81 LATE ONSET ALZHEIMER'S DISEASE WITH BEHAVIORAL DISTURBANCE (HCC): ICD-10-CM

## 2020-03-03 DIAGNOSIS — R74.01 TRANSAMINITIS: ICD-10-CM

## 2020-03-03 DIAGNOSIS — N18.30 CHRONIC KIDNEY DISEASE, STAGE III (MODERATE) (HCC): Chronic | ICD-10-CM

## 2020-03-03 DIAGNOSIS — N17.9 ACUTE KIDNEY INJURY (HCC): ICD-10-CM

## 2020-03-03 DIAGNOSIS — R53.1 WEAKNESS: ICD-10-CM

## 2020-03-03 DIAGNOSIS — A41.9 SEPTIC SHOCK (HCC): Primary | ICD-10-CM

## 2020-03-03 DIAGNOSIS — R65.21 SEPTIC SHOCK (HCC): Primary | ICD-10-CM

## 2020-03-03 DIAGNOSIS — E89.0 POSTOPERATIVE HYPOTHYROIDISM: Chronic | ICD-10-CM

## 2020-03-03 DIAGNOSIS — D51.0 PERNICIOUS ANEMIA: ICD-10-CM

## 2020-03-03 PROBLEM — R11.10 VOMITING AND DIARRHEA: Status: RESOLVED | Noted: 2020-02-13 | Resolved: 2020-03-03

## 2020-03-03 PROBLEM — E87.2 HIGH ANION GAP METABOLIC ACIDOSIS: Status: RESOLVED | Noted: 2020-02-13 | Resolved: 2020-03-03

## 2020-03-03 PROBLEM — R19.7 VOMITING AND DIARRHEA: Status: RESOLVED | Noted: 2020-02-13 | Resolved: 2020-03-03

## 2020-03-03 PROBLEM — E87.29 HIGH ANION GAP METABOLIC ACIDOSIS: Status: RESOLVED | Noted: 2020-02-13 | Resolved: 2020-03-03

## 2020-03-03 PROCEDURE — 96372 THER/PROPH/DIAG INJ SC/IM: CPT

## 2020-03-03 PROCEDURE — 99495 TRANSJ CARE MGMT MOD F2F 14D: CPT | Performed by: FAMILY MEDICINE

## 2020-03-03 RX ORDER — CYANOCOBALAMIN 1000 UG/ML
1000 INJECTION INTRAMUSCULAR; SUBCUTANEOUS
Status: SHIPPED | OUTPATIENT
Start: 2020-03-03

## 2020-03-03 RX ADMIN — CYANOCOBALAMIN 1000 MCG: 1000 INJECTION INTRAMUSCULAR; SUBCUTANEOUS at 10:10

## 2020-03-03 NOTE — PATIENT INSTRUCTIONS
We reviewed hospital stay, she appears to be getting back to her baseline  She will be doing OT/ PT  Has home nursing  Continue with caregiver twice weekly  Inpatient creatinine had risen to 1 9, returned to baseline by discharge  Baseline 0 9 to 1 2  Inpatient LFT was elevated, at discharge , AST 61    CT A/P was ok -   CT chest stable back in Jan   Inpatient levothyroxine was adjusted, changed from 88 mcg daily to 112 daily, slip given to redo LFT/CMP/ TSH 3 weeks  History pernicious anemia, B12 injection given here today  Should continue every 6 to 8 weeks  Continue with other medication as is, atorvastatin was held due to elevated LFT, consider restarting in the future  She will have follow-up with Endocrinology, also to see Geriatrics as before  She has appointment here next week for annual wellness visit only, can keep that

## 2020-03-03 NOTE — PROGRESS NOTES
FAMILY PRACTICE OFFICE VISIT  Kan العلي O  [facility]  [address]  [address] [place], [place], [address]      NAME: Evens Rizzo  AGE: 66 y o  SEX: female  : 1945   MRN: [id number]    DATE: 3/3/2020  TIME: 4:35 PM    Assessment and Plan     Problem List Items Addressed This Visit        Endocrine    Postoperative hypothyroidism (Chronic)    Relevant Orders    TSH, 3rd generation       Nervous and Auditory    Late onset Alzheimer's disease with behavioral disturbance (HCC)       Genitourinary    Acute kidney injury ([facility] )    Chronic kidney disease, stage III (moderate) (HCC) (Chronic)       Other    Pernicious anemia    Relevant Medications    cyanocobalamin injection 1,000 mcg    Transaminitis    Relevant Orders    Comprehensive metabolic panel    Weakness      Other Visit Diagnoses     Septic shock ([facility] ) -  resolved    -  Primary          Patient Instructions     We reviewed hospital stay, she appears to be getting back to her baseline  She will be doing OT/ PT  Has home nursing  Continue with caregiver twice weekly  Inpatient creatinine had risen to 1 9, returned to baseline by discharge  Baseline 0 9 to 1 2  Inpatient LFT was elevated, at discharge , AST 61    CT A/P was ok -   CT chest stable back in    Inpatient levothyroxine was adjusted, changed from 88 mcg daily to 112 daily, slip given to redo LFT/CMP/ TSH 3 weeks  History pernicious anemia, B12 injection given here today  Should continue every 6 to 8 weeks  Continue with other medication as is, atorvastatin was held due to elevated LFT, consider restarting in the future  She will have follow-up with Endocrinology, also to see Geriatrics as before  She has appointment here next week for annual wellness visit only, can keep that        Chief Complaint     Chief Complaint   Patient presents with    Transition of Care Management     TCM Call (since 2/1/2020)     Date and time call was made  2/19/2020 11:11 AM    Hospital care reviewed  Records reviewed    Patient was hospitialized at  [facility] [address]    Date of Admission  02/13/20    Date of discharge  02/19/20    Diagnosis  Septic shock     Disposition  Home    Were the patients medications reviewed and updated  No    Current Symptoms  None      TCM Call (since 2/1/2020)     Post hospital issues  None    Should patient be enrolled in anticoag monitoring? Yes <img src='C:FILES (X86)    Scheduled for follow up? Yes    Did you obtain your prescribed medications  Yes    Do you need help managing your prescriptions or medications  No    Is transportation to your appointment needed  No    I have advised the patient to call PCP with any new or worsening symptoms  deysi yeboah ma    Living Arrangements  Spouse or Significiant other    Comments  pt scheduled with dr Sheng Car 2/27        History of Present Illness   Shree Lane is a 66y o -year-old female who Is in today for a post hospital check, she was admitted February 13th through 19th at [facility] with nausea/vomiting, septic shock  Symptoms slowly resolved, she was stable for discharge, returned home where she continues to reside with her daughter  Does have home nursing, OT/ PT  Also has caregiver who comes in for showering 2 times weekly  Sleeping in her own room  Continues to see Geriatrics  No new behavioral issues  She is using medication as directed, inpatient Levoxyl dose changed from 88 mcg daily to 112 daily  She last saw Endocrinology months ago  Inpatient liver function testing was elevated, was improving  Statin was held  Inpatient CT abdomen /pelvis was unremarkable  No recent new respiratory issues, had CT scanning back in January which showed stable nodules, COPD  No further hemoptysis  She did not want to use nicotine patch, daughter supplies 2 cigarettes daily        Review of Systems   Review of Systems Constitutional: Positive for fatigue (Chronic baseline)  Negative for appetite change, fever and unexpected weight change  HENT: Negative for sore throat and trouble swallowing  Respiratory: Positive for cough (Chronic, no hemoptysis) and shortness of breath (Chronic mild dyspnea on exertion)  Negative for chest tightness  Cardiovascular: Negative for chest pain, palpitations and leg swelling  Gastrointestinal: Negative for abdominal pain, blood in stool, nausea and vomiting  No acid reflux     No change in bowel   Genitourinary: Negative for dysuria and hematuria  Neurological: Positive for syncope, light-headedness (Occasional long-term) and headaches (No worse than baseline)  Negative for dizziness  Psychiatric/Behavioral:        No increased agitation         Active Problem List     Patient Active Problem List   Diagnosis    Sinus bradycardia    Postoperative hypothyroidism    Essential hypertension    Urinary incontinence    Chronic kidney disease, stage III (moderate) ([facility])    Umbilical hernia    Trigger middle finger of right hand    Current smoker    Thyroid nodule    Pernicious anemia    Osteoarthritis    Neuropathy of both feet    Low back pain radiating to left lower extremity    Leukocytosis    Lacunar stroke (HCC)    Hyperglycemia    Hypercholesterolemia    Graves' disease    Gait disturbance    Dizziness    Disc degeneration, lumbar    Late onset Alzheimer's disease with behavioral disturbance (HCC)    Cough, chronic    Hearing loss    Major depressive disorder with single episode    Visual impairment    Full dentures    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HCC)    Weakness    Fall    Anemia    Acute kidney injury ([facility] )    Transaminitis    Hypotension, unspecified    Hypocalcemia    Bradycardia       Past Medical History:  Reviewed    Past Surgical History:  Reviewed    Family History:  Reviewed    Social History:  Reviewed    Objective     Vitals:    03/03/20 6539 BP: 110/64   Pulse: 56   Resp: 16   SpO2: 92%   Weight: 58 1 kg (128 lb)   Height: 5' 2" (1 575 m)     Body mass index is 23 41 kg/m²  BP Readings from Last 3 Encounters:   03/03/20 110/64   02/19/20 144/73   02/13/20 92/56       Wt Readings from Last 3 Encounters:   03/03/20 58 1 kg (128 lb)   02/19/20 71 8 kg (158 lb 4 6 oz)   02/13/20 59 2 kg (130 lb 8 oz)       Physical Exam   Constitutional:   Pleasant female seated on table, looks fairly well here today, alert, well-kept, no acute distress  Hard of hearing as before  Eyes: Conjunctivae are normal  No scleral icterus  Cardiovascular: Normal rate and regular rhythm  Murmur (1/6 systolic ejection murmur, occasional ectopic) heard  Pulmonary/Chest: Effort normal and breath sounds normal  No respiratory distress  She has no wheezes  She has no rales  Abdominal: Soft  She exhibits no distension  There is no tenderness  There is no guarding  Musculoskeletal: She exhibits no edema  Lymphadenopathy:     She has no cervical adenopathy  Neurological: She is alert  Psychiatric: She has a normal mood and affect  ALLERGIES:  Allergies   Allergen Reactions    Naproxen Itching    Niacin     Nsaids Other (See Comments) and Swelling    Azithromycin Nausea Only and Rash    Cephalexin Rash       Current Medications     Current Outpatient Medications   Medication Sig Dispense Refill    alendronate (FOSAMAX) 70 mg tablet       aspirin 81 MG tablet Take 81 mg by mouth daily   bisacodyl (DULCOLAX) 5 mg EC tablet Take 1 tablet (5 mg total) by mouth daily as needed for constipation 30 tablet 0    calcium-vitamin D (OSCAL) 250-125 MG-UNIT per tablet Take 1 tablet by mouth daily        citalopram (CeleXA) 10 mg tablet take 1 tablet (10 MG TOTAL) by mouth daily 90 tablet 1    donepezil (ARICEPT) 10 mg tablet Take 1 tablet (10 mg total) by mouth daily at bedtime 30 tablet 5    ergocalciferol (VITAMIN D2) 50,000 units   0    levothyroxine 112 mcg tablet Take 1 tablet (112 mcg total) by mouth daily in the early morning 30 tablet 0    lisinopril (ZESTRIL) 20 mg tablet Take 1 tablet (20 mg total) by mouth daily 90 tablet 3    metoprolol succinate (TOPROL-XL) 50 mg 24 hr tablet Take 50 mg by mouth daily      niacin (NIASPAN) 1000 MG CR tablet Take 2 tablets (2,000 mg total) by mouth daily  0    cyanocobalamin 1,000 mcg/mL Injection Q 6-8 weeks re PA (Patient not taking: Reported on 3/3/2020) 1 mL      Current Facility-Administered Medications   Medication Dose Route Frequency Provider Last Rate Last Dose    cyanocobalamin injection 1,000 mcg  1,000 mcg Intramuscular Q56 Days Zenobia Mock DO   1,000 mcg at 03/03/20 1010            Orders Placed This Encounter   Procedures    Comprehensive metabolic panel    TSH, 3rd generation         Zenobia Mock DO

## 2020-03-13 ENCOUNTER — OFFICE VISIT (OUTPATIENT)
Dept: FAMILY MEDICINE CLINIC | Facility: CLINIC | Age: 75
End: 2020-03-13
Payer: MEDICARE

## 2020-03-13 VITALS
WEIGHT: 125.6 LBS | RESPIRATION RATE: 18 BRPM | HEIGHT: 62 IN | DIASTOLIC BLOOD PRESSURE: 70 MMHG | HEART RATE: 70 BPM | SYSTOLIC BLOOD PRESSURE: 120 MMHG | OXYGEN SATURATION: 91 % | BODY MASS INDEX: 23.11 KG/M2

## 2020-03-13 DIAGNOSIS — Z12.31 SCREENING MAMMOGRAM, ENCOUNTER FOR: ICD-10-CM

## 2020-03-13 DIAGNOSIS — Z00.00 MEDICARE ANNUAL WELLNESS VISIT, SUBSEQUENT: Primary | ICD-10-CM

## 2020-03-13 PROBLEM — N17.9 ACUTE KIDNEY INJURY (HCC): Status: RESOLVED | Noted: 2020-02-13 | Resolved: 2020-03-13

## 2020-03-13 PROCEDURE — 1123F ACP DISCUSS/DSCN MKR DOCD: CPT | Performed by: FAMILY MEDICINE

## 2020-03-13 PROCEDURE — 3008F BODY MASS INDEX DOCD: CPT | Performed by: FAMILY MEDICINE

## 2020-03-13 PROCEDURE — 3078F DIAST BP <80 MM HG: CPT | Performed by: FAMILY MEDICINE

## 2020-03-13 PROCEDURE — 4040F PNEUMOC VAC/ADMIN/RCVD: CPT | Performed by: FAMILY MEDICINE

## 2020-03-13 PROCEDURE — 3074F SYST BP LT 130 MM HG: CPT | Performed by: FAMILY MEDICINE

## 2020-03-13 PROCEDURE — G0438 PPPS, INITIAL VISIT: HCPCS | Performed by: FAMILY MEDICINE

## 2020-03-13 PROCEDURE — 1111F DSCHRG MED/CURRENT MED MERGE: CPT | Performed by: FAMILY MEDICINE

## 2020-03-13 PROCEDURE — 1160F RVW MEDS BY RX/DR IN RCRD: CPT | Performed by: FAMILY MEDICINE

## 2020-03-13 PROCEDURE — 1170F FXNL STATUS ASSESSED: CPT | Performed by: FAMILY MEDICINE

## 2020-03-13 PROCEDURE — 1125F AMNT PAIN NOTED PAIN PRSNT: CPT | Performed by: FAMILY MEDICINE

## 2020-03-13 NOTE — PROGRESS NOTES
Assessment and Plan:     Problem List Items Addressed This Visit     None      Visit Diagnoses     Medicare annual wellness visit, subsequent    -  Primary    Screening mammogram, encounter for        Relevant Orders    Mammo screening bilateral w 3d & cad           Preventive health issues were discussed with patient, and age appropriate screening tests were ordered as noted in patient's After Visit Summary  Personalized health advice and appropriate referrals for health education or preventive services given if needed, as noted in patient's After Visit Summary      See other note today regarding provider information     History of Present Illness:     Patient presents for Medicare Annual Wellness visit    Patient Care Team:  Tasneem Barkley DO as PCP - Karrie Varghese MD     Problem List:     Patient Active Problem List   Diagnosis    Sinus bradycardia    Postoperative hypothyroidism    Essential hypertension    Urinary incontinence    Chronic kidney disease, stage III (moderate) ([facility] )    Umbilical hernia    Trigger middle finger of right hand    Current smoker    Thyroid nodule    Pernicious anemia    Osteoarthritis    Neuropathy of both feet    Low back pain radiating to left lower extremity    Leukocytosis    Lacunar stroke ([address] )    Hyperglycemia    Hypercholesterolemia    Graves' disease    Gait disturbance    Dizziness    Disc degeneration, lumbar    Late onset Alzheimer's disease with behavioral disturbance (HCC)    Cough, chronic -  improved    Hearing loss    Major depressive disorder with single episode    Visual impairment    Full dentures    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HCC)    Weakness    Fall    Anemia    Transaminitis    Hypotension, unspecified    Hypocalcemia    Bradycardia      Past Medical and Surgical History:     Past Medical History:   Diagnosis Date    Acute kidney injury (LAYLA) with acute tubular necrosis (ATN) ([facility] ) 2/13/2020    Bed bug bite     LAST ASSESSED [id number]    Disease of thyroid gland     Graves' disease     Hyperlipidemia     Hypertension     LLL pneumonia ([address] )     LAST ASSESSED [id number]    Migraine 2001    OCULAR    Scabies     LAST ASSESSED [id number]    Syncope and collapse     LAST ASSESSED [id number]    Vertigo     RESOLVED      Past Surgical History:   Procedure Laterality Date    CHOLECYSTECTOMY      HYSTERECTOMY      TOTAL THYROIDECTOMY      TUBAL LIGATION Bilateral       Family History:     Family History   Problem Relation Age of Onset    Breast cancer Sister     Diabetes Family       Social History:        Social History     Socioeconomic History    Marital status:       Spouse name: None    Number of children: None    Years of education: None    Highest education level: None   Occupational History    None   Social Needs    Financial resource strain: None    Food insecurity:     Worry: None     Inability: None    Transportation needs:     Medical: None     Non-medical: None   Tobacco Use    Smoking status: Current Every Day Smoker     Packs/day: 0 50    Smokeless tobacco: Never Used   Substance and Sexual Activity    Alcohol use: Never     Binge frequency: Never    Drug use: No    Sexual activity: Not Currently   Lifestyle    Physical activity:     Days per week: None     Minutes per session: None    Stress: None   Relationships    Social connections:     Talks on phone: None     Gets together: None     Attends Buddhism service: None     Active member of club or organization: None     Attends meetings of clubs or organizations: None     Relationship status: None    Intimate partner violence:     Fear of current or ex partner: None     Emotionally abused: None     Physically abused: None     Forced sexual activity: None   Other Topics Concern    None   Social History Narrative    COPY OF ADVANCED DIRECTIVE OBTAINED FROM PATIENT       Medications and Allergies:     Current Outpatient Medications   Medication Sig Dispense Refill    alendronate (FOSAMAX) 70 mg tablet       aspirin 81 MG tablet Take 81 mg by mouth daily   bisacodyl (DULCOLAX) 5 mg EC tablet Take 1 tablet (5 mg total) by mouth daily as needed for constipation 30 tablet 0    calcium-vitamin D (OSCAL) 250-125 MG-UNIT per tablet Take 1 tablet by mouth daily        citalopram (CeleXA) 10 mg tablet take 1 tablet (10 MG TOTAL) by mouth daily 90 tablet 1    cyanocobalamin 1,000 mcg/mL Injection Q 6-8 weeks re PA 1 mL     donepezil (ARICEPT) 10 mg tablet Take 1 tablet (10 mg total) by mouth daily at bedtime 30 tablet 5    ergocalciferol (VITAMIN D2) 50,000 units   0    levothyroxine 112 mcg tablet Take 1 tablet (112 mcg total) by mouth daily in the early morning 30 tablet 0    lisinopril (ZESTRIL) 20 mg tablet Take 1 tablet (20 mg total) by mouth daily 90 tablet 3    metoprolol succinate (TOPROL-XL) 50 mg 24 hr tablet Take 50 mg by mouth daily      niacin (NIASPAN) 1000 MG CR tablet Take 2 tablets (2,000 mg total) by mouth daily  0     Current Facility-Administered Medications   Medication Dose Route Frequency Provider Last Rate Last Dose    cyanocobalamin injection 1,000 mcg  1,000 mcg Intramuscular Q56 Days Louie Evansagi, DO   1,000 mcg at 03/03/20 1010     Allergies   Allergen Reactions    Naproxen Itching    Niacin     Nsaids Other (See Comments) and Swelling    Azithromycin Nausea Only and Rash    Cephalexin Rash      Immunizations:     Immunization History   Administered Date(s) Administered    Influenza Split High Dose Preservative Free IM 09/18/2012, 11/14/2013, 09/25/2014, 09/08/2015, 08/29/2017    Influenza TIV (IM) 09/27/2011    Influenza, high dose seasonal 0 5 mL 09/12/2019    Pneumococcal Conjugate 13-Valent 06/20/2017    Pneumococcal Polysaccharide PPV23 01/16/2009    Td (adult), adsorbed 09/14/2007    influenza, trivalent, adjuvanted 09/30/2018      Health Maintenance:         Topic Date Due    Hepatitis C Screening Completed         Topic Date Due    DTaP,Tdap,and Td Vaccines (1 - Tdap) 09/18/1956      Medicare Health Risk Assessment:     /70   Pulse 70   Resp 18   Ht 5' 2" (1 575 m)   Wt 57 kg (125 lb 9 6 oz)   SpO2 91%   BMI 22 97 kg/m²      James Orr is here for her Subsequent Wellness visit  Health Risk Assessment:   Patient rates overall health as fair  Patient feels that their physical health rating is slightly worse  Eyesight was rated as slightly worse  Hearing was rated as same  Patient feels that their emotional and mental health rating is same  Pain experienced in the last 7 days has been some  Patient's pain rating has been 3/10  Patient states that she has experienced weight loss or gain in last 6 months  Fall Risk Screening: In the past year, patient has experienced: no history of falling in past year      Urinary Incontinence Screening:   Patient has leaked urine accidently in the last six months  Home Safety:  Patient does not have trouble with stairs inside or outside of their home  Patient has working smoke alarms and has working carbon monoxide detector  Home safety hazards include: none  Nutrition:   Current diet is Regular  Medications:   Patient is currently taking over-the-counter supplements  OTC medications include: see medication list  Patient is able to manage medications  Activities of Daily Living (ADLs)/Instrumental Activities of Daily Living (IADLs):   Walk and transfer into and out of bed and chair?: Yes  Dress and groom yourself?: Yes    Bathe or shower yourself?: Yes    Feed yourself?  Yes  Do your laundry/housekeeping?: No  Manage your money, pay your bills and track your expenses?: Yes  Make your own meals?: Yes    Do your own shopping?: Yes    Previous Hospitalizations:   Any hospitalizations or ED visits within the last 12 months?: Yes    How many hospitalizations have you had in the last year?: 1-2    Hospitalization Comments: Dehydration and stomach ulcers    Advance Care Planning:   Living will: Yes    Durable POA for healthcare:  Yes    Advanced directive: Yes      PREVENTIVE SCREENINGS      Cardiovascular Screening:    General: Screening Not Indicated and History Lipid Disorder      Diabetes Screening:     General: Screening Current      Cervical Cancer Screening:    General: Screening Not Indicated      Lung Cancer Screening:     General: Screening Not Indicated      Hepatitis C Screening:    General: Screening Current      Isela Ceballos DO

## 2020-03-13 NOTE — PATIENT INSTRUCTIONS
Reviewed health history along with meds, since I saw her March 3rd after hospital stay she has been stable  Continue with therapy, OT / PT/ home nurse, aide  2 times a week  She will be re-doing blood work, CMP /TSH, at the end of this month  As in prior note her levothyroxine was changed from 88 mcg daily to 112 daily inpatient  Await redo  She will be seeing Endocrinology again May 4th  Also on alendronate via Endo  Blood pressure acceptable here today, 120/70  Stay on metoprolol succinate 50 mg daily, lisinopril 20 mg daily as is  We did review previous blood work,   From hospital, baseline creatinine 0 9 to 1 2  Inpatient CT abdomen /pelvis was okay  Did have elevated LFTs, await redo  If liver function normalizes we will plan to restart Atorvastatin / Lipitor  She does have intention tremor right hand by history, mild here today  Observe  She will be seeing Geriatrics April 22nd  Continue donepezil 10 mg daily  Also on citalopram 10 mg daily  Immunization History   Administered Date(s) Administered    Influenza Split High Dose Preservative Free IM 09/18/2012, 11/14/2013, 09/25/2014, 09/08/2015, 08/29/2017    Influenza TIV (IM) 09/27/2011    Influenza, high dose seasonal 0 5 mL 09/12/2019    Pneumococcal Conjugate 13-Valent 06/20/2017    Pneumococcal Polysaccharide PPV23 01/16/2009    Td (adult), adsorbed 09/14/2007    influenza, trivalent, adjuvanted 09/30/2018     Discussed Vaccines,    Prevnar  is up to date   Pneumovax  is up to date  She does do yearly Flu shot  Tdap/tetanus shot will be done at a future date  (done every 10 yrs for superficial cuts, every 5 yrs for deep wounds)   Can also look into coverage for new shingles shot, Shingrix  Can do that at pharmacy  Long-term smoker, currently down to 4 to 5 cigarettes daily, well aware risk associated with smoking  Has been unable to quit    Continue to try and supplied less cigarettes daily until she is off     She does not see her Gynecologist routinely  not indicated  Mammogram screening was discussed, is   ordered  Last mammogram 2016, can call for slip if changes mind  Does have family history breast cancer     We discussed end of life planning, she does have a  "LIVING WILL"     Glaucoma screening is up-to-date-She did receive new glasses recently  Discussed importance of healthy diet  We will see her back in 4 months, sooner as needed  - also return for B12 shot 6 weeks, should continue to do B12 injection every 6 to 8 weeks

## 2020-03-13 NOTE — PROGRESS NOTES
Shady PINEDO  [facility]  [facility]  [address]  [address] [place], [place], [address]     MEDICARE SUBSEQUENT VISIT NOTE only      NAME: Madelyn Cloud  AGE: 66 y o  SEX: female  : 1945   MRN: [id number]    DATE: 3/13/2020  TIME: 11:48 AM    Assessment and Plan     Problem List Items Addressed This Visit     None      Visit Diagnoses     Medicare annual wellness visit, subsequent    -  Primary    Screening mammogram, encounter for        Relevant Orders    Mammo screening bilateral w 3d & cad          Medicare Wellness Counseling/ Discussion    Patient Instructions       Reviewed health history along with meds, since I saw her  after hospital stay she has been stable  Continue with therapy, OT / PT/ home nurse, aide  2 times a week  She will be re-doing blood work, CMP /TSH, at the end of this month  As in prior note her levothyroxine was changed from 88 mcg daily to 112 daily inpatient  Await redo  She will be seeing Endocrinology again May 4th  Also on alendronate via Endo  Blood pressure acceptable here today, 120/70  Stay on metoprolol succinate 50 mg daily, lisinopril 20 mg daily as is  We did review previous blood work,   From hospital, baseline creatinine 0 9 to 1 2  Inpatient CT abdomen /pelvis was okay  Did have elevated LFTs, await redo  If liver function normalizes we will plan to restart Atorvastatin / Lipitor  She does have intention tremor right hand by history, mild here today  Observe  She will be seeing Geriatrics   Continue donepezil 10 mg daily  Also on citalopram 10 mg daily      Immunization History   Administered Date(s) Administered    Influenza Split High Dose Preservative Free IM 2012, 2013, 2014, 2015, 2017    Influenza TIV (IM) 2011    Influenza, high dose seasonal 0 5 mL 2019    Pneumococcal Conjugate 13-Valent 2017    Pneumococcal Polysaccharide PPV23 01/16/2009    Td (adult), adsorbed 09/14/2007    influenza, trivalent, adjuvanted 09/30/2018     Discussed Vaccines,    Prevnar  is up to date   Pneumovax  is up to date  She does do yearly Flu shot  Tdap/tetanus shot will be done at a future date  (done every 10 yrs for superficial cuts, every 5 yrs for deep wounds)   Can also look into coverage for new shingles shot, Shingrix  Can do that at pharmacy  Long-term smoker, currently down to 4 to 5 cigarettes daily, well aware risk associated with smoking  Has been unable to quit  Continue to try and supplied less cigarettes daily until she is off  She does not see her Gynecologist routinely  not indicated  Mammogram screening was discussed, is   ordered  Last mammogram 2016, can call for slip if changes mind  Does have family history breast cancer     We discussed end of life planning, she does have a  "LIVING WILL"     Glaucoma screening is up-to-date-She did receive new glasses recently  Discussed importance of healthy diet  We will see her back in 4 months, sooner as needed  - also return for B12 shot 6 weeks, should continue to do B12 injection every 6 to 8 weeks  Chief Complaint     Chief Complaint   Patient presents with    Medicare Wellness Visit       History of Present Illness     Maribell Iniguez is a 66y o -year-old female who is in today for an annual wellness check, I had seen her recently for transition of care visit after hospital stay  She is in today accompanied by her daughter with whom she resides, has been doing okay  Is doing therapy, has home nursing, has home aide  Is using medication as directed  Review of Systems     Review of Systems   Constitutional: Positive for fatigue (Chronic) and unexpected weight change (Has continued with slow weight loss)  Negative for appetite change and fever  HENT: Negative for sore throat and trouble swallowing      Respiratory: Negative for chest tightness  Chronic mild dyspnea with cough, no hemoptysis, smoking 4 to 5 cigarettes daily  Cardiovascular: Negative for chest pain, palpitations and leg swelling  Gastrointestinal: Negative for abdominal pain, blood in stool, nausea and vomiting  No acid reflux     No change in bowel   Genitourinary: Negative for dysuria and hematuria  Neurological: Negative for dizziness, syncope, light-headedness and headaches  Hematological: Does not bruise/bleed easily  Psychiatric/Behavioral: Positive for confusion         Active Problem List     Patient Active Problem List   Diagnosis    Sinus bradycardia    Postoperative hypothyroidism    Essential hypertension    Urinary incontinence    Chronic kidney disease, stage III (moderate) ([facility])    Umbilical hernia    Trigger middle finger of right hand    Current smoker    Thyroid nodule    Pernicious anemia    Osteoarthritis    Neuropathy of both feet    Low back pain radiating to left lower extremity    Leukocytosis    Lacunar stroke ([facility])    Hyperglycemia    Hypercholesterolemia    Graves' disease    Gait disturbance    Dizziness    Disc degeneration, lumbar    Late onset Alzheimer's disease with behavioral disturbance ([facility])    Cough, chronic -  improved    Hearing loss    Major depressive disorder with single episode    Visual impairment    Full dentures    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[facility])    Weakness    Fall    Anemia    Transaminitis    Hypotension, unspecified    Hypocalcemia    Bradycardia       Past Medical History:  Past Medical History:   Diagnosis Date    Acute kidney injury (LAYLA) with acute tubular necrosis (ATN) ([facility] ) 2/13/2020    Bed bug bite     LAST ASSESSED [id number]    Disease of thyroid gland     Graves' disease     Hyperlipidemia     Hypertension     LLL pneumonia ([facility] )     LAST ASSESSED [id number]    Migraine 2001    OCULAR    Scabies     LAST ASSESSED [id number]    Syncope and collapse LAST ASSESSED [id number]    Vertigo     RESOLVED        Past Surgical History:  Past Surgical History:   Procedure Laterality Date    CHOLECYSTECTOMY      HYSTERECTOMY      TOTAL THYROIDECTOMY      TUBAL LIGATION Bilateral        Family History:  Family History   Problem Relation Age of Onset    Breast cancer Sister     Diabetes Family        Social History:  Social History     Tobacco Use    Smoking status: Current Every Day Smoker     Packs/day: 0 50    Smokeless tobacco: Never Used   Substance Use Topics    Alcohol use: Never     Binge frequency: Never       Objective     Vitals:    03/13/20 1110   BP: 120/70   Pulse: 70   Resp: 18   SpO2: 91%   Weight: 57 kg (125 lb 9 6 oz)   Height: 5' 2" (1 575 m)     Body mass index is 22 97 kg/m²  BP Readings from Last 3 Encounters:   03/13/20 120/70   03/03/20 110/64   02/19/20 144/73       Wt Readings from Last 3 Encounters:   03/13/20 57 kg (125 lb 9 6 oz)   03/03/20 58 1 kg (128 lb)   02/19/20 71 8 kg (158 lb 4 6 oz)       Physical Exam   Constitutional:   Pleasant female seated on table, hard of hearing, answers short but mostly appropriate   HENT:   No obstructive wax, pharynx clear, neck is supple without mass   Cardiovascular:   Regular rate and rhythm, 1/6 systolic ejection murmur  Lungs are slightly decreased bilateral but clear   Abdominal: Soft  There is no tenderness  Musculoskeletal: She exhibits no edema  Neurological: She is alert  ALLERGIES:  Allergies   Allergen Reactions    Naproxen Itching    Niacin     Nsaids Other (See Comments) and Swelling    Azithromycin Nausea Only and Rash    Cephalexin Rash       Current Medications     Current Outpatient Medications   Medication Sig Dispense Refill    alendronate (FOSAMAX) 70 mg tablet       aspirin 81 MG tablet Take 81 mg by mouth daily        bisacodyl (DULCOLAX) 5 mg EC tablet Take 1 tablet (5 mg total) by mouth daily as needed for constipation 30 tablet 0    calcium-vitamin D (OSCAL) 250-125 MG-UNIT per tablet Take 1 tablet by mouth daily        citalopram (CeleXA) 10 mg tablet take 1 tablet (10 MG TOTAL) by mouth daily 90 tablet 1    cyanocobalamin 1,000 mcg/mL Injection Q 6-8 weeks re PA 1 mL     donepezil (ARICEPT) 10 mg tablet Take 1 tablet (10 mg total) by mouth daily at bedtime 30 tablet 5    ergocalciferol (VITAMIN D2) 50,000 units   0    levothyroxine 112 mcg tablet Take 1 tablet (112 mcg total) by mouth daily in the early morning 30 tablet 0    lisinopril (ZESTRIL) 20 mg tablet Take 1 tablet (20 mg total) by mouth daily 90 tablet 3    metoprolol succinate (TOPROL-XL) 50 mg 24 hr tablet Take 50 mg by mouth daily      niacin (NIASPAN) 1000 MG CR tablet Take 2 tablets (2,000 mg total) by mouth daily  0     Current Facility-Administered Medications   Medication Dose Route Frequency Provider Last Rate Last Dose    cyanocobalamin injection 1,000 mcg  1,000 mcg Intramuscular Q56 Days Savannah Gtz DO   1,000 mcg at 03/03/20 1010         Health Maintenance     See other note today re clinical AWV info             Most recent labs available from [address] [place] [address]   ( others may be available in Care Everywhere / Media sections)  Lab Results   Component Value Date    WBC 10 07 02/19/2020    HGB 10 2 (L) 02/19/2020    HCT 32 6 (L) 02/19/2020     (L) 02/19/2020    TRIG 121 06/19/2019    HDL 35 (L) 06/19/2019     (H) 02/19/2020    AST 61 (H) 02/19/2020     06/08/2015    K 3 6 02/19/2020     02/19/2020    CREATININE 0 82 02/19/2020    BUN 9 02/19/2020    CO2 31 02/19/2020    TSH 1 79 06/19/2019    INR 0 99 02/18/2020    GLUF 90 01/11/2018    HGBA1C 5 5 06/19/2019       Orders Placed This Encounter   Procedures    Mammo screening bilateral w 3d & cad             Savannah Gtz DO

## 2020-04-04 DIAGNOSIS — F32.A DEPRESSION, UNSPECIFIED DEPRESSION TYPE: ICD-10-CM

## 2020-04-09 DIAGNOSIS — F02.81 LATE ONSET ALZHEIMER'S DISEASE WITH BEHAVIORAL DISTURBANCE (HCC): ICD-10-CM

## 2020-04-09 DIAGNOSIS — G30.1 LATE ONSET ALZHEIMER'S DISEASE WITH BEHAVIORAL DISTURBANCE (HCC): ICD-10-CM

## 2020-04-09 RX ORDER — DONEPEZIL HYDROCHLORIDE 10 MG/1
10 TABLET, FILM COATED ORAL
Qty: 30 TABLET | Refills: 5 | Status: SHIPPED | OUTPATIENT
Start: 2020-04-09 | End: 2020-04-22 | Stop reason: SDUPTHER

## 2020-04-20 ENCOUNTER — TELEPHONE (OUTPATIENT)
Dept: FAMILY MEDICINE CLINIC | Facility: CLINIC | Age: 75
End: 2020-04-20

## 2020-04-20 ENCOUNTER — TELEMEDICINE (OUTPATIENT)
Dept: FAMILY MEDICINE CLINIC | Facility: CLINIC | Age: 75
End: 2020-04-20
Payer: MEDICARE

## 2020-04-20 VITALS — HEIGHT: 62 IN | WEIGHT: 128 LBS | BODY MASS INDEX: 23.55 KG/M2 | TEMPERATURE: 98.6 F

## 2020-04-20 DIAGNOSIS — M79.605 LOW BACK PAIN RADIATING TO LEFT LOWER EXTREMITY: ICD-10-CM

## 2020-04-20 DIAGNOSIS — R26.9 GAIT DISTURBANCE: ICD-10-CM

## 2020-04-20 DIAGNOSIS — G30.1 LATE ONSET ALZHEIMER'S DISEASE WITH BEHAVIORAL DISTURBANCE (HCC): ICD-10-CM

## 2020-04-20 DIAGNOSIS — R73.9 HYPERGLYCEMIA: ICD-10-CM

## 2020-04-20 DIAGNOSIS — M51.36 DISC DEGENERATION, LUMBAR: ICD-10-CM

## 2020-04-20 DIAGNOSIS — M54.50 LOW BACK PAIN RADIATING TO LEFT LOWER EXTREMITY: ICD-10-CM

## 2020-04-20 DIAGNOSIS — F02.81 LATE ONSET ALZHEIMER'S DISEASE WITH BEHAVIORAL DISTURBANCE (HCC): ICD-10-CM

## 2020-04-20 DIAGNOSIS — M79.652 PAIN OF LEFT THIGH: Primary | ICD-10-CM

## 2020-04-20 PROCEDURE — 99441 PR PHYS/QHP TELEPHONE EVALUATION 5-10 MIN: CPT | Performed by: FAMILY MEDICINE

## 2020-04-20 RX ORDER — PREDNISONE 10 MG/1
TABLET ORAL
Qty: 20 TABLET | Refills: 0 | Status: SHIPPED | OUTPATIENT
Start: 2020-04-20 | End: 2020-04-30 | Stop reason: ALTCHOICE

## 2020-04-22 ENCOUNTER — TELEMEDICINE (OUTPATIENT)
Dept: GERIATRICS | Age: 75
End: 2020-04-22
Payer: MEDICARE

## 2020-04-22 ENCOUNTER — TELEPHONE (OUTPATIENT)
Dept: FAMILY MEDICINE CLINIC | Facility: CLINIC | Age: 75
End: 2020-04-22

## 2020-04-22 VITALS — TEMPERATURE: 98.6 F

## 2020-04-22 DIAGNOSIS — G30.1 LATE ONSET ALZHEIMER'S DISEASE WITH BEHAVIORAL DISTURBANCE (HCC): Primary | ICD-10-CM

## 2020-04-22 DIAGNOSIS — F02.81 LATE ONSET ALZHEIMER'S DISEASE WITH BEHAVIORAL DISTURBANCE (HCC): Primary | ICD-10-CM

## 2020-04-22 DIAGNOSIS — H90.3 SENSORINEURAL HEARING LOSS (SNHL) OF BOTH EARS: ICD-10-CM

## 2020-04-22 DIAGNOSIS — R26.9 GAIT DISTURBANCE: ICD-10-CM

## 2020-04-22 DIAGNOSIS — F32.0 CURRENT MILD EPISODE OF MAJOR DEPRESSIVE DISORDER WITHOUT PRIOR EPISODE (HCC): ICD-10-CM

## 2020-04-22 LAB
ALBUMIN SERPL-MCNC: 4.1 G/DL (ref 3.6–5.1)
ALBUMIN/GLOB SERPL: 1.4 (CALC) (ref 1–2.5)
ALP SERPL-CCNC: 68 U/L (ref 37–153)
ALT SERPL-CCNC: 15 U/L (ref 6–29)
AST SERPL-CCNC: 20 U/L (ref 10–35)
BILIRUB SERPL-MCNC: 0.6 MG/DL (ref 0.2–1.2)
BUN SERPL-MCNC: 23 MG/DL (ref 7–25)
BUN/CREAT SERPL: 20 (CALC) (ref 6–22)
CALCIUM SERPL-MCNC: 9.3 MG/DL (ref 8.6–10.4)
CHLORIDE SERPL-SCNC: 99 MMOL/L (ref 98–110)
CO2 SERPL-SCNC: 31 MMOL/L (ref 20–32)
CREAT SERPL-MCNC: 1.15 MG/DL (ref 0.6–0.93)
GLOBULIN SER CALC-MCNC: 2.9 G/DL (CALC) (ref 1.9–3.7)
GLUCOSE SERPL-MCNC: 121 MG/DL (ref 65–99)
POTASSIUM SERPL-SCNC: 4.7 MMOL/L (ref 3.5–5.3)
PROT SERPL-MCNC: 7 G/DL (ref 6.1–8.1)
SL AMB EGFR AFRICAN AMERICAN: 54 ML/MIN/1.73M2
SL AMB EGFR NON AFRICAN AMERICAN: 46 ML/MIN/1.73M2
SODIUM SERPL-SCNC: 138 MMOL/L (ref 135–146)
TSH SERPL-ACNC: 2.2 MIU/L (ref 0.4–4.5)

## 2020-04-22 PROCEDURE — 99214 OFFICE O/P EST MOD 30 MIN: CPT | Performed by: FAMILY MEDICINE

## 2020-04-22 RX ORDER — DONEPEZIL HYDROCHLORIDE 10 MG/1
10 TABLET, FILM COATED ORAL
Qty: 30 TABLET | Refills: 6 | Status: SHIPPED | OUTPATIENT
Start: 2020-04-22 | End: 2021-04-06 | Stop reason: SDUPTHER

## 2020-04-23 ENCOUNTER — TELEPHONE (OUTPATIENT)
Dept: GERIATRICS | Age: 75
End: 2020-04-23

## 2020-04-24 ENCOUNTER — CLINICAL SUPPORT (OUTPATIENT)
Dept: FAMILY MEDICINE CLINIC | Facility: CLINIC | Age: 75
End: 2020-04-24
Payer: MEDICARE

## 2020-04-24 ENCOUNTER — TELEPHONE (OUTPATIENT)
Dept: FAMILY MEDICINE CLINIC | Facility: CLINIC | Age: 75
End: 2020-04-24

## 2020-04-24 DIAGNOSIS — D51.0 PERNICIOUS ANEMIA: Primary | ICD-10-CM

## 2020-04-24 DIAGNOSIS — E53.8 VITAMIN B12 DEFICIENCY: ICD-10-CM

## 2020-04-24 PROCEDURE — 96372 THER/PROPH/DIAG INJ SC/IM: CPT

## 2020-04-24 RX ADMIN — CYANOCOBALAMIN 1000 MCG: 1000 INJECTION INTRAMUSCULAR; SUBCUTANEOUS at 10:03

## 2020-04-28 ENCOUNTER — TELEPHONE (OUTPATIENT)
Dept: FAMILY MEDICINE CLINIC | Facility: CLINIC | Age: 75
End: 2020-04-28

## 2020-04-28 ENCOUNTER — APPOINTMENT (OUTPATIENT)
Dept: RADIOLOGY | Facility: MEDICAL CENTER | Age: 75
End: 2020-04-28
Payer: MEDICARE

## 2020-04-28 DIAGNOSIS — M79.605 LOW BACK PAIN RADIATING TO LEFT LOWER EXTREMITY: ICD-10-CM

## 2020-04-28 DIAGNOSIS — M79.605 LEG PAIN, LEFT: ICD-10-CM

## 2020-04-28 DIAGNOSIS — M51.36 DISC DEGENERATION, LUMBAR: ICD-10-CM

## 2020-04-28 DIAGNOSIS — M54.50 LOW BACK PAIN RADIATING TO LEFT LOWER EXTREMITY: ICD-10-CM

## 2020-04-28 DIAGNOSIS — M79.605 LEG PAIN, LEFT: Primary | ICD-10-CM

## 2020-04-28 PROCEDURE — 72110 X-RAY EXAM L-2 SPINE 4/>VWS: CPT

## 2020-04-28 PROCEDURE — 73502 X-RAY EXAM HIP UNI 2-3 VIEWS: CPT

## 2020-04-30 ENCOUNTER — TELEPHONE (OUTPATIENT)
Dept: FAMILY MEDICINE CLINIC | Facility: CLINIC | Age: 75
End: 2020-04-30

## 2020-05-05 ENCOUNTER — OFFICE VISIT (OUTPATIENT)
Dept: FAMILY MEDICINE CLINIC | Facility: CLINIC | Age: 75
End: 2020-05-05
Payer: MEDICARE

## 2020-05-05 VITALS
OXYGEN SATURATION: 99 % | WEIGHT: 123 LBS | DIASTOLIC BLOOD PRESSURE: 68 MMHG | HEIGHT: 60 IN | BODY MASS INDEX: 24.15 KG/M2 | SYSTOLIC BLOOD PRESSURE: 128 MMHG | HEART RATE: 96 BPM | TEMPERATURE: 98.1 F | RESPIRATION RATE: 18 BRPM

## 2020-05-05 DIAGNOSIS — G30.1 LATE ONSET ALZHEIMER'S DISEASE WITH BEHAVIORAL DISTURBANCE (HCC): ICD-10-CM

## 2020-05-05 DIAGNOSIS — M79.605 LOW BACK PAIN RADIATING TO LEFT LOWER EXTREMITY: ICD-10-CM

## 2020-05-05 DIAGNOSIS — F02.81 LATE ONSET ALZHEIMER'S DISEASE WITH BEHAVIORAL DISTURBANCE (HCC): ICD-10-CM

## 2020-05-05 DIAGNOSIS — M51.36 DISC DEGENERATION, LUMBAR: ICD-10-CM

## 2020-05-05 DIAGNOSIS — M54.50 LOW BACK PAIN RADIATING TO LEFT LOWER EXTREMITY: ICD-10-CM

## 2020-05-05 DIAGNOSIS — M79.605 PAIN OF LEFT LOWER EXTREMITY: Primary | ICD-10-CM

## 2020-05-05 PROCEDURE — 3008F BODY MASS INDEX DOCD: CPT | Performed by: FAMILY MEDICINE

## 2020-05-05 PROCEDURE — 1160F RVW MEDS BY RX/DR IN RCRD: CPT | Performed by: FAMILY MEDICINE

## 2020-05-05 PROCEDURE — 3074F SYST BP LT 130 MM HG: CPT | Performed by: FAMILY MEDICINE

## 2020-05-05 PROCEDURE — 4004F PT TOBACCO SCREEN RCVD TLK: CPT | Performed by: FAMILY MEDICINE

## 2020-05-05 PROCEDURE — 99214 OFFICE O/P EST MOD 30 MIN: CPT | Performed by: FAMILY MEDICINE

## 2020-05-05 PROCEDURE — 3078F DIAST BP <80 MM HG: CPT | Performed by: FAMILY MEDICINE

## 2020-05-05 PROCEDURE — 4040F PNEUMOC VAC/ADMIN/RCVD: CPT | Performed by: FAMILY MEDICINE

## 2020-05-05 RX ORDER — GABAPENTIN 100 MG/1
CAPSULE ORAL
Qty: 90 CAPSULE | Refills: 0 | Status: SHIPPED | OUTPATIENT
Start: 2020-05-05 | End: 2020-05-28

## 2020-05-26 ENCOUNTER — TELEPHONE (OUTPATIENT)
Dept: FAMILY MEDICINE CLINIC | Facility: CLINIC | Age: 75
End: 2020-05-26

## 2020-05-27 DIAGNOSIS — M79.604 PAIN OF RIGHT LOWER EXTREMITY: Primary | ICD-10-CM

## 2020-05-28 DIAGNOSIS — M54.50 LOW BACK PAIN RADIATING TO LEFT LOWER EXTREMITY: ICD-10-CM

## 2020-05-28 DIAGNOSIS — M79.605 LOW BACK PAIN RADIATING TO LEFT LOWER EXTREMITY: ICD-10-CM

## 2020-05-28 DIAGNOSIS — M51.36 DISC DEGENERATION, LUMBAR: ICD-10-CM

## 2020-05-28 DIAGNOSIS — M79.605 PAIN OF LEFT LOWER EXTREMITY: ICD-10-CM

## 2020-05-28 RX ORDER — GABAPENTIN 100 MG/1
100 CAPSULE ORAL 3 TIMES DAILY
Qty: 90 CAPSULE | Refills: 0 | Status: SHIPPED | OUTPATIENT
Start: 2020-05-28 | End: 2020-06-25

## 2020-06-01 ENCOUNTER — HOSPITAL ENCOUNTER (OUTPATIENT)
Dept: CT IMAGING | Facility: HOSPITAL | Age: 75
Discharge: HOME | End: 2020-06-01
Attending: FAMILY MEDICINE
Payer: MEDICARE

## 2020-06-01 ENCOUNTER — OFFICE VISIT (OUTPATIENT)
Dept: OBGYN CLINIC | Facility: MEDICAL CENTER | Age: 75
End: 2020-06-01
Payer: MEDICARE

## 2020-06-01 ENCOUNTER — APPOINTMENT (OUTPATIENT)
Dept: RADIOLOGY | Facility: MEDICAL CENTER | Age: 75
End: 2020-06-01
Payer: MEDICARE

## 2020-06-01 ENCOUNTER — TELEPHONE (OUTPATIENT)
Dept: OBGYN CLINIC | Facility: HOSPITAL | Age: 75
End: 2020-06-01

## 2020-06-01 VITALS
BODY MASS INDEX: 22.26 KG/M2 | SYSTOLIC BLOOD PRESSURE: 128 MMHG | HEIGHT: 62 IN | HEART RATE: 68 BPM | WEIGHT: 121 LBS | DIASTOLIC BLOOD PRESSURE: 79 MMHG

## 2020-06-01 DIAGNOSIS — M79.652 LEFT THIGH PAIN: Primary | ICD-10-CM

## 2020-06-01 DIAGNOSIS — M79.652 LEFT THIGH PAIN: ICD-10-CM

## 2020-06-01 DIAGNOSIS — M79.605 LEFT LEG PAIN: ICD-10-CM

## 2020-06-01 PROCEDURE — 72170 X-RAY EXAM OF PELVIS: CPT

## 2020-06-01 PROCEDURE — 73552 X-RAY EXAM OF FEMUR 2/>: CPT

## 2020-06-01 PROCEDURE — 3074F SYST BP LT 130 MM HG: CPT | Performed by: FAMILY MEDICINE

## 2020-06-01 PROCEDURE — 4004F PT TOBACCO SCREEN RCVD TLK: CPT | Performed by: FAMILY MEDICINE

## 2020-06-01 PROCEDURE — 1160F RVW MEDS BY RX/DR IN RCRD: CPT | Performed by: FAMILY MEDICINE

## 2020-06-01 PROCEDURE — 73700 CT LOWER EXTREMITY W/O DYE: CPT

## 2020-06-01 PROCEDURE — 99204 OFFICE O/P NEW MOD 45 MIN: CPT | Performed by: FAMILY MEDICINE

## 2020-06-01 PROCEDURE — 3078F DIAST BP <80 MM HG: CPT | Performed by: FAMILY MEDICINE

## 2020-06-01 PROCEDURE — 4040F PNEUMOC VAC/ADMIN/RCVD: CPT | Performed by: FAMILY MEDICINE

## 2020-06-02 ENCOUNTER — TELEPHONE (OUTPATIENT)
Dept: OBGYN CLINIC | Facility: OTHER | Age: 75
End: 2020-06-02

## 2020-06-10 NOTE — TELEPHONE ENCOUNTER
Patient is calling back  She would like to know what the next step would be  since the CT aragon did not show anything      #[phone]

## 2020-06-12 NOTE — TELEPHONE ENCOUNTER
Patient called back to speak to Dr Kelly Maier personally  I called the [facility] office & had talked to a MA, who told me to let her know he would call back as soon as he should  She also said she would relay the message  I told Joanne Castro that the doctor was in the room with a patient and would give the patient a call back as soon as he could    Best call back number is [phone]

## 2020-06-12 NOTE — TELEPHONE ENCOUNTER
I've tried contacting this patient and her daughter twice now to discuss next step which would be a trial of steroid injection into patient's hip joint to see if that can help her pain  The injection requires ultrasound probe in the groin region and underwear pulled away from the site  She will also be required to maintain stillness on the table for 20-30 minutes  If she is amenable to this when we can setup for a ultrasound guided steroid injection  If not, I still want her to come in for follow-up in 6 weeks after a physical therapy trial  She may also consider physical therapy first if has not done for her hip prior to attempting relief with procedure   thanks

## 2020-06-25 DIAGNOSIS — M79.605 LOW BACK PAIN RADIATING TO LEFT LOWER EXTREMITY: ICD-10-CM

## 2020-06-25 DIAGNOSIS — M54.50 LOW BACK PAIN RADIATING TO LEFT LOWER EXTREMITY: ICD-10-CM

## 2020-06-25 DIAGNOSIS — M51.36 DISC DEGENERATION, LUMBAR: ICD-10-CM

## 2020-06-25 DIAGNOSIS — M79.605 PAIN OF LEFT LOWER EXTREMITY: ICD-10-CM

## 2020-06-25 RX ORDER — GABAPENTIN 100 MG/1
100 CAPSULE ORAL 3 TIMES DAILY
Qty: 90 CAPSULE | Refills: 0 | Status: SHIPPED | OUTPATIENT
Start: 2020-06-25 | End: 2020-07-23

## 2020-06-25 RX ORDER — CLOTRIMAZOLE AND BETAMETHASONE DIPROPIONATE 10; .64 MG/G; MG/G
CREAM TOPICAL
COMMUNITY
Start: 2020-05-12 | End: 2021-03-26 | Stop reason: ALTCHOICE

## 2020-06-25 RX ORDER — CEPHALEXIN 500 MG/1
CAPSULE ORAL
COMMUNITY
Start: 2020-05-15 | End: 2020-06-26 | Stop reason: HOSPADM

## 2020-06-26 ENCOUNTER — OFFICE VISIT (OUTPATIENT)
Dept: OBGYN CLINIC | Facility: OTHER | Age: 75
End: 2020-06-26
Payer: MEDICARE

## 2020-06-26 VITALS
WEIGHT: 121 LBS | BODY MASS INDEX: 22.13 KG/M2 | DIASTOLIC BLOOD PRESSURE: 87 MMHG | HEART RATE: 71 BPM | SYSTOLIC BLOOD PRESSURE: 159 MMHG

## 2020-06-26 DIAGNOSIS — M25.552 PAIN IN LEFT HIP: Primary | ICD-10-CM

## 2020-06-26 PROCEDURE — 20611 DRAIN/INJ JOINT/BURSA W/US: CPT | Performed by: FAMILY MEDICINE

## 2020-06-26 PROCEDURE — 4040F PNEUMOC VAC/ADMIN/RCVD: CPT | Performed by: FAMILY MEDICINE

## 2020-06-26 PROCEDURE — 1160F RVW MEDS BY RX/DR IN RCRD: CPT | Performed by: FAMILY MEDICINE

## 2020-06-26 PROCEDURE — 99213 OFFICE O/P EST LOW 20 MIN: CPT | Performed by: FAMILY MEDICINE

## 2020-06-26 PROCEDURE — 3077F SYST BP >= 140 MM HG: CPT | Performed by: FAMILY MEDICINE

## 2020-06-26 PROCEDURE — 4004F PT TOBACCO SCREEN RCVD TLK: CPT | Performed by: FAMILY MEDICINE

## 2020-06-26 PROCEDURE — 3079F DIAST BP 80-89 MM HG: CPT | Performed by: FAMILY MEDICINE

## 2020-06-26 RX ORDER — LIDOCAINE HYDROCHLORIDE 10 MG/ML
5 INJECTION, SOLUTION INFILTRATION; PERINEURAL
Status: COMPLETED | OUTPATIENT
Start: 2020-06-26 | End: 2020-06-26

## 2020-06-26 RX ORDER — LIDOCAINE HYDROCHLORIDE 10 MG/ML
8 INJECTION, SOLUTION INFILTRATION; PERINEURAL
Status: COMPLETED | OUTPATIENT
Start: 2020-06-26 | End: 2020-06-26

## 2020-06-26 RX ORDER — TRIAMCINOLONE ACETONIDE 40 MG/ML
80 INJECTION, SUSPENSION INTRA-ARTICULAR; INTRAMUSCULAR
Status: COMPLETED | OUTPATIENT
Start: 2020-06-26 | End: 2020-06-26

## 2020-06-26 RX ADMIN — LIDOCAINE HYDROCHLORIDE 5 ML: 10 INJECTION, SOLUTION INFILTRATION; PERINEURAL at 08:48

## 2020-06-26 RX ADMIN — LIDOCAINE HYDROCHLORIDE 8 ML: 10 INJECTION, SOLUTION INFILTRATION; PERINEURAL at 08:48

## 2020-06-26 RX ADMIN — TRIAMCINOLONE ACETONIDE 80 MG: 40 INJECTION, SUSPENSION INTRA-ARTICULAR; INTRAMUSCULAR at 08:48

## 2020-07-01 ENCOUNTER — EVALUATION (OUTPATIENT)
Dept: PHYSICAL THERAPY | Facility: CLINIC | Age: 75
End: 2020-07-01
Payer: MEDICARE

## 2020-07-01 DIAGNOSIS — M25.552 PAIN IN LEFT HIP: ICD-10-CM

## 2020-07-01 DIAGNOSIS — M25.552 LEFT HIP PAIN: Primary | ICD-10-CM

## 2020-07-01 PROCEDURE — 97110 THERAPEUTIC EXERCISES: CPT | Performed by: PHYSICAL THERAPIST

## 2020-07-01 PROCEDURE — 97161 PT EVAL LOW COMPLEX 20 MIN: CPT | Performed by: PHYSICAL THERAPIST

## 2020-07-01 NOTE — PROGRESS NOTES
PT Evaluation     Today's date: 2020  Patient name: Jeff Conklin  : 1945  MRN: [id number]  Referring provider: Carmen Srinivasan  Dx:   Encounter Diagnosis     ICD-10-CM    1  Left hip pain M25 [id number]                   Assessment  Assessment details: Pt is a pleasant 66 y o  female presenting to outpatient physical therapy with Left hip pain  (primary encounter diagnosis)  Pt presents with pain, decreased range of motion, decreased strength, and decreased tolerance to activity  Patient is a poor/fair historian, with significant hearing deficits, difficulty coming to clear answers when investigating subjective history of present illness  Pt displays movement impairment diagnosis of left hip and pelvic girdle weakness  Pt is a good candidate for outpatient physical therapy and would benefit from skilled physical therapy to address limitations and to achieve goals  Thank you for this referral    Impairments: abnormal coordination, abnormal or restricted ROM, activity intolerance, impaired physical strength and pain with function  Understanding of Dx/Px/POC: good   Prognosis: good    Goals  ST  Patient will report 25% decrease in pain in 4 weeks  2  Patient will demonstrate 25% improvement in ROM in 4 weeks  3  Patient will demonstrate 1/2 grade improvement in strength in 4 weeks  LT  Patient will be able to perform IADLS without restriction or pain by discharge  2  Patient will be independent in HEP by discharge  3  Patient will be able to return to recreational/work duties without restriction or pain by discharge        Plan  Patient would benefit from: PT eval and skilled PT  Planned modality interventions: cryotherapy and thermotherapy: hydrocollator packs  Planned therapy interventions: IADL retraining, body mechanics training, flexibility, functional ROM exercises, home exercise program, neuromuscular re-education, manual therapy, postural training, strengthening, stretching, therapeutic activities, therapeutic exercise and joint mobilization  Frequency: 2x week  Duration in visits: 8  Duration in weeks: 4  Treatment plan discussed with: patient        Subjective Evaluation    History of Present Illness  Mechanism of injury: : Pt comes to therapy reporting chronic history of left hip pain (6+ months)  Pt reports she consulted orthopedic physician, had CT scan performed, and daughter reports she had injection performed recently  Denies relief from injection  Sx AGGS: stairs, walking, sit to stand transfers    Sx LOC: side, posterior aspects of L hip  Sx Description: aching, pulling    Sx EASES: medications    Follow ups: 4-6 weeks  Pain  Current pain ratin  At best pain ratin  At worst pain ratin    Patient Goals  Patient goals for therapy: decreased pain          Objective     Palpation     Additional Palpation Details  : Denies TTP    Passive Range of Motion   Left Hip   Flexion: WFL  Abduction: [place] [place]  External rotation (90/90): [place] [place]  Internal rotation (90/90): WFL    Right Hip   Flexion: WFL  Abduction: [place] [place]  External rotation (90/90): [place] [place]  Internal rotation (90/90): [place] [place]    Strength/Myotome Testing     Left Hip   Planes of Motion   Flexion: 4  Abduction: 4-  External rotation: 4-  Internal rotation: 4-    Right Hip   Planes of Motion   Flexion: 4  Abduction: 4  External rotation: 4-  Internal rotation: 4-    Left Knee   Flexion: 4-  Extension: 4-    Right Knee   Flexion: 4-  Extension: 4-    Tests     Additional Tests Details  :   Gait - Decreased weight bearing on left;  Lateral shift of trunk to right             Precautions: HTN    Daily Treatment Diary    Date             FOTO IE            Re-Eval IE               Manuals                                                        Neuro Re-Ed     Bridges  3"x10            Hip abd H/L 5"x10            Marches H/L x10 ea                                                                Ther Ex    Standing hip abd, ext, flex             Standing knee flex             Standing march             Heel slides             Sidelying hip abd                                                    Ther Activity    bike             Mini squats                                                    Gait Training                              Modalities

## 2020-07-06 ENCOUNTER — OFFICE VISIT (OUTPATIENT)
Dept: FAMILY MEDICINE CLINIC | Facility: CLINIC | Age: 75
End: 2020-07-06
Payer: MEDICARE

## 2020-07-06 VITALS
DIASTOLIC BLOOD PRESSURE: 70 MMHG | WEIGHT: 120 LBS | HEIGHT: 62 IN | HEART RATE: 80 BPM | OXYGEN SATURATION: 99 % | SYSTOLIC BLOOD PRESSURE: 122 MMHG | BODY MASS INDEX: 22.08 KG/M2 | TEMPERATURE: 97.6 F

## 2020-07-06 DIAGNOSIS — I10 ESSENTIAL HYPERTENSION: ICD-10-CM

## 2020-07-06 DIAGNOSIS — F02.81 LATE ONSET ALZHEIMER'S DISEASE WITH BEHAVIORAL DISTURBANCE (HCC): ICD-10-CM

## 2020-07-06 DIAGNOSIS — D51.0 PERNICIOUS ANEMIA: ICD-10-CM

## 2020-07-06 DIAGNOSIS — I10 ESSENTIAL HYPERTENSION: Primary | ICD-10-CM

## 2020-07-06 DIAGNOSIS — G30.1 LATE ONSET ALZHEIMER'S DISEASE WITH BEHAVIORAL DISTURBANCE (HCC): ICD-10-CM

## 2020-07-06 PROCEDURE — 3078F DIAST BP <80 MM HG: CPT | Performed by: FAMILY MEDICINE

## 2020-07-06 PROCEDURE — 96372 THER/PROPH/DIAG INJ SC/IM: CPT

## 2020-07-06 PROCEDURE — 3008F BODY MASS INDEX DOCD: CPT | Performed by: FAMILY MEDICINE

## 2020-07-06 PROCEDURE — 4004F PT TOBACCO SCREEN RCVD TLK: CPT | Performed by: FAMILY MEDICINE

## 2020-07-06 PROCEDURE — 99214 OFFICE O/P EST MOD 30 MIN: CPT | Performed by: FAMILY MEDICINE

## 2020-07-06 PROCEDURE — 1170F FXNL STATUS ASSESSED: CPT | Performed by: FAMILY MEDICINE

## 2020-07-06 PROCEDURE — 1160F RVW MEDS BY RX/DR IN RCRD: CPT | Performed by: FAMILY MEDICINE

## 2020-07-06 PROCEDURE — 3074F SYST BP LT 130 MM HG: CPT | Performed by: FAMILY MEDICINE

## 2020-07-06 PROCEDURE — 4040F PNEUMOC VAC/ADMIN/RCVD: CPT | Performed by: FAMILY MEDICINE

## 2020-07-06 RX ORDER — LISINOPRIL 20 MG/1
20 TABLET ORAL DAILY
Qty: 90 TABLET | Refills: 3 | Status: SHIPPED | OUTPATIENT
Start: 2020-07-06 | End: 2021-03-26

## 2020-07-06 RX ADMIN — CYANOCOBALAMIN 1000 MCG: 1000 INJECTION INTRAMUSCULAR; SUBCUTANEOUS at 10:17

## 2020-07-06 NOTE — PATIENT INSTRUCTIONS
Medically stable here today, hopefully injection she received from orthopedist late June helps with leg pain  Pulses intact left lower extremity, doubt vascular cause/claudication  Can continue gabapentin 100 mg 3 times daily  Blood pressure excellent here today, she should be on metoprolol succinate 50 mg daily along with lisinopril 20 mg daily, I would like them to check regarding lisinopril, appears endocrinology may have sent in lisinopril HCT, I would like her to not use that  Back in April CBC, CMP, TSH were okay with A1c 5 6, cholesterol 171 with HDL 62, LDL 98, vitamin-D 28  She does do B12 injection regarding pernicious anemia every 6 to 8 weeks, injection given here today  We will continue to see her every 4 months  She will be seeing geriatrics in October  Mental status about the same  She did see Gynecology recently, vulvar biopsy showed lichen simplex, also has psoriasis, will be seeing Dermatology  Continue to try to limit the amount of cigarettes she gets, I really would like her to stop smoking

## 2020-07-06 NOTE — PROGRESS NOTES
Assessment and Plan:     Problem List Items Addressed This Visit     None           Preventive health issues were discussed with patient, and age appropriate screening tests were ordered as noted in patient's After Visit Summary  Personalized health advice and appropriate referrals for health education or preventive services given if needed, as noted in patient's After Visit Summary       History of Present Illness:     Patient presents for Medicare Annual Wellness visit    Patient Care Team:  Mine Verdugo DO as PCP - Radha Patel MD     Problem List:     Patient Active Problem List   Diagnosis    Sinus bradycardia    Postoperative hypothyroidism    Essential hypertension    Urinary incontinence    Chronic kidney disease, stage III (moderate) ([address] )    Umbilical hernia    Trigger middle finger of right hand    Current smoker    Thyroid nodule    Pernicious anemia    Osteoarthritis    Neuropathy of both feet    Low back pain radiating to left lower extremity    Leukocytosis    Lacunar stroke ([address] )    Hyperglycemia    Hypercholesterolemia    Graves' disease    Gait disturbance    Dizziness    Disc degeneration, lumbar    Late onset Alzheimer's disease with behavioral disturbance (HCC)    Cough, chronic -  improved    Hearing loss    Major depressive disorder with single episode    Visual impairment    Full dentures    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HCC)    Weakness    Fall    Anemia    Transaminitis    Hypotension, unspecified    Hypocalcemia    Bradycardia      Past Medical and Surgical History:     Past Medical History:   Diagnosis Date    Acute kidney injury (LAYLA) with acute tubular necrosis (ATN) ([facility] [address] ) 2/13/2020    Bed bug bite     LAST ASSESSED [id number]    Disease of thyroid gland     Graves' disease     Hyperlipidemia     Hypertension     LLL pneumonia     LAST ASSESSED [id number]    Migraine 2001    OCULAR    Scabies     LAST ASSESSED [id number]    Syncope and collapse LAST ASSESSED [id number]    Vertigo     RESOLVED      Past Surgical History:   Procedure Laterality Date    CHOLECYSTECTOMY      HYSTERECTOMY      TOTAL THYROIDECTOMY      TUBAL LIGATION Bilateral       Family History:     Family History   Problem Relation Age of Onset    Breast cancer Sister     Diabetes Family       Social History:     E-Cigarette/Vaping    E-Cigarette Use Never User      E-Cigarette/Vaping Substances    Nicotine No     THC No     CBD No     Flavoring No     Other No     Unknown No      Social History     Socioeconomic History    Marital status:       Spouse name: None    Number of children: None    Years of education: None    Highest education level: None   Occupational History    None   Social Needs    Financial resource strain: None    Food insecurity:     Worry: None     Inability: None    Transportation needs:     Medical: None     Non-medical: None   Tobacco Use    Smoking status: Current Every Day Smoker     Packs/day: 0 50    Smokeless tobacco: Never Used   Substance and Sexual Activity    Alcohol use: Never     Binge frequency: Never    Drug use: No    Sexual activity: Not Currently   Lifestyle    Physical activity:     Days per week: None     Minutes per session: None    Stress: None   Relationships    Social connections:     Talks on phone: None     Gets together: None     Attends Zoroastrian service: None     Active member of club or organization: None     Attends meetings of clubs or organizations: None     Relationship status: None    Intimate partner violence:     Fear of current or ex partner: None     Emotionally abused: None     Physically abused: None     Forced sexual activity: None   Other Topics Concern    None   Social History Narrative    COPY OF ADVANCED DIRECTIVE OBTAINED FROM PATIENT       Medications and Allergies:     Current Outpatient Medications   Medication Sig Dispense Refill    alendronate (FOSAMAX) 70 mg tablet       calcium-vitamin D (OSCAL) 250-125 MG-UNIT per tablet Take 1 tablet by mouth daily   citalopram (CeleXA) 10 mg tablet take 1 tablet (10 MG TOTAL) by mouth daily 90 tablet 1    clotrimazole-betamethasone (LOTRISONE) 1-0 05 % cream APPLY TOPICALLY 2 (TWO) TIMES A  DAY FOR 14 DAYS TO AFFECTED AREA(S)      cyanocobalamin 1,000 mcg/mL Injection Q 6-8 weeks re PA 1 mL     donepezil (ARICEPT) 10 mg tablet Take 1 tablet (10 mg total) by mouth daily at bedtime 30 tablet 6    ergocalciferol (VITAMIN D2) 50,000 units   0    gabapentin (NEURONTIN) 100 mg capsule Take 1 capsule (100 mg total) by mouth 3 (three) times a day (to control leg pain) 90 capsule 0    levothyroxine 112 mcg tablet Take 1 tablet (112 mcg total) by mouth daily in the early morning 30 tablet 0    metoprolol succinate (TOPROL-XL) 50 mg 24 hr tablet Take 50 mg by mouth daily      niacin (NIASPAN) 1000 MG CR tablet Take 2 tablets (2,000 mg total) by mouth daily  0    aspirin 81 MG tablet Take 81 mg by mouth daily        bisacodyl (DULCOLAX) 5 mg EC tablet Take 1 tablet (5 mg total) by mouth daily as needed for constipation (Patient not taking: Reported on 7/6/2020) 30 tablet 0    lisinopril (ZESTRIL) 20 mg tablet Take 1 tablet (20 mg total) by mouth daily (Patient not taking: Reported on 7/6/2020) 90 tablet 3    terconazole (TERAZOL 3) 0 8 % vaginal cream insert 1 applicatorful vaginally once daily at bedtime for 3 days       Current Facility-Administered Medications   Medication Dose Route Frequency Provider Last Rate Last Dose    cyanocobalamin injection 1,000 mcg  1,000 mcg Intramuscular Q56 Days Aye Loza DO   1,000 mcg at 04/24/20 1003     Allergies   Allergen Reactions    Naproxen Itching    Niacin     Nsaids Other (See Comments) and Swelling    Azithromycin Nausea Only and Rash    Cephalexin Rash      Immunizations:     Immunization History   Administered Date(s) Administered    Influenza Split High Dose Preservative Free IM 09/18/2012, 11/14/2013, 09/25/2014, 09/08/2015, 08/29/2017    Influenza TIV (IM) 09/27/2011    Influenza, high dose seasonal 0 5 mL 09/12/2019    Pneumococcal Conjugate 13-Valent 06/20/2017    Pneumococcal Polysaccharide PPV23 01/16/2009    Td (adult), adsorbed 09/14/2007    influenza, trivalent, adjuvanted 09/30/2018      Health Maintenance:         Topic Date Due    Hepatitis C Screening  Completed         Topic Date Due    DTaP,Tdap,and Td Vaccines (1 - Tdap) 09/18/1956    Influenza Vaccine  07/01/2020      Medicare Health Risk Assessment:     /70 (BP Location: Left arm, Patient Position: Sitting, Cuff Size: Standard)   Pulse 80   Temp 97 6 °F (36 4 °C)   Ht 5' 2" (1 575 m)   Wt 54 4 kg (120 lb)   SpO2 99%   BMI 21 95 kg/m²          Health Risk Assessment:   Patient rates overall health as fair  Patient feels that their physical health rating is slightly worse  Eyesight was rated as same  Hearing was rated as same  Patient feels that their emotional and mental health rating is much worse  Patient states that she has experienced weight loss or gain in last 6 months  Depression Screening:   PHQ-2 Score: 3  PHQ-9 Score: 18      Fall Risk Screening: In the past year, patient has experienced: history of falling in past year    Number of falls: 1  Injured during fall?: No    Feels unsteady when standing or walking?: Yes    Worried about falling?: Yes      Urinary Incontinence Screening:   Patient has leaked urine accidently in the last six months  Home Safety:  Patient has trouble with stairs inside or outside of their home  Patient has working smoke alarms and has working carbon monoxide detector  Home safety hazards include: none  Nutrition:   Current diet is Regular  Medications:   Patient is currently taking over-the-counter supplements  OTC medications include: see medication list  Patient is not able to manage medications       Activities of Daily Living (ADLs)/Instrumental Activities of Daily Living (IADLs):   Walk and transfer into and out of bed and chair?: Yes  Dress and groom yourself?: No    Bathe or shower yourself?: No    Feed yourself? No  Do your laundry/housekeeping?: No  Manage your money, pay your bills and track your expenses?: No  Make your own meals?: No    Do your own shopping?: No    Previous Hospitalizations:   Any hospitalizations or ED visits within the last 12 months?: Yes    How many hospitalizations have you had in the last year?: 1-2    Advance Care Planning:   Living will: Yes    Durable POA for healthcare:  Yes    Advanced directive: Yes      PREVENTIVE SCREENINGS      Cardiovascular Screening:    General: Screening Not Indicated and History Lipid Disorder      Diabetes Screening:     General: Screening Current      Cervical Cancer Screening:    General: Screening Not Indicated      Lung Cancer Screening:     General: Screening Not Indicated      Hepatitis C Screening:    General: Screening Current      Adarsh Gonzalez DO

## 2020-07-06 NOTE — PROGRESS NOTES
FAMILY PRACTICE OFFICE VISIT  [facility]  [address]  [address] [address] [place], [place], [address]      NAME: Angus Mcleod  AGE: 66 y o  SEX: female  : 1945   MRN: [id number]    DATE: 2020  TIME: 11:10 AM    Assessment and Plan     Problem List Items Addressed This Visit        Cardiovascular and Mediastinum    Essential hypertension - Primary       Nervous and Auditory    Late onset Alzheimer's disease with behavioral disturbance ([facility] [address] )       Other    Pernicious anemia          Patient Instructions   Medically stable here today, hopefully injection she received from orthopedist late  helps with leg pain  Pulses intact left lower extremity, doubt vascular cause/claudication  Can continue gabapentin 100 mg 3 times daily  Blood pressure excellent here today, she should be on metoprolol succinate 50 mg daily along with lisinopril 20 mg daily, I would like them to check regarding lisinopril, appears endocrinology may have sent in lisinopril HCT, I would like her to not use that  Back in April CBC, CMP, TSH were okay with A1c 5 6, cholesterol 171 with HDL 62, LDL 98, vitamin-D 28  She does do B12 injection regarding pernicious anemia every 6 to 8 weeks, injection given here today  We will continue to see her every 4 months  She will be seeing geriatrics in October  Mental status about the same  She did see Gynecology recently, vulvar biopsy showed lichen simplex, also has psoriasis, will be seeing Dermatology  Continue to try to limit the amount of cigarettes she gets, I really would like her to stop smoking  Chief Complaint     Chief Complaint   Patient presents with   [facility] Wellness Visit     Mp B 12 IM injection today          History of Present Illness   Angus Mcleod is a 66y o -year-old female who is in today for a routine follow-up accompanied by her daughter Genesis Millan, she continues to reside with another daughter  Overall is doing about the same, slowly progressive dementia  Does have some home care, has been bathing routinely, occasionally angry but much less than before  Still with pain in legs, did have injection last week, has noted some benefit  Is using gabapentin  Continues to see geriatric group, continues to see endocrinology  There list of medications today does not have lisinopril on it  Continues to smoke at least 1/4 pack per day, her daughter at home continues to smoke also & supplies her with cigarettes  Review of Systems   Review of Systems   Constitutional: Positive for fatigue (Chronic unchanged)  Negative for appetite change (Slow decline in appetite, weight loss appears to have stabilized for the most part, picks at her food  Denies abdominal pain, change in bowel ) and fever  HENT: Negative for sore throat and trouble swallowing  Respiratory: Positive for cough (Mild chronic)  Negative for chest tightness and shortness of breath  Cardiovascular: Negative for chest pain, palpitations and leg swelling  Gastrointestinal: Negative for abdominal pain, blood in stool, nausea and vomiting  No acid reflux     No change in bowel   Genitourinary: Negative for dysuria and hematuria  Neurological: Negative for dizziness, syncope, light-headedness and headaches  Psychiatric/Behavioral: Positive for confusion          See HPI       Active Problem List     Patient Active Problem List   Diagnosis    Sinus bradycardia    Postoperative hypothyroidism    Essential hypertension    Urinary incontinence    Chronic kidney disease, stage III (moderate) (HCC)    Umbilical hernia    Trigger middle finger of right hand    Current smoker    Thyroid nodule    Pernicious anemia    Osteoarthritis    Neuropathy of both feet    Low back pain radiating to left lower extremity    Leukocytosis    Lacunar stroke (HCC)    Hyperglycemia    Hypercholesterolemia   Grady Catherine disease    Gait disturbance    Dizziness    Disc degeneration, lumbar    Late onset Alzheimer's disease with behavioral disturbance (HCC)    Cough, chronic -  improved    Hearing loss    Major depressive disorder with single episode    Visual impairment    Full dentures    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HCC)    Weakness    Fall    Anemia    Transaminitis    Hypotension, unspecified    Hypocalcemia    Bradycardia       Past Medical History:  Reviewed    Past Surgical History:  Reviewed    Family History:  Reviewed    Social History:  Reviewed    Objective     Vitals:    07/06/20 0924   BP: 122/70   BP Location: Left arm   Patient Position: Sitting   Cuff Size: Standard   Pulse: 80   Temp: 97 6 °F (36 4 °C)   SpO2: 99%   Weight: 54 4 kg (120 lb)   Height: 5' 2" (1 575 m)     Body mass index is 21 95 kg/m²  BP Readings from Last 3 Encounters:   07/06/20 122/70   06/26/20 159/87   06/01/20 128/79       Wt Readings from Last 3 Encounters:   07/06/20 54 4 kg (120 lb)   06/26/20 54 9 kg (121 lb)   06/01/20 54 9 kg (121 lb)       Physical Exam   Constitutional:   Seated comfortably in chair, hard of hearing, humming to self frequently  Cooperative  No cough or respiratory distress noted  Eyes: No scleral icterus  Cardiovascular: Normal rate and regular rhythm  Murmur (1/6 systolic ejection murmur) heard  Pulmonary/Chest:   Decreased breath sounds bilateral but clear, no rhonchi, rales, wheeze   Abdominal: Soft  She exhibits no distension  There is no tenderness  There is no guarding  Musculoskeletal: She exhibits no edema  Multiple areas psoriatic rash  No ankle edema    Pulses intact lower extremities, dorsalis pedis       ALLERGIES:  Allergies   Allergen Reactions    Naproxen Itching    Niacin     Nsaids Other (See Comments) and Swelling    Azithromycin Nausea Only and Rash    Cephalexin Rash       Current Medications     Current Outpatient Medications   Medication Sig Dispense Refill    alendronate (FOSAMAX) 70 mg tablet       calcium-vitamin D (OSCAL) 250-125 MG-UNIT per tablet Take 1 tablet by mouth daily   citalopram (CeleXA) 10 mg tablet take 1 tablet (10 MG TOTAL) by mouth daily 90 tablet 1    clotrimazole-betamethasone (LOTRISONE) 1-0 05 % cream APPLY TOPICALLY 2 (TWO) TIMES A  DAY FOR 14 DAYS TO AFFECTED AREA(S)      cyanocobalamin 1,000 mcg/mL Injection Q 6-8 weeks re PA 1 mL     donepezil (ARICEPT) 10 mg tablet Take 1 tablet (10 mg total) by mouth daily at bedtime 30 tablet 6    ergocalciferol (VITAMIN D2) 50,000 units   0    gabapentin (NEURONTIN) 100 mg capsule Take 1 capsule (100 mg total) by mouth 3 (three) times a day (to control leg pain) 90 capsule 0    levothyroxine 112 mcg tablet Take 1 tablet (112 mcg total) by mouth daily in the early morning 30 tablet 0    metoprolol succinate (TOPROL-XL) 50 mg 24 hr tablet Take 50 mg by mouth daily      niacin (NIASPAN) 1000 MG CR tablet Take 2 tablets (2,000 mg total) by mouth daily  0    aspirin 81 MG tablet Take 81 mg by mouth daily   bisacodyl (DULCOLAX) 5 mg EC tablet Take 1 tablet (5 mg total) by mouth daily as needed for constipation (Patient not taking: Reported on 7/6/2020) 30 tablet 0    lisinopril (ZESTRIL) 20 mg tablet Take 1 tablet (20 mg total) by mouth daily (Patient not taking: Reported on 7/6/2020) 90 tablet 3    terconazole (TERAZOL 3) 0 8 % vaginal cream insert 1 applicatorful vaginally once daily at bedtime for 3 days       Current Facility-Administered Medications   Medication Dose Route Frequency Provider Last Rate Last Dose    cyanocobalamin injection 1,000 mcg  1,000 mcg Intramuscular Q56 Days Jenna Collins DO   1,000 mcg at 07/06/20 1017            No orders of the defined types were placed in this encounter          Jenna Collins DO

## 2020-07-07 ENCOUNTER — OFFICE VISIT (OUTPATIENT)
Dept: PHYSICAL THERAPY | Facility: CLINIC | Age: 75
End: 2020-07-07
Payer: MEDICARE

## 2020-07-07 DIAGNOSIS — M25.552 LEFT HIP PAIN: Primary | ICD-10-CM

## 2020-07-07 PROCEDURE — 97110 THERAPEUTIC EXERCISES: CPT | Performed by: PHYSICAL THERAPIST

## 2020-07-07 PROCEDURE — 97112 NEUROMUSCULAR REEDUCATION: CPT | Performed by: PHYSICAL THERAPIST

## 2020-07-07 NOTE — PROGRESS NOTES
Daily Note     Today's date: 2020  Patient name: Hugo Almeida  : 1945  MRN: [id number]  Referring provider: Godwin Gaxiola  Dx:   Encounter Diagnosis     ICD-10-CM    1  Left hip pain M25 552    2  Pain in left hip M25 552                   Subjective: Pt comes to therapy denying discomfort following last therapy session  Denies pain in hip upon arrival to therapy today  Objective: See treatment diary below      Assessment: Tolerated treatment well  Patient demonstrated fatigue post treatment, exhibited good technique with therapeutic exercises and would benefit from continued PT      Plan: Progress treatment as tolerated         Precautions: HTN    Daily Treatment Diary    Date            FOTO IE            Re-Eval IE               Manuals                                                        Neuro Re-Ed     Bridges  3"x10 x10           Hip abd H/L 5"x10 D/C           Marches H/L x10 ea x10 ea                                                               Ther Ex    Standing hip abd, ext, flex  1x10 ea B           Standing knee flex  1x10 ea B           Standing march  1x10 ea B           Heel slides  x10 ea           SLR - flex, abd bilat  1x10 ea                                                  Ther Activity    bike  5'           Mini squats  1x10                                                  Gait Training                              Modalities

## 2020-07-10 ENCOUNTER — OFFICE VISIT (OUTPATIENT)
Dept: PHYSICAL THERAPY | Facility: CLINIC | Age: 75
End: 2020-07-10
Payer: MEDICARE

## 2020-07-10 DIAGNOSIS — M25.552 LEFT HIP PAIN: Primary | ICD-10-CM

## 2020-07-10 DIAGNOSIS — F32.A DEPRESSION, UNSPECIFIED DEPRESSION TYPE: ICD-10-CM

## 2020-07-10 DIAGNOSIS — M25.552 PAIN IN LEFT HIP: ICD-10-CM

## 2020-07-10 PROCEDURE — 97112 NEUROMUSCULAR REEDUCATION: CPT

## 2020-07-10 PROCEDURE — 97110 THERAPEUTIC EXERCISES: CPT

## 2020-07-10 NOTE — PROGRESS NOTES
Daily Note     Today's date: 7/10/2020  Patient name: Bret Casillas  : 1945  MRN: [id number]  Referring provider: Coral Brice  Dx:   Encounter Diagnosis     ICD-10-CM    1  Left hip pain M25 552    2  Pain in left hip M25 552                   Subjective: Pt reports soreness in L hip prior to beginning today  She denied adverse reactions following last visit  Objective: See treatment diary below      Assessment: Tolerated treatment well  Patient demonstrated fatigue post treatment, exhibited good technique with therapeutic exercises and would benefit from continued PT      Plan: Continue per plan of care  Progress treatment as tolerated         Precautions: HTN    Daily Treatment Diary    Date 7/1 7/7 7/10          FOTO IE            Re-Eval IE               Manuals                                                        Neuro Re-Ed     Bridges  3"x10 x10 x10          Hip abd H/L 5"x10 D/C           Marches H/L x10 ea x10 ea x10 ea                                                              Ther Ex    Standing hip abd, ext, flex  1x10 ea B 1x10 ea B          Standing knee flex  1x10 ea B 1x10 ea B          Standing march  1x10 ea B 1x10 ea B          Heel slides  x10 ea nv          SLR - flex, abd bilat  1x10 ea 1x10 ea B                                                 Ther Activity    bike  5' 5'          Mini squats  1x10 x10                                                 Gait Training                              Modalities

## 2020-07-11 RX ORDER — CITALOPRAM 10 MG/1
TABLET ORAL
Qty: 90 TABLET | Refills: 1 | Status: SHIPPED | OUTPATIENT
Start: 2020-07-11 | End: 2020-10-22 | Stop reason: SDUPTHER

## 2020-07-14 ENCOUNTER — OFFICE VISIT (OUTPATIENT)
Dept: PHYSICAL THERAPY | Facility: CLINIC | Age: 75
End: 2020-07-14
Payer: MEDICARE

## 2020-07-14 DIAGNOSIS — M25.552 LEFT HIP PAIN: Primary | ICD-10-CM

## 2020-07-14 DIAGNOSIS — M25.552 PAIN IN LEFT HIP: ICD-10-CM

## 2020-07-14 PROCEDURE — 97112 NEUROMUSCULAR REEDUCATION: CPT | Performed by: PHYSICAL THERAPIST

## 2020-07-14 PROCEDURE — 97110 THERAPEUTIC EXERCISES: CPT | Performed by: PHYSICAL THERAPIST

## 2020-07-14 NOTE — PROGRESS NOTES
Daily Note     Today's date: 2020  Patient name: Loren Calderon  : 1945  MRN: [id number]  Referring provider: Chetna Jones  Dx:   Encounter Diagnosis     ICD-10-CM    1  Left hip pain M25 552    2  Pain in left hip M25 552                   Subjective: Pt comes to therapy denying pain or discomfort  Denies discomfort following last treatment session  Objective: See treatment diary below      Assessment: Tolerated treatment well  Patient demonstrated fatigue post treatment, exhibited good technique with therapeutic exercises and would benefit from continued PT  Requires many cues to remain on count and for form  Plan: Progress treatment as tolerated         Precautions: HTN    Daily Treatment Diary    Date 7/1 7/7 7/10 7/14         FOTO IE            Re-Eval IE               Manuals                                                        Neuro Re-Ed     Bridges  3"x10 x10 x10 2x10         Hip abd H/L 5"x10 D/C           Marches H/L x10 ea x10 ea x10 ea 2x10 ea                                                             Ther Ex    Standing hip abd, ext, flex  1x10 ea B 1x10 ea B 2x10 ea B         Standing knee flex  1x10 ea B 1x10 ea B 2x10 ea B         Standing march  1x10 ea B 1x10 ea B 2x10 ea B         Heel slides  x10 ea nv          SLR - flex, abd bilat  1x10 ea 1x10 ea B 2x10 ea B                                                Ther Activity    bike  5' 5' 5'         Mini squats  1x10 x10 2x10         Side stepping    3 laps                                   Gait Training                              Modalities

## 2020-07-17 ENCOUNTER — OFFICE VISIT (OUTPATIENT)
Dept: PHYSICAL THERAPY | Facility: CLINIC | Age: 75
End: 2020-07-17
Payer: MEDICARE

## 2020-07-17 DIAGNOSIS — M25.552 LEFT HIP PAIN: Primary | ICD-10-CM

## 2020-07-17 DIAGNOSIS — M25.552 PAIN IN LEFT HIP: ICD-10-CM

## 2020-07-17 PROCEDURE — 97112 NEUROMUSCULAR REEDUCATION: CPT

## 2020-07-17 PROCEDURE — 97110 THERAPEUTIC EXERCISES: CPT

## 2020-07-17 NOTE — PROGRESS NOTES
Daily Note     Today's date: 2020  Patient name: Hugo Almeida  : 1945  MRN: [id number]  Referring provider: Godwin Gaxiola  Dx:   Encounter Diagnosis     ICD-10-CM    1  Left hip pain M25 552    2  Pain in left hip M25 552                   Subjective: Pt comes to therapy denying pain or discomfort  Denies discomfort following last treatment session  Objective: See treatment diary below      Assessment: Tolerated treatment well  Patient demonstrated fatigue post treatment, exhibited good technique with therapeutic exercises and would benefit from continued PT  Requires many cues to remain on count and for form  Plan: Progress treatment as tolerated         Precautions: HTN    Daily Treatment Diary    Date 7/1 7/7 7/10 7/14 7/17        FOTO IE            Re-Eval IE               Manuals                                                        Neuro Re-Ed     Bridges  3"x10 x10 x10 2x10 x20        Hip abd H/L 5"x10 D/C           Marches H/L x10 ea x10 ea x10 ea 2x10 ea 2x10 ea B                                                            Ther Ex    Standing hip abd, ext, flex  1x10 ea B 1x10 ea B 2x10 ea B x20 ea        Standing knee flex  1x10 ea B 1x10 ea B 2x10 ea B x20 ea        Standing march  1x10 ea B 1x10 ea B 2x10 ea B x20 ea        Heel slides  x10 ea nv          SLR - flex, abd bilat  1x10 ea 1x10 ea B 2x10 ea B 2x10 ea B                                               Ther Activity    bike  5' 5' 5' 5'        Mini squats  1x10 x10 2x10 x20        Side stepping    3 laps 3 laps                                  Gait Training                              Modalities

## 2020-07-21 ENCOUNTER — OFFICE VISIT (OUTPATIENT)
Dept: PHYSICAL THERAPY | Facility: CLINIC | Age: 75
End: 2020-07-21
Payer: MEDICARE

## 2020-07-21 DIAGNOSIS — M25.552 LEFT HIP PAIN: Primary | ICD-10-CM

## 2020-07-21 DIAGNOSIS — M25.552 PAIN IN LEFT HIP: ICD-10-CM

## 2020-07-21 PROCEDURE — 97110 THERAPEUTIC EXERCISES: CPT

## 2020-07-21 PROCEDURE — 97112 NEUROMUSCULAR REEDUCATION: CPT

## 2020-07-21 NOTE — PROGRESS NOTES
Daily Note     Today's date: 2020  Patient name: Christine Weiss  : 1945  MRN: [id number]  Referring provider: Nirav Salazar  Dx:   Encounter Diagnosis     ICD-10-CM    1  Left hip pain M25 552    2  Pain in left hip M25 552                   Subjective: Pt reports her hip is feeling better today  She noted pain mostly in hamstrings region pre-tx  Objective: See treatment diary below      Assessment: Tolerated treatment well  Required frequent cues to correct form and  Perform as prescribed  Patient demonstrated fatigue post treatment and would benefit from continued PT      Plan: Continue per plan of care  Progress treatment as tolerated         Precautions: HTN    Daily Treatment Diary    Date 7/1 7/7 7/10 7/14 7/17 7/21       FOTO IE            Re-Eval IE               Manuals                                                        Neuro Re-Ed     Bridges  3"x10 x10 x10 2x10 x20 x20       Hip abd H/L 5"x10 D/C           Marches H/L x10 ea x10 ea x10 ea 2x10 ea 2x10 ea B x20 ea B                                                           Ther Ex    Standing hip abd, ext, flex  1x10 ea B 1x10 ea B 2x10 ea B x20 ea x20 ea       Standing knee flex  1x10 ea B 1x10 ea B 2x10 ea B x20 ea x20 ea       Standing march  1x10 ea B 1x10 ea B 2x10 ea B x20 ea x20 ea       Heel slides  x10 ea nv          SLR - flex, abd bilat  1x10 ea 1x10 ea B 2x10 ea B 2x10 ea B x20 ea B                                              Ther Activity    bike  5' 5' 5' 5' 5'       Mini squats  1x10 x10 2x10 x20 x20       Side stepping    3 laps 3 laps  3 laps                                 Gait Training                              Modalities

## 2020-07-23 DIAGNOSIS — M54.50 LOW BACK PAIN RADIATING TO LEFT LOWER EXTREMITY: ICD-10-CM

## 2020-07-23 DIAGNOSIS — M79.605 LOW BACK PAIN RADIATING TO LEFT LOWER EXTREMITY: ICD-10-CM

## 2020-07-23 DIAGNOSIS — M79.605 PAIN OF LEFT LOWER EXTREMITY: ICD-10-CM

## 2020-07-23 DIAGNOSIS — M51.36 DISC DEGENERATION, LUMBAR: ICD-10-CM

## 2020-07-23 RX ORDER — GABAPENTIN 100 MG/1
100 CAPSULE ORAL 3 TIMES DAILY
Qty: 90 CAPSULE | Refills: 1 | Status: SHIPPED | OUTPATIENT
Start: 2020-07-23 | End: 2020-11-13

## 2020-07-24 ENCOUNTER — OFFICE VISIT (OUTPATIENT)
Dept: PHYSICAL THERAPY | Facility: CLINIC | Age: 75
End: 2020-07-24
Payer: MEDICARE

## 2020-07-24 DIAGNOSIS — M25.552 PAIN IN LEFT HIP: ICD-10-CM

## 2020-07-24 DIAGNOSIS — M25.552 LEFT HIP PAIN: Primary | ICD-10-CM

## 2020-07-24 PROCEDURE — 97110 THERAPEUTIC EXERCISES: CPT | Performed by: PHYSICAL THERAPIST

## 2020-07-24 NOTE — PROGRESS NOTES
Daily Note     Today's date: 2020  Patient name: Constanza Newman  : 1945  MRN: [id number]  Referring provider: Arben Gonzalez  Dx:   Encounter Diagnosis     ICD-10-CM    1  Left hip pain M25 552    2  Pain in left hip M25 552        Start Time: 1052  Stop Time: 1130  Total time in clinic (min): 38 minutes    Subjective: Pt comes to therapy stating she feels minimal discomfort in LE  Objective: See treatment diary below      Assessment: Tolerated treatment well  Patient would benefit from continued PT  Patient displays difficulty maintaining rep counts, therefore, began to time patient's exercises  Continues to require verbal and visual cuing for set up of most exercises  Plan: Progress treatment as tolerated         Precautions: HTN    Daily Treatment Diary    Date 7/1 7/7 7/10 7/14 7/17 7/21 7/24      FOTO             Re-Eval IE               Manuals                                                        Neuro Re-Ed     Bridges  3"x10 x10 x10 2x10 x20 x20 x30      Hip abd H/L 5"x10 D/C           Marches H/L x10 ea x10 ea x10 ea 2x10 ea 2x10 ea B x20 ea B 1'                                                          Ther Ex    Standing hip abd, ext, flex  1x10 ea B 1x10 ea B 2x10 ea B x20 ea x20 ea x30 ea      Standing knee flex  1x10 ea B 1x10 ea B 2x10 ea B x20 ea x20 ea 1' ea      Standing march  1x10 ea B 1x10 ea B 2x10 ea B x20 ea x20 ea 1' ea      Heel slides  x10 ea nv          SLR - flex, abd bilat  1x10 ea 1x10 ea B 2x10 ea B 2x10 ea B x20 ea B 30" x1 ea                                             Ther Activity    bike  5' 5' 5' 5' 5' 5'      Mini squats  1x10 x10 2x10 x20 x20 x30      Side stepping    3 laps 3 laps  3 laps 3 laps                                Gait Training                              Modalities

## 2020-07-28 ENCOUNTER — OFFICE VISIT (OUTPATIENT)
Dept: PHYSICAL THERAPY | Facility: CLINIC | Age: 75
End: 2020-07-28
Payer: MEDICARE

## 2020-07-28 DIAGNOSIS — M25.552 PAIN IN LEFT HIP: ICD-10-CM

## 2020-07-28 DIAGNOSIS — M25.552 LEFT HIP PAIN: Primary | ICD-10-CM

## 2020-07-28 PROCEDURE — 97112 NEUROMUSCULAR REEDUCATION: CPT

## 2020-07-28 PROCEDURE — 97110 THERAPEUTIC EXERCISES: CPT

## 2020-07-28 NOTE — PROGRESS NOTES
Daily Note     Today's date: 2020  Patient name: Yin Mejia  : 1945  MRN: [id number]  Referring provider: David Rowland  Dx:   Encounter Diagnosis     ICD-10-CM    1  Left hip pain M25 552    2  Pain in left hip M25 552                   Subjective: Pt reports with c/o discomfort in L anterior thigh prior to beginning today  She denied adverse reactions following last visit  Objective: See treatment diary below      Assessment: Tolerated treatment well  Timed exercises appeared more effective this visit  Patient demonstrated fatigue post treatment, exhibited good technique with therapeutic exercises and would benefit from continued PT      Plan: Continue per plan of care  Progress treatment as tolerated         Precautions: HTN    Daily Treatment Diary    Date 7/1 7/7 7/10 7/14 7/17 7/21 7/24 7/28     FOTO             Re-Eval IE               Manuals                                                        Neuro Re-Ed     Bridges  3"x10 x10 x10 2x10 x20 x20 x30 x30     Hip abd H/L 5"x10 D/C           Marches H/L x10 ea x10 ea x10 ea 2x10 ea 2x10 ea B x20 ea B 1' 1'                                                         Ther Ex    Standing hip abd, ext, flex  1x10 ea B 1x10 ea B 2x10 ea B x20 ea x20 ea x30 ea 1' ea     Standing knee flex  1x10 ea B 1x10 ea B 2x10 ea B x20 ea x20 ea 1' ea 1' ea     Standing march  1x10 ea B 1x10 ea B 2x10 ea B x20 ea x20 ea 1' ea 1' ea     Heel slides  x10 ea nv          SLR - flex, abd bilat  1x10 ea 1x10 ea B 2x10 ea B 2x10 ea B x20 ea B 30" x1 ea x20 ea                                            Ther Activity    bike  5' 5' 5' 5' 5' 5' 5'     Mini squats  1x10 x10 2x10 x20 x20 x30 1'      Side stepping    3 laps 3 laps  3 laps 3 laps 3 laps                               Gait Training                              Modalities

## 2020-07-31 ENCOUNTER — EVALUATION (OUTPATIENT)
Dept: PHYSICAL THERAPY | Facility: CLINIC | Age: 75
End: 2020-07-31
Payer: MEDICARE

## 2020-07-31 DIAGNOSIS — M25.552 PAIN IN LEFT HIP: ICD-10-CM

## 2020-07-31 DIAGNOSIS — M25.552 LEFT HIP PAIN: Primary | ICD-10-CM

## 2020-07-31 PROCEDURE — 97140 MANUAL THERAPY 1/> REGIONS: CPT

## 2020-07-31 PROCEDURE — 97110 THERAPEUTIC EXERCISES: CPT

## 2020-07-31 NOTE — PROGRESS NOTES
Daily Note     Today's date: 2020  Patient name: Ondina Lemus  : 1945  MRN: [id number]  Referring provider: Venkatesh Parada  Dx:   Encounter Diagnosis     ICD-10-CM    1  Left hip pain M25 552    2  Pain in left hip M25 552                   Subjective: Pt reports sustaining a fall last week in which she lost her balance  She presented with abrasion on her nose, but denied hitting her head and LOC  She reported increased pain in L thigh following fall, but has reduced at present time  Pt ambulating with full weight wearing into clinic today  She reported no difficulty with stair climbing or household activities 2* pain  She reported pain "comes and goes for no reason "      Objective: See treatment diary below      Assessment: Tolerated treatment well  Continued use of timer for exercises with improved control of appropriate dosage  Patient demonstrated fatigue post treatment, exhibited good technique with therapeutic exercises and would benefit from continued PT to increased hip and lower extremity strength and improve balance  Hip MMT completed and measured as follows: flexion, IR, ER 4/5 and abduction 4-/5      Plan: Continue per plan of care  Progress treatment as tolerated         Precautions: HTN    Daily Treatment Diary    Date 7/1 7/7 7/10 7/14 7/17 7/21 7/24 7/28 7/31    FOTO             Re-Eval IE               Manuals                                                        Neuro Re-Ed     Bridges  3"x10 x10 x10 2x10 x20 x20 x30 x30 1'    Hip abd H/L 5"x10 D/C           Marches H/L x10 ea x10 ea x10 ea 2x10 ea 2x10 ea B x20 ea B 1' 1' 1'                                                        Ther Ex    Standing hip abd, ext, flex  1x10 ea B 1x10 ea B 2x10 ea B x20 ea x20 ea x30 ea 1' ea 1' ea    Standing knee flex  1x10 ea B 1x10 ea B 2x10 ea B x20 ea x20 ea 1' ea 1' ea 1' ea    Standing march  1x10 ea B 1x10 ea B 2x10 ea B x20 ea x20 ea 1' ea 1' ea 1' ea    Heel slides  x10 ea nv          SLR - flex, abd bilat  1x10 ea 1x10 ea B 2x10 ea B 2x10 ea B x20 ea B 30" x1 ea x20 ea x20 ea                                           Ther Activity    bike  5' 5' 5' 5' 5' 5' 5' 5'    Mini squats  1x10 x10 2x10 x20 x20 x30 1'  1'    Side stepping    3 laps 3 laps  3 laps 3 laps 3 laps 3 laps                              Gait Training                              Modalities

## 2020-08-04 ENCOUNTER — OFFICE VISIT (OUTPATIENT)
Dept: PHYSICAL THERAPY | Facility: CLINIC | Age: 75
End: 2020-08-04
Payer: MEDICARE

## 2020-08-04 DIAGNOSIS — M25.552 LEFT HIP PAIN: Primary | ICD-10-CM

## 2020-08-04 PROCEDURE — 97112 NEUROMUSCULAR REEDUCATION: CPT | Performed by: PHYSICAL THERAPIST

## 2020-08-04 PROCEDURE — 97110 THERAPEUTIC EXERCISES: CPT | Performed by: PHYSICAL THERAPIST

## 2020-08-04 NOTE — PROGRESS NOTES
PT Re-Evaluation     Today's date: 2020  Patient name: Camron Haney  : 1945  MRN: [id number]  Referring provider: Ankur Melgar  Dx:   Encounter Diagnosis     ICD-10-CM    1  Left hip pain  M25 552    2  Pain in left hip  M25 552                   Assessment  Assessment details: Camron Haney has been treated in outpatient physical therapy over the past 4 weeks for diagnosis/complaints of Left hip pain  (primary encounter diagnosis)  Pt demonstrates increased range of motion, improved strength, decreased pain, and increased activity tolerance  Pt reports no changes in functional activity tolerance  Pt would benefit from 2-3 more visits to wean to HEP  Thank you for this referral   Impairments: abnormal coordination, abnormal or restricted ROM, activity intolerance, impaired physical strength and pain with function  Understanding of Dx/Px/POC: good   Prognosis: good    Goals  ST  Patient will report 25% decrease in pain in 4 weeks  2  Patient will demonstrate 25% improvement in ROM in 4 weeks  3  Patient will demonstrate 1/2 grade improvement in strength in 4 weeks  LT  Patient will be able to perform IADLS without restriction or pain by discharge  2  Patient will be independent in HEP by discharge  3  Patient will be able to return to recreational/work duties without restriction or pain by discharge        Plan  Patient would benefit from: PT eval and skilled PT  Planned modality interventions: cryotherapy and thermotherapy: hydrocollator packs  Planned therapy interventions: IADL retraining, body mechanics training, flexibility, functional ROM exercises, home exercise program, neuromuscular re-education, manual therapy, postural training, strengthening, stretching, therapeutic activities, therapeutic exercise and joint mobilization  Frequency: 2x week  Duration in visits: 3  Duration in weeks: 2  Treatment plan discussed with: patient        Subjective Evaluation    History of Present Illness  Mechanism of injury: : Pt comes to therapy reporting chronic history of left hip pain (6+ months)  Pt reports she consulted orthopedic physician, had CT scan performed, and daughter reports she had injection performed recently  Denies relief from injection  Sx AGGS: stairs, walking, sit to stand transfers    Sx LOC: side, posterior aspects of L hip  Sx Description: aching, pulling    Sx EASES: medications    Follow ups: 4-6 weeks      20  Pt reports she has seen no difference and no improvements in her left hip pain since starting therapy 4 weeks ago  Pt reports she mostly experiences pain during the night  States her hip also bothers during the day when standing or walking  States symptoms are located in area of left buttock region  Follows up with orthopedic physician on 20  Pain  Current pain ratin  At best pain ratin  At worst pain ratin    Patient Goals  Patient goals for therapy: decreased pain          Objective     Palpation     Additional Palpation Details  : Denies TTP  : Denies TTP    Passive Range of Motion   Left Hip   Flexion: WFL  Abduction: [place] [place]  External rotation (90/90): [place] [place]  Internal rotation (90/90): WFL    Right Hip   Flexion: WFL  Abduction: [facility] [place]  External rotation (90/90): [place] [place]  Internal rotation (90/90): [place] [place]    Additional Passive Range of Motion Details   Discomfort reported into end range flexion and ER at 90/90 on L    Strength/Myotome Testing     Left Hip   Planes of Motion   Flexion: 4+  Abduction: 4+  External rotation: 4+  Internal rotation: 4+    Right Hip   Planes of Motion   Flexion: 4+  Abduction: 4+  External rotation: 4+  Internal rotation: 4+    Left Knee   Flexion: 4+  Extension: 4+    Right Knee   Flexion: 4+  Extension: 4+    Tests     Left Hip   Positive piriformis  Negative MEEK and scour  Additional Tests Details  :   Gait - Decreased weight bearing on left;  Lateral shift of trunk to right             Precautions: HTN    Daily Treatment Diary    Date 7/1 7/7 7/10 7/14 7/17 7/21 7/24 7/28 7/31 8/4   FOTO             Re-Eval IE         LORRAINE      Manuals                                                        Neuro Re-Ed     Bridges  3"x10 x10 x10 2x10 x20 x20 x30 x30 1' 1'   Hip abd H/L 5"x10 D/C           Marches H/L x10 ea x10 ea x10 ea 2x10 ea 2x10 ea B x20 ea B 1' 1' 1' 1'                                                       Ther Ex    Standing hip abd, ext, flex  1x10 ea B 1x10 ea B 2x10 ea B x20 ea x20 ea x30 ea 1' ea 1' ea 1' ea   Standing knee flex  1x10 ea B 1x10 ea B 2x10 ea B x20 ea x20 ea 1' ea 1' ea 1' ea 1' ea   Standing march  1x10 ea B 1x10 ea B 2x10 ea B x20 ea x20 ea 1' ea 1' ea 1' ea 1' ea   Heel slides  x10 ea nv          SLR - flex, abd bilat  1x10 ea 1x10 ea B 2x10 ea B 2x10 ea B x20 ea B 30" x1 ea x20 ea x20 ea 1' ea                                          Ther Activity    bike  5' 5' 5' 5' 5' 5' 5' 5' 5'   Mini squats  1x10 x10 2x10 x20 x20 x30 1'  1' 1'   Side stepping    3 laps 3 laps  3 laps 3 laps 3 laps 3 laps 3 laps                             Gait Training                              Modalities

## 2020-08-05 RX ORDER — LISINOPRIL AND HYDROCHLOROTHIAZIDE 20; 12.5 MG/1; MG/1
1 TABLET ORAL DAILY
COMMUNITY
Start: 2020-06-28 | End: 2020-11-09 | Stop reason: ALTCHOICE

## 2020-08-07 ENCOUNTER — OFFICE VISIT (OUTPATIENT)
Dept: OBGYN CLINIC | Facility: OTHER | Age: 75
End: 2020-08-07
Payer: MEDICARE

## 2020-08-07 ENCOUNTER — OFFICE VISIT (OUTPATIENT)
Dept: PHYSICAL THERAPY | Facility: CLINIC | Age: 75
End: 2020-08-07
Payer: MEDICARE

## 2020-08-07 VITALS
TEMPERATURE: 97.3 F | WEIGHT: 120 LBS | SYSTOLIC BLOOD PRESSURE: 96 MMHG | DIASTOLIC BLOOD PRESSURE: 56 MMHG | BODY MASS INDEX: 21.95 KG/M2 | HEART RATE: 80 BPM

## 2020-08-07 DIAGNOSIS — M25.552 PAIN IN LEFT HIP: ICD-10-CM

## 2020-08-07 DIAGNOSIS — M79.652 LEFT THIGH PAIN: ICD-10-CM

## 2020-08-07 DIAGNOSIS — M54.41 CHRONIC LOW BACK PAIN WITH RIGHT-SIDED SCIATICA, UNSPECIFIED BACK PAIN LATERALITY: Primary | ICD-10-CM

## 2020-08-07 DIAGNOSIS — M25.552 LEFT HIP PAIN: Primary | ICD-10-CM

## 2020-08-07 DIAGNOSIS — G89.29 CHRONIC LOW BACK PAIN WITH RIGHT-SIDED SCIATICA, UNSPECIFIED BACK PAIN LATERALITY: Primary | ICD-10-CM

## 2020-08-07 PROCEDURE — 4004F PT TOBACCO SCREEN RCVD TLK: CPT | Performed by: FAMILY MEDICINE

## 2020-08-07 PROCEDURE — 3078F DIAST BP <80 MM HG: CPT | Performed by: FAMILY MEDICINE

## 2020-08-07 PROCEDURE — 97112 NEUROMUSCULAR REEDUCATION: CPT

## 2020-08-07 PROCEDURE — 97110 THERAPEUTIC EXERCISES: CPT

## 2020-08-07 PROCEDURE — 4040F PNEUMOC VAC/ADMIN/RCVD: CPT | Performed by: FAMILY MEDICINE

## 2020-08-07 PROCEDURE — 3074F SYST BP LT 130 MM HG: CPT | Performed by: FAMILY MEDICINE

## 2020-08-07 PROCEDURE — 1160F RVW MEDS BY RX/DR IN RCRD: CPT | Performed by: FAMILY MEDICINE

## 2020-08-07 PROCEDURE — 99213 OFFICE O/P EST LOW 20 MIN: CPT | Performed by: FAMILY MEDICINE

## 2020-08-07 RX ORDER — TRIAMCINOLONE ACETONIDE 5 MG/G
CREAM TOPICAL 3 TIMES DAILY
COMMUNITY
Start: 2020-08-06 | End: 2021-03-26 | Stop reason: ALTCHOICE

## 2020-08-07 RX ORDER — TRIAMCINOLONE ACETONIDE 5 MG/G
CREAM TOPICAL
COMMUNITY
Start: 2020-08-06 | End: 2020-11-10

## 2020-08-07 NOTE — PROGRESS NOTES
Daily Note     Today's date: 2020  Patient name: Lm Way  : 1945  MRN: [id number]  Referring provider: Kelvin Zaragoza  Dx:   Encounter Diagnosis     ICD-10-CM    1  Left hip pain  M25 552    2  Pain in left hip  M25 552                   Subjective: Pt reports with c/o increased pain in L hip/LE which she attributes damp weather today  Objective: See treatment diary below      Assessment: Tolerated treatment well  Patient demonstrated fatigue post treatment, exhibited good technique with therapeutic exercises and would benefit from continued PT      Plan: Continue per plan of care  Progress treatment as tolerated         Precautions: HTN    Daily Treatment Diary    Date 8/7 7/7 7/10 7/14 7/17 7/21 7/24 7/28 7/31 8/4   FOTO             Re-Evjm PETERS      Manuals                                                        Neuro Re-Ed     Barbra Sanshama  1'  x10 x10 2x10 x20 x20 x30 x30 1' 1'   Hip abd H/L 1'  D/C           Marches H/L 1'  x10 ea x10 ea 2x10 ea 2x10 ea B x20 ea B 1' 1' 1' 1'                                                       Ther Ex    Standing hip abd, ext, flex 1' ea 1x10 ea B 1x10 ea B 2x10 ea B x20 ea x20 ea x30 ea 1' ea 1' ea 1' ea   Standing knee flex 1' ea 1x10 ea B 1x10 ea B 2x10 ea B x20 ea x20 ea 1' ea 1' ea 1' ea 1' ea   Standing march 1' ea 1x10 ea B 1x10 ea B 2x10 ea B x20 ea x20 ea 1' ea 1' ea 1' ea 1' ea   Heel slides  x10 ea nv          SLR - flex, abd bilat 1' ea 1x10 ea 1x10 ea B 2x10 ea B 2x10 ea B x20 ea B 30" x1 ea x20 ea x20 ea 1' ea                                          Ther Activity    bike 5' 5' 5' 5' 5' 5' 5' 5' 5' 5'   Mini squats 1' ea 1x10 x10 2x10 x20 x20 x30 1'  1' 1'   Side stepping 3 laps   3 laps 3 laps  3 laps 3 laps 3 laps 3 laps 3 laps                             Gait Training                              Modalities

## 2020-08-07 NOTE — PROGRESS NOTES
1  Chronic low back pain with right-sided sciatica, unspecified back pain laterality  Ambulatory referral to Pain Management   2  Left thigh pain       Orders Placed This Encounter   Procedures    Ambulatory referral to Pain Management        Imaging Studies (I personally reviewed images in PACS and report):    Past diagnostics images reviewed:  CT left lower extremity 06/01/2020:  No significant abnormality left femur  Mild degenerative changes  No evidence of stress fracture  Mature into use of foot at hamstring origin  X-ray left hip 04/28/2020:  Mild hip degenerative changes  Positive crossover sign possible pincer lesion      IMPRESSION:   back pain -referred pain management  Left groin and hip pain  -Improved  Differential diagnosis:  Mild hip arthritis  MARION pincer lesion  Labral tear      Repeat X-ray next visit:   None      Return for follow-up pain management  Patient Instructions    Explained to patient and daughter that it appears her left thigh pain and hip pain is improved after corticosteroid injection  She does however have complaints today of back pain which is likely due to arthritis and possibly a pinched nerve causing numbness and tingling down her leg  Explained that corticosteroid injection for her hip did not cure her hip arthritis that I do recommend physical therapy to help maintain strengthening of her hip as well as her back at least for another course of approximately 6 weeks  I have referred her to pain management for further evaluation of back pain and leg pain  CHIEF COMPLAINT:  Follow-up left groin and leg pain    HPI:  Isabelle Jean is a 66 y o  female  who presents for       Visit 06/26/2020: Follow-up left leg and groin pain:  Uncontrolled    Last visit patient had CT scan ordered to evaluate for possible stress fracture or other etiology of her groin and thigh pain which was normal   She does have evidence of mild hip degenerative changes as well as possible MARION  Today, patient presents with her daughter who confirms that patient does complain of anterior hip pain rain down towards her thigh  Associated symptoms do include lateral hip pain  08/07/2020: Follow-up left hip osteoarthritis:  Mild to moderately improved since last visit  Overall she is about 50% improved  She did have ultrasound-guided corticosteroid injection to left hip approximately 6 weeks ago which offer some relief  She is also taking gabapentin 100 mg once at bedtime which is offering no relief  Today, she points to her low back lumbar region as source of her pain  She denies any pain radiating down legs  Associated symptoms do include numbness and tingling right greater than left  Daughter confirms that patient has left leg pain is improved since her injection  Denies any bowel or bladder incontinence, saddle anesthesia, fever, personal history of cancer  Review of Systems   Constitutional: Negative for chills and fever  Neurological: Negative for weakness and numbness           Following history reviewed and update:    Past Medical History:   Diagnosis Date    Acute kidney injury (LAYLA) with acute tubular necrosis (ATN) ([facility] [address] ) 2/13/2020    Bed bug bite     LAST ASSESSED [id number]    Disease of thyroid gland     Graves' disease     Hyperlipidemia     Hypertension     LLL pneumonia     LAST ASSESSED [id number]    Migraine 2001    OCULAR    Scabies     LAST ASSESSED [id number]    Syncope and collapse     LAST ASSESSED [id number]    Vertigo     RESOLVED      Past Surgical History:   Procedure Laterality Date    CHOLECYSTECTOMY      HYSTERECTOMY      TOTAL THYROIDECTOMY      TUBAL LIGATION Bilateral      Social History   Social History     Substance and Sexual Activity   Alcohol Use Never    Binge frequency: Never     Social History     Substance and Sexual Activity   Drug Use No     Social History     Tobacco Use   Smoking Status Current Every Day Smoker    Packs/day: 0 50   Smokeless Tobacco Never Used     Family History   Problem Relation Age of Onset    Breast cancer Sister     Diabetes Family      Allergies   Allergen Reactions    Naproxen Itching    Niacin     Nsaids Other (See Comments) and Swelling    Azithromycin Nausea Only and Rash    Cephalexin Rash          Physical Exam  BP 96/56 (BP Location: Right arm, Patient Position: Sitting, Cuff Size: Standard)   Pulse 80   Temp (!) 97 3 °F (36 3 °C)   Wt 54 4 kg (120 lb)   BMI 21 95 kg/m²     Constitutional:  see vital signs  Gen: well-developed, normocephalic/atraumatic, well-groomed  Eyes: No inflammation or discharge of conjunctiva or lids; sclera clear   Pharynx: no inflammation, lesion, or mass of lips  Neck: supple, no masses, non-distended  MSK: no inflammation, lesion, mass, or clubbing of nails and digits except for other than mentioned below  SKIN: no visible rashes or skin lesions  Pulmonary/Chest: Effort normal  No respiratory distress     NEURO: cranial nerves grossly intact  PSYCH:  Alert and oriented to person, place, and time; recent and remote memory intact; mood normal, no depression, anxiety, or agitation, judgment and insight good and intact      Ortho Exam    BACK EXAM:  Gait: normal, no trendelenberg gait, no antalgic gait    BACK TENDERNESS:  Spinous Processes: no  Paraspinal Muscles: no  SI Joint: no  Sacrum: no    ROM:  Flexion: intact  Extension: intact  Sidebending: intact    DERMATOMAL SENSATION:  L1: normal   L2: normal   L3: normal   L4: normal   L5: normal   S1: normal    STRENGTH (bilateral):  Knee Extension: 5/5  Knee Flexion: 5/5  Foot Dorsiflexion: 5/5  Great Toe Extension: 5/5  Foot Plantarflexion: 5/5  Hip Flexion: 5/5  Hip Abduction: 5/5    REFLEXES:  Patellar: 2+ bilateral  Achilles: 2+ bilateral  Clonus: negative bilateral    BACK:   SUPINE STRAIGHT LEG: negative  PRONE STRAIGHT LEG:  SLUMP: negative    HIP:  LOG ROLL: negative  MEEK: +  FADIR: negative                Procedures

## 2020-08-07 NOTE — PATIENT INSTRUCTIONS
Explained to patient and daughter that it appears her left thigh pain and hip pain is improved after corticosteroid injection  She does however have complaints today of back pain which is likely due to arthritis and possibly a pinched nerve causing numbness and tingling down her leg  Explained that corticosteroid injection for her hip did not cure her hip arthritis that I do recommend physical therapy to help maintain strengthening of her hip as well as her back at least for another course of approximately 6 weeks  I have referred her to pain management for further evaluation of back pain and leg pain

## 2020-08-11 ENCOUNTER — OFFICE VISIT (OUTPATIENT)
Dept: PHYSICAL THERAPY | Facility: CLINIC | Age: 75
End: 2020-08-11
Payer: MEDICARE

## 2020-08-11 DIAGNOSIS — M25.552 PAIN IN LEFT HIP: ICD-10-CM

## 2020-08-11 DIAGNOSIS — M25.552 LEFT HIP PAIN: Primary | ICD-10-CM

## 2020-08-11 PROCEDURE — 97112 NEUROMUSCULAR REEDUCATION: CPT

## 2020-08-11 PROCEDURE — 97110 THERAPEUTIC EXERCISES: CPT

## 2020-08-11 NOTE — PROGRESS NOTES
Daily Note     Today's date: 2020  Patient name: Weston Llanos  : 1945  MRN: [id number]  Referring provider: Sangeetha Millard  Dx:   Encounter Diagnosis     ICD-10-CM    1  Left hip pain  M25 552    2  Pain in left hip  M25 552                   Subjective: Pt noted continued pain today  She reported pain in left hamstring region at present time      Objective: See treatment diary below      Assessment: Tolerated treatment well  Frequent cues to follow through with each exercise in correct form  Patient demonstrated fatigue post treatment and would benefit from continued PT      Plan: Continue per plan of care  Progress treatment as tolerated         Precautions: HTN    Daily Treatment Diary    Date 8/7 8/11 7/10 7/14 7/17 7/21 7/24 7/28 7/31 8/4   FOTO             Re-Riley PEETRS      Manuals                                                        Neuro Re-Ed     Lisha Mates  1'  1' x10 2x10 x20 x20 x30 x30 1' 1'   Hip abd H/L 1'  1'           Marches H/L 1'  1' x10 ea 2x10 ea 2x10 ea B x20 ea B 1' 1' 1' 1'                                                       Ther Ex    Standing hip abd, ext, flex 1' ea 1' 1x10 ea B 2x10 ea B x20 ea x20 ea x30 ea 1' ea 1' ea 1' ea   Standing knee flex 1' ea 1' 1x10 ea B 2x10 ea B x20 ea x20 ea 1' ea 1' ea 1' ea 1' ea   Standing march 1' ea 1' 1x10 ea B 2x10 ea B x20 ea x20 ea 1' ea 1' ea 1' ea 1' ea   Heel slides  x1' nv          SLR - flex, abd bilat 1' ea np 1x10 ea B 2x10 ea B 2x10 ea B x20 ea B 30" x1 ea x20 ea x20 ea 1' ea                                          Ther Activity    bike 5' 5' 5' 5' 5' 5' 5' 5' 5' 5'   Mini squats 1' ea 1x10 x10 2x10 x20 x20 x30 1'  1' 1'   Side stepping 3 laps   3 laps 3 laps  3 laps 3 laps 3 laps 3 laps 3 laps                             Gait Training                              Modalities

## 2020-08-14 ENCOUNTER — OFFICE VISIT (OUTPATIENT)
Dept: PHYSICAL THERAPY | Facility: CLINIC | Age: 75
End: 2020-08-14
Payer: MEDICARE

## 2020-08-14 DIAGNOSIS — M25.552 PAIN IN LEFT HIP: ICD-10-CM

## 2020-08-14 DIAGNOSIS — M25.552 LEFT HIP PAIN: Primary | ICD-10-CM

## 2020-08-14 PROCEDURE — 97112 NEUROMUSCULAR REEDUCATION: CPT

## 2020-08-14 PROCEDURE — 97110 THERAPEUTIC EXERCISES: CPT

## 2020-08-14 NOTE — PROGRESS NOTES
Daily Note     Today's date: 2020  Patient name: Darcie Miller  : 1945  MRN: [id number]  Referring provider: Diana Ontiveros  Dx:   Encounter Diagnosis     ICD-10-CM    1  Left hip pain  M25 552    2  Pain in left hip  M25 552                   Subjective: Pt presents to PT reporting decreased pain in L hip  Objective: See treatment diary below      Assessment: Pt demonstrates good tolerance to TE with no complaints  Pt unable to slide heel to full ext secondary to pain in L hip  Patient DC'd to [facility],  per POC and PTA TE   Educated pt on transfers from supine <---> to sit; pt demonstrates an understanding by performing correctly in the clinic  Plan: Pt DC'd to [facility]       Precautions: HTN    Daily Treatment Diary    Date    FOTO             Re-Eval          LORRAINE      Manuals                                                        Neuro Re-Ed     David Storm  1'  1' 1'  x20 x20 x30 x30 1' 1'   Hip abd H/L 1'  1' 1'          Marches H/L 1'  1' 1'  2x10 ea B x20 ea B 1' 1' 1' 1'                                                       Ther Ex    Standing hip abd, ext, flex 1' ea 1' 1' ea  x20 ea x20 ea x30 ea 1' ea 1' ea 1' ea   Standing knee flex 1' ea 1' 1' ea  x20 ea x20 ea 1' ea 1' ea 1' ea 1' ea   Standing march 1' ea 1'   x20 ea x20 ea 1' ea 1' ea 1' ea 1' ea   Heel slides  x1' 10x ea          SLR - flex, abd bilat 1' ea np   2x10 ea B x20 ea B 30" x1 ea x20 ea x20 ea 1' ea                                          Ther Activity    bike 5' 5' 5'  5' 5' 5' 5' 5' 5'   Mini squats 1' ea 1x10 2 x 10  x20 x20 x30 1'  1' 1'   Side stepping 3 laps    3 laps  3 laps 3 laps 3 laps 3 laps 3 laps                             Gait Training                              Modalities

## 2020-08-17 ENCOUNTER — CONSULT (OUTPATIENT)
Dept: PAIN MEDICINE | Facility: MEDICAL CENTER | Age: 75
End: 2020-08-17
Payer: MEDICARE

## 2020-08-17 VITALS
TEMPERATURE: 99.6 F | HEIGHT: 62 IN | RESPIRATION RATE: 16 BRPM | DIASTOLIC BLOOD PRESSURE: 68 MMHG | WEIGHT: 123 LBS | SYSTOLIC BLOOD PRESSURE: 114 MMHG | HEART RATE: 98 BPM | BODY MASS INDEX: 22.63 KG/M2

## 2020-08-17 DIAGNOSIS — M54.41 CHRONIC LOW BACK PAIN WITH RIGHT-SIDED SCIATICA, UNSPECIFIED BACK PAIN LATERALITY: ICD-10-CM

## 2020-08-17 DIAGNOSIS — G89.29 CHRONIC LOW BACK PAIN WITH RIGHT-SIDED SCIATICA, UNSPECIFIED BACK PAIN LATERALITY: ICD-10-CM

## 2020-08-17 PROCEDURE — 1160F RVW MEDS BY RX/DR IN RCRD: CPT | Performed by: PHYSICAL MEDICINE & REHABILITATION

## 2020-08-17 PROCEDURE — 3074F SYST BP LT 130 MM HG: CPT | Performed by: PHYSICAL MEDICINE & REHABILITATION

## 2020-08-17 PROCEDURE — 99204 OFFICE O/P NEW MOD 45 MIN: CPT | Performed by: PHYSICAL MEDICINE & REHABILITATION

## 2020-08-17 PROCEDURE — 3078F DIAST BP <80 MM HG: CPT | Performed by: PHYSICAL MEDICINE & REHABILITATION

## 2020-08-17 PROCEDURE — 4040F PNEUMOC VAC/ADMIN/RCVD: CPT | Performed by: PHYSICAL MEDICINE & REHABILITATION

## 2020-08-17 PROCEDURE — 3008F BODY MASS INDEX DOCD: CPT | Performed by: PHYSICAL MEDICINE & REHABILITATION

## 2020-08-17 PROCEDURE — 4004F PT TOBACCO SCREEN RCVD TLK: CPT | Performed by: PHYSICAL MEDICINE & REHABILITATION

## 2020-08-17 NOTE — PROGRESS NOTES
Assessment  1  Chronic low back pain with right-sided sciatica, unspecified back pain laterality        Plan  1  At this time we will obtain an MRI of the lumbar spine to determine if there is any significant neural compression  The patient is describing some radiating right leg pain complaints  She also describes some numbness  2  Continue with current medication regimen until we obtain imaging studies and determine further treatment options  My impressions and treatment recommendations were discussed in detail with the patient who verbalized understanding and had no further questions  Discharge instructions were provided  I personally saw and examined the patient and I agree with the above discussed plan of care  Orders Placed This Encounter   Procedures    MRI lumbar spine wo contrast     Standing Status:   Future     Standing Expiration Date:   8/17/2024     Scheduling Instructions: There is no preparation for this test  Please leave your jewelry and valuables at home, wedding rings are the exception  Magnetic nail polish must be removed prior to arrival for your test  Please bring your insurance cards, a form of photo ID and a list of your medications with you  Arrive 15 minutes prior to your appointment time in order to register  Please bring any prior CT or MRI studies of this area that were not performed at a [facility]  To schedule this appointment, please contact Central Scheduling at [phone]  Prior to your appointment, please make sure you complete the MRI Screening Form when you e-Check in for your appointment  This will be available starting 7 days before your appointment in [address]  You may receive an e-mail with an activation code if you do not have a [employer] account  If you do not have access to a device, we will complete your screening at your appointment  Order Specific Question:   What is the patient's sedation requirement?      Answer: No Sedation     Order Specific Question:   Does the patient have metallic implants? Answer:   No     Order Specific Question:   Does this procedure require the 3T MRI at [place]? Answer:   No     No orders of the defined types were placed in this encounter  History of Present Illness    Zack Sanchez is a 66 y o  female presents in consultation at the request of Dr Fitz Vazquez regarding back and radiating leg pain  This is been problematic over the past 3 and half months without any inciting event or trauma  She describes moderate intensity pain currently rated as a 6/10 which is intermittent and worse in the evening and nighttime  She characterizes the pain as burning, numbness, throbbing, dull, aching  This is mainly in the lower back and radiates down right greater than left lower extremities  Aggravating factors include standing, bending, sitting, walking  She is unable to determine any significant alleviating factors  Diagnostic studies include x-rays and CT scans  Lumbar spine x-ray demonstrates grade 1 anterolisthesis of L4 and L5 and severe disc degeneration at T11-12, T12-L1, L5-S1, L4-5 is more mild  Significant facet degenerative changes at L4-5 and L5-S1 as well  Patient does have kidney disease as well as hypertension  Not a good candidate for chronic NSAID use  She has noted some moderate relief physical therapy and no relief with heat or ice application  I have personally reviewed and/or updated the patient's past medical history, past surgical history, family history, social history, current medications, allergies, and vital signs today  Review of Systems   Constitutional: Negative for fever and unexpected weight change  HENT: Positive for hearing loss  Negative for trouble swallowing  Eyes: Negative for visual disturbance  Respiratory: Negative for shortness of breath and wheezing  Cardiovascular: Negative for chest pain and palpitations  Gastrointestinal: Negative for constipation, diarrhea, nausea and vomiting  Endocrine: Negative for cold intolerance, heat intolerance and polydipsia  Genitourinary: Negative for difficulty urinating and frequency  Musculoskeletal: Positive for back pain, gait problem and myalgias  Negative for arthralgias and joint swelling  Skin: Negative for rash  Neurological: Negative for dizziness, seizures, syncope, weakness and headaches  Hematological: Does not bruise/bleed easily  Psychiatric/Behavioral: Negative for dysphoric mood  All other systems reviewed and are negative        Patient Active Problem List   Diagnosis    Sinus bradycardia    Postoperative hypothyroidism    Essential hypertension    Urinary incontinence    Chronic kidney disease, stage III (moderate) ([facility])    Umbilical hernia    Trigger middle finger of right hand    Current smoker    Thyroid nodule    Pernicious anemia    Osteoarthritis    Neuropathy of both feet    Low back pain radiating to left lower extremity    Leukocytosis    Lacunar stroke ([facility])    Hyperglycemia    Hypercholesterolemia    Graves' disease    Gait disturbance    Dizziness    Disc degeneration, lumbar    Late onset Alzheimer's disease with behavioral disturbance ([facility])    Cough, chronic -  improved    Hearing loss    Major depressive disorder with single episode    Visual impairment    Full dentures    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[facility])    Weakness    Fall    Anemia    Transaminitis    Hypotension, unspecified    Hypocalcemia    Bradycardia       Past Medical History:   Diagnosis Date    Acute kidney injury (LAYLA) with acute tubular necrosis (ATN) ([facility]) 2/13/2020    Bed bug bite     LAST ASSESSED [id number]    Chronic pain disorder     Disease of thyroid gland     Extremity pain     Graves' disease     Hyperlipidemia     Hypertension     LLL pneumonia     LAST ASSESSED [id number]    Low back pain     Migraine 2001    OCULAR    Scabies LAST ASSESSED [id number]    Syncope and collapse     LAST ASSESSED [id number]    Vertigo     RESOLVED        Past Surgical History:   Procedure Laterality Date    CHOLECYSTECTOMY      HYSTERECTOMY      TOTAL THYROIDECTOMY      TUBAL LIGATION Bilateral        Family History   Problem Relation Age of Onset    Breast cancer Sister     Diabetes Family     No Known Problems Mother     No Known Problems Father        Social History     Occupational History    Not on file   Tobacco Use    Smoking status: Current Every Day Smoker     Packs/day: 0 50    Smokeless tobacco: Never Used   Substance and Sexual Activity    Alcohol use: Never     Binge frequency: Never    Drug use: No    Sexual activity: Not Currently       Current Outpatient Medications on File Prior to Visit   Medication Sig    alendronate (FOSAMAX) 70 mg tablet     aspirin 81 MG tablet Take 81 mg by mouth daily   calcium-vitamin D (OSCAL) 250-125 MG-UNIT per tablet Take 1 tablet by mouth daily      citalopram (CeleXA) 10 mg tablet take 1 tablet by mouth once daily    cyanocobalamin 1,000 mcg/mL Injection Q 6-8 weeks re PA    donepezil (ARICEPT) 10 mg tablet Take 1 tablet (10 mg total) by mouth daily at bedtime    ergocalciferol (VITAMIN D2) 50,000 units     gabapentin (NEURONTIN) 100 mg capsule Take 1 capsule (100 mg total) by mouth 3 (three) times a day (to control leg pain)    levothyroxine 112 mcg tablet Take 1 tablet (112 mcg total) by mouth daily in the early morning    lisinopril (ZESTRIL) 20 mg tablet Take 1 tablet (20 mg total) by mouth daily    metoprolol succinate (TOPROL-XL) 50 mg 24 hr tablet Take 50 mg by mouth daily    niacin (NIASPAN) 1000 MG CR tablet Take 2 tablets (2,000 mg total) by mouth daily    triamcinolone (KENALOG) 0 5 % cream Apply topically Three times a day    bisacodyl (DULCOLAX) 5 mg EC tablet Take 1 tablet (5 mg total) by mouth daily as needed for constipation (Patient not taking: Reported on 8/17/2020)    clotrimazole-betamethasone (LOTRISONE) 1-0 05 % cream APPLY TOPICALLY 2 (TWO) TIMES A  DAY FOR 14 DAYS TO AFFECTED AREA(S)    lisinopril-hydrochlorothiazide (PRINZIDE,ZESTORETIC) 20-12 5 MG per tablet Take 1 tablet by mouth daily    terconazole (TERAZOL 3) 0 8 % vaginal cream insert 1 applicatorful vaginally once daily at bedtime for 3 days    triamcinolone (KENALOG) 0 5 % cream apply to affected area three times a day     Current Facility-Administered Medications on File Prior to Visit   Medication    cyanocobalamin injection 1,000 mcg       Allergies   Allergen Reactions    Naproxen Itching    Niacin     Nsaids Other (See Comments) and Swelling    Azithromycin Nausea Only and Rash    Cephalexin Rash       Physical Exam    /68   Pulse 98   Temp 99 6 °F (37 6 °C)   Resp 16   Ht 5' 2" (1 575 m)   Wt 55 8 kg (123 lb)   BMI 22 50 kg/m²     LUMBAR  General: Well-developed, well-nourished individual in no acute distress  Mental: Appropriate mood and affect  Grossly oriented with coherent speech and thought processing   Patient is extremely hard of hearing  Neuro:  Cranial nerves: Cranial nerve function is grossly intact bilaterally   Strength: Bilateral lower extremity strength is normal and symmetric   No atrophy or tone abnormalities noted   Reflexes: Bilateral lower extremity muscle stretch reflexes are absent at the knees and ankles    No ankle clonus is noted   Sensation: No loss of sensation is noted   SLR/Foraminal Compression Maneuvers: Straight leg raising in the sitting position is positive for radicular pain on the right  Gait:  Gait/gross motor: Gait is shuffling in nature  Station is forward flexed  Toe walking, heel walking  are not tested secondary to balance dysfunction      Musculoskeletal:  Palpation: Inspection and palpation of the spine and extremities are unremarkable except for tenderness to palpation along the lower lumbar spine and sacroiliac region bilaterally reproducing pain complaint  Spine:  Spinal range of motion is significantly limited with lumbar extension reproducing her pain complaint, she is able to forward flex within functional range of motion    No gross axial skeletal deformities   Skin: Skin inspection grossly negative for erythema, breakdown, or concerning lesions in affected area except for psoriatic lesions noted over lower back region  Lymph: No lymphadenopathy is appreciated in the involved extremity   Vessels: No lower extremity edema   Lungs: Breathing is comfortable and regular  No dyspnea noted during examination   Eyes: Visual field grossly intact to confrontation  No redness appreciated  ENT: No craniofacial deformities or asymmetry  No neck masses appreciated  Imaging  Study Result   LUMBAR SPINE   INDICATION: M79 605: Pain in left leg   M51 36: Other intervertebral disc degeneration, lumbar region   M54 5: Low back pain   M79 605: Pain in left leg  COMPARISON: July 16, 2015   VIEWS: XR SPINE LUMBAR MINIMUM 4 VIEWS NON INJURY   FINDINGS:   There are 5 non rib bearing lumbar vertebral bodies  There is no evidence of acute fracture or destructive osseous lesion  There is grade 1 anterolisthesis L4 on L5 which is stable when compared with the prior study  There is severe disc degeneration at T11-T12 and T12-L1 and L5-S1 and mild degeneration at L4-L5  There is also disc degeneration at T9-T10 and the other lower thoracic discs  There are degenerative changes of the facet joints at L4-L5 and L5-S1  The pedicles appear intact  There are atherosclerotic calcifications  Soft tissues are otherwise unremarkable  IMPRESSION:   Stable appearance of chronic degenerative changes of the lumbar and lower thoracic spine

## 2020-08-24 ENCOUNTER — HOSPITAL ENCOUNTER (OUTPATIENT)
Dept: MRI IMAGING | Facility: HOSPITAL | Age: 75
Discharge: HOME | End: 2020-08-24
Attending: PHYSICAL MEDICINE & REHABILITATION
Payer: MEDICARE

## 2020-08-24 DIAGNOSIS — M54.41 CHRONIC LOW BACK PAIN WITH RIGHT-SIDED SCIATICA, UNSPECIFIED BACK PAIN LATERALITY: ICD-10-CM

## 2020-08-24 DIAGNOSIS — G89.29 CHRONIC LOW BACK PAIN WITH RIGHT-SIDED SCIATICA, UNSPECIFIED BACK PAIN LATERALITY: ICD-10-CM

## 2020-08-24 PROCEDURE — G1004 CDSM NDSC: HCPCS

## 2020-08-24 PROCEDURE — 72148 MRI LUMBAR SPINE W/O DYE: CPT

## 2020-08-25 ENCOUNTER — TELEPHONE (OUTPATIENT)
Dept: PAIN MEDICINE | Facility: MEDICAL CENTER | Age: 75
End: 2020-08-25

## 2020-08-25 DIAGNOSIS — M54.50 LOW BACK PAIN RADIATING TO LEFT LOWER EXTREMITY: Primary | ICD-10-CM

## 2020-08-25 DIAGNOSIS — M79.605 LOW BACK PAIN RADIATING TO LEFT LOWER EXTREMITY: Primary | ICD-10-CM

## 2020-08-25 DIAGNOSIS — M54.16 LUMBAR RADICULOPATHY: ICD-10-CM

## 2020-08-25 DIAGNOSIS — M48.062 SPINAL STENOSIS OF LUMBAR REGION WITH NEUROGENIC CLAUDICATION: ICD-10-CM

## 2020-08-25 NOTE — TELEPHONE ENCOUNTER
Radiology called for L spine has "significant findings" she also put a copy in epic for you       Thank you     [facility]     [phone]

## 2020-08-25 NOTE — TELEPHONE ENCOUNTER
RN s/w pt's daughter as per release of health information on chart regarding previous  Per daughter Yin Rachel, they would like a referral to Dr Mahi Miller at this time thank you

## 2020-08-25 NOTE — TELEPHONE ENCOUNTER
KANDI Correa  [facility] [place] [facility]               We please call patient and let her know that there is pretty significant stenosis at L4-5 there is a severely pinched nerve at L3-4   At this point, I would recommend that she be evaluated by a neurosurgeon  Benjy fang if she has someone that she prefers seeing  Houston Martinez, I will refer her to Dr Amelia Holland

## 2020-09-04 ENCOUNTER — CONSULT (OUTPATIENT)
Dept: NEUROSURGERY | Facility: CLINIC | Age: 75
End: 2020-09-04
Payer: MEDICARE

## 2020-09-04 VITALS
TEMPERATURE: 97.6 F | HEIGHT: 62 IN | WEIGHT: 126.3 LBS | BODY MASS INDEX: 23.24 KG/M2 | DIASTOLIC BLOOD PRESSURE: 75 MMHG | RESPIRATION RATE: 16 BRPM | SYSTOLIC BLOOD PRESSURE: 110 MMHG | HEART RATE: 76 BPM

## 2020-09-04 DIAGNOSIS — G30.1 LATE ONSET ALZHEIMER'S DISEASE WITH BEHAVIORAL DISTURBANCE (HCC): Primary | ICD-10-CM

## 2020-09-04 DIAGNOSIS — M79.605 LOW BACK PAIN RADIATING TO LEFT LOWER EXTREMITY: ICD-10-CM

## 2020-09-04 DIAGNOSIS — M48.062 SPINAL STENOSIS OF LUMBAR REGION WITH NEUROGENIC CLAUDICATION: ICD-10-CM

## 2020-09-04 DIAGNOSIS — N18.30 CHRONIC KIDNEY DISEASE, STAGE III (MODERATE) (HCC): Chronic | ICD-10-CM

## 2020-09-04 DIAGNOSIS — F02.81 LATE ONSET ALZHEIMER'S DISEASE WITH BEHAVIORAL DISTURBANCE (HCC): Primary | ICD-10-CM

## 2020-09-04 DIAGNOSIS — M54.16 LUMBAR RADICULOPATHY: ICD-10-CM

## 2020-09-04 DIAGNOSIS — M54.50 LOW BACK PAIN RADIATING TO LEFT LOWER EXTREMITY: ICD-10-CM

## 2020-09-04 PROCEDURE — 99203 OFFICE O/P NEW LOW 30 MIN: CPT | Performed by: NEUROLOGICAL SURGERY

## 2020-09-04 NOTE — PROGRESS NOTES
Office Note - Neurosurgery   [place] 66 y o  female MRN: [id number]      Assessment:    Patient is gradually worsening  57-year-old woman with lumbar neurogenic claudication secondary to lumbar spondylosis and stenosis with anterolisthesis  The natural history was reviewed in detail with the patient and her POA  There is also history of kidney disease and progressive dementia  Would recommend she exhaust nonsurgical pain management strategies  Surgical decompression would involve an L4-5 decompression and fusion  However, the patient clearly has significant cognitive impairment and I am quite concerned with respect to her ability to recover from surgery and general anesthetic  The patient herself indicates that she is not particularly interested in any surgical intervention, though once again is somewhat unclear the extent to which she understands the goals and rationale for the surgery  At present, the risks of surgery outweigh the potential benefits  She will follow up through this office on a p r n  Basis  History, physical examination and diagnostic tests were reviewed and questions answered  Diagnosis, care plan and treatment options were discussed  The patient and daughter understand instructions and will follow up as directed  Plan:    Follow-up: prn    Problem List Items Addressed This Visit        Nervous and Auditory    Late onset Alzheimer's disease with behavioral disturbance ([facility] [address] ) - Primary       Genitourinary    Chronic kidney disease, stage III (moderate) (HCC) (Chronic)       Other    Low back pain radiating to left lower extremity      Other Visit Diagnoses     Lumbar radiculopathy        Spinal stenosis of lumbar region with neurogenic claudication              Subjective/Objective     Chief Complaint    Low back and bilateral leg pain  HPI    Pleasant 57-year-old woman with a 2-3 month history of lower back and bilateral leg pain    The pain seems to occur mostly when she stands and walks  It will radiate into both anterior thighs  His it does not radiate below the knees  She denies any weakness or numbness in her legs  She denies any urinary incontinence or changing perineal sensation  However, the patient is a somewhat vague historian and quite hard of hearing  Her daughter, who is 1 of her POAs, is present today  Physical therapy was not particularly helpful, though it is somewhat unclear how much the patient could participate  She is able to walk for 10-15 minutes without a walker  She can walk for much longer with a walker but refuses to use it and seems to forget to use it quite frequently  Membrane stabilizing agents were not helpful  Apparently a right intra-articular hip injection was helpful  She has not tried epidural steroid injections  She presents today to review the results of recent MRI of the lumbar spine  MELI GARRISON personally reviewed and updated  Review of Systems   Constitutional: Negative  HENT:        Hard of hearing   Eyes: Negative  Respiratory: Negative  Cardiovascular: Negative  Gastrointestinal: Negative  Endocrine: Negative  Genitourinary: Negative  Musculoskeletal: Positive for back pain (2 months back pain radiating to b/l hips and thigh) and gait problem (unsteady gait)  Skin: Negative  Allergic/Immunologic: Negative  Neurological: Positive for weakness (b/l )  Negative for numbness  Psychiatric/Behavioral: Negative  Family History    Family History   Problem Relation Age of Onset    Breast cancer Sister     Diabetes Family     No Known Problems Mother     No Known Problems Father        Social History    Social History     Socioeconomic History    Marital status:       Spouse name: Not on file    Number of children: Not on file    Years of education: Not on file    Highest education level: Not on file   Occupational History    Not on file   Social Needs    Financial resource strain: Not on file    Food insecurity     Worry: Not on file     Inability: Not on file    Transportation needs     Medical: Not on file     Non-medical: Not on file   Tobacco Use    Smoking status: Current Every Day Smoker     Packs/day: 0 50    Smokeless tobacco: Never Used   Substance and Sexual Activity    Alcohol use: Never     Binge frequency: Never    Drug use: No    Sexual activity: Not Currently   Lifestyle    Physical activity     Days per week: Not on file     Minutes per session: Not on file    Stress: Not on file   Relationships    Social connections     Talks on phone: Not on file     Gets together: Not on file     Attends Judaism service: Not on file     Active member of club or organization: Not on file     Attends meetings of clubs or organizations: Not on file     Relationship status: Not on file    Intimate partner violence     Fear of current or ex partner: Not on file     Emotionally abused: Not on file     Physically abused: Not on file     Forced sexual activity: Not on file   Other Topics Concern    Not on file   Social History Narrative    COPY OF ADVANCED DIRECTIVE OBTAINED FROM PATIENT        Past Medical History    Past Medical History:   Diagnosis Date    [facility] kidney [facility] (LAYLA) [facility] 2/13/2020    Bed bug bite     LAST ASSESSED [id number]    Chronic pain disorder     Disease of thyroid gland     Extremity pain     Graves' disease     Hyperlipidemia     Hypertension     LLL pneumonia     LAST ASSESSED [id number]    Low back pain     Migraine 2001    OCULAR    Scabies     LAST ASSESSED [id number]    Syncope and collapse     LAST ASSESSED [id number]    Vertigo     RESOLVED        Surgical History    Past Surgical History:   Procedure Laterality Date    CHOLECYSTECTOMY      HYSTERECTOMY      TOTAL THYROIDECTOMY      TUBAL LIGATION Bilateral        Medications      Current Outpatient Medications:     alendronate (FOSAMAX) 70 mg tablet, , Disp: , Rfl:     calcium-vitamin D (OSCAL) 250-125 MG-UNIT per tablet, Take 1 tablet by mouth daily  , Disp: , Rfl:     citalopram (CeleXA) 10 mg tablet, take 1 tablet by mouth once daily, Disp: 90 tablet, Rfl: 1    cyanocobalamin 1,000 mcg/mL, Injection Q 6-8 weeks re PA, Disp: 1 mL, Rfl:     donepezil (ARICEPT) 10 mg tablet, Take 1 tablet (10 mg total) by mouth daily at bedtime, Disp: 30 tablet, Rfl: 6    gabapentin (NEURONTIN) 100 mg capsule, Take 1 capsule (100 mg total) by mouth 3 (three) times a day (to control leg pain), Disp: 90 capsule, Rfl: 1    levothyroxine 112 mcg tablet, Take 1 tablet (112 mcg total) by mouth daily in the early morning, Disp: 30 tablet, Rfl: 0    lisinopril (ZESTRIL) 20 mg tablet, Take 1 tablet (20 mg total) by mouth daily, Disp: 90 tablet, Rfl: 3    metoprolol succinate (TOPROL-XL) 50 mg 24 hr tablet, Take 50 mg by mouth daily, Disp: , Rfl:     niacin (NIASPAN) 1000 MG CR tablet, Take 2 tablets (2,000 mg total) by mouth daily, Disp: , Rfl: 0    terconazole (TERAZOL 3) 0 8 % vaginal cream, insert 1 applicatorful vaginally once daily at bedtime for 3 days, Disp: , Rfl:     triamcinolone (KENALOG) 0 5 % cream, Apply topically Three times a day, Disp: , Rfl:     triamcinolone (KENALOG) 0 5 % cream, apply to affected area three times a day, Disp: , Rfl:     aspirin 81 MG tablet, Take 81 mg by mouth daily  , Disp: , Rfl:     bisacodyl (DULCOLAX) 5 mg EC tablet, Take 1 tablet (5 mg total) by mouth daily as needed for constipation (Patient not taking: Reported on 8/17/2020), Disp: 30 tablet, Rfl: 0    clotrimazole-betamethasone (LOTRISONE) 1-0 05 % cream, APPLY TOPICALLY 2 (TWO) TIMES A  DAY FOR 14 DAYS TO AFFECTED AREA(S), Disp: , Rfl:     ergocalciferol (VITAMIN D2) 50,000 units, , Disp: , Rfl: 0    lisinopril-hydrochlorothiazide (PRINZIDE,ZESTORETIC) 20-12 5 MG per tablet, Take 1 tablet by mouth daily, Disp: , Rfl:     Current Facility-Administered Medications:     cyanocobalamin injection 1,000 mcg, 1,000 mcg, Intramuscular, Q56 Days, Marinell Diandra, DO, 1,000 mcg at 07/06/20 1017    Allergies    Allergies   Allergen Reactions    Naproxen Itching    Niacin     Nsaids Other (See Comments) and Swelling    Azithromycin Nausea Only and Rash    Cephalexin Rash       The following portions of the patient's history were reviewed and updated as appropriate: allergies, current medications, past family history, past medical history, past social history, past surgical history and problem list     Investigations    I personally reviewed the MRI results with the patient:    MRI of the lumbar spine without contrast dated August 17th, 2020  Note is made of a right paracentral disc herniation at T11-T12  This produces moderate central canal stenosis without compression of the spinal cord or obvious spinal cord signal change  There is right foraminal stenosis secondary to degenerative changes at T11-T12 and T12-L1  Grade 1 degenerative anterolisthesis at L3-4  Mild stenosis at L2-3 and mild to moderate stenosis at L3-4  At L4-5 there is severe central and bilateral lateral recess stenosis secondary to degenerative changes and anterolisthesis  At L5-S1 there is a left paracentral disc herniation which contacts the traversing nerve root  No other areas of significant neural compression  There is redundancy of the nerve roots of the cauda equina above the L4-5 level  Physical Exam    Vitals:  Blood pressure 110/75, pulse 76, temperature 97 6 °F (36 4 °C), temperature source Tympanic, resp  rate 16, height 5' 2" (1 575 m), weight 57 3 kg (126 lb 4 8 oz)  ,Body mass index is 23 1 kg/m²  Physical Exam  Vitals signs reviewed  Constitutional:       General: She is not in acute distress  HENT:      Head: Atraumatic  Eyes:      Extraocular Movements: Extraocular movements intact  Pulmonary:      Effort: Pulmonary effort is normal  No respiratory distress  Musculoskeletal:         General: No deformity (Full range of motion on flexion-extension lumbar spine without pain  )  Skin:     General: Skin is warm and dry  Neurological:      General: No focal deficit present  Mental Status: She is alert  Comments: 5/5 power in lower extremities  Walks with a steady gait  Mildly stooped forward posture  Sensory exam is somewhat unreliable in lower extremities  Psychiatric:         Attention and Perception: She is inattentive  Speech: Speech normal          Behavior: Behavior is slowed  Cognition and Memory: Cognition is impaired  Memory is impaired  She exhibits impaired recent memory         Neurologic Exam     Mental Status   Speech: speech is normal

## 2020-09-08 ENCOUNTER — CLINICAL SUPPORT (OUTPATIENT)
Dept: FAMILY MEDICINE CLINIC | Facility: CLINIC | Age: 75
End: 2020-09-08
Payer: MEDICARE

## 2020-09-08 VITALS — TEMPERATURE: 99.3 F

## 2020-09-08 DIAGNOSIS — E53.8 VITAMIN B12 DEFICIENCY: Primary | ICD-10-CM

## 2020-09-08 PROCEDURE — 96372 THER/PROPH/DIAG INJ SC/IM: CPT

## 2020-09-08 RX ADMIN — CYANOCOBALAMIN 1000 MCG: 1000 INJECTION INTRAMUSCULAR; SUBCUTANEOUS at 13:13

## 2020-09-09 ENCOUNTER — TELEPHONE (OUTPATIENT)
Dept: PAIN MEDICINE | Facility: MEDICAL CENTER | Age: 75
End: 2020-09-09

## 2020-10-08 ENCOUNTER — OFFICE VISIT (OUTPATIENT)
Dept: PAIN MEDICINE | Facility: MEDICAL CENTER | Age: 75
End: 2020-10-08
Payer: MEDICARE

## 2020-10-08 VITALS
HEIGHT: 62 IN | BODY MASS INDEX: 23.55 KG/M2 | TEMPERATURE: 97 F | WEIGHT: 128 LBS | DIASTOLIC BLOOD PRESSURE: 75 MMHG | HEART RATE: 71 BPM | SYSTOLIC BLOOD PRESSURE: 117 MMHG

## 2020-10-08 DIAGNOSIS — M48.062 SPINAL STENOSIS OF LUMBAR REGION WITH NEUROGENIC CLAUDICATION: Primary | ICD-10-CM

## 2020-10-08 PROCEDURE — 99214 OFFICE O/P EST MOD 30 MIN: CPT | Performed by: PHYSICAL MEDICINE & REHABILITATION

## 2020-10-21 ENCOUNTER — HOSPITAL ENCOUNTER (OUTPATIENT)
Dept: RADIOLOGY | Facility: MEDICAL CENTER | Age: 75
Discharge: HOME | End: 2020-10-21
Attending: PHYSICAL MEDICINE & REHABILITATION | Admitting: PHYSICAL MEDICINE & REHABILITATION
Payer: MEDICARE

## 2020-10-21 ENCOUNTER — OFFICE VISIT (OUTPATIENT)
Dept: GERIATRICS | Age: 75
End: 2020-10-21
Payer: MEDICARE

## 2020-10-21 VITALS
OXYGEN SATURATION: 93 % | SYSTOLIC BLOOD PRESSURE: 126 MMHG | HEART RATE: 64 BPM | HEIGHT: 62 IN | DIASTOLIC BLOOD PRESSURE: 48 MMHG | WEIGHT: 131 LBS | BODY MASS INDEX: 24.11 KG/M2 | TEMPERATURE: 96.5 F

## 2020-10-21 VITALS
TEMPERATURE: 98.4 F | OXYGEN SATURATION: 97 % | HEART RATE: 66 BPM | SYSTOLIC BLOOD PRESSURE: 103 MMHG | DIASTOLIC BLOOD PRESSURE: 59 MMHG | RESPIRATION RATE: 22 BRPM

## 2020-10-21 DIAGNOSIS — L40.9 PSORIASIS: ICD-10-CM

## 2020-10-21 DIAGNOSIS — G57.93 NEUROPATHY OF BOTH FEET: ICD-10-CM

## 2020-10-21 DIAGNOSIS — G30.1 LATE ONSET ALZHEIMER'S DISEASE WITH BEHAVIORAL DISTURBANCE (HCC): Primary | ICD-10-CM

## 2020-10-21 DIAGNOSIS — I10 ESSENTIAL HYPERTENSION: ICD-10-CM

## 2020-10-21 DIAGNOSIS — F02.81 LATE ONSET ALZHEIMER'S DISEASE WITH BEHAVIORAL DISTURBANCE (HCC): Primary | ICD-10-CM

## 2020-10-21 DIAGNOSIS — M48.062 SPINAL STENOSIS OF LUMBAR REGION WITH NEUROGENIC CLAUDICATION: ICD-10-CM

## 2020-10-21 DIAGNOSIS — R26.9 GAIT DISTURBANCE: ICD-10-CM

## 2020-10-21 DIAGNOSIS — F32.A DEPRESSION, UNSPECIFIED DEPRESSION TYPE: ICD-10-CM

## 2020-10-21 DIAGNOSIS — F32.0 CURRENT MILD EPISODE OF MAJOR DEPRESSIVE DISORDER WITHOUT PRIOR EPISODE (HCC): ICD-10-CM

## 2020-10-21 PROCEDURE — 64483 NJX AA&/STRD TFRM EPI L/S 1: CPT | Performed by: PHYSICAL MEDICINE & REHABILITATION

## 2020-10-21 PROCEDURE — 99214 OFFICE O/P EST MOD 30 MIN: CPT | Performed by: FAMILY MEDICINE

## 2020-10-21 RX ORDER — LIDOCAINE HYDROCHLORIDE 10 MG/ML
5 INJECTION, SOLUTION EPIDURAL; INFILTRATION; INTRACAUDAL; PERINEURAL ONCE
Status: COMPLETED | OUTPATIENT
Start: 2020-10-21 | End: 2020-10-21

## 2020-10-21 RX ORDER — PAPAVERINE HCL 150 MG
20 CAPSULE, EXTENDED RELEASE ORAL ONCE
Status: COMPLETED | OUTPATIENT
Start: 2020-10-21 | End: 2020-10-21

## 2020-10-21 RX ADMIN — IOHEXOL 2 ML: 300 INJECTION, SOLUTION INTRAVENOUS at 11:14

## 2020-10-21 RX ADMIN — LIDOCAINE HYDROCHLORIDE 2 ML: 10 INJECTION, SOLUTION EPIDURAL; INFILTRATION; INTRACAUDAL; PERINEURAL at 11:12

## 2020-10-21 RX ADMIN — DEXAMETHASONE SODIUM PHOSPHATE 15 MG: 10 INJECTION, SOLUTION INTRAMUSCULAR; INTRAVENOUS at 11:14

## 2020-10-22 PROBLEM — L40.9 PSORIASIS: Status: ACTIVE | Noted: 2020-10-22

## 2020-10-22 RX ORDER — CITALOPRAM 10 MG/1
10 TABLET ORAL DAILY
Qty: 90 TABLET | Refills: 1 | Status: SHIPPED | OUTPATIENT
Start: 2020-10-22 | End: 2021-06-30

## 2020-10-28 ENCOUNTER — TELEPHONE (OUTPATIENT)
Dept: PAIN MEDICINE | Facility: CLINIC | Age: 75
End: 2020-10-28

## 2020-11-03 ENCOUNTER — CLINICAL SUPPORT (OUTPATIENT)
Dept: FAMILY MEDICINE CLINIC | Facility: CLINIC | Age: 75
End: 2020-11-03
Payer: MEDICARE

## 2020-11-03 VITALS — TEMPERATURE: 97.8 F

## 2020-11-03 DIAGNOSIS — E53.8 VITAMIN B12 DEFICIENCY: Primary | ICD-10-CM

## 2020-11-03 PROCEDURE — 96372 THER/PROPH/DIAG INJ SC/IM: CPT

## 2020-11-03 RX ADMIN — CYANOCOBALAMIN 1000 MCG: 1000 INJECTION INTRAMUSCULAR; SUBCUTANEOUS at 13:16

## 2020-11-09 PROBLEM — E55.9 VITAMIN D DEFICIENCY: Status: ACTIVE | Noted: 2020-11-04

## 2020-11-09 RX ORDER — ALENDRONATE SODIUM 70 MG/1
TABLET ORAL
COMMUNITY
Start: 2020-11-04

## 2020-11-09 RX ORDER — LEVOTHYROXINE SODIUM 137 UG/1
137 TABLET ORAL DAILY
COMMUNITY
Start: 2020-11-04 | End: 2021-03-10 | Stop reason: SDUPTHER

## 2020-11-10 ENCOUNTER — OFFICE VISIT (OUTPATIENT)
Dept: FAMILY MEDICINE CLINIC | Facility: CLINIC | Age: 75
End: 2020-11-10
Payer: MEDICARE

## 2020-11-10 VITALS
OXYGEN SATURATION: 98 % | BODY MASS INDEX: 23.19 KG/M2 | HEART RATE: 71 BPM | TEMPERATURE: 98.9 F | WEIGHT: 126 LBS | DIASTOLIC BLOOD PRESSURE: 70 MMHG | SYSTOLIC BLOOD PRESSURE: 122 MMHG | HEIGHT: 62 IN

## 2020-11-10 DIAGNOSIS — Z23 FLU VACCINE NEED: ICD-10-CM

## 2020-11-10 DIAGNOSIS — G47.9 SLEEP DISTURBANCE: ICD-10-CM

## 2020-11-10 DIAGNOSIS — G30.1 LATE ONSET ALZHEIMER'S DISEASE WITH BEHAVIORAL DISTURBANCE (HCC): ICD-10-CM

## 2020-11-10 DIAGNOSIS — E89.0 POSTOPERATIVE HYPOTHYROIDISM: Chronic | ICD-10-CM

## 2020-11-10 DIAGNOSIS — I63.81 LACUNAR STROKE (HCC): ICD-10-CM

## 2020-11-10 DIAGNOSIS — I10 ESSENTIAL HYPERTENSION: Primary | ICD-10-CM

## 2020-11-10 DIAGNOSIS — R26.9 GAIT DISTURBANCE: ICD-10-CM

## 2020-11-10 DIAGNOSIS — F33.41 RECURRENT MAJOR DEPRESSIVE DISORDER, IN PARTIAL REMISSION (HCC): ICD-10-CM

## 2020-11-10 DIAGNOSIS — D51.0 PERNICIOUS ANEMIA: ICD-10-CM

## 2020-11-10 DIAGNOSIS — F17.200 CURRENT SMOKER: ICD-10-CM

## 2020-11-10 DIAGNOSIS — F02.81 LATE ONSET ALZHEIMER'S DISEASE WITH BEHAVIORAL DISTURBANCE (HCC): ICD-10-CM

## 2020-11-10 PROBLEM — R00.1 BRADYCARDIA: Status: RESOLVED | Noted: 2020-02-15 | Resolved: 2020-11-10

## 2020-11-10 PROBLEM — R74.01 TRANSAMINITIS: Status: RESOLVED | Noted: 2020-02-13 | Resolved: 2020-11-10

## 2020-11-10 PROCEDURE — 99214 OFFICE O/P EST MOD 30 MIN: CPT | Performed by: FAMILY MEDICINE

## 2020-11-10 PROCEDURE — 90662 IIV NO PRSV INCREASED AG IM: CPT

## 2020-11-10 PROCEDURE — G0008 ADMIN INFLUENZA VIRUS VAC: HCPCS

## 2020-11-13 DIAGNOSIS — M51.36 DISC DEGENERATION, LUMBAR: ICD-10-CM

## 2020-11-13 DIAGNOSIS — M79.605 PAIN OF LEFT LOWER EXTREMITY: ICD-10-CM

## 2020-11-13 DIAGNOSIS — M54.50 LOW BACK PAIN RADIATING TO LEFT LOWER EXTREMITY: ICD-10-CM

## 2020-11-13 DIAGNOSIS — M79.605 LOW BACK PAIN RADIATING TO LEFT LOWER EXTREMITY: ICD-10-CM

## 2020-11-13 RX ORDER — GABAPENTIN 100 MG/1
100 CAPSULE ORAL 3 TIMES DAILY
Qty: 90 CAPSULE | Refills: 1 | Status: SHIPPED | OUTPATIENT
Start: 2020-11-13 | End: 2020-11-22 | Stop reason: SDUPTHER

## 2020-11-18 ENCOUNTER — OFFICE VISIT (OUTPATIENT)
Dept: PAIN MEDICINE | Facility: MEDICAL CENTER | Age: 75
End: 2020-11-18
Payer: MEDICARE

## 2020-11-18 VITALS
DIASTOLIC BLOOD PRESSURE: 74 MMHG | WEIGHT: 126 LBS | HEIGHT: 62 IN | BODY MASS INDEX: 23.19 KG/M2 | HEART RATE: 81 BPM | SYSTOLIC BLOOD PRESSURE: 142 MMHG | TEMPERATURE: 98.6 F | RESPIRATION RATE: 20 BRPM

## 2020-11-18 DIAGNOSIS — M79.605 LOW BACK PAIN RADIATING TO LEFT LOWER EXTREMITY: ICD-10-CM

## 2020-11-18 DIAGNOSIS — M51.36 DISC DEGENERATION, LUMBAR: Primary | ICD-10-CM

## 2020-11-18 DIAGNOSIS — M48.062 SPINAL STENOSIS OF LUMBAR REGION WITH NEUROGENIC CLAUDICATION: ICD-10-CM

## 2020-11-18 DIAGNOSIS — M54.50 LOW BACK PAIN RADIATING TO LEFT LOWER EXTREMITY: ICD-10-CM

## 2020-11-18 PROCEDURE — 99213 OFFICE O/P EST LOW 20 MIN: CPT | Performed by: NURSE PRACTITIONER

## 2020-11-22 DIAGNOSIS — M54.50 LOW BACK PAIN RADIATING TO LEFT LOWER EXTREMITY: ICD-10-CM

## 2020-11-22 DIAGNOSIS — M51.36 DISC DEGENERATION, LUMBAR: ICD-10-CM

## 2020-11-22 DIAGNOSIS — M79.605 LOW BACK PAIN RADIATING TO LEFT LOWER EXTREMITY: ICD-10-CM

## 2020-11-22 DIAGNOSIS — M79.605 PAIN OF LEFT LOWER EXTREMITY: ICD-10-CM

## 2020-11-22 RX ORDER — GABAPENTIN 100 MG/1
100 CAPSULE ORAL 3 TIMES DAILY
Qty: 90 CAPSULE | Refills: 1 | Status: SHIPPED | OUTPATIENT
Start: 2020-11-22 | End: 2020-11-23 | Stop reason: SDUPTHER

## 2020-11-23 RX ORDER — GABAPENTIN 100 MG/1
100 CAPSULE ORAL 3 TIMES DAILY
Qty: 90 CAPSULE | Refills: 1 | Status: SHIPPED | OUTPATIENT
Start: 2020-11-23 | End: 2020-12-22 | Stop reason: SDUPTHER

## 2020-12-22 ENCOUNTER — CLINICAL SUPPORT (OUTPATIENT)
Dept: FAMILY MEDICINE CLINIC | Facility: CLINIC | Age: 75
End: 2020-12-22
Payer: MEDICARE

## 2020-12-22 VITALS — TEMPERATURE: 97.8 F

## 2020-12-22 DIAGNOSIS — M79.605 PAIN OF LEFT LOWER EXTREMITY: ICD-10-CM

## 2020-12-22 DIAGNOSIS — M79.605 LOW BACK PAIN RADIATING TO LEFT LOWER EXTREMITY: ICD-10-CM

## 2020-12-22 DIAGNOSIS — M51.36 DISC DEGENERATION, LUMBAR: ICD-10-CM

## 2020-12-22 DIAGNOSIS — M54.50 LOW BACK PAIN RADIATING TO LEFT LOWER EXTREMITY: ICD-10-CM

## 2020-12-22 DIAGNOSIS — E53.8 B12 DEFICIENCY: Primary | ICD-10-CM

## 2020-12-22 RX ORDER — GABAPENTIN 100 MG/1
100 CAPSULE ORAL 3 TIMES DAILY
Qty: 90 CAPSULE | Refills: 2 | Status: SHIPPED | OUTPATIENT
Start: 2020-12-22 | End: 2021-08-04

## 2020-12-22 RX ADMIN — CYANOCOBALAMIN 1000 MCG: 1000 INJECTION INTRAMUSCULAR; SUBCUTANEOUS at 13:15

## 2021-01-29 LAB
LEFT EYE DIABETIC RETINOPATHY: NORMAL
RIGHT EYE DIABETIC RETINOPATHY: NORMAL

## 2021-02-12 DIAGNOSIS — Z23 ENCOUNTER FOR IMMUNIZATION: ICD-10-CM

## 2021-03-03 NOTE — ASSESSMENT & PLAN NOTE
· Hypotensive in setting of septic shock and severe dehydration  · A-line in place, Right fem TLC placed under sterile conditions for levo administration and poor IV access Keystone Flap Text: The defect edges were debeveled with a #15 scalpel blade.  Given the location of the defect, shape of the defect a keystone flap was deemed most appropriate.  Using a sterile surgical marker, an appropriate keystone flap was drawn incorporating the defect, outlining the appropriate donor tissue and placing the expected incisions within the relaxed skin tension lines where possible. The area thus outlined was incised deep to adipose tissue with a #15 scalpel blade.  The skin margins were undermined to an appropriate distance in all directions around the primary defect and laterally outward around the flap utilizing iris scissors.

## 2021-03-10 RX ORDER — ERGOCALCIFEROL 1.25 MG/1
CAPSULE ORAL
COMMUNITY
Start: 2021-03-10

## 2021-03-10 RX ORDER — LEVOTHYROXINE SODIUM 0.15 MG/1
150 TABLET ORAL DAILY
COMMUNITY
Start: 2021-03-10 | End: 2021-10-04 | Stop reason: SDUPTHER

## 2021-03-10 RX ORDER — ERGOCALCIFEROL 1.25 MG/1
CAPSULE ORAL
COMMUNITY
Start: 2021-01-06 | End: 2021-03-10 | Stop reason: SDUPTHER

## 2021-03-11 ENCOUNTER — OFFICE VISIT (OUTPATIENT)
Dept: FAMILY MEDICINE CLINIC | Facility: CLINIC | Age: 76
End: 2021-03-11
Payer: MEDICARE

## 2021-03-11 VITALS
HEART RATE: 94 BPM | DIASTOLIC BLOOD PRESSURE: 80 MMHG | BODY MASS INDEX: 24.84 KG/M2 | OXYGEN SATURATION: 98 % | HEIGHT: 62 IN | WEIGHT: 135 LBS | SYSTOLIC BLOOD PRESSURE: 150 MMHG | TEMPERATURE: 97.5 F

## 2021-03-11 DIAGNOSIS — D51.0 PERNICIOUS ANEMIA: Primary | ICD-10-CM

## 2021-03-11 PROBLEM — R79.89 ELEVATED LFTS: Status: ACTIVE | Noted: 2021-03-11

## 2021-03-11 RX ADMIN — CYANOCOBALAMIN 1000 MCG: 1000 INJECTION INTRAMUSCULAR; SUBCUTANEOUS at 13:22

## 2021-03-11 NOTE — PATIENT INSTRUCTIONS
B12 shot only today    She has a regular annual wellness with us March 26  She did see Endocrinology yesterday  TSH 47 -  Med adjusted  Sees them every 3 to 4 months  Also will be seeing Geriatrics again in April  I would like them to double check medications, our list has plain lisinopril 20 mg, computer states she picked up lisinopril HCT 1 month ago  Bring list into next visit  Does have elevated liver function testing, evaluate further at follow-up  May need US liver-  Last CT A/P 1 yr ago

## 2021-03-11 NOTE — PROGRESS NOTES
She was only in for a B12 shot today    Patient Instructions   B12 shot only today    She has a regular annual wellness with us March 26  She did see Endocrinology yesterday  TSH 47 -  Med adjusted  Sees them every 3 to 4 months  Also will be seeing Geriatrics again in April  I would like them to double check medications, our list has plain lisinopril 20 mg, computer states she picked up lisinopril HCT 1 month ago  Bring list into next visit  Does have elevated liver function testing, evaluate further at follow-up  May need US liver-  Last CT A/P 1 yr ago

## 2021-03-17 RX ORDER — CITALOPRAM 10 MG/1
10 TABLET ORAL DAILY
Qty: 90 TABLET | Refills: 1 | OUTPATIENT
Start: 2021-03-17

## 2021-03-26 ENCOUNTER — OFFICE VISIT (OUTPATIENT)
Dept: FAMILY MEDICINE CLINIC | Facility: CLINIC | Age: 76
End: 2021-03-26
Payer: MEDICARE

## 2021-03-26 VITALS
DIASTOLIC BLOOD PRESSURE: 78 MMHG | WEIGHT: 135 LBS | OXYGEN SATURATION: 97 % | BODY MASS INDEX: 24.84 KG/M2 | SYSTOLIC BLOOD PRESSURE: 142 MMHG | HEART RATE: 85 BPM | HEIGHT: 62 IN | TEMPERATURE: 97.7 F

## 2021-03-26 DIAGNOSIS — R26.9 GAIT DISTURBANCE: ICD-10-CM

## 2021-03-26 DIAGNOSIS — R73.9 HYPERGLYCEMIA: ICD-10-CM

## 2021-03-26 DIAGNOSIS — M48.062 SPINAL STENOSIS OF LUMBAR REGION WITH NEUROGENIC CLAUDICATION: ICD-10-CM

## 2021-03-26 DIAGNOSIS — F33.41 RECURRENT MAJOR DEPRESSIVE DISORDER, IN PARTIAL REMISSION (HCC): ICD-10-CM

## 2021-03-26 DIAGNOSIS — I10 ESSENTIAL HYPERTENSION: ICD-10-CM

## 2021-03-26 DIAGNOSIS — D51.0 PERNICIOUS ANEMIA: ICD-10-CM

## 2021-03-26 DIAGNOSIS — F17.200 CURRENT SMOKER: ICD-10-CM

## 2021-03-26 DIAGNOSIS — G30.1 LATE ONSET ALZHEIMER'S DISEASE WITH BEHAVIORAL DISTURBANCE (HCC): ICD-10-CM

## 2021-03-26 DIAGNOSIS — Z00.00 MEDICARE ANNUAL WELLNESS VISIT, SUBSEQUENT: Primary | ICD-10-CM

## 2021-03-26 DIAGNOSIS — R79.89 ELEVATED LFTS: ICD-10-CM

## 2021-03-26 DIAGNOSIS — E78.00 HYPERCHOLESTEROLEMIA: ICD-10-CM

## 2021-03-26 DIAGNOSIS — F02.81 LATE ONSET ALZHEIMER'S DISEASE WITH BEHAVIORAL DISTURBANCE (HCC): ICD-10-CM

## 2021-03-26 PROBLEM — R53.1 WEAKNESS: Status: RESOLVED | Noted: 2019-09-02 | Resolved: 2021-03-26

## 2021-03-26 PROCEDURE — 99213 OFFICE O/P EST LOW 20 MIN: CPT | Performed by: FAMILY MEDICINE

## 2021-03-26 PROCEDURE — G0439 PPPS, SUBSEQ VISIT: HCPCS | Performed by: FAMILY MEDICINE

## 2021-03-26 PROCEDURE — 1123F ACP DISCUSS/DSCN MKR DOCD: CPT | Performed by: FAMILY MEDICINE

## 2021-03-26 RX ORDER — LISINOPRIL AND HYDROCHLOROTHIAZIDE 20; 12.5 MG/1; MG/1
1 TABLET ORAL DAILY
COMMUNITY
Start: 2021-02-14 | End: 2021-06-21 | Stop reason: ALTCHOICE

## 2021-03-26 NOTE — PROGRESS NOTES
BMI Counseling: Body mass index is 24 69 kg/m²  The BMI is above normal  Nutrition recommendations include encouraging healthy choices of fruits and vegetables  [facility]  [id number], [address] MEDICARE SUBSEQUENT VISIT NOTE ( part 1 )      NAME: Ac Ramsey  AGE: 66 y o  SEX: female  : 1945   MRN: [id number]    DATE: 3/26/2021  TIME: 11:24 AM    Assessment and Plan     Problem List Items Addressed This Visit        Cardiovascular and Mediastinum    Essential hypertension    Relevant Medications    lisinopril-hydrochlorothiazide (PRINZIDE,ZESTORETIC) 20-12 5 MG per tablet       Nervous and Auditory    Late onset Alzheimer's disease with behavioral disturbance (HCC)       Other    Current smoker    Pernicious anemia    Hyperglycemia    Hypercholesterolemia    Gait disturbance    Recurrent major depressive disorder, in partial remission ([facility] [address] )    Spinal stenosis of lumbar region with neurogenic claudication    Elevated LFTs    Relevant Orders    US abdomen complete      Other Visit Diagnoses     Medicare annual wellness visit, subsequent    -  Primary          Medicare Wellness Counseling/ Discussion    Patient Instructions     Reviewed health history along with medications  Reviewed recent blood work  She does continue to see Endocrinology  TSH was 47, she is not using thyroid med on empty stomach, levothyroxine 150 mcg daily  endocrinology gave slip to redo blood work in 4 months/July  She will be seeing Geriatrics again next month, last [place]   Continue Aricept along with citalopram   Blood pressure acceptable here today, we reviewed medication, endocrinology has continued lisinopril HCT rather than plain lisinopril  Also on metoprolol succinate 50 mg daily      She does have long-term low back pain, leg pain related spinal stenosis/lumbar degeneration, has seen pain management, declined injection  Does find gabapentin 100 mg 3 times daily to be helpful  We did discuss elevated liver function testing, LFTs were okay in October, recently AST 64, , bilirubin normal   CMP otherwise unremarkable  I would like her to do ultrasound of abdomen attention liver  She did have CT abdomen/pelvis 1 year ago  Gallbladder is out  She is up to date with Lipid screening  Endocrinology has her on niacin 2000 mg daily  She is up to date with Diabetes screening  Immunization History   Administered Date(s) Administered    Influenza Split High Dose Preservative Free IM 09/18/2012, 11/14/2013, 09/25/2014, 09/08/2015, 08/29/2017    Influenza, high dose seasonal 0 7 mL 09/12/2019, 11/10/2020    Influenza, seasonal, injectable 09/27/2011    Pneumococcal Conjugate 13-Valent 06/20/2017    Pneumococcal Polysaccharide PPV23 01/16/2009    Td (adult), adsorbed 09/14/2007    influenza, trivalent, adjuvanted 09/30/2018     Discussed Vaccines,    Prevnar-13 is up to date   Pneumovax - 23  -we will give booster at next office visit, not given today due to upcoming COVID vaccine  She does do yearly Flu shot  Tdap/tetanus shot will be done at a future date  (done every 10 yrs for superficial cuts, every 5 yrs for deep wounds)   Can also look into coverage for new shingles shot, Shingrix  Can do that at pharmacy  Covid vaccine series starts next week  Still smoking, 1 pack every 1 to 2 weeks  She does not smoke alone, aware of increased fire risk, health risk  she has no increased shortness of breath  Regarding Colon Cancer screening,    Not indicated    She does not see her Gynecologist routinely  Mammogram screening was discussed, is  declined  Discussed bone density screening/ DEXA Scan, this is up-to-date   ( done w/ Endo)     We discussed end of life planning, she does have a  "LIVING WILL"     Glaucoma screening is up-to-date   Plans to see Derm  Won't wear hearing aids -  she does have upcoming appointment with ENT for cerumen impaction  I did give handicap placard form  We will see her back in 6 months, sooner as needed  She will also continue B12 injection every 6 to 8 weeks  Chief Complaint     Chief Complaint   Patient presents with    Medicare Wellness Visit       History of Present Illness     Jade Segovia is a 66y o -year-old female who is in today accompanied by her daughter who is her caregiver/with whom she resides  Today is an annual wellness check/regular follow-up  She is doing about the same, significant dementia  Sees Geriatrics, last [place] 16/30  Daughter is having some difficulty get her in her to take pills routinely, is resistant at times but daughter does eventually get her to take these  Recent TSH had been elevated at 47, thyroid medicine was not being used on empty stomach  Blood work also had shown elevated liver function testing  She is not using Tylenol, no alcohol use, no abdominal complaints  She does continue to see Endocrinology, it appears they have taken over prescribing most medication, including lisinopril HCT, previously we had prescribed plain lisinopril due to increased urinary complaints  She does do B12 injections every 6 to 8 weeks regarding pernicious anemia, last was on March 11th  Still waiting to get in with Dermatology  Refuses to wear hearing aids  Does have in-home caregiver 2 hours a day 2 times a week, area agency on Aging has been involved  She smokes 1 pack a week, her daughter does not allow her to use lighter or smoke along    Review of Systems     Review of Systems   Constitutional: Positive for fatigue  Negative for appetite change, fever and unexpected weight change (She is up about 10 lb)  HENT: Negative for sore throat and trouble swallowing  Respiratory: Positive for cough (Mild chronic) and shortness of breath (No worse than baseline, no hemoptysis)  Negative for chest tightness  Cardiovascular: Negative for chest pain, palpitations and leg swelling  Gastrointestinal: Negative for abdominal pain, blood in stool, nausea and vomiting  No acid reflux     No change in bowel   Genitourinary: Negative for dysuria and hematuria  Musculoskeletal:        Chronic back pain which radiates into legs, does use gabapentin 3 times daily, declined injections with pain management   Neurological: Positive for light-headedness (Occasional long-term) and headaches (Occasional long-term)  Negative for dizziness and syncope  Hematological: Does not bruise/bleed easily  Psychiatric/Behavioral: Positive for confusion         Active Problem List     Patient Active Problem List   Diagnosis    Sinus bradycardia    Postoperative hypothyroidism    Essential hypertension    Urinary incontinence    Chronic kidney disease, stage III (moderate) ([facility]    Umbilical hernia    Trigger middle finger of right hand    Current smoker    Thyroid nodule    Pernicious anemia    Osteoarthritis    Neuropathy of both feet    Low back pain radiating to left lower extremity    Leukocytosis    Lacunar stroke ([facility])    Hyperglycemia    Hypercholesterolemia    Graves' disease    Gait disturbance    Dizziness    Disc degeneration, lumbar    Late onset Alzheimer's disease with behavioral disturbance ([facility])    Cough, chronic -  improved    Hearing loss    Recurrent major depressive disorder, in partial remission ([facility])    Visual impairment    Full dentures    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[address] )    Fall    Anemia    Hypotension, unspecified    Hypocalcemia    Spinal stenosis of lumbar region with neurogenic claudication    Psoriasis    Vitamin D deficiency    Elevated LFTs       Past Medical History:  Past Medical History:   Diagnosis Date    Acute kidney injury (LAYLA) with acute tubular necrosis (ATN) ([facility] [address] ) 2/13/2020    Bed bug bite     LAST ASSESSED [id number]    Chronic pain disorder     Disease of thyroid gland     Extremity pain     Graves' disease     Hyperlipidemia     Hypertension     LLL pneumonia     LAST ASSESSED [id number]    Low back pain     Migraine 2001    OCULAR    Scabies     LAST ASSESSED [id number]    Syncope and collapse     LAST ASSESSED [id number]    Vertigo     RESOLVED        Past Surgical History:  Past Surgical History:   Procedure Laterality Date    CHOLECYSTECTOMY      HYSTERECTOMY      TOTAL THYROIDECTOMY      TUBAL LIGATION Bilateral        Family History:  Family History   Problem Relation Age of Onset    Breast cancer Sister     Diabetes Family     No Known Problems Mother     No Known Problems Father        Social History:  Social History     Tobacco Use    Smoking status: Current Every Day Smoker     Packs/day: 0 50    Smokeless tobacco: Never Used   Substance Use Topics    Alcohol use: Never     Binge frequency: Never       Objective     Vitals:    03/26/21 1019   BP: 142/78   BP Location: Left arm   Patient Position: Sitting   Cuff Size: Standard   Pulse: 85   Temp: 97 7 °F (36 5 °C)   SpO2: 97%   Weight: 61 2 kg (135 lb)   Height: 5' 2" (1 575 m)     Body mass index is 24 69 kg/m²  BP Readings from Last 3 Encounters:   03/26/21 142/78   03/11/21 150/80   11/18/20 142/74       Wt Readings from Last 3 Encounters:   03/26/21 61 2 kg (135 lb)   03/11/21 61 2 kg (135 lb)   11/18/20 57 2 kg (126 lb)       Physical Exam  Constitutional:       Comments: 49-year-old female seated on table, blunted affect, very short responses  Daughter gives history  Overall she appears as at baseline  At time she was anxious to leave the room   HENT:      Ears:      Comments: Wax impaction right more than left  Eyes:      General: No scleral icterus  Cardiovascular:      Rate and Rhythm: Normal rate and regular rhythm  Heart sounds: Murmur present     Pulmonary:      Comments: Slightly decreased bilateral but clear, no rhonchi, rales, wheeze  Abdominal:      General: There is no distension  Palpations: Abdomen is soft  Tenderness: There is no abdominal tenderness  There is no right CVA tenderness, left CVA tenderness, guarding or rebound  Musculoskeletal:      Right lower leg: No edema  Left lower leg: No edema  Lymphadenopathy:      Cervical: No cervical adenopathy  Skin:     Coloration: Skin is not jaundiced  Neurological:      Mental Status: Mental status is at baseline  ALLERGIES:  Allergies   Allergen Reactions    Naproxen Itching    Niacin     Nsaids Other (See Comments) and Swelling    Azithromycin Nausea Only and Rash    Cephalexin Rash       Current Medications     Current Outpatient Medications   Medication Sig Dispense Refill    alendronate (FOSAMAX) 70 mg tablet In AM on empty stomach with a full glass of water  Do not lie down for 30 min      aspirin 81 MG tablet Take 81 mg by mouth daily   citalopram (CeleXA) 10 mg tablet Take 1 tablet (10 mg total) by mouth daily 90 tablet 1    cyanocobalamin 1,000 mcg/mL Injection Q 6-8 weeks re PA 1 mL     donepezil (ARICEPT) 10 mg tablet Take 1 tablet (10 mg total) by mouth daily at bedtime 30 tablet 6    ergocalciferol (VITAMIN D2) 50,000 units TAKE 1 CAPSULE BY MOUTH ONCE A WEEK      gabapentin (NEURONTIN) 100 mg capsule Take 1 capsule (100 mg total) by mouth 3 (three) times a day (to control leg pain) 90 capsule 2    levothyroxine 150 mcg tablet Take 150 mcg by mouth daily      metoprolol succinate (TOPROL-XL) 50 mg 24 hr tablet Take 50 mg by mouth daily      niacin (NIASPAN) 1000 MG CR tablet Take 2 tablets (2,000 mg total) by mouth daily  0    calcium-vitamin D (OSCAL) 250-125 MG-UNIT per tablet Take 1 tablet by mouth daily        lisinopril-hydrochlorothiazide (PRINZIDE,ZESTORETIC) 20-12 5 MG per tablet Take 1 tablet by mouth daily       Current Facility-Administered Medications   Medication Dose Route Frequency Provider Last Rate Last Admin    cyanocobalamin injection 1,000 mcg  1,000 mcg Intramuscular Q56 Days Omayra Orozco DO   1,000 mcg at 03/11/21 1322         Health Maintenance     See other note today re clinical AWV info             Most recent labs available from [address] [place] [address]   ( others may be available in Care Everywhere / Media sections)  Lab Results   Component Value Date    WBC 10 07 02/19/2020    HGB 10 2 (L) 02/19/2020    HCT 32 6 (L) 02/19/2020     (L) 02/19/2020    TRIG 121 06/19/2019    HDL 35 (L) 06/19/2019    ALT 15 04/21/2020    AST 20 04/21/2020     06/08/2015    K 4 7 04/21/2020    CL 99 04/21/2020    CREATININE 1 15 (H) 04/21/2020    BUN 23 04/21/2020    CO2 31 04/21/2020    TSH 2 20 04/21/2020    INR 0 99 02/18/2020    GLUF 90 01/11/2018    HGBA1C 5 5 06/19/2019       Orders Placed This Encounter   Procedures    US abdomen complete             Omayra Orozco DO

## 2021-03-26 NOTE — PROGRESS NOTES
Assessment and Plan:     Problem List Items Addressed This Visit        Cardiovascular and Mediastinum    Essential hypertension    Relevant Medications    lisinopril-hydrochlorothiazide (PRINZIDE,ZESTORETIC) 20-12 5 MG per tablet       Nervous and Auditory    Late onset Alzheimer's disease with behavioral disturbance (HCC)       Other    Current smoker    Pernicious anemia    Hyperglycemia    Hypercholesterolemia    Gait disturbance    Recurrent major depressive disorder, in partial remission ([address] )    Spinal stenosis of lumbar region with neurogenic claudication    Elevated LFTs    Relevant Orders    US abdomen complete      Other Visit Diagnoses     Medicare annual wellness visit, subsequent    -  Primary           Preventive health issues were discussed with patient, and age appropriate screening tests were ordered as noted in patient's After Visit Summary  Personalized health advice and appropriate referrals for health education or preventive services given if needed, as noted in patient's After Visit Summary      See other note today regarding provider information       History of Present Illness:     Patient presents for Medicare Annual Wellness visit    Patient Care Team:  Savannah Gtz DO as PCP - Albin Waggoner MD     Problem List:     Patient Active Problem List   Diagnosis    Sinus bradycardia    Postoperative hypothyroidism    Essential hypertension    Urinary incontinence    Chronic kidney disease, stage III (moderate) ([address] )    Umbilical hernia    Trigger middle finger of right hand    Current smoker    Thyroid nodule    Pernicious anemia    Osteoarthritis    Neuropathy of both feet    Low back pain radiating to left lower extremity    Leukocytosis    Lacunar stroke ([address] )    Hyperglycemia    Hypercholesterolemia    Graves' disease    Gait disturbance    Dizziness    Disc degeneration, lumbar    Late onset Alzheimer's disease with behavioral disturbance ([address] )    Cough, chronic -  improved    Hearing loss    Recurrent major depressive disorder, in partial remission (HCC)    Visual impairment    Full dentures    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[address] )    Fall    Anemia    Hypotension, unspecified    Hypocalcemia    Spinal stenosis of lumbar region with neurogenic claudication    Psoriasis    Vitamin D deficiency    Elevated LFTs      Past Medical and Surgical History:     Past Medical History:   Diagnosis Date    Acute kidney injury (LAYLA) with acute tubular necrosis (ATN) ([facility] [address] ) 2/13/2020    Bed bug bite     LAST ASSESSED [id number]    Chronic pain disorder     Disease of thyroid gland     Extremity pain     Graves' disease     Hyperlipidemia     Hypertension     LLL pneumonia     LAST ASSESSED [id number]    Low back pain     Migraine 2001    OCULAR    Scabies     LAST ASSESSED [id number]    Syncope and collapse     LAST ASSESSED [id number]    Vertigo     RESOLVED      Past Surgical History:   Procedure Laterality Date    CHOLECYSTECTOMY      HYSTERECTOMY      TOTAL THYROIDECTOMY      TUBAL LIGATION Bilateral       Family History:     Family History   Problem Relation Age of Onset    Breast cancer Sister     Diabetes Family     No Known Problems Mother     No Known Problems Father       Social History:     E-Cigarette/Vaping    E-Cigarette Use Never User      E-Cigarette/Vaping Substances    Nicotine No     THC No     CBD No     Flavoring No     Other No     Unknown No      Social History     Socioeconomic History    Marital status:       Spouse name: None    Number of children: None    Years of education: None    Highest education level: None   Occupational History    None   Social Needs    Financial resource strain: None    Food insecurity     Worry: None     Inability: None    Transportation needs     Medical: None     Non-medical: None   Tobacco Use    Smoking status: Current Every Day Smoker     Packs/day: 0 50    Smokeless tobacco: Never Used Substance and Sexual Activity    Alcohol use: Never     Binge frequency: Never    Drug use: No    Sexual activity: Not Currently   Lifestyle    Physical activity     Days per week: None     Minutes per session: None    Stress: None   Relationships    Social connections     Talks on phone: None     Gets together: None     Attends [employer] service: None     Active member of club or organization: None     Attends meetings of clubs or organizations: None     Relationship status: None    Intimate partner violence     Fear of current or ex partner: None     Emotionally abused: None     Physically abused: None     Forced sexual activity: None   Other Topics Concern    None   Social History Narrative    COPY OF ADVANCED DIRECTIVE OBTAINED FROM PATIENT       Medications and Allergies:     Current Outpatient Medications   Medication Sig Dispense Refill    alendronate (FOSAMAX) 70 mg tablet In AM on empty stomach with a full glass of water  Do not lie down for 30 min      aspirin 81 MG tablet Take 81 mg by mouth daily   citalopram (CeleXA) 10 mg tablet Take 1 tablet (10 mg total) by mouth daily 90 tablet 1    cyanocobalamin 1,000 mcg/mL Injection Q 6-8 weeks re PA 1 mL     donepezil (ARICEPT) 10 mg tablet Take 1 tablet (10 mg total) by mouth daily at bedtime 30 tablet 6    ergocalciferol (VITAMIN D2) 50,000 units TAKE 1 CAPSULE BY MOUTH ONCE A WEEK      gabapentin (NEURONTIN) 100 mg capsule Take 1 capsule (100 mg total) by mouth 3 (three) times a day (to control leg pain) 90 capsule 2    levothyroxine 150 mcg tablet Take 150 mcg by mouth daily      metoprolol succinate (TOPROL-XL) 50 mg 24 hr tablet Take 50 mg by mouth daily      niacin (NIASPAN) 1000 MG CR tablet Take 2 tablets (2,000 mg total) by mouth daily  0    calcium-vitamin D (OSCAL) 250-125 MG-UNIT per tablet Take 1 tablet by mouth daily        lisinopril-hydrochlorothiazide (PRINZIDE,ZESTORETIC) 20-12 5 MG per tablet Take 1 tablet by mouth daily       Current Facility-Administered Medications   Medication Dose Route Frequency Provider Last Rate Last Admin    cyanocobalamin injection 1,000 mcg  1,000 mcg Intramuscular Q56 Days Luis Carlos Monsalve DO   1,000 mcg at 03/11/21 1322     Allergies   Allergen Reactions    Naproxen Itching    Niacin     Nsaids Other (See Comments) and Swelling    Azithromycin Nausea Only and Rash    Cephalexin Rash      Immunizations:     Immunization History   Administered Date(s) Administered    Influenza Split High Dose Preservative Free IM 09/18/2012, 11/14/2013, 09/25/2014, 09/08/2015, 08/29/2017    Influenza, high dose seasonal 0 7 mL 09/12/2019, 11/10/2020    Influenza, seasonal, injectable 09/27/2011    Pneumococcal Conjugate 13-Valent 06/20/2017    Pneumococcal Polysaccharide PPV23 01/16/2009    Td (adult), adsorbed 09/14/2007    influenza, trivalent, adjuvanted 09/30/2018      Health Maintenance:         Topic Date Due    Hepatitis C Screening  Completed         Topic Date Due    COVID-19 Vaccine (1) Never done    DTaP,Tdap,and Td Vaccines (1 - Tdap) 09[birth year]18/1964      Medicare Health Risk Assessment:     /78 (BP Location: Left arm, Patient Position: Sitting, Cuff Size: Standard)   Pulse 85   Temp 97 7 °F (36 5 °C)   Ht 5' 2" (1 575 m)   Wt 61 2 kg (135 lb)   SpO2 97%   BMI 24 69 kg/m²      Kilo Harley is here for her Subsequent Wellness visit  Health Risk Assessment:   Patient rates overall health as fair  Patient feels that their physical health rating is same  Patient is satisfied with their life  Eyesight was rated as same  Hearing was rated as slightly worse  Patient feels that their emotional and mental health rating is same  Patients states they are sometimes angry  Patient states they are always unusually tired/fatigued  Pain experienced in the last 7 days has been some  Patient's pain rating has been 6/10  Patient states that she has experienced no weight loss or gain in last 6 months  Depression Screening:   PHQ-2 Score: 0  PHQ-9 Score: 6      Fall Risk Screening: In the past year, patient has experienced: no history of falling in past year      Urinary Incontinence Screening:   Patient has leaked urine accidently in the last six months  Home Safety:  Patient has trouble with stairs inside or outside of their home  Patient has working smoke alarms and has working carbon monoxide detector  Home safety hazards include: none  Nutrition:   Current diet is Regular  Medications:   Patient is currently taking over-the-counter supplements  OTC medications include: see medication list  Patient is not able to manage medications  Activities of Daily Living (ADLs)/Instrumental Activities of Daily Living (IADLs):   Walk and transfer into and out of bed and chair?: Yes  Dress and groom yourself?: Yes    Bathe or shower yourself?: No    Feed yourself? Yes  Do your laundry/housekeeping?: No  Manage your money, pay your bills and track your expenses?: No  Make your own meals?: Yes    Do your own shopping?: No    Previous Hospitalizations:   Any hospitalizations or ED visits within the last 12 months?: No      Advance Care Planning:   Living will: Yes    Durable POA for healthcare: Yes    Advanced directive: Yes      PREVENTIVE SCREENINGS      Cardiovascular Screening:    General: Screening Not Indicated and History Lipid Disorder      Diabetes Screening:     General: Screening Current      Cervical Cancer Screening:    General: Screening Not Indicated      Lung Cancer Screening:     General: Screening Not Indicated      Hepatitis C Screening:    General: Screening Current    Screening, Brief Intervention, and Referral to Treatment (SBIRT)    Screening  Typical number of drinks in a day: 0  Typical number of drinks in a week: 0  Interpretation: Low risk drinking behavior      Single Item Drug Screening:  How often have you used an illegal drug (including marijuana) or a prescription medication for non-medical reasons in the past year? never    Single Item Drug Screen Score: 0  Interpretation: Negative screen for possible drug use disorder      Darcie Grider, DO

## 2021-03-26 NOTE — PATIENT INSTRUCTIONS
Reviewed health history along with medications  Reviewed recent blood work  She does continue to see Endocrinology  TSH was 47, she is not using thyroid med on empty stomach, levothyroxine 150 mcg daily  endocrinology gave slip to redo blood work in 4 months/July  She will be seeing Geriatrics again next month, last [place] 16/30  Continue Aricept along with citalopram   Blood pressure acceptable here today, we reviewed medication, endocrinology has continued lisinopril HCT rather than plain lisinopril  Also on metoprolol succinate 50 mg daily  She does have long-term low back pain, leg pain related spinal stenosis/lumbar degeneration, has seen pain management, declined injection  Does find gabapentin 100 mg 3 times daily to be helpful  We did discuss elevated liver function testing, LFTs were okay in October, recently AST 64, , bilirubin normal   CMP otherwise unremarkable  I would like her to do ultrasound of abdomen attention liver  She did have CT abdomen/pelvis 1 year ago  Gallbladder is out  She is up to date with Lipid screening  Endocrinology has her on niacin 2000 mg daily  She is up to date with Diabetes screening  Immunization History   Administered Date(s) Administered    Influenza Split High Dose Preservative Free IM 09/18/2012, 11/14/2013, 09/25/2014, 09/08/2015, 08/29/2017    Influenza, high dose seasonal 0 7 mL 09/12/2019, 11/10/2020    Influenza, seasonal, injectable 09/27/2011    Pneumococcal Conjugate 13-Valent 06/20/2017    Pneumococcal Polysaccharide PPV23 01/16/2009    Td (adult), adsorbed 09/14/2007    influenza, trivalent, adjuvanted 09/30/2018     Discussed Vaccines,    Prevnar-13 is up to date   Pneumovax - 23  -we will give booster at next office visit, not given today due to upcoming COVID vaccine  She does do yearly Flu shot     Tdap/tetanus shot will be done at a future date  (done every 10 yrs for superficial cuts, every 5 yrs for deep wounds) Can also look into coverage for new shingles shot, Shingrix  Can do that at pharmacy  Covid vaccine series starts next week  Still smoking, 1 pack every 1 to 2 weeks  She does not smoke alone, aware of increased fire risk, health risk  she has no increased shortness of breath  Regarding Colon Cancer screening,    Not indicated    She does not see her Gynecologist routinely  Mammogram screening was discussed, is  declined  Discussed bone density screening/ DEXA Scan, this is up-to-date  ( done w/ Endo)     We discussed end of life planning, she does have a  "LIVING WILL"     Glaucoma screening is up-to-date   Plans to see Derm  Won't wear hearing aids -  she does have upcoming appointment with ENT for cerumen impaction  I did give handicap placard form  We will see her back in 6 months, sooner as needed  She will also continue B12 injection every 6 to 8 weeks

## 2021-03-29 ENCOUNTER — IMMUNIZATIONS (OUTPATIENT)
Dept: FAMILY MEDICINE CLINIC | Facility: HOSPITAL | Age: 76
End: 2021-03-29

## 2021-03-29 DIAGNOSIS — Z23 ENCOUNTER FOR IMMUNIZATION: Primary | ICD-10-CM

## 2021-03-29 PROCEDURE — 0011A SARS-COV-2 / COVID-19 MRNA VACCINE (MODERNA) 100 MCG: CPT

## 2021-03-29 PROCEDURE — 91301 SARS-COV-2 / COVID-19 MRNA VACCINE (MODERNA) 100 MCG: CPT

## 2021-04-06 DIAGNOSIS — F02.81 LATE ONSET ALZHEIMER'S DISEASE WITH BEHAVIORAL DISTURBANCE (HCC): ICD-10-CM

## 2021-04-06 DIAGNOSIS — G30.1 LATE ONSET ALZHEIMER'S DISEASE WITH BEHAVIORAL DISTURBANCE (HCC): ICD-10-CM

## 2021-04-06 RX ORDER — DONEPEZIL HYDROCHLORIDE 10 MG/1
10 TABLET, FILM COATED ORAL
Qty: 30 TABLET | Refills: 6 | Status: SHIPPED | OUTPATIENT
Start: 2021-04-06 | End: 2021-09-22

## 2021-04-06 NOTE — TELEPHONE ENCOUNTER
Pharmacy faxed prescription request for Donepezil HCL 10MG tablet; take 1 tablet (10MG total) by mouth daily at bedtime

## 2021-04-07 ENCOUNTER — HOSPITAL ENCOUNTER (OUTPATIENT)
Dept: ULTRASOUND IMAGING | Facility: HOSPITAL | Age: 76
Discharge: HOME | End: 2021-04-07
Payer: MEDICARE

## 2021-04-07 DIAGNOSIS — R79.89 ELEVATED LFTS: ICD-10-CM

## 2021-04-07 PROCEDURE — 76700 US EXAM ABDOM COMPLETE: CPT

## 2021-04-21 ENCOUNTER — OFFICE VISIT (OUTPATIENT)
Dept: GERIATRICS | Age: 76
End: 2021-04-21
Payer: MEDICARE

## 2021-04-21 VITALS
BODY MASS INDEX: 24.69 KG/M2 | DIASTOLIC BLOOD PRESSURE: 70 MMHG | TEMPERATURE: 99.2 F | SYSTOLIC BLOOD PRESSURE: 128 MMHG | OXYGEN SATURATION: 97 % | HEIGHT: 62 IN | HEART RATE: 95 BPM | WEIGHT: 134.2 LBS

## 2021-04-21 DIAGNOSIS — F17.200 CURRENT SMOKER: ICD-10-CM

## 2021-04-21 DIAGNOSIS — F33.41 RECURRENT MAJOR DEPRESSIVE DISORDER, IN PARTIAL REMISSION (HCC): ICD-10-CM

## 2021-04-21 DIAGNOSIS — H90.3 SENSORINEURAL HEARING LOSS (SNHL) OF BOTH EARS: ICD-10-CM

## 2021-04-21 DIAGNOSIS — F02.81 LATE ONSET ALZHEIMER'S DISEASE WITH BEHAVIORAL DISTURBANCE (HCC): Primary | ICD-10-CM

## 2021-04-21 DIAGNOSIS — G47.9 SLEEP DISTURBANCE: ICD-10-CM

## 2021-04-21 DIAGNOSIS — M79.605 LOW BACK PAIN RADIATING TO LEFT LOWER EXTREMITY: ICD-10-CM

## 2021-04-21 DIAGNOSIS — M54.50 LOW BACK PAIN RADIATING TO LEFT LOWER EXTREMITY: ICD-10-CM

## 2021-04-21 DIAGNOSIS — L40.9 PSORIASIS: ICD-10-CM

## 2021-04-21 DIAGNOSIS — N39.46 MIXED STRESS AND URGE URINARY INCONTINENCE: Chronic | ICD-10-CM

## 2021-04-21 DIAGNOSIS — R26.9 GAIT DISTURBANCE: ICD-10-CM

## 2021-04-21 DIAGNOSIS — I10 ESSENTIAL HYPERTENSION: ICD-10-CM

## 2021-04-21 DIAGNOSIS — E89.0 POSTOPERATIVE HYPOTHYROIDISM: Chronic | ICD-10-CM

## 2021-04-21 DIAGNOSIS — G30.1 LATE ONSET ALZHEIMER'S DISEASE WITH BEHAVIORAL DISTURBANCE (HCC): Primary | ICD-10-CM

## 2021-04-21 PROCEDURE — 99215 OFFICE O/P EST HI 40 MIN: CPT | Performed by: NURSE PRACTITIONER

## 2021-04-21 NOTE — ASSESSMENT & PLAN NOTE
- Patient reported to be sleeping 19 out of 24 hours and is awake at night walking up and down the steps continuously  - Recommend Melatonin and Tylenol at HS to relax patient  - Good sleep hygiene encouraged  Decrease daytime sleep, engage patient in cognitive stimulating activities  Adult day care recommended  Exercise encouraged during day time hours     - Close monitoring recommended at night  - Fall precautions recommended

## 2021-04-21 NOTE — ASSESSMENT & PLAN NOTE
- Mood currently stable  - GDS score 3/15, improved from October 2020, was 5/15  - Continue Citalopram 10 mg PO QD  - No side effects reported  - Recommend to monitor sodium level

## 2021-04-21 NOTE — PROGRESS NOTES
ASSESSMENT AND PLAN:  There are no diagnoses linked to this encounter  There are no diagnoses linked to this encounter  HPI:    We had the pleasure of evaluating Hugo Almeida who is a 66 y o  female in Geriatric consultation today  Ms Arjun Colindres is in the office with her daughter  She lives with her daughter at home  Memory Issues noticed since {0 - 10:11733} {Time; day/wk/mo/yr(s):6013}    Memory affected: {ED  CD_MEMORY:67081}    Symptoms started: {DESC; DBAQR:25768}  Over time the memory has:  {progression:7363447112}  Memory issue(s) were noted by: {Patient/Family/Caregiver:071454}   Patient has difficulties with {memory prob:69913}  Difficulty finding the right word while speaking: {Yes or No:27269}  Requires repeat information or asking the same question repeatedly: {Yes or No:42316}      Does patient handle own financial affairs such as balancing your checkbook, paying bills, investments: {Yes or No:32393}  Difficulties with handling own financial affairs: {Yes or No:96894}    Changes in mood or personality:{Yes or No:04204}  Current or previous treatment for depression or anxiety: {Yes or No:66721}  Any hallucination or delusion: {Yes or No:64531}  Fluctuation in alertness: {Yes or No:47743}  Sleep Issues: {Yes or No:37183}  Urinary/Stool Incontinence: {Yes or No:24425}  Hearing and vision issue: {Yes or No:66586}  Family member with dementia and what type?  {YES/NO:24149}  History of head trauma {Yes or No:91441}  History of alcohol or substance abuse {Yes or No:69678}    Any gait or balance disorder: {Yes or No:57614}  Uses: ***  Any falls in the last year: {Yes or No:33658}  Does the patient still drive: {Yes or KO:65731}       Any recent accidents, citations or getting lost in familiar places :{Yes or No:63991}  Are there firearms in the house?: {Yes or No:32946}    Does patient have POA:{Yes or No:55361}  Does patient have a Living will {Yes or No:82759}    Behavioral Symptom List:  pacing  {Yes or No:40289}  agressive/combative behavior  {Yes or No:00059}  agitated  {Yes or No:53887}  temper outbursts  {Yes or No:20980}  throwing items {Yes or No:18815}  resistance to care  {Yes or No:95408}  forgetfulness of actions {Yes or No:54174}  hoarding/hiding objects  {Yes or No:20519}  suspicious  {Yes or No:39481}  withdrawn {Yes or No:53715}  wandering  {Yes or No:78009}  rummaging/pillaging  {Yes or No:75691}  misplacing/losing objects {Yes or No:59244}  personal hygiene problems  {Yes or No:02914}  inappropriate sexual behavior{Yes or No:59090}        ROS: Review of Systems    Allergies   Allergen Reactions    Naproxen Itching    Niacin     Nsaids Other (See Comments) and Swelling    Azithromycin Nausea Only and Rash    Cephalexin Rash       Medications:    Current Outpatient Medications on File Prior to Visit   Medication Sig Dispense Refill    alendronate (FOSAMAX) 70 mg tablet In AM on empty stomach with a full glass of water  Do not lie down for 30 min      aspirin 81 MG tablet Take 81 mg by mouth daily   calcium-vitamin D (OSCAL) 250-125 MG-UNIT per tablet Take 1 tablet by mouth daily        citalopram (CeleXA) 10 mg tablet Take 1 tablet (10 mg total) by mouth daily 90 tablet 1    cyanocobalamin 1,000 mcg/mL Injection Q 6-8 weeks re PA 1 mL     donepezil (ARICEPT) 10 mg tablet Take 1 tablet (10 mg total) by mouth daily at bedtime 30 tablet 6    ergocalciferol (VITAMIN D2) 50,000 units TAKE 1 CAPSULE BY MOUTH ONCE A WEEK      gabapentin (NEURONTIN) 100 mg capsule Take 1 capsule (100 mg total) by mouth 3 (three) times a day (to control leg pain) 90 capsule 2    levothyroxine 150 mcg tablet Take 150 mcg by mouth daily      lisinopril-hydrochlorothiazide (PRINZIDE,ZESTORETIC) 20-12 5 MG per tablet Take 1 tablet by mouth daily      metoprolol succinate (TOPROL-XL) 50 mg 24 hr tablet Take 50 mg by mouth daily      niacin (NIASPAN) 1000 MG CR tablet Take 2 tablets (2,000 mg total) by mouth daily  0     Current Facility-Administered Medications on File Prior to Visit   Medication Dose Route Frequency Provider Last Rate Last Admin    cyanocobalamin injection 1,000 mcg  1,000 mcg Intramuscular Q56 Days Vianey Ayala, DO   1,000 mcg at 03/11/21 1322       History:  Past Medical History:   Diagnosis Date    Acute kidney injury (LAYLA) with acute tubular necrosis (ATN) ([facility] [address] ) 2/13/2020    Bed bug bite     LAST ASSESSED [id number]    Chronic pain disorder     Disease of thyroid gland     Extremity pain     Graves' disease     Hyperlipidemia     Hypertension     LLL pneumonia     LAST ASSESSED [id number]    Low back pain     Migraine 2001    OCULAR    Scabies     LAST ASSESSED [id number]    Syncope and collapse     LAST ASSESSED [id number]    Vertigo     RESOLVED      Past Surgical History:   Procedure Laterality Date    CHOLECYSTECTOMY      HYSTERECTOMY      TOTAL THYROIDECTOMY      TUBAL LIGATION Bilateral      Family History   Problem Relation Age of Onset    Breast cancer Sister     Diabetes Family     No Known Problems Mother     No Known Problems Father      Social History     Socioeconomic History    Marital status:       Spouse name: Not on file    Number of children: Not on file    Years of education: Not on file    Highest education level: Not on file   Occupational History    Not on file   Social Needs    Financial resource strain: Not on file    Food insecurity     Worry: Not on file     Inability: Not on file    Transportation needs     Medical: Not on file     Non-medical: Not on file   Tobacco Use    Smoking status: Current Every Day Smoker     Packs/day: 0 50    Smokeless tobacco: Never Used   Substance and Sexual Activity    Alcohol use: Never     Binge frequency: Never    Drug use: No    Sexual activity: Not Currently   Lifestyle    Physical activity     Days per week: Not on file     Minutes per session: Not on file    Stress: Not on file Relationships    Social connections     Talks on phone: Not on file     Gets together: Not on file     Attends [employer] service: Not on file     Active member of club or organization: Not on file     Attends meetings of clubs or organizations: Not on file     Relationship status: Not on file    Intimate partner violence     Fear of current or ex partner: Not on file     Emotionally abused: Not on file     Physically abused: Not on file     Forced sexual activity: Not on file   Other Topics Concern    Not on file   Social History Narrative    COPY OF ADVANCED DIRECTIVE OBTAINED FROM PATIENT      Past Surgical History:   Procedure Laterality Date    CHOLECYSTECTOMY      HYSTERECTOMY      [address] Bilateral        OBJECTIVE:  There were no vitals filed for this visit  There is no height or weight on file to calculate BMI  Physical Exam    MoCA: ***/30  GDS: ***/15    Labs & Imaging:  Lab Results   Component Value Date    WBC 10 07 02/19/2020    HGB 10 2 (L) 02/19/2020    HCT 32 6 (L) 02/19/2020    MCV 91 02/19/2020     (L) 02/19/2020     Lab Results   Component Value Date    SODIUM 138 04/21/2020    K 4 7 04/21/2020    CL 99 04/21/2020    CO2 31 04/21/2020    BUN 23 04/21/2020    CREATININE 1 15 (H) 04/21/2020    GLUC 121 (H) 04/21/2020    CALCIUM 9 3 04/21/2020     Lab Results   Component Value Date    UDYULKDL24 270 06/19/2019     Lab Results   Component Value Date    KGX3FHFSMGMM 7 460 (H) 02/14/2020    TSH 2 20 04/21/2020     Lab Results   Component Value Date    MZFA90HTYVWE 36 06/19/2019      Results for orders placed during the hospital encounter of 09/02/19   CT head without contrast    Narrative CT BRAIN - WITHOUT CONTRAST    INDICATION:  Syncope    COMPARISON:  None  TECHNIQUE:  CT examination of the brain was performed  In addition to axial images, coronal reformatted images were created and submitted for interpretation        Radiation dose length product (DLP) for this visit:  415 mGy-cm   This examination, like all CT scans performed in the [facility], was performed utilizing techniques to minimize radiation dose exposure, including the use of iterative   reconstruction and automated exposure control  IMAGE QUALITY:  Diagnostic  FINDINGS:     PARENCHYMA:  Encephalomalacia and volume loss left frontal opercular region, and right temporal parietal periventricular region representing sequela of prior infarct  Mild chronic microangiopathic changes are noted  No mass, mass effect, or midline   shift  There is no parenchymal hemorrhage  VENTRICLES AND EXTRA-AXIAL SPACES:  Normal for patient's age  VISUALIZED ORBITS AND PARANASAL SINUSES:  Unremarkable  CALVARIUM AND EXTRACRANIAL SOFT TISSUES:   Normal       Impression 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

## 2021-04-21 NOTE — PATIENT INSTRUCTIONS
1  Recommend to discourage daytime sleep  Encourage cognitive enhancing activities during the day  Exercise as tolerated  2  May take Melatonin 1 mg tablet at night for sleep  3  Recommend Tylenol, two 500 mg tablets at night time before bed and every 8 hours as needed for pain  Do not take more than six 500 mg tablets in a 24 hour period  4   Follow-up in 6 months

## 2021-04-21 NOTE — ASSESSMENT & PLAN NOTE
- Recommend scheduled toileting every 2 to 3 hours when awake  - Avoid fluid intake after 1800 to prevent nighttime urination  - Avoid caffeine after 1200 noon  - Recommend good perineal hygiene

## 2021-04-21 NOTE — ASSESSMENT & PLAN NOTE
- No recent falls reported  - Patient walks up and down the steps at night  - Recommend Melatonin to start at 1 mg PO at HS, may titrate up to 5 mg if patient tolerates medication in order to help relax patient at night    - Fall precautions advised

## 2021-04-21 NOTE — ASSESSMENT & PLAN NOTE
- Patient progressively declining  - [facility] scores 16/30 October 2020 and today is 13/30  Patient is more forgetful and sleeping more during the day  - Continue Aricept 10 mg PO daily  - Socialization, exercise and cognitive activities encouraged  Recommend adult day care for a couple of hours during the day to keep patient engaged  - Continue home health aid two times weekly  - Daughter feels like she can manage taking care of her mother in her home with help of HHA's      - Follow-up in 6 months

## 2021-04-21 NOTE — ASSESSMENT & PLAN NOTE
- BP stable and controlled  - Continue Lisinopril-HCTZ 20-12 5 mg PO daily  - Continue Metoprolol succinate 50 mg daily

## 2021-04-21 NOTE — ASSESSMENT & PLAN NOTE
- Rash continues on bilateral lower extremities  - Recommend to follow-up with Dermatology  - Continue terconazole cream as ordered

## 2021-04-21 NOTE — PROGRESS NOTES
Assessment/Plan:       Problem List Items Addressed This Visit        Endocrine    Postoperative hypothyroidism (Chronic)     - TSH 47 02 on 03/03/2021  - Continue Levothyroxine 150 mcg daily  - Follow-up with Endocrine            Cardiovascular and Mediastinum    Essential hypertension     - BP stable and controlled  - Continue Lisinopril-HCTZ 20-12 5 mg PO daily  - Continue Metoprolol succinate 50 mg daily              Nervous and Auditory    Late onset Alzheimer's disease with behavioral disturbance ([facility] [address] ) - Primary     - Patient progressively declining  - MOCA scores 16/30 October 2020 and today is 13/30  Patient is more forgetful and sleeping more during the day  - Continue Aricept 10 mg PO daily  - Socialization, exercise and cognitive activities encouraged  Recommend adult day care for a couple of hours during the day to keep patient engaged  - Continue home health aid two times weekly  - Daughter feels like she can manage taking care of her mother in her home with help of HHA's  - Follow-up in 6 months         Hearing loss     - Follow-up with Audiology            Musculoskeletal and Integument    Psoriasis     - Rash continues on bilateral lower extremities  - Recommend to follow-up with Dermatology  - Continue terconazole cream as ordered            Other    Urinary incontinence (Chronic)     - Recommend scheduled toileting every 2 to 3 hours when awake  - Avoid fluid intake after 1800 to prevent nighttime urination  - Avoid caffeine after 1200 noon  - Recommend good perineal hygiene         Current smoker     - 1/2 pack every week and one half  - Recommend smoking cessation with Nicotine patch         Low back pain radiating to left lower extremity     - History of low back pain  - Recommend tylenol 500 mg tablet, take 2 tablet at HS and every 8 hours prn   Do not exceed greater than 3 grams in a 24 hour period         Gait disturbance     - No recent falls reported  - Patient walks up and down the steps at night  - Recommend Melatonin to start at 1 mg PO at HS, may titrate up to 5 mg if patient tolerates medication in order to help relax patient at night    - Fall precautions advised         [facility]     - Mood currently stable  - GDS score 3/15, improved from October 2020, was 5/15  - Continue Citalopram 10 mg PO QD  - No side effects reported  - Recommend to monitor sodium level         Sleep disturbance     - Patient reported to be sleeping 19 out of 24 hours and is awake at night walking up and down the steps continuously  - Recommend Melatonin and Tylenol at HS to relax patient  - Good sleep hygiene encouraged  Decrease daytime sleep, engage patient in cognitive stimulating activities  Adult day care recommended  Exercise encouraged during day time hours  - Close monitoring recommended at night  - Fall precautions recommended                     Subjective: Geriatric Follow-up VIsit     Patient ID: Camron Haney is a 66 y o  female  Alma Rosa Rice is a 66year old female patient who presented to the office today for a geriatric follow-up visit with her daughter present  Daughter states that she feels like her Mom's dementia is getting worse  She is becoming more forgetful and is sleeping approximately 19 hours out of 24 hours  Her daughter states that she walks up and down the steps continuously at night  She denies recent falls and walks without an assistive device  She needs minimal assistance with her ADLs  Her  daughter manages her IADLs and medication management  No behaviors or mood reported  She has a history of chronic back pain and was supposed to get surgery, however, it was not performed related to her dementia  She declined a steroid injection in the past per daughter     She follows with endocrine for post operative hypothyroidism, status post thyroidectomy   Her TSH was 47 02 on 03/03/2021, her Levothyroine was increased to 150 mcg PO daily  She is currently smoking but her daughter states that she is smoking less, she smokes 1/2 pack of cigarettes every week and one half  She recently had an abdominal ultrasound for elevated LFT's with no acute findings reported by daughter  She has difficulty finding the right word while speaking: Yes  Patient requires repeat information or ask the same question repeatedly: Yes  Do you drive: No       Do you handle your own financial affairs such as balancing your checkbook, paying bills, investments: No  Have you or your family noted any change in your mood or personality:No  Are you currently or have you been treated in the past for depression or anxiety: Yes  Have you noticed any gait or balance disorder: Yes  Uses :Walker: on/off  Any hallucination or delusion: No  Fluctuation in alertness: Yes  Sleep Issues: Yes  Urinary/Stool Incontinence: Yes  Hearing and vision issue: Yes  Do you have POA:Yes  Do you have a Living will Yes  Past Medical, surgical, social, medication and allergy history and patients previous records reviewed      Family Review of Behavior [facility]:    pacing  Yes, going up and down stairs at night   agressive/combative behavior  No    agitated  Yes   wandering  No   resistance to care  No   hoarding/hiding objects  No    suspicious  No  withdrawn No  inappropriate sexual behavior No  rummaging/pillaging  No    misplacing/losing objects Yes  personal hygiene problems  Yes  forgetfulness of actions Yes   temper outbursts  No     throwing items No    Upon examination, patient is calm, cooperative and in no acute distress  She is without complaint  A repeat MOCA cognitive assessment was performed today with score 13/30  Previous score in October 2020 was 16/30  Her GDS score was 3/15         The following portions of the patient's history were reviewed and updated as appropriate: allergies, current medications, past family history, past medical history, past social history, past surgical history and problem list     Review of Systems   Constitutional: Negative for activity change, appetite change, chills, fatigue and fever  HENT: Positive for hearing loss  Negative for congestion, postnasal drip, rhinorrhea and sneezing  Eyes: Negative for photophobia, pain, redness, itching and visual disturbance  Respiratory: Negative for cough, choking, chest tightness, shortness of breath and wheezing  Cardiovascular: Negative for chest pain, palpitations and leg swelling  Gastrointestinal: Negative for abdominal distention, constipation, diarrhea and nausea  Genitourinary: Negative for difficulty urinating, dysuria, flank pain and frequency  Musculoskeletal: Negative for arthralgias, back pain, gait problem, myalgias and neck pain  Skin: Negative for color change, pallor, rash and wound  Neurological: Negative for dizziness, weakness, light-headedness and headaches  Psychiatric/Behavioral: Positive for confusion and sleep disturbance  Negative for agitation, behavioral problems and dysphoric mood  The patient is not nervous/anxious  Sleeps 19 out of 24 hours per daughter         Objective:      /70 (BP Location: Left arm, Patient Position: Sitting, Cuff Size: Adult)   Pulse 95   Temp 99 2 °F (37 3 °C) (Temporal)   Ht 5' 2" (1 575 m)   Wt 60 9 kg (134 lb 3 2 oz)   SpO2 97%   BMI 24 55 kg/m²          Physical Exam  Constitutional:       General: She is not in acute distress  Appearance: Normal appearance  She is normal weight  She is diaphoretic  She is not ill-appearing  HENT:      Head: Normocephalic and atraumatic  Nose: Nose normal    Neck:      Musculoskeletal: Normal range of motion and neck supple  No neck rigidity  Cardiovascular:      Rate and Rhythm: Normal rate and regular rhythm  Pulses: Normal pulses  Heart sounds: Murmur present  No friction rub  No gallop      Pulmonary:      Effort: Pulmonary effort is normal  No respiratory distress  Breath sounds: Normal breath sounds  No wheezing, rhonchi or rales  Abdominal:      General: Bowel sounds are normal  There is no distension  Palpations: Abdomen is soft  There is no mass  Tenderness: There is no abdominal tenderness  Hernia: No hernia is present  Musculoskeletal: Normal range of motion  General: No swelling, tenderness, deformity or signs of injury  Right lower leg: No edema  Left lower leg: No edema  Skin:     General: Skin is warm  Coloration: Skin is not jaundiced  Findings: Rash present  No bruising  Comments: Dry, scaly maculopapular rash to bilateral lower extremities   Neurological:      General: No focal deficit present  Mental Status: She is alert  Cranial Nerves: No cranial nerve deficit  Sensory: No sensory deficit  Motor: No weakness        Gait: Gait normal       Comments: Alert and oriented to person and place, disoriented to time   Psychiatric:         Mood and Affect: Mood normal          Behavior: Behavior normal

## 2021-04-21 NOTE — ASSESSMENT & PLAN NOTE
- History of low back pain  - Recommend tylenol 500 mg tablet, take 2 tablet at HS and every 8 hours prn   Do not exceed greater than 3 grams in a 24 hour period

## 2021-04-27 ENCOUNTER — TELEPHONE (OUTPATIENT)
Dept: FAMILY MEDICINE CLINIC | Facility: CLINIC | Age: 76
End: 2021-04-27

## 2021-04-30 ENCOUNTER — IMMUNIZATIONS (OUTPATIENT)
Dept: FAMILY MEDICINE CLINIC | Facility: HOSPITAL | Age: 76
End: 2021-04-30

## 2021-04-30 DIAGNOSIS — Z23 ENCOUNTER FOR IMMUNIZATION: Primary | ICD-10-CM

## 2021-04-30 PROCEDURE — 0012A SARS-COV-2 / COVID-19 MRNA VACCINE (MODERNA) 100 MCG: CPT

## 2021-04-30 PROCEDURE — 91301 SARS-COV-2 / COVID-19 MRNA VACCINE (MODERNA) 100 MCG: CPT

## 2021-06-09 ENCOUNTER — APPOINTMENT (EMERGENCY)
Dept: CT IMAGING | Facility: HOSPITAL | Age: 76
DRG: 682 | End: 2021-06-09
Payer: MEDICARE

## 2021-06-09 ENCOUNTER — APPOINTMENT (EMERGENCY)
Dept: RADIOLOGY | Facility: HOSPITAL | Age: 76
DRG: 682 | End: 2021-06-09
Payer: MEDICARE

## 2021-06-09 ENCOUNTER — TELEPHONE (OUTPATIENT)
Dept: FAMILY MEDICINE CLINIC | Facility: CLINIC | Age: 76
End: 2021-06-09

## 2021-06-09 ENCOUNTER — HOSPITAL ENCOUNTER (INPATIENT)
Facility: HOSPITAL | Age: 76
LOS: 2 days | Discharge: HOME HEALTH | DRG: 682 | End: 2021-06-11
Attending: EMERGENCY MEDICINE | Admitting: INTERNAL MEDICINE
Payer: MEDICARE

## 2021-06-09 DIAGNOSIS — R79.89 ELEVATED TSH: ICD-10-CM

## 2021-06-09 DIAGNOSIS — F03.90 DEMENTIA (HCC): ICD-10-CM

## 2021-06-09 DIAGNOSIS — N17.9 ACUTE RENAL FAILURE (ARF) (HCC): Primary | ICD-10-CM

## 2021-06-09 DIAGNOSIS — G30.1 LATE ONSET ALZHEIMER'S DISEASE WITH BEHAVIORAL DISTURBANCE (HCC): ICD-10-CM

## 2021-06-09 DIAGNOSIS — R53.83 FATIGUE: ICD-10-CM

## 2021-06-09 DIAGNOSIS — F02.81 LATE ONSET ALZHEIMER'S DISEASE WITH BEHAVIORAL DISTURBANCE (HCC): ICD-10-CM

## 2021-06-09 DIAGNOSIS — G93.40 ENCEPHALOPATHY: ICD-10-CM

## 2021-06-09 LAB
ALBUMIN SERPL BCP-MCNC: 3.6 G/DL (ref 3.5–5)
ALP SERPL-CCNC: 66 U/L (ref 46–116)
ALT SERPL W P-5'-P-CCNC: 20 U/L (ref 12–78)
ANION GAP SERPL CALCULATED.3IONS-SCNC: 15 MMOL/L (ref 4–13)
AST SERPL W P-5'-P-CCNC: 35 U/L (ref 5–45)
ATRIAL RATE: 87 BPM
BASOPHILS # BLD AUTO: 0.06 THOUSANDS/ΜL (ref 0–0.1)
BASOPHILS NFR BLD AUTO: 1 % (ref 0–1)
BILIRUB SERPL-MCNC: 0.53 MG/DL (ref 0.2–1)
BILIRUB UR QL STRIP: ABNORMAL
BUN SERPL-MCNC: 62 MG/DL (ref 5–25)
CALCIUM SERPL-MCNC: 8.9 MG/DL (ref 8.3–10.1)
CHLORIDE SERPL-SCNC: 99 MMOL/L (ref 100–108)
CLARITY UR: CLEAR
CO2 SERPL-SCNC: 24 MMOL/L (ref 21–32)
COLOR UR: YELLOW
CREAT SERPL-MCNC: 3.52 MG/DL (ref 0.6–1.3)
EOSINOPHIL # BLD AUTO: 0.09 THOUSAND/ΜL (ref 0–0.61)
EOSINOPHIL NFR BLD AUTO: 1 % (ref 0–6)
ERYTHROCYTE [DISTWIDTH] IN BLOOD BY AUTOMATED COUNT: 15.1 % (ref 11.6–15.1)
GFR SERPL CREATININE-BSD FRML MDRD: 12 ML/MIN/1.73SQ M
GLUCOSE SERPL-MCNC: 96 MG/DL (ref 65–140)
GLUCOSE UR STRIP-MCNC: NEGATIVE MG/DL
HCT VFR BLD AUTO: 38 % (ref 34.8–46.1)
HGB BLD-MCNC: 12.1 G/DL (ref 11.5–15.4)
HGB UR QL STRIP.AUTO: NEGATIVE
IMM GRANULOCYTES # BLD AUTO: 0.07 THOUSAND/UL (ref 0–0.2)
IMM GRANULOCYTES NFR BLD AUTO: 1 % (ref 0–2)
KETONES UR STRIP-MCNC: NEGATIVE MG/DL
LEUKOCYTE ESTERASE UR QL STRIP: NEGATIVE
LIPASE SERPL-CCNC: 148 U/L (ref 73–393)
LYMPHOCYTES # BLD AUTO: 3.21 THOUSANDS/ΜL (ref 0.6–4.47)
LYMPHOCYTES NFR BLD AUTO: 24 % (ref 14–44)
MCH RBC QN AUTO: 28.3 PG (ref 26.8–34.3)
MCHC RBC AUTO-ENTMCNC: 31.8 G/DL (ref 31.4–37.4)
MCV RBC AUTO: 89 FL (ref 82–98)
MONOCYTES # BLD AUTO: 0.97 THOUSAND/ΜL (ref 0.17–1.22)
MONOCYTES NFR BLD AUTO: 7 % (ref 4–12)
NEUTROPHILS # BLD AUTO: 8.86 THOUSANDS/ΜL (ref 1.85–7.62)
NEUTS SEG NFR BLD AUTO: 66 % (ref 43–75)
NITRITE UR QL STRIP: NEGATIVE
NRBC BLD AUTO-RTO: 0 /100 WBCS
P AXIS: 78 DEGREES
PH UR STRIP.AUTO: 5 [PH]
PLATELET # BLD AUTO: 375 THOUSANDS/UL (ref 149–390)
PMV BLD AUTO: 9.9 FL (ref 8.9–12.7)
POTASSIUM SERPL-SCNC: 4.5 MMOL/L (ref 3.5–5.3)
PR INTERVAL: 184 MS
PROT SERPL-MCNC: 7.2 G/DL (ref 6.4–8.2)
PROT UR STRIP-MCNC: NEGATIVE MG/DL
QRS AXIS: 55 DEGREES
QRSD INTERVAL: 88 MS
QT INTERVAL: 402 MS
QTC INTERVAL: 411 MS
RBC # BLD AUTO: 4.27 MILLION/UL (ref 3.81–5.12)
SODIUM SERPL-SCNC: 138 MMOL/L (ref 136–145)
SP GR UR STRIP.AUTO: >=1.03 (ref 1–1.03)
T WAVE AXIS: 64 DEGREES
T4 FREE SERPL-MCNC: 0.57 NG/DL (ref 0.76–1.46)
TROPONIN I SERPL-MCNC: <0.02 NG/ML
TSH SERPL DL<=0.05 MIU/L-ACNC: 69.84 UIU/ML (ref 0.36–3.74)
UROBILINOGEN UR QL STRIP.AUTO: 0.2 E.U./DL
VENTRICULAR RATE: 63 BPM
WBC # BLD AUTO: 13.26 THOUSAND/UL (ref 4.31–10.16)

## 2021-06-09 PROCEDURE — 84443 ASSAY THYROID STIM HORMONE: CPT | Performed by: EMERGENCY MEDICINE

## 2021-06-09 PROCEDURE — 85025 COMPLETE CBC W/AUTO DIFF WBC: CPT | Performed by: EMERGENCY MEDICINE

## 2021-06-09 PROCEDURE — 81003 URINALYSIS AUTO W/O SCOPE: CPT | Performed by: EMERGENCY MEDICINE

## 2021-06-09 PROCEDURE — 36415 COLL VENOUS BLD VENIPUNCTURE: CPT | Performed by: EMERGENCY MEDICINE

## 2021-06-09 PROCEDURE — 70450 CT HEAD/BRAIN W/O DYE: CPT

## 2021-06-09 PROCEDURE — 84439 ASSAY OF FREE THYROXINE: CPT | Performed by: EMERGENCY MEDICINE

## 2021-06-09 PROCEDURE — 83690 ASSAY OF LIPASE: CPT | Performed by: EMERGENCY MEDICINE

## 2021-06-09 PROCEDURE — 71046 X-RAY EXAM CHEST 2 VIEWS: CPT

## 2021-06-09 PROCEDURE — 80053 COMPREHEN METABOLIC PANEL: CPT | Performed by: EMERGENCY MEDICINE

## 2021-06-09 PROCEDURE — 93005 ELECTROCARDIOGRAM TRACING: CPT

## 2021-06-09 PROCEDURE — 96365 THER/PROPH/DIAG IV INF INIT: CPT

## 2021-06-09 PROCEDURE — 93010 ELECTROCARDIOGRAM REPORT: CPT | Performed by: INTERNAL MEDICINE

## 2021-06-09 PROCEDURE — 84484 ASSAY OF TROPONIN QUANT: CPT | Performed by: EMERGENCY MEDICINE

## 2021-06-09 PROCEDURE — 99285 EMERGENCY DEPT VISIT HI MDM: CPT

## 2021-06-09 PROCEDURE — 99223 1ST HOSP IP/OBS HIGH 75: CPT | Performed by: INTERNAL MEDICINE

## 2021-06-09 PROCEDURE — 99285 EMERGENCY DEPT VISIT HI MDM: CPT | Performed by: EMERGENCY MEDICINE

## 2021-06-09 RX ORDER — METOPROLOL SUCCINATE 50 MG/1
50 TABLET, EXTENDED RELEASE ORAL DAILY
Status: DISCONTINUED | OUTPATIENT
Start: 2021-06-09 | End: 2021-06-11 | Stop reason: HOSPADM

## 2021-06-09 RX ORDER — NICOTINE 21 MG/24HR
1 PATCH, TRANSDERMAL 24 HOURS TRANSDERMAL DAILY
Status: DISCONTINUED | OUTPATIENT
Start: 2021-06-09 | End: 2021-06-09

## 2021-06-09 RX ORDER — SODIUM CHLORIDE 9 MG/ML
100 INJECTION, SOLUTION INTRAVENOUS CONTINUOUS
Status: DISCONTINUED | OUTPATIENT
Start: 2021-06-09 | End: 2021-06-10

## 2021-06-09 RX ORDER — HEPARIN SODIUM 5000 [USP'U]/ML
5000 INJECTION, SOLUTION INTRAVENOUS; SUBCUTANEOUS EVERY 8 HOURS SCHEDULED
Status: DISCONTINUED | OUTPATIENT
Start: 2021-06-09 | End: 2021-06-11 | Stop reason: HOSPADM

## 2021-06-09 RX ORDER — NIACIN 500 MG/1
2000 TABLET, EXTENDED RELEASE ORAL DAILY
Status: DISCONTINUED | OUTPATIENT
Start: 2021-06-09 | End: 2021-06-11 | Stop reason: HOSPADM

## 2021-06-09 RX ORDER — GABAPENTIN 100 MG/1
100 CAPSULE ORAL 3 TIMES DAILY
Status: DISCONTINUED | OUTPATIENT
Start: 2021-06-09 | End: 2021-06-11 | Stop reason: HOSPADM

## 2021-06-09 RX ORDER — CITALOPRAM 20 MG/1
10 TABLET ORAL DAILY
Status: DISCONTINUED | OUTPATIENT
Start: 2021-06-09 | End: 2021-06-11 | Stop reason: HOSPADM

## 2021-06-09 RX ORDER — LEVOTHYROXINE SODIUM 0.07 MG/1
150 TABLET ORAL
Status: DISCONTINUED | OUTPATIENT
Start: 2021-06-09 | End: 2021-06-11 | Stop reason: HOSPADM

## 2021-06-09 RX ORDER — SODIUM CHLORIDE, SODIUM LACTATE, POTASSIUM CHLORIDE, CALCIUM CHLORIDE 600; 310; 30; 20 MG/100ML; MG/100ML; MG/100ML; MG/100ML
100 INJECTION, SOLUTION INTRAVENOUS ONCE
Status: COMPLETED | OUTPATIENT
Start: 2021-06-09 | End: 2021-06-09

## 2021-06-09 RX ORDER — ASPIRIN 81 MG/1
81 TABLET, CHEWABLE ORAL DAILY
Status: DISCONTINUED | OUTPATIENT
Start: 2021-06-09 | End: 2021-06-11 | Stop reason: HOSPADM

## 2021-06-09 RX ORDER — DONEPEZIL HYDROCHLORIDE 10 MG/1
10 TABLET, FILM COATED ORAL
Status: DISCONTINUED | OUTPATIENT
Start: 2021-06-09 | End: 2021-06-11 | Stop reason: HOSPADM

## 2021-06-09 RX ORDER — NICOTINE 21 MG/24HR
14 PATCH, TRANSDERMAL 24 HOURS TRANSDERMAL DAILY
Status: DISCONTINUED | OUTPATIENT
Start: 2021-06-09 | End: 2021-06-11 | Stop reason: HOSPADM

## 2021-06-09 RX ADMIN — SODIUM CHLORIDE, SODIUM LACTATE, POTASSIUM CHLORIDE, AND CALCIUM CHLORIDE 500 ML: .6; .31; .03; .02 INJECTION, SOLUTION INTRAVENOUS at 12:57

## 2021-06-09 RX ADMIN — GABAPENTIN 100 MG: 100 CAPSULE ORAL at 20:37

## 2021-06-09 RX ADMIN — SODIUM CHLORIDE 100 ML/HR: 0.9 INJECTION, SOLUTION INTRAVENOUS at 16:34

## 2021-06-09 RX ADMIN — SODIUM CHLORIDE, SODIUM LACTATE, POTASSIUM CHLORIDE, AND CALCIUM CHLORIDE 100 ML/HR: .6; .31; .03; .02 INJECTION, SOLUTION INTRAVENOUS at 13:38

## 2021-06-09 RX ADMIN — DONEPEZIL HYDROCHLORIDE 10 MG: 10 TABLET, FILM COATED ORAL at 22:38

## 2021-06-09 RX ADMIN — HEPARIN SODIUM 5000 UNITS: 5000 INJECTION INTRAVENOUS; SUBCUTANEOUS at 20:37

## 2021-06-09 NOTE — H&P
H&P Exam - Zack Sanchez 66 y o  female MRN: [id number]    Unit/Bed#: E5 -01 Encounter: [id number]        History of Present Illness   HPI:  Zack Sanchez is a 66 y o  female who presents with daughter  Reportedly the patient has been more lethargic/sleepy with decrease intake of food and fluids  Approximately 4 days ago patient had nausea/vomiting since progression of above symptoms  Daughter Bianka Barnard with whom she resides provides the history, she has been somewhat noncompliant with her medication, patient herself has no complaints  Review of Systems   Constitutional: Positive for activity change and appetite change  HENT: Negative  Respiratory: Negative  Cardiovascular: Negative  Gastrointestinal: Positive for nausea and vomiting  Genitourinary: Negative  Musculoskeletal: Negative  Skin: Negative  Neurological: Positive for weakness  Psychiatric/Behavioral: Positive for confusion         Historical Information   Past Medical History:   Diagnosis Date    [facility]LAYLA[facility] 2/13/2020    Bed bug bite     LAST ASSESSED [id number]    Chronic pain disorder     Disease of thyroid gland     Extremity pain     Graves' disease     Hyperlipidemia     Hypertension     LLL pneumonia     LAST ASSESSED [id number]    Low back pain     Migraine 2001    OCULAR    Scabies     LAST ASSESSED [id number]    Syncope and collapse     LAST ASSESSED [id number]    Vertigo     RESOLVED      Past Surgical History:   Procedure Laterality Date    CHOLECYSTECTOMY      HYSTERECTOMY      TOTAL THYROIDECTOMY      TUBAL LIGATION Bilateral      Social History   Social History     Substance and Sexual Activity   Alcohol Use Never    Binge frequency: Never     Social History     Substance and Sexual Activity   Drug Use No     Social History     Tobacco Use   Smoking Status Current Every Day Smoker    Packs/day: 0 50   Smokeless Tobacco Never Used Family History   Problem Relation Age of Onset    Breast cancer Sister     Diabetes Family     No Known Problems Mother     No Known Problems Father        Meds/Allergies   Facility-Administered Medications Prior to Admission   Medication    cyanocobalamin injection 1,000 mcg     Medications Prior to Admission   Medication    alendronate (FOSAMAX) 70 mg tablet    aspirin 81 MG tablet    calcium-vitamin D (OSCAL) 250-125 MG-UNIT per tablet    citalopram (CeleXA) 10 mg tablet    cyanocobalamin 1,000 mcg/mL    donepezil (ARICEPT) 10 mg tablet    ergocalciferol (VITAMIN D2) 50,000 units    gabapentin (NEURONTIN) 100 mg capsule    levothyroxine 150 mcg tablet    lisinopril-hydrochlorothiazide (PRINZIDE,ZESTORETIC) 20-12 5 MG per tablet    metoprolol succinate (TOPROL-XL) 50 mg 24 hr tablet    niacin (NIASPAN) 1000 MG CR tablet     Allergies   Allergen Reactions    Naproxen Itching    Niacin     Nsaids Other (See Comments) and Swelling    Azithromycin Nausea Only and Rash    Cephalexin Rash       Objective   Vitals: Blood pressure 144/72, pulse 75, temperature 97 7 °F (36 5 °C), resp  rate 17, height 5' 1" (1 549 m), weight 61 5 kg (135 lb 9 3 oz), SpO2 96 %  Intake/Output Summary (Last 24 hours) at 6/9/2021 1516  Last data filed at 6/9/2021 1419  Gross per 24 hour   Intake 1000 ml   Output 300 ml   Net 700 ml       Invasive Devices     Peripheral Intravenous Line            Peripheral IV 06/09/21 Right Antecubital less than 1 day                Physical Exam  Constitutional:       Appearance: Normal appearance  Comments: Elderly, poor hearing, confused   HENT:      Head: Normocephalic and atraumatic  Nose: Nose normal    Eyes:      Extraocular Movements: Extraocular movements intact  Pupils: Pupils are equal, round, and reactive to light  Neck:      Musculoskeletal: Neck supple  Cardiovascular:      Rate and Rhythm: Normal rate and regular rhythm     Pulmonary: Effort: Pulmonary effort is normal       Breath sounds: Normal breath sounds  Abdominal:      General: Bowel sounds are normal       Palpations: Abdomen is soft  Skin:     General: Skin is warm and dry  Neurological:      General: No focal deficit present  Mental Status: She is alert  Mental status is at baseline           Lab Results:   Admission on 06/09/2021   Component Date Value    Color, UA 06/09/2021 Yellow     Clarity, UA 06/09/2021 Clear     Specific Gravity, UA 06/09/2021 >=1 030     pH, UA 06/09/2021 5 0     Leukocytes, UA 06/09/2021 Negative     Nitrite, UA 06/09/2021 Negative     Protein, UA 06/09/2021 Negative     Glucose, UA 06/09/2021 Negative     Ketones, UA 06/09/2021 Negative     Urobilinogen, UA 06/09/2021 0 2     Bilirubin, UA 06/09/2021 Interference- unable to analyze*    Blood, UA 06/09/2021 Negative     WBC 06/09/2021 13 26*    RBC 06/09/2021 4 27     Hemoglobin 06/09/2021 12 1     Hematocrit 06/09/2021 38 0     MCV 06/09/2021 89     MCH 06/09/2021 28 3     MCHC 06/09/2021 31 8     RDW 06/09/2021 15 1     MPV 06/09/2021 9 9     Platelets 40/23/9246 375     nRBC 06/09/2021 0     Neutrophils Relative 06/09/2021 66     Immat GRANS % 06/09/2021 1     Lymphocytes Relative 06/09/2021 24     Monocytes Relative 06/09/2021 7     Eosinophils Relative 06/09/2021 1     Basophils Relative 06/09/2021 1     Neutrophils Absolute 06/09/2021 8 86*    Immature Grans Absolute 06/09/2021 0 07     Lymphocytes Absolute 06/09/2021 3 21     Monocytes Absolute 06/09/2021 0 97     Eosinophils Absolute 06/09/2021 0 09     Basophils Absolute 06/09/2021 0 06     Sodium 06/09/2021 138     Potassium 06/09/2021 4 5     Chloride 06/09/2021 99*    CO2 06/09/2021 24     ANION GAP 06/09/2021 15*    BUN 06/09/2021 62*    Creatinine 06/09/2021 3 52*    Glucose 06/09/2021 96     Calcium 06/09/2021 8 9     AST 06/09/2021 35     ALT 06/09/2021 20     Alkaline Phosphatase 06/09/2021 66     Total Protein 06/09/2021 7 2     Albumin 06/09/2021 3 6     Total Bilirubin 06/09/2021 0 53     eGFR 06/09/2021 12     Lipase 06/09/2021 148     TSH 3RD GENERATON 06/09/2021 69 840*    Troponin I 06/09/2021 <0 02     Ventricular Rate 06/09/2021 63     Atrial Rate 06/09/2021 87     AR Interval 06/09/2021 184     QRSD Interval 06/09/2021 88     QT Interval 06/09/2021 402     QTC Interval 06/09/2021 411     P Axis 06/09/2021 78     QRS Axis 06/09/2021 55     T Wave Axis 06/09/2021 64      Imaging: Ct Head Without Contrast    Result Date: 6/9/2021  Narrative: CT BRAIN - WITHOUT CONTRAST INDICATION:   AMS/fatigue  History of Alzheimer's  2 day history of change in mental status and poor by mouth intake  No reported head trauma  COMPARISON:  9/2/2019 TECHNIQUE:  CT examination of the brain was performed  In addition to axial images, sagittal and coronal 2D reformatted images were created and submitted for interpretation  Radiation dose length product (DLP) for this visit:  866 mGy-cm   This examination, like all CT scans performed in the 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  VENTRICLES AND EXTRA-AXIAL SPACES:  Stable size and configuration  No hydrocephalus  VISUALIZED ORBITS AND PARANASAL SINUSES:  Unremarkable  CALVARIUM AND EXTRACRANIAL SOFT TISSUES:  Normal  Mastoid air cells are well aerated  Impression: No acute intracranial abnormality  Chronic, nonemergent findings above  Workstation performed: [id number]     EKG, Pathology, and Other Studies: I have personally reviewed pertinent reports  Assessment/Plan     Acute kidney injury   baseline creatinine approximately 1, currently increased to 3 52, appears pre renal with decrease in taking continue use of diuretic    Urine increased specific gravity, wheeled DC diuretic and provide IV fluids and closely monitor creatinine and urine output    Elevated TSH    patient reportedly has been noncompliant with her thyroid medication, awaiting free T4, will continue Synthroid and encourage compliance    Dementia    care provided by daughter and aides, will request PT OT eval, continue Aricept    Hypertension    currently well controlled will monitor with IV fluids continue metoprolol holding HCTZ and lisinopril    History of anemia   hemoglobin currently stable 12 1    Tobacco abuse   cessation counseling provided will provide nicotine patch    Admission discussed with daughter Dae Tyler    Patient is DNR status  Expect greater than 2 midnight stay

## 2021-06-09 NOTE — PLAN OF CARE
Problem: Potential for Falls  Goal: Patient will remain free of falls  Description: INTERVENTIONS:  - Assess patient frequently for physical needs  -  Identify cognitive and physical deficits and behaviors that affect risk of falls    -  [place] fall precautions as indicated by assessment   - Educate patient/family on patient safety including physical limitations  - Instruct patient to call for assistance with activity based on assessment  - Modify environment to reduce risk of injury  - Consider OT/PT consult to assist with strengthening/mobility  Outcome: Progressing     Problem: Prexisting or High Potential for Compromised Skin Integrity  Goal: Skin integrity is maintained or improved  Description: INTERVENTIONS:  - Identify patients at risk for skin breakdown  - Assess and monitor skin integrity  - Assess and monitor nutrition and hydration status  - Monitor labs   - Assess for incontinence   - Turn and reposition patient  - Assist with mobility/ambulation  - Relieve pressure over bony prominences  - Avoid friction and shearing  - Provide appropriate hygiene as needed including keeping skin clean and dry  - Evaluate need for skin moisturizer/barrier cream  - Collaborate with interdisciplinary team   - Patient/family teaching  - Consider wound care consult   Outcome: Progressing     Problem: PAIN - ADULT  Goal: Verbalizes/displays adequate comfort level or baseline comfort level  Description: Interventions:  - Encourage patient to monitor pain and request assistance  - Assess pain using appropriate pain scale  - Administer analgesics based on type and severity of pain and evaluate response  - Implement non-pharmacological measures as appropriate and evaluate response  - Consider cultural and social influences on pain and pain management  - Notify physician/advanced practitioner if interventions unsuccessful or patient reports new pain  Outcome: Progressing     Problem: SAFETY ADULT  Goal: Patient will remain free of falls  Description: INTERVENTIONS:  - Assess patient frequently for physical needs  -  Identify cognitive and physical deficits and behaviors that affect risk of falls    -  [place] fall precautions as indicated by assessment   - Educate patient/family on patient safety including physical limitations  - Instruct patient to call for assistance with activity based on assessment  - Modify environment to reduce risk of injury  - Consider OT/PT consult to assist with strengthening/mobility  Outcome: Progressing  Goal: Maintain or return to baseline ADL function  Description: INTERVENTIONS:  -  Assess patient's ability to carry out ADLs; assess patient's baseline for ADL function and identify physical deficits which impact ability to perform ADLs (bathing, care of mouth/teeth, toileting, grooming, dressing, etc )  - Assess/evaluate cause of self-care deficits   - Assess range of motion  - Assess patient's mobility; develop plan if impaired  - Assess patient's need for assistive devices and provide as appropriate  - Encourage maximum independence but intervene and supervise when necessary  - Involve family in performance of ADLs  - Assess for home care needs following discharge   - Consider OT consult to assist with ADL evaluation and planning for discharge  - Provide patient education as appropriate  Outcome: Progressing  Goal: Maintain or return mobility status to optimal level  Description: INTERVENTIONS:  - Assess patient's baseline mobility status (ambulation, transfers, stairs, etc )    - Identify cognitive and physical deficits and behaviors that affect mobility  - Identify mobility aids required to assist with transfers and/or ambulation (gait belt, sit-to-stand, lift, walker, cane, etc )  - [place] fall precautions as indicated by assessment  - Record patient progress and toleration of activity level on Mobility SBAR; progress patient to next Phase/Stage  - Instruct patient to call for assistance with activity based on assessment  - Consider rehabilitation consult to assist with strengthening/weightbearing, etc   Outcome: Progressing     Problem: DISCHARGE PLANNING  Goal: Discharge to home or other facility with appropriate resources  Description: INTERVENTIONS:  - Identify barriers to discharge w/patient and caregiver  - Arrange for needed discharge resources and transportation as appropriate  - Identify discharge learning needs (meds, wound care, etc )  - Arrange for interpretive services to assist at discharge as needed  - Refer to Case Management Department for coordinating discharge planning if the patient needs post-hospital services based on physician/advanced practitioner order or complex needs related to functional status, cognitive ability, or social support system  Outcome: Progressing

## 2021-06-09 NOTE — TELEPHONE ENCOUNTER
Patient daughter Bishnu Heading) called today asking if Thurlow Cockayne can come to see you tomorrow  Her another daughter Power Kaplan have visit with you tomorrow at 2:00AM and she will bring her to  Shamar Douglas notice that her mom Thurlow Cockayne is very depressed, she is not talking or engaging in any activities and she need to see her doctor

## 2021-06-09 NOTE — ED PROVIDER NOTES
History  Chief Complaint   Patient presents with    Altered Mental Status     Per pts daughter, pt has loss of appetite and increased confusion  Is demented at baseline but worse the last 2 days  Pt has hx of UTI  Pt denies pain, reports loss of appetite  AO x2     65 yo F h/o hypothyroidism, HTN, Alzheimer's disease presenting for evaluation of 2 days of change in mental status/poor po intake  Daughter at bedside providing history  States pt has not been eating/drinking for 2 days and sleeping all day which is abnormal for her  Was able to get her to take her medications today, but yesterday she threw them  Denies any falls/head injury  Few days ago had 1 hour of vomiting/diarrhea, but that has resolved  Incontinent of urine, this has worsened  H/o recurrent UTIs, last abx about 6 months ago  Smoking less cigarettes than usual  Pt denies any areas of pain  No recent med changes  Lives at home with daughter  Home health aide 2x/week    MDM: 65 yo F with fatigue/poor po intake- will get UA to r/o urine infection, labs to r/o metabolic derangement, CTH to r/o space occupying lesion             Prior to Admission Medications   Prescriptions Last Dose Informant Patient Reported? Taking? alendronate (FOSAMAX) 70 mg tablet   Yes No   Sig: In AM on empty stomach with a full glass of water  Do not lie down for 30 min   aspirin 81 MG tablet  Self Yes No   Sig: Take 81 mg by mouth daily  calcium-vitamin D (OSCAL) 250-125 MG-UNIT per tablet  Self Yes No   Sig: Take 1 tablet by mouth daily     citalopram (CeleXA) 10 mg tablet   No No   Sig: Take 1 tablet (10 mg total) by mouth daily   cyanocobalamin 1,000 mcg/mL  Self No No   Sig: Injection Q 6-8 weeks re PA   Patient not taking: Reported on 4/21/2021   donepezil (ARICEPT) 10 mg tablet   No No   Sig: Take 1 tablet (10 mg total) by mouth daily at bedtime   ergocalciferol (VITAMIN D2) 50,000 units   Yes No   Sig: TAKE 1 CAPSULE BY MOUTH ONCE A WEEK   gabapentin (NEURONTIN) 100 mg capsule   No No   Sig: Take 1 capsule (100 mg total) by mouth 3 (three) times a day (to control leg pain)   levothyroxine 150 mcg tablet   Yes No   Sig: Take 150 mcg by mouth daily   lisinopril-hydrochlorothiazide (PRINZIDE,ZESTORETIC) 20-12 5 MG per tablet   Yes No   Sig: Take 1 tablet by mouth daily   metoprolol succinate (TOPROL-XL) 50 mg 24 hr tablet  Self Yes No   Sig: Take 50 mg by mouth daily   niacin (NIASPAN) 1000 MG CR tablet  Self No No   Sig: Take 2 tablets (2,000 mg total) by mouth daily      Facility-Administered Medications Last Administration Doses Remaining   cyanocobalamin injection 1,000 mcg 3/11/2021  1:22 PM           Past Medical History:   Diagnosis Date    Acute kidney injury (LAYLA) with acute tubular necrosis (ATN) ([facility]) 2/13/2020    Bed bug bite     LAST ASSESSED [id number]    Chronic pain disorder     Disease of thyroid gland     Extremity pain     Graves' disease     Hyperlipidemia     Hypertension     LLL pneumonia     LAST ASSESSED [id number]    Low back pain     Migraine 2001    OCULAR    Scabies     LAST ASSESSED [id number]    Syncope and collapse     LAST ASSESSED [id number]    Vertigo     RESOLVED        Past Surgical History:   Procedure Laterality Date    CHOLECYSTECTOMY      HYSTERECTOMY      TOTAL THYROIDECTOMY      TUBAL LIGATION Bilateral        Family History   Problem Relation Age of Onset    Breast cancer Sister     Diabetes Family     No Known Problems Mother     No Known Problems Father      I have reviewed and agree with the history as documented      E-Cigarette/Vaping    E-Cigarette Use Never User      E-Cigarette/Vaping Substances    Nicotine No     THC No     CBD No     Flavoring No     Other No     Unknown No      Social History     Tobacco Use    Smoking status: Current Every Day Smoker     Packs/day: 0 50    Smokeless tobacco: Never Used   Substance Use Topics    Alcohol use: Never     Binge frequency: Never    Drug use: No       Review of Systems   Unable to perform ROS: Dementia   Constitutional: Positive for fatigue  Negative for fever  HENT: Negative for congestion and rhinorrhea  Respiratory: Negative for cough and shortness of breath  Cardiovascular: Negative for chest pain, palpitations and leg swelling  Gastrointestinal: Positive for diarrhea and vomiting  Negative for abdominal pain, constipation and nausea  Genitourinary: Negative for dysuria and hematuria  Musculoskeletal: Negative for back pain and neck pain  Skin: Positive for rash  Negative for color change  Allergic/Immunologic: Negative for immunocompromised state  Neurological: Negative for light-headedness and headaches  Psychiatric/Behavioral: Positive for confusion  Physical Exam  Physical Exam  Vitals signs and nursing note reviewed  Constitutional:       Appearance: She is well-developed  Comments: Resting comfortably in bed   HENT:      Head: Normocephalic and atraumatic  Nose: Nose normal    Eyes:      Conjunctiva/sclera: Conjunctivae normal    Neck:      Musculoskeletal: Normal range of motion and neck supple  Cardiovascular:      Rate and Rhythm: Normal rate and regular rhythm  Heart sounds: Normal heart sounds  Pulmonary:      Effort: Pulmonary effort is normal  No respiratory distress  Breath sounds: Normal breath sounds  No stridor  No wheezing or rales  Chest:      Chest wall: No tenderness  Abdominal:      General: There is no distension  Palpations: Abdomen is soft  Tenderness: There is no abdominal tenderness  There is no guarding or rebound  Musculoskeletal:         General: No swelling, tenderness or deformity  Skin:     General: Skin is warm and dry  Findings: No rash  Neurological:      Mental Status: She is alert and oriented to person, place, and time  Motor: No weakness or abnormal muscle tone        Coordination: Coordination normal    Psychiatric: Thought Content:  Thought content normal          Judgment: Judgment normal          Vital Signs  ED Triage Vitals   Temperature Pulse Respirations Blood Pressure SpO2   06/09/21 1231 06/09/21 1231 06/09/21 1231 06/09/21 1231 06/09/21 1231   97 7 °F (36 5 °C) 63 18 113/56 93 %      Temp Source Heart Rate Source Patient Position - Orthostatic VS BP Location FiO2 (%)   06/09/21 1231 06/09/21 1231 06/09/21 1231 06/09/21 1231 --   Oral Monitor Lying Left arm       Pain Score       06/09/21 1500       No Pain           Vitals:    06/09/21 1231 06/09/21 1438 06/09/21 1455   BP: 113/56 126/60 144/72   Pulse: 63 65 75   Patient Position - Orthostatic VS: Lying           Visual Acuity      ED Medications  Medications   nicotine (NICODERM CQ) 14 mg/24hr TD 24 hr patch 14 mg (14 mg Transdermal Not Given 6/9/21 1725)   aspirin chewable tablet 81 mg (81 mg Oral Not Given 6/9/21 1635)   citalopram (CeleXA) tablet 10 mg (10 mg Oral Not Given 6/9/21 1635)   donepezil (ARICEPT) tablet 10 mg (has no administration in time range)   gabapentin (NEURONTIN) capsule 100 mg (100 mg Oral Not Given 6/9/21 1635)   levothyroxine tablet 150 mcg (150 mcg Oral Not Given 6/9/21 1635)   metoprolol succinate (TOPROL-XL) 24 hr tablet 50 mg (50 mg Oral Not Given 6/9/21 1635)   niacin (NIASPAN) CR tablet 2,000 mg (2,000 mg Oral Not Given 6/9/21 1636)   heparin (porcine) subcutaneous injection 5,000 Units (has no administration in time range)   sodium chloride 0 9 % infusion (100 mL/hr Intravenous New Bag 6/9/21 1634)   lactated ringers bolus 500 mL (0 mL Intravenous Stopped 6/9/21 1419)   lactated ringers infusion (100 mL/hr Intravenous Restarted 6/9/21 1339)       Diagnostic Studies  Results Reviewed     Procedure Component Value Units Date/Time    TSH, 3rd generation with Free T4 reflex [[id number]]  (Abnormal) Collected: 06/09/21 1251    Lab Status: Final result Specimen: Blood from Arm, Right Updated: 06/09/21 1402     TSH 3RD [facility] 69 840 uIU/mL Narrative:      Patients undergoing fluorescein dye angiography may retain small amounts of fluorescein in the body for 48-72 hours post procedure  Samples containing fluorescein can produce falsely depressed TSH values  If the patient had this procedure,a specimen should be resubmitted post fluorescein clearance  T4, free [[id number]] Collected: 06/09/21 1251    Lab Status:  In process Specimen: Blood from Arm, Right Updated: 06/09/21 1402    Comprehensive metabolic panel [[id number]]  (Abnormal) Collected: 06/09/21 1251    Lab Status: Final result Specimen: Blood from Arm, Right Updated: 06/09/21 1318     Sodium 138 mmol/L      Potassium 4 5 mmol/L      Chloride 99 mmol/L      CO2 24 mmol/L      ANION GAP 15 mmol/L      BUN 62 mg/dL      Creatinine 3 52 mg/dL      Glucose 96 mg/dL      Calcium 8 9 mg/dL      AST 35 U/L      ALT 20 U/L      Alkaline Phosphatase 66 U/L      Total Protein 7 2 g/dL      Albumin 3 6 g/dL      Total Bilirubin 0 53 mg/dL      eGFR 12 ml/min/1 73sq m     Narrative:      [place] guidelines for Chronic Kidney Disease (CKD):     Stage 1 with normal or high GFR (GFR > 90 mL/min/1 73 square meters)    Stage 2 Mild CKD (GFR = 60-89 mL/min/1 73 square meters)    Stage 3A Moderate CKD (GFR = 45-59 mL/min/1 73 square meters)    Stage 3B Moderate CKD (GFR = 30-44 mL/min/1 73 square meters)    Stage 4 Severe CKD (GFR = 15-29 mL/min/1 73 square meters)    Stage 5 End Stage CKD (GFR <15 mL/min/1 73 square meters)  Note: GFR calculation is accurate only with a steady state creatinine    Lipase [[id number]]  (Normal) Collected: 06/09/21 1251    Lab Status: Final result Specimen: Blood from Arm, Right Updated: 06/09/21 1318     Lipase 148 u/L     Troponin I [[id number]]  (Normal) Collected: 06/09/21 1251    Lab Status: Final result Specimen: Blood from Arm, Right Updated: 06/09/21 1318     Troponin I <0 02 ng/mL     UA w Reflex to Microscopic w Reflex to Culture [[id number]]  (Abnormal) Collected: 06/09/21 1251    Lab Status: Final result Specimen: Urine, Clean Catch Updated: 06/09/21 1306     Color, UA Yellow     Clarity, UA Clear     Specific Gravity, UA >=1 030     pH, UA 5 0     Leukocytes, UA Negative     Nitrite, UA Negative     Protein, UA Negative mg/dl      Glucose, UA Negative mg/dl      Ketones, UA Negative mg/dl      Urobilinogen, UA 0 2 E U /dl      Bilirubin, UA Interference- unable to analyze     Blood, UA Negative    CBC and differential [[id number]]  (Abnormal) Collected: 06/09/21 1251    Lab Status: Final result Specimen: Blood from Arm, Right Updated: 06/09/21 1300     WBC 13 26 Thousand/uL      RBC 4 27 Million/uL      Hemoglobin 12 1 g/dL      Hematocrit 38 0 %      MCV 89 fL      MCH 28 3 pg      MCHC 31 8 g/dL      RDW 15 1 %      MPV 9 9 fL      Platelets 051 Thousands/uL      nRBC 0 /100 WBCs      Neutrophils Relative 66 %      Immat GRANS % 1 %      Lymphocytes Relative 24 %      Monocytes Relative 7 %      Eosinophils Relative 1 %      Basophils Relative 1 %      Neutrophils Absolute 8 86 Thousands/µL      Immature Grans Absolute 0 07 Thousand/uL      Lymphocytes Absolute 3 21 Thousands/µL      Monocytes Absolute 0 97 Thousand/µL      Eosinophils Absolute 0 09 Thousand/µL      Basophils Absolute 0 06 Thousands/µL                  CT head without contrast   ED Interpretation by Carleen Hanson DO (06/09 1401)   FINDINGS:     PARENCHYMA:    [address] white matter differentiation preserved  No hemorrhage, extra axial fluid collection, mass effect or midline shift  Nonspecific, chronic patchy periventricular and subcortical white matter lucencies most commonly related to changes of microangiopathy      Chronic left frontal and right posterior parietal encephalomalacia, likely remote infarcts      VENTRICLES AND EXTRA-AXIAL SPACES:  Stable size and configuration    No hydrocephalus      VISUALIZED ORBITS AND PARANASAL SINUSES:  Unremarkable      CALVARIUM AND EXTRACRANIAL SOFT TISSUES:  Normal  Mastoid air cells are well aerated      IMPRESSION:     No acute intracranial abnormality  Chronic, nonemergent findings above  Final Result by Renan Amaral MD (06/09 1400)      No acute intracranial abnormality  Chronic, nonemergent findings above  Workstation performed: [id number]         XR chest 2 views    (Results Pending)       Interpreted by myself: no acute abn      Procedures  Procedures         ED Course  ED Course as of Jun 09 1814   Wed Jun 09, 2021   1309 WBC(!): 13 26   1337 EKG: sinus @ 63 bpm, normal axis, normal intervals, no ST/ changes      1339 eGFR: 12   1339 1 in March per [facility] records   Creatinine(!): 3 52   1411 TSH 3RD GENERATON(!): 69 840                             SBIRT 22yo+      Most Recent Value   SBIRT (25 yo +)   In order to provide better care to our patients, we are screening all of our patients for alcohol and drug use  Would it be okay to ask you these screening questions?   No Filed at: 06/09/2021 1333                    [facility]  Number of Diagnoses or Management Options  Acute renal failure (ARF) ([facility] [place] [address] ):   Elevated TSH:   Fatigue:   Diagnosis management comments: 65 yo F with fatigue and poor po intake, found to have LAYLA and elevated TSH, admitted for further workup/management       Amount and/or Complexity of Data Reviewed  Clinical lab tests: ordered and reviewed  Tests in the radiology section of CPT®: ordered and reviewed  Tests in the medicine section of CPT®: ordered and reviewed  Obtain history from someone other than the patient: yes (Daughter )  Review and summarize past medical records: yes  Independent visualization of images, tracings, or specimens: yes        Disposition  Final diagnoses:   Acute renal failure (ARF) (HCC)   Fatigue   Elevated TSH     Time reflects when diagnosis was documented in both MDM as applicable and the Disposition within this note     Time User Action Codes Description Comment    6/9/2021 2:04 PM Jefferson PINEDO Add [N17 9] Acute renal failure (ARF) ([facility] [address] )     6/9/2021  2:04 PM Jefferson PINEDO Add [R53 83] Fatigue     6/9/2021  2:10 PM Jennifer Levine Add [R79 89] Elevated TSH       ED Disposition     ED Disposition Condition Date/Time Comment    Admit Stable Wed Jun 9, 2021  2:04 PM Case was discussed with PREM and the patient's admission status was agreed to be Admission Status: inpatient status to the service of Dr Charlotte Crowe  Follow-up Information    None         Current Discharge Medication List      CONTINUE these medications which have NOT CHANGED    Details   alendronate (FOSAMAX) 70 mg tablet In AM on empty stomach with a full glass of water  Do not lie down for 30 min      aspirin 81 MG tablet Take 81 mg by mouth daily  calcium-vitamin D (OSCAL) 250-125 MG-UNIT per tablet Take 1 tablet by mouth daily  citalopram (CeleXA) 10 mg tablet Take 1 tablet (10 mg total) by mouth daily  Qty: 90 tablet, Refills: 1    Associated Diagnoses: Depression, unspecified depression type      cyanocobalamin 1,000 mcg/mL Injection Q 6-8 weeks re PA  Qty: 1 mL    Associated Diagnoses: Pernicious anemia      donepezil (ARICEPT) 10 mg tablet Take 1 tablet (10 mg total) by mouth daily at bedtime  Qty: 30 tablet, Refills: 6    Associated Diagnoses: Late onset Alzheimer's disease with behavioral disturbance (HCC)      ergocalciferol (VITAMIN D2) 50,000 units TAKE 1 CAPSULE BY MOUTH ONCE A WEEK      gabapentin (NEURONTIN) 100 mg capsule Take 1 capsule (100 mg total) by mouth 3 (three) times a day (to control leg pain)  Qty: 90 capsule, Refills: 2    Associated Diagnoses: Pain of left lower extremity;  Disc degeneration, lumbar; Low back pain radiating to left lower extremity      levothyroxine 150 mcg tablet Take 150 mcg by mouth daily      lisinopril-hydrochlorothiazide (PRINZIDE,ZESTORETIC) 20-12 5 MG per tablet Take 1 tablet by mouth daily      metoprolol succinate (TOPROL-XL) 50 mg 24 hr tablet Take 50 mg by mouth daily      niacin (NIASPAN) 1000 MG CR tablet Take 2 tablets (2,000 mg total) by mouth daily  Refills: 0    Associated Diagnoses: Hypercholesterolemia           No discharge procedures on file      PDMP Review     None          ED Provider  Electronically Signed by           Jessica Cruz DO  06/09/21 2612

## 2021-06-10 PROBLEM — G93.40 ACUTE ENCEPHALOPATHY: Status: ACTIVE | Noted: 2021-06-10

## 2021-06-10 LAB
ANION GAP SERPL CALCULATED.3IONS-SCNC: 9 MMOL/L (ref 4–13)
BUN SERPL-MCNC: 47 MG/DL (ref 5–25)
CALCIUM SERPL-MCNC: 8.4 MG/DL (ref 8.3–10.1)
CHLORIDE SERPL-SCNC: 105 MMOL/L (ref 100–108)
CO2 SERPL-SCNC: 27 MMOL/L (ref 21–32)
CREAT SERPL-MCNC: 1.77 MG/DL (ref 0.6–1.3)
GFR SERPL CREATININE-BSD FRML MDRD: 27 ML/MIN/1.73SQ M
GLUCOSE SERPL-MCNC: 86 MG/DL (ref 65–140)
POTASSIUM SERPL-SCNC: 3.7 MMOL/L (ref 3.5–5.3)
SODIUM SERPL-SCNC: 141 MMOL/L (ref 136–145)

## 2021-06-10 PROCEDURE — 97163 PT EVAL HIGH COMPLEX 45 MIN: CPT

## 2021-06-10 PROCEDURE — 97166 OT EVAL MOD COMPLEX 45 MIN: CPT

## 2021-06-10 PROCEDURE — 80048 BASIC METABOLIC PNL TOTAL CA: CPT | Performed by: INTERNAL MEDICINE

## 2021-06-10 PROCEDURE — 99232 SBSQ HOSP IP/OBS MODERATE 35: CPT | Performed by: FAMILY MEDICINE

## 2021-06-10 PROCEDURE — 99222 1ST HOSP IP/OBS MODERATE 55: CPT | Performed by: FAMILY MEDICINE

## 2021-06-10 RX ADMIN — SODIUM CHLORIDE 100 ML/HR: 0.9 INJECTION, SOLUTION INTRAVENOUS at 15:50

## 2021-06-10 RX ADMIN — DONEPEZIL HYDROCHLORIDE 10 MG: 10 TABLET, FILM COATED ORAL at 21:13

## 2021-06-10 RX ADMIN — SODIUM CHLORIDE 100 ML/HR: 0.9 INJECTION, SOLUTION INTRAVENOUS at 06:09

## 2021-06-10 RX ADMIN — ASPIRIN 81 MG: 81 TABLET, CHEWABLE ORAL at 08:50

## 2021-06-10 RX ADMIN — GABAPENTIN 100 MG: 100 CAPSULE ORAL at 15:23

## 2021-06-10 RX ADMIN — HEPARIN SODIUM 5000 UNITS: 5000 INJECTION INTRAVENOUS; SUBCUTANEOUS at 06:03

## 2021-06-10 RX ADMIN — CITALOPRAM HYDROBROMIDE 10 MG: 20 TABLET ORAL at 08:48

## 2021-06-10 RX ADMIN — HEPARIN SODIUM 5000 UNITS: 5000 INJECTION INTRAVENOUS; SUBCUTANEOUS at 21:13

## 2021-06-10 RX ADMIN — LEVOTHYROXINE SODIUM 150 MCG: 75 TABLET ORAL at 06:03

## 2021-06-10 RX ADMIN — NIACIN 2000 MG: 500 TABLET, EXTENDED RELEASE ORAL at 08:50

## 2021-06-10 RX ADMIN — HEPARIN SODIUM 5000 UNITS: 5000 INJECTION INTRAVENOUS; SUBCUTANEOUS at 15:23

## 2021-06-10 RX ADMIN — GABAPENTIN 100 MG: 100 CAPSULE ORAL at 08:49

## 2021-06-10 RX ADMIN — GABAPENTIN 100 MG: 100 CAPSULE ORAL at 21:13

## 2021-06-10 NOTE — PHYSICAL THERAPY NOTE
PT EVALUATION    Pt  Name: Lm Way  Pt  Age: 66 y o    MRN: [id number]  LENGTH OF STAY: 1    Patient Active Problem List   Diagnosis    Sinus bradycardia    Postoperative hypothyroidism    Essential hypertension    Urinary incontinence    Chronic kidney disease, stage III (moderate) ([facility])    Umbilical hernia    Trigger middle finger of right hand    Current smoker    Thyroid nodule    Pernicious anemia    Osteoarthritis    Neuropathy of both feet    Low back pain radiating to left lower extremity    Leukocytosis    Lacunar stroke ([facility])    Hyperglycemia    Hypercholesterolemia    Graves' disease    Gait disturbance    Dizziness    Disc degeneration, lumbar    Late onset Alzheimer's disease with behavioral disturbance ([facility])    Cough, chronic -  improved    Hearing loss    Recurrent major depressive disorder, in partial remission ([facility])    Visual impairment    Full dentures    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[facility]    Fall    LAYLA [facility]acute kidney injury) ([facility]    Anemia    Hypotension, unspecified    Hypocalcemia    Spinal stenosis of lumbar region with neurogenic claudication    Psoriasis    Vitamin D deficiency    Elevated LFTs    Sleep disturbance       Admitting Diagnoses:   Fatigue [R53 83]  Weakness [R53 1]  Elevated TSH [R79 89]  Acute renal failure (ARF) ([facility]) [N17 9]    Past Medical History:   Diagnosis Date    Acute kidney injury (LAYLA) with acute tubular necrosis (ATN) ([facility]) 2/13/2020    Bed bug bite     LAST ASSESSED [id number]    Chronic pain disorder     Disease of thyroid gland     Extremity pain     Graves' disease     Hyperlipidemia     Hypertension     LLL pneumonia     LAST ASSESSED [id number]    Low back pain     Migraine 2001    OCULAR    Scabies     LAST ASSESSED [id number]    Syncope and collapse     LAST ASSESSED [id number]    Vertigo     RESOLVED        Past Surgical History:   Procedure Laterality Date    CHOLECYSTECTOMY      HYSTERECTOMY      TOTAL THYROIDECTOMY      TUBAL LIGATION Bilateral        Imaging Studies:  XR chest 2 views   Final Result by Narcisa Collins MD (06/09 2023)      No acute cardiopulmonary disease  Workstation performed: [id number]         CT head without contrast   ED Interpretation by Jeff Santiago DO (06/09 1401)   FINDINGS:     PARENCHYMA:    Donnamarie Renee white matter differentiation preserved  No hemorrhage, extra axial fluid collection, mass effect or midline shift  Nonspecific, chronic patchy periventricular and subcortical white matter lucencies most commonly related to changes of microangiopathy      Chronic left frontal and right posterior parietal encephalomalacia, likely remote infarcts      VENTRICLES AND EXTRA-AXIAL SPACES:  Stable size and configuration  No hydrocephalus      VISUALIZED ORBITS AND PARANASAL SINUSES:  Unremarkable      CALVARIUM AND EXTRACRANIAL SOFT TISSUES:  Normal  Mastoid air cells are well aerated      IMPRESSION:     No acute intracranial abnormality  Chronic, nonemergent findings above  Final Result by Nancy Cervantes MD (06/09 1400)      No acute intracranial abnormality  Chronic, nonemergent findings above  Workstation performed: [id number]              06/10/21 1424   PT Last Visit   PT Visit Date 06/10/21   Note Type   Note type Evaluation   Pain Assessment   Pain Score No Pain   Home Living   Type of Home House   Home Layout Two level;Stairs to enter with rails;Bed/bath upstairs  (5STE; 13steps to 2nd flr bed/bath; BSC on 1st flr)   Bathroom Shower/Tub Tub/shower unit   Bathroom Toilet Standard   Bathroom Equipment Grab bars in shower; Shower chair;Commode   Home Equipment Walker;Cane  (RW, rollator)   Prior Function   Level of [place] Needs assistance with ADLs and functional mobility  (S for amb w/o AD but "furniture walks" per daughter)   Lives With Daughter   Receives Help From Family;Home health   ADL Assistance Independent   IADLs Needs assistance   Falls in the last 6 months 0   Vocational Retired   Comments Pt daughter reports that pt requires S for overall tasks; pt "furniture walks", pt refused to ambulate w/ AD; has home health aide Tue/Thur 12:30-14:30   Restrictions/Precautions   Weight Bearing Precautions Per Order No   Other Precautions Cognitive; Chair Alarm; Bed Alarm;Multiple lines; Fall Risk   General   Additional Pertinent History dementia   Family/Caregiver Present Yes  (daughter)   Cognition   Overall Cognitive Status Impaired   Arousal/Participation Alert   Orientation Level Oriented to person;Oriented to place   Following Commands Follows one step commands with increased time or repetition   Comments pt pleasantly confused; cooperative; Lime   RUE Assessment   RUE Assessment   (refer to OT)   LUE Assessment   LUE Assessment   (refer to OT)   RLE Assessment   RLE Assessment WFL  (4/5 grossly)   LLE Assessment   LLE Assessment WFL  (4/5 grossly)   Bed Mobility   Supine to Sit 6  Modified independent   Additional items Verbal cues;HOB elevated   Sit to Supine 6  Modified independent   Additional items Verbal cues;HOB elevated   Additional Comments pt can be impulsive but can be redirected   Transfers   Sit to Stand 5  Supervision   Additional items Verbal cues   Stand to Sit 5  Supervision   Additional items Verbal cues   Additional Comments cues for safety   Ambulation/Elevation   Gait pattern Decreased foot clearance; Wide SARAHI; Excessively slow   Gait Assistance 4  Minimal assist   Additional items Assist x 1;Verbal cues; Tactile cues   Assistive Device Other (Comment)  (hand-held assistance)   Distance 150'x1   Balance   Static Sitting Good   Dynamic Sitting Fair +   Static Standing Fair   Dynamic Standing Fair -   Ambulatory Poor +   Activity Tolerance   Activity Tolerance Patient tolerated treatment well   Medical Staff Made Aware OT Edith   Nurse Made Aware RN Marlys   Assessment   Prognosis Good   Problem List Decreased strength;Decreased endurance; Impaired balance;Decreased mobility; Decreased cognition; Impaired judgement;Decreased safety awareness; Impaired hearing   Assessment Pt  66 y  o female presented w/ lethargy w/ decrease food & fluid intake  Pt admitted for LAYLA (acute kidney injury) ([facility] ) w/ elevated TSH  Pt referred to PT for mobility assessment & D/C planning w/ orders of activity as tolerated  PTA, pt's daughter reports that pt require S for overall functional tasks including amb but refused to use AD & "furniture walks" at baseline  (+) baseline dementia  On eval, pt demonstrate dec mobility, balance, endurance & amb  Pt require modified I w/ bed mobility, S for transfers & minAx1 for amb w/ hand-held assistance + cues for techniques  Gait deviations as above, slow w/ dec foot clearance but no gross LOB noted  No dizziness reported t/o session  Nsg staff most recent vital signs as follows: /62 (BP Location: Right arm)   Pulse 64   Temp 97 9 °F (36 6 °C) (Oral)   Resp 16   Ht 5' 1" (1 549 m)   Wt 61 5 kg (135 lb 9 3 oz)   SpO2 96%   BMI 25 62 kg/m²   At end of session, pt back in bed in stable condition, call bell & phone in reach, bed alarm activated, all lines intact  Fall precautions reinforced w/ good understanding  Pt functioning below baseline hence will continue skilled PT to improve function & safety  The patient's AM-PAC Basic Mobility Inpatient Short Form Raw Score is 19, Standardized Score is 42 48  A standardized score less than 42 9 suggests the patient may benefit from discharge to post-acute rehabilitation services  From PT standpoint,  will anticipate good progress in PT for safe D/C to home  Will recommend HHPT & family support at D/C  CM to follow  Nsg staff to continue to mobilized pt (OOB in chair for all meals & ambulate in room/unit) as tolerated to prevent further decline in function  Nsg notified      Goals   Patient Goals to go home   STG Expiration Date 06/20/21   Short Term Goal #1 Goals to be met in 10 days; pt will be able to: 1) inc strength & balance by 1/2 grade to improve overall functional mobility & dec fall risk; 2) inc bed mobility to I for pt to be able to get in/OOB safely w/ proper techniques 100% of the time, to dec caregiver burden & safely function at home; 3) inc transfers to modified I for pt to transition safely from one surface to another w/o % of the time, to dec caregiver burden & safely function at home; 4) inc amb w/ appropriate AD approx  >350' w/ S for pt to ambulate community distances w/o any % of the time, to dec caregiver burden & safely function at home; 5) negotiate stairs w/ S for inc safety during stair mgt inside/outside of home & dec caregiver burden; 6) pt/caregiver ed   PT Treatment Day 0   Plan   Treatment/Interventions Functional transfer training;LE strengthening/ROM; Elevations; Therapeutic exercise; Endurance training;Patient/family training;Bed mobility;Gait training;Spoke to nursing;OT   PT Frequency Other (Comment)  (3-5x/wk)   Recommendation   PT Discharge Recommendation Home with home health rehabilitation   AM-PAC Basic Mobility Inpatient   Turning in Bed Without Bedrails 4   Lying on Back to Sitting on Edge of Flat Bed 4   Moving Bed to Chair 3   Standing Up From Chair 3   Walk in Room 3   Climb 3-5 Stairs 2   Basic Mobility Inpatient Raw Score 19   Basic Mobility Standardized Score 42 48   Hx/personal factors: co-morbidities, inaccessible home, advanced age, mutliple lines, telemetry, dec cognition, fall risk and assist w/ ADL's  Examination: dec mobility, dec balance, dec endurance, dec amb, risk for falls, dec cognition  Clinical: unpredictable (ongoing medical status, abnormal lab values and risk for falls)  Complexity: high     Emily Rodriguez, PT

## 2021-06-10 NOTE — ASSESSMENT & PLAN NOTE
Change in mental status most likely secondary to metabolic changes and uncontrolled hypothyroidism  Treat LAYLA and hypothyroidism as per primary team  She seems improved during the encounter  Place on delirium precautions    -maintain normal sleep/wake cycle  -minimize overnight interruptions, group overnight vitals/labs/nursing checks as possible  -dim lights, close blinds and turn off tv to minimize stimulation and encourage sleep environment in evenings   -monitor for fecal and urinary retention which may precipitate delirium  -encourage early mobilization and ambulation  -provide frequent reorientation and redirection  -encourage family and friends at the bedside to help calm patient if anxious  -avoid medications which may precipitate or worsen delirium such as tramadol, benzodiazepine, anticholinergics, and antihistaminics  -encourage hydration and nutrition   -redirect unwanted behaviors as first line, avoid physical restraints

## 2021-06-10 NOTE — OCCUPATIONAL THERAPY NOTE
Occupational Therapy Evaluation     Patient Name: Constanza Newman  WUYJR'M Date: 6/10/2021  Problem List  Principal Problem:    LAYLA (acute kidney injury) ([facility] )  Active Problems:    Postoperative hypothyroidism    Essential hypertension    Chronic kidney disease, stage III (moderate) ([facility] )    Late onset Alzheimer's disease with behavioral disturbance ([facility] )    Hearing loss    Past Medical History  Past Medical History:   Diagnosis Date    Acute kidney injury (LAYLA) with acute tubular necrosis (ATN) ([facility] ) 2/13/2020    Bed bug bite     LAST ASSESSED [id number]    Chronic pain disorder     Disease of thyroid gland     Extremity pain     Graves' disease     Hyperlipidemia     Hypertension     LLL pneumonia     LAST ASSESSED [id number]    Low back pain     Migraine 2001    OCULAR    Scabies     LAST ASSESSED [id number]    Syncope and collapse     LAST ASSESSED [id number]    Vertigo     RESOLVED      Past Surgical History  Past Surgical History:   Procedure Laterality Date    CHOLECYSTECTOMY      HYSTERECTOMY      TOTAL THYROIDECTOMY      TUBAL LIGATION Bilateral              06/10/21 1443   OT Last Visit   OT Visit Date 06/10/21   Note Type   Note type Evaluation   Restrictions/Precautions   Weight Bearing Precautions Per Order No   Other Precautions Cognitive; Chair Alarm; Bed Alarm; Fall Risk;Multiple lines;Hard of hearing   Pain Assessment   Pain Assessment Tool Pain Assessment not indicated - pt denies pain   Pain Score No Pain   Home Living   Type of Home House   Home Layout Two level;Bed/bath upstairs;Stairs to enter with rails  (5 JOE)   Bathroom Shower/Tub Tub/shower unit   Bathroom Toilet Standard   Bathroom Equipment Shower chair;Grab bars in shower;Commode   Bathroom Accessibility Accessible   Home Equipment Walker;Cane  (rollator)   Additional Comments pt furniture walks at home and does not use AD   Prior Function   Level of [place] Needs assistance with IADLs; Needs assistance with ADLs and functional mobility  (supervision w/ cues for ADLs)   Lives With Daughter   Receives Help From Family;Home health  (home aide TUE/THUR 12:30-2:30)   ADL Assistance Needs assistance  (cues for safety and to initiate tasks)   IADLs Needs assistance   Falls in the last 6 months 0   Vocational Retired   Comments pt daughter (-) alone, pt daughter provides transports, pt daughter has to prompt her to complete ADL tasks and initiate them   Lifestyle   Autonomy per pt supervision/assist to initiate ADLs, assist w/ IADLs, supervision functional mobility furniture walking, assist w/ IADLS   Reciprocal Relationships daughter   Service to Others retired nurses aide   Intrinsic Gratification watching tv   [address] "I am doing okay"   ADL   Where Assessed Sitting at [address]  3  Moderate Assistance   [place]  4  Minimal Assistance   Bed Mobility   Supine to Sit 5  Supervision   Additional items Increased time required   Sit to Supine 5  Supervision   Additional items Increased time required   Transfers   Sit to Stand 5  Supervision   Additional items Assist x 1; Increased time required;Verbal cues; Bedrails   Stand to Sit 5  Supervision   Additional items Assist x 1; Increased time required;Verbal cues; Bedrails   Additional Comments cues for safety and hand placement   Functional Mobility   Functional Mobility 4  Minimal assistance   Additional Comments assist x1 w/ hand held assist   Additional items Hand hold assistance   Balance   Static Sitting Good   Dynamic Sitting Fair +   Static Standing Fair   Dynamic Standing Fair -   Ambulatory Fair -   Activity Tolerance   Activity Tolerance Patient limited by fatigue;Patient tolerated treatment well   Nurse Made Aware appropriate to see per Kendal BISHOP   RUE Assessment   RUE Assessment WFL  (4/5)   LUE Assessment   LUE Assessment WFL  (4/5)   Hand Function   Gross Motor Coordination Functional   Fine Motor Coordination Functional   Sensation   Light Touch No apparent deficits   Proprioception   Proprioception No apparent deficits   Vision-Basic Assessment   Current Vision No visual deficits   Vision - Complex Assessment   Ocular Range of Motion WFL   Acuity Able to read clock/calendar on wall without difficulty   Perception   Inattention/Neglect Appears intact   Cognition   Overall Cognitive Status Impaired   Arousal/Participation Responsive; Cooperative   Attention Attends with cues to redirect   Orientation Level Oriented to person;Oriented to place; Disoriented to situation;Disoriented to time   Memory Decreased recall of precautions;Decreased recall of recent events;Decreased short term memory   Following Commands Follows one step commands with increased time or repetition   Comments pt w/ Dementia at baseline, hard of hearing use of pocket talker   Assessment   Limitation Decreased ADL status; Decreased Safe judgement during ADL;Decreased UE strength;Decreased cognition;Decreased endurance;Decreased self-care trans;Decreased high-level ADLs   Prognosis Good   Assessment Pt is a 66 y o  female seen for OT evaluation s/p admit to [facility] on 6/9/2021 w/ increased lethargy, LAYLA (acute kidney injury) ([facility] )  Comorbidities affecting pt's functional performance at time of assessment include: anemia, tobacco abuse, HTN, Dementia, elevated TSH, AK, h/o tobacco abuseI  CT head: No acute intracranial abnormality  Chronic, nonemergent findings above  Personal factors affecting pt at time of IE include: 24/7 support/assist from daughter  Prior to admission, pt was living w/ daughter and home aides 2x/wk for bathing, independent w/ ADLs, supervision w/ functional transfers and mobility w/ furniture walking, assist w/ IADLs   Upon evaluation: Pt requires supervision bed mobility, supervision sit<>stand w/ VCs for safety, MIN assist functional mobility w/ hand held assist, MIN LB ADLs, supervision-min assist UB ADLs, min assist toileting 2* the following deficits impacting occupational performance: decreased endurance, impaired balance, impaired cognition, increased insight and safety awareness, multiple lines  Pt to benefit from continued skilled OT tx while in the hospital to address deficits as defined above and maximize level of functional independence w ADL's and functional mobility  Occupational Performance areas to address include: grooming, bathing/shower, toilet hygiene, dressing, functional mobility, clothing management, cleaning and meal prep  From OT standpoint, recommendation at time of d/c would be home w/ family support and HOME OT  The patient's raw score on the AM-PAC Daily Activity inpatient short form is 17, standardized score is 37 26, less than 39 4  Patients at this level are likely to benefit from discharge to post-acute rehabilitation services  Please refer to the recommendation of the Occupational Therapist for safe discharge planning  Pt has assist for LB ADLs at baseline, lowering AMPAC score  Goals   Patient Goals "to go home"   LTG Time Frame 10-14   Long Term Goal please see below goals   Plan   Treatment Interventions ADL retraining;Functional transfer training;UE strengthening/ROM; Endurance training;Cognitive reorientation;Patient/family training;Equipment evaluation/education; Compensatory technique education; Activityengagement; Energy conservation   Goal Expiration Date 06/24/21   OT Frequency 2-3x/wk   Recommendation   OT Discharge Recommendation Home with home health rehabilitation   OT - OK to Discharge   (when medically stable)   Additional Comments  HOME OT and family support   [facility] Daily Activity Inpatient   Lower Body Dressing 3   Bathing 2   Toileting 3   Upper Body Dressing 3   Grooming 3   Eating 3   Daily Activity Raw Score 17   Daily Activity Standardized Score (Calc for Raw Score >=11) 37 26   AM-PAC Applied Cognition Inpatient   Following a Speech/Presentation 2   Understanding Ordinary Conversation 3   Taking Medications 1   Remembering Where Things Are Placed or Put Away 2   Remembering List of 4-5 Errands 1   Taking Care of Complicated Tasks 1   Applied Cognition Raw Score 10   Applied Cognition Standardized Score 24 98     Occupational Therapy Goals to be met in 7-10 days:  1) Pt will improve activity tolerance to G for 30 min txment sessions to enhance ADLs  2) Pt will complete ADLs/self care w/ supervision   3) Pt will complete toileting w/ mod I w/ G hygiene/thoroughness using DME PRN  4) Pt will improve functional transfers on/off all surfaces using DME PRN w/ G balance/safety including toileting w/ mod I  5) Pt will improve fx'l mobility during I/ADl/leisure tasks using DME PRN w/ g balance/safety w/ supervision  6) Pt will engage in ongoing cognitive assessment w/ G participation to A w/ safe d/c planning/recommendations  7) Pt will demonstrate G carryover of pt/caregiver education and training as appropriate w/ mod I  w/ G tolerance  8) Pt will engage in depression screen/leisure interest checklist w/ G participation to monitor s/s depression and ID 3 positive coping strategies to A w/ emotional regulation and management  9) Pt will demonstrate 100% carryover of E C  techniques w/ mod I t/o fx'l I/ADL/leisure tasks w/o cues s/p skilled education  10) Pt will engage in activity configuration activity w/ G participation and mod I to increase time management skills and improve participation in a structured routine to improve overall quality of life  11) Pt will demonstrate improved standing tolerance to 3-5 minutes during functional tasks w/ Good - dynamic standing balance to enhance ADL performance    Documentation completed by: Birgit Kraft, MS, OTR/L

## 2021-06-10 NOTE — ASSESSMENT & PLAN NOTE
Elevated TSH noted, which can contribute to her increased somnolence and decreased p o  Intake  Levothyroxine was resumed  Monitor closely  Educate patient and family about compliance

## 2021-06-10 NOTE — ASSESSMENT & PLAN NOTE
Patient is currently  alert oriented x3, no behaviors reported by nursing staff since admission  She is dependent for IADL's, independent with ADL's  Consider administering donepezil in the morning and not at bedtime due to reported insomnia  Continue to monitor for side effects  Consider melatonin 3 mg p o  Q h s , maintain sleep-wake cycle  Consider resuming gabapentin 100 mg p o  T i d  For anxiety and may increase dose as needed and tolerated  Provide supportive care and reorient as needed  Provided hearing amplifiers to help communicating with the patient  Encourage out of bed as tolerated, continue physical therapy  Consider screening for depression as outpatient  Consider formal geriatric assessment for medically stable

## 2021-06-10 NOTE — PLAN OF CARE
Problem: OCCUPATIONAL THERAPY ADULT  Goal: Performs self-care activities at highest level of function for planned discharge setting  See evaluation for individualized goals  Description: Treatment Interventions: ADL retraining, Functional transfer training, UE strengthening/ROM, Endurance training, Cognitive reorientation, Patient/family training, Equipment evaluation/education, Compensatory technique education, Activityengagement, Energy conservation          See flowsheet documentation for full assessment, interventions and recommendations  Note: Limitation: Decreased ADL status, Decreased Safe judgement during ADL, Decreased UE strength, Decreased cognition, Decreased endurance, Decreased self-care trans, Decreased high-level ADLs  Prognosis: Good  Assessment: Pt is a 66 y o  female seen for OT evaluation s/p admit to [facility] on 6/9/2021 w/ increased lethargy, LAYLA (acute kidney injury) ([facility] [address] )  Comorbidities affecting pt's functional performance at time of assessment include: anemia, tobacco abuse, HTN, Dementia, elevated TSH, AK, h/o tobacco abuseI  CT head: No acute intracranial abnormality  Chronic, nonemergent findings above  Personal factors affecting pt at time of IE include: 24/7 support/assist from daughter  Prior to admission, pt was living w/ daughter and home aides 2x/wk for bathing, independent w/ ADLs, supervision w/ functional transfers and mobility w/ furniture walking, assist w/ IADLs  Upon evaluation: Pt requires supervision bed mobility, supervision sit<>stand w/ VCs for safety, MIN assist functional mobility w/ hand held assist, MIN LB ADLs, supervision-min assist UB ADLs, min assist toileting 2* the following deficits impacting occupational performance: decreased endurance, impaired balance, impaired cognition, increased insight and safety awareness, multiple lines   Pt to benefit from continued skilled OT tx while in the hospital to address deficits as defined above and maximize level of functional independence w ADL's and functional mobility  Occupational Performance areas to address include: grooming, bathing/shower, toilet hygiene, dressing, functional mobility, clothing management, cleaning and meal prep  From OT standpoint, recommendation at time of d/c would be home w/ family support and HOME OT  The patient's raw score on the AM-PAC Daily Activity inpatient short form is 17, standardized score is 37 26, less than 39 4  Patients at this level are likely to benefit from discharge to post-acute rehabilitation services  Please refer to the recommendation of the Occupational Therapist for safe discharge planning  Pt has assist for LB ADLs at baseline, lowering [facility] score       OT Discharge Recommendation: Home with home health rehabilitation  OT - OK to Discharge: (when medically stable)

## 2021-06-10 NOTE — CONSULTS
Consultation - [address] Ramsey 66 y o  female MRN: [id number]  Unit/Bed#: E5 -01 Encounter: [id number]      Assessment/Plan     Acute encephalopathy  Assessment & Plan  Change in mental status most likely secondary to metabolic changes and uncontrolled hypothyroidism  Treat LAYLA and hypothyroidism as per primary team  She seems improved during the encounter  Place on delirium precautions  -maintain normal sleep/wake cycle  -minimize overnight interruptions, group overnight vitals/labs/nursing checks as possible  -dim lights, close blinds and turn off tv to minimize stimulation and encourage sleep environment in evenings   -monitor for fecal and urinary retention which may precipitate delirium  -encourage early mobilization and ambulation  -provide frequent reorientation and redirection  -encourage family and friends at the bedside to help calm patient if anxious  -avoid medications which may precipitate or worsen delirium such as tramadol, benzodiazepine, anticholinergics, and antihistaminics  -encourage hydration and nutrition   -redirect unwanted behaviors as first line, avoid physical restraints    Hearing loss  Assessment & Plan  Provide hearing amplifier is while in hospital    Late onset Alzheimer's disease with behavioral disturbance [facility])  Assessment & Plan  Patient is currently  alert oriented x3, no behaviors reported by nursing staff since admission  She is dependent for IADL's, independent with ADL's  Consider administering donepezil in the morning and not at bedtime due to reported insomnia  Continue to monitor for side effects  Consider melatonin 3 mg p o  Q h s , maintain sleep-wake cycle  Consider resuming gabapentin 100 mg p o  T i d  For anxiety and may increase dose as needed and tolerated  Provide supportive care and reorient as needed  Provided hearing amplifiers to help communicating with the patient     Encourage out of bed as tolerated, continue physical therapy  Consider screening for depression as outpatient  Consider formal geriatric assessment for medically stable  Postoperative hypothyroidism  Assessment & Plan  Elevated TSH noted, which can contribute to her increased somnolence and decreased p o  Intake  Levothyroxine was resumed  Monitor closely  Educate patient and family about compliance  * LAYLA (acute kidney injury) ([facility] [address] )  Assessment & Plan  Renal function improving, continue IV fluids as per primary team          History of Present Illness   Physician Requesting Consult: Naresh Monte MD  Reason for Consult / Principal Problem: DEMENTIA WITH BEHAVIORS        HPI: Loren Calderon is a 66y o  year old female who presents with lethargy and decreased p o  Intake  She was found to have LAYLA  Patient lives with her daughter Niels Dey  Patient denied any acute complaints during the encounter she is asking when she is going to be able to go back home  When asked why she was not drinking fluids she said she does not like water  Patient reports insomnia  She denies chest pain shortness of breath, she does have a chronic productive cough  She admits of smoking 10 cigarettes daily  She denies nausea vomiting, as per nursing staff she had good p o  Intake today  No behaviors reported  Patient was alert oriented x3 during the encounter, she is a poor historian  She reported that she does not have a legal power of  however she would like her grandson Dimitri Naranjo to make decisions regarding her health along with her daughters Niels Dey and Lala Meyers  I discussed over the phone with her daughter Niels Dey, who stated that patient is noncompliant with medication and refuses to take it most of the time  She can be verbally aggressive  Inpatient consult to Gerontology  Consult performed by: Herb Medina MD  Consult ordered by: Naresh Monte MD          Review of Systems   Constitutional: Negative for chills and fever  HENT: Positive for hearing loss  Negative for congestion and rhinorrhea  Respiratory: Positive for cough  Negative for shortness of breath and wheezing  Cardiovascular: Negative for chest pain, palpitations and leg swelling  Gastrointestinal: Negative for abdominal pain and constipation  Endocrine: Negative for cold intolerance  Genitourinary: Negative for difficulty urinating, dysuria and hematuria  Musculoskeletal: Negative for gait problem  Skin: Negative for wound  Allergic/Immunologic: Negative for environmental allergies  Neurological: Negative for dizziness  Hematological: Does not bruise/bleed easily  Psychiatric/Behavioral: Positive for sleep disturbance  Negative for behavioral problems             Historical Information   Past Medical History:   Diagnosis Date    [facility]LAYLA[facility]    Bed bug bite     LAST ASSESSED [id number]    Chronic pain disorder     Disease of thyroid gland     Extremity pain     Graves' disease     Hyperlipidemia     Hypertension     LLL pneumonia     LAST ASSESSED [id number]    Low back pain     Migraine 2001    OCULAR    Scabies     LAST ASSESSED [id number]    Syncope and collapse     LAST ASSESSED [id number]    Vertigo     RESOLVED      Past Surgical History:   Procedure Laterality Date    CHOLECYSTECTOMY      HYSTERECTOMY      TOTAL THYROIDECTOMY      TUBAL LIGATION Bilateral      Social History   Social History     Substance and Sexual Activity   Alcohol Use Never    Binge frequency: Never     Social History     Substance and Sexual Activity   Drug Use No     Social History     Tobacco Use   Smoking Status Current Every Day Smoker    Packs/day: 0 50   Smokeless Tobacco Never Used     Family History:   Family History   Problem Relation Age of Onset    Breast cancer Sister     Diabetes Family     No Known Problems Mother     No Known Problems Father        Meds/Allergies   Calcium 600 mg daily  Niacin 1000 mg daily  Gabapentin 100 mg p o  T i d   Vitamin D 95469 units weekly  Lisinopril 12 5 mg daily  Levothyroxine 150 mcg daily  Donepezil 10 mg daily  Metoprolol succinate 50 mg daily  Citalopram 10 mg daily  Alendronate weekly  Allergies   Allergen Reactions    Naproxen Itching    Niacin     Nsaids Other (See Comments) and Swelling    Azithromycin Nausea Only and Rash    Cephalexin Rash       Objective     Intake/Output Summary (Last 24 hours) at 6/10/2021 1801  Last data filed at 6/10/2021 1729  Gross per 24 hour   Intake 2693 33 ml   Output 800 ml   Net 1893 33 ml     Invasive Devices     Peripheral Intravenous Line            Peripheral IV 06/09/21 Right Antecubital 1 day    Peripheral IV 06/10/21 Left;Dorsal (posterior) Hand less than 1 day                Physical Exam  Vitals signs and nursing note reviewed  Constitutional:       General: She is not in acute distress  Appearance: She is well-developed  HENT:      Head: Normocephalic and atraumatic  Ears:      Comments: [place]  Eyes:      Conjunctiva/sclera: Conjunctivae normal    Neck:      Musculoskeletal: Neck supple  Cardiovascular:      Rate and Rhythm: Normal rate and regular rhythm  Heart sounds: No murmur  Pulmonary:      Effort: Pulmonary effort is normal  No respiratory distress  Breath sounds: Normal breath sounds  Abdominal:      Palpations: Abdomen is soft  Tenderness: There is no abdominal tenderness  Musculoskeletal:         General: No tenderness  Right lower leg: No edema  Left lower leg: No edema  Skin:     General: Skin is warm and dry  Neurological:      Mental Status: She is alert and oriented to person, place, and time  Mental status is at baseline     Psychiatric:      Comments: Slightly anxious         Lab Results:   I have personally reviewed pertinent lab results including the following:  -CBC, CMP, TSH        VTE Prophylaxis: Heparin    Code Status: Level 3 - DNAR and DNI  Advance Directive and Living Will: Yes      Family and Social Support:  Living Arrangements: Children  Support Systems: Family members  Assistance Needed: none  Type of Current Residence: Private residence  [address]: Yes  Type of Current Home Care Services: Home health aide      Goals of Care: return home

## 2021-06-10 NOTE — PLAN OF CARE
Problem: Potential for Falls  Goal: Patient will remain free of falls  Description: INTERVENTIONS:  - Assess patient frequently for physical needs  -  Identify cognitive and physical deficits and behaviors that affect risk of falls    -  [place] fall precautions as indicated by assessment   - Educate patient/family on patient safety including physical limitations  - Instruct patient to call for assistance with activity based on assessment  - Modify environment to reduce risk of injury  - Consider OT/PT consult to assist with strengthening/mobility  Outcome: Progressing     Problem: Prexisting or High Potential for Compromised Skin Integrity  Goal: Skin integrity is maintained or improved  Description: INTERVENTIONS:  - Identify patients at risk for skin breakdown  - Assess and monitor skin integrity  - Assess and monitor nutrition and hydration status  - Monitor labs   - Assess for incontinence   - Turn and reposition patient  - Assist with mobility/ambulation  - Relieve pressure over bony prominences  - Avoid friction and shearing  - Provide appropriate hygiene as needed including keeping skin clean and dry  - Evaluate need for skin moisturizer/barrier cream  - Collaborate with interdisciplinary team   - Patient/family teaching  - Consider wound care consult   Outcome: Progressing     Problem: PAIN - ADULT  Goal: Verbalizes/displays adequate comfort level or baseline comfort level  Description: Interventions:  - Encourage patient to monitor pain and request assistance  - Assess pain using appropriate pain scale  - Administer analgesics based on type and severity of pain and evaluate response  - Implement non-pharmacological measures as appropriate and evaluate response  - Consider cultural and social influences on pain and pain management  - Notify physician/advanced practitioner if interventions unsuccessful or patient reports new pain  Outcome: Progressing     Problem: SAFETY ADULT  Goal: Patient will remain free of falls  Description: INTERVENTIONS:  - Assess patient frequently for physical needs  -  Identify cognitive and physical deficits and behaviors that affect risk of falls    -  [place] fall precautions as indicated by assessment   - Educate patient/family on patient safety including physical limitations  - Instruct patient to call for assistance with activity based on assessment  - Modify environment to reduce risk of injury  - Consider OT/PT consult to assist with strengthening/mobility  Outcome: Progressing  Goal: Maintain or return to baseline ADL function  Description: INTERVENTIONS:  -  Assess patient's ability to carry out ADLs; assess patient's baseline for ADL function and identify physical deficits which impact ability to perform ADLs (bathing, care of mouth/teeth, toileting, grooming, dressing, etc )  - Assess/evaluate cause of self-care deficits   - Assess range of motion  - Assess patient's mobility; develop plan if impaired  - Assess patient's need for assistive devices and provide as appropriate  - Encourage maximum independence but intervene and supervise when necessary  - Involve family in performance of ADLs  - Assess for home care needs following discharge   - Consider OT consult to assist with ADL evaluation and planning for discharge  - Provide patient education as appropriate  Outcome: Progressing  Goal: Maintain or return mobility status to optimal level  Description: INTERVENTIONS:  - Assess patient's baseline mobility status (ambulation, transfers, stairs, etc )    - Identify cognitive and physical deficits and behaviors that affect mobility  - Identify mobility aids required to assist with transfers and/or ambulation (gait belt, sit-to-stand, lift, walker, cane, etc )  - [place] fall precautions as indicated by assessment  - Record patient progress and toleration of activity level on Mobility SBAR; progress patient to next Phase/Stage  - Instruct patient to call for assistance with activity based on assessment  - Consider rehabilitation consult to assist with strengthening/weightbearing, etc   Outcome: Progressing     Problem: DISCHARGE PLANNING  Goal: Discharge to home or other facility with appropriate resources  Description: INTERVENTIONS:  - Identify barriers to discharge w/patient and caregiver  - Arrange for needed discharge resources and transportation as appropriate  - Identify discharge learning needs (meds, wound care, etc )  - Arrange for interpretive services to assist at discharge as needed  - Refer to Case Management Department for coordinating discharge planning if the patient needs post-hospital services based on physician/advanced practitioner order or complex needs related to functional status, cognitive ability, or social support system  Outcome: Progressing

## 2021-06-10 NOTE — PROGRESS NOTES
[address]  Progress Note - Allyson Prow [birth year], 66 y o  female MRN: [id number]  Unit/Bed#: E5 -01 Encounter: [id number]  Primary Care Provider: Maryse Singh DO   Date and time admitted to hospital: 6/9/2021 12:24 PM     Acute on chronic encephalopathy-likely due to dehydration versus worsening dementia  Geriatrics consult  No suspicion for infection at this time  Urinalysis is normal as well as chest x-ray  No fevers  Acute kidney injury                              baseline creatinine approximately 1,  on admission increased to 3 52, appears pre renal with decrease in taking continue use of diuretic  Creatinine improved to 1 7    after provided IV fluids and holding diuretics  Will stop fluids for now and reassess tomorrow      Hypothyroidism        poorly controlled  patient reportedly has been noncompliant with her thyroid medication  Resumed levothyroxine 150 mg daily  Will need to recheck TSH in 4-6 weeks      Dementia                                             care provided by daughter and aides, will request PT OT eval, continue Aricept  Geriatrics consult as well  I feel that patient would benefit from a nursing facility, however, her family is not ready for that transition yet      Hypertension                                       currently well controlled will monitor   Will continue metoprolol holding HCTZ and lisinopril     History of anemia                                hemoglobin currently stable    12 1     Tobacco abuse                                   cessation counseling provided will provide nicotine patch     VTE Pharmacologic Prophylaxis:   Pharmacologic: Heparin  Mechanical VTE Prophylaxis in Place: Yes    Patient Centered Rounds: I have performed bedside rounds with nursing staff today      Discussions with Specialists or Other Care Team Provider: Geriatrics    Education and Discussions with Family / Patient: patient's daughter    Time Spent for Care: 20 minutes  More than 50% of total time spent on counseling and coordination of care as described above  Current Length of Stay: 1 day(s)    Current Patient Status: Inpatient   Certification Statement: The patient will continue to require additional inpatient hospital stay due to encephalopathy and decreased PO intake    Discharge Plan:     Code Status: Level 3 - DNAR and DNI      Subjective:   Emelyn Care was seen and examined  She remains confused and mainly non-verbal     Objective:     Vitals:   Temp (24hrs), Av 9 °F (36 6 °C), Min:97 6 °F (36 4 °C), Max:98 1 °F (36 7 °C)    Temp:  [97 6 °F (36 4 °C)-98 1 °F (36 7 °C)] 97 9 °F (36 6 °C)  HR:  [54-64] 64  Resp:  [16-18] 16  BP: ()/(51-63) 128/62  SpO2:  [94 %-96 %] 96 %  Body mass index is 25 62 kg/m²  Input and Output Summary (last 24 hours): Intake/Output Summary (Last 24 hours) at 6/10/2021 1728  Last data filed at 6/10/2021 3678  Gross per 24 hour   Intake 1140 ml   Output 1100 ml   Net 40 ml       Physical Exam:     Physical Exam  Constitutional:       Comments: frail   HENT:      Ears:      Comments: Hearing impaired  Cardiovascular:      Rate and Rhythm: Normal rate and regular rhythm  Pulmonary:      Effort: Pulmonary effort is normal       Breath sounds: Normal breath sounds  Abdominal:      Palpations: Abdomen is soft  Skin:     General: Skin is warm  Findings: No erythema  Neurological:      Mental Status: She is alert  Mental status is at baseline  She is disoriented and confused     Psychiatric:         Behavior: Behavior normal            Additional Data:     Labs:    Results from last 7 days   Lab Units 21  1251   WBC Thousand/uL 13 26*   HEMOGLOBIN g/dL 12 1   HEMATOCRIT % 38 0   PLATELETS Thousands/uL 375   NEUTROS PCT % 66   LYMPHS PCT % 24   MONOS PCT % 7   EOS PCT % 1     Results from last 7 days   Lab Units 06/10/21  0521 21  1251   SODIUM mmol/L 141 138   POTASSIUM mmol/L 3 7 4 5   CHLORIDE mmol/L 105 99*   CO2 mmol/L 27 24   BUN mg/dL 47* 62*   CREATININE mg/dL 1 77* 3 52*   ANION GAP mmol/L 9 15*   CALCIUM mg/dL 8 4 8 9   ALBUMIN g/dL  --  3 6   TOTAL BILIRUBIN mg/dL  --  0 53   ALK PHOS U/L  --  66   ALT U/L  --  20   AST U/L  --  35   GLUCOSE RANDOM mg/dL 86 96                           * I Have Reviewed All Lab Data Listed Above  * Additional Pertinent Lab Tests Reviewed: All Labs Within Last 24 Hours Reviewed    Imaging:  CT head and chest x-ray  Recent Cultures (last 7 days):           Last 24 Hours Medication List:   Current Facility-Administered Medications   Medication Dose Route Frequency Provider Last Rate    aspirin  81 mg Oral Daily Mitch Martinez, DO      citalopram  10 mg Oral Daily Mitch Martinez, DO      donepezil  10 mg Oral HS Mitch Martinez, DO      gabapentin  100 mg Oral TID Mitch Martinez, DO      heparin (porcine)  5,000 Units Subcutaneous [place], [place]      levothyroxine  150 mcg Oral Daily Before [address] [place], [place]      metoprolol succinate  50 mg Oral Daily [place]      niacin  2,000 mg Oral Daily [place], [place]      nicotine  14 mg Transdermal Daily Mitch Martinez DO          Today, Patient Was Seen By: Hanna Skelton MD    ** Please Note: Dictation voice to text software may have been used in the creation of this document   **

## 2021-06-10 NOTE — PLAN OF CARE
Problem: PHYSICAL THERAPY ADULT  Goal: Performs mobility at highest level of function for planned discharge setting  See evaluation for individualized goals  Description: Treatment/Interventions: Functional transfer training, LE strengthening/ROM, Elevations, Therapeutic exercise, Endurance training, Patient/family training, Bed mobility, Gait training, Spoke to nursing, OT          See flowsheet documentation for full assessment, interventions and recommendations  Note: Prognosis: Good  Problem List: Decreased strength, Decreased endurance, Impaired balance, Decreased mobility, Decreased cognition, Impaired judgement, Decreased safety awareness, Impaired hearing  Assessment: Pt  66 y  o female presented w/ lethargy w/ decrease food & fluid intake  Pt admitted for LAYLA (acute kidney injury) ([facility] [address] ) w/ elevated TSH  Pt referred to PT for mobility assessment & D/C planning w/ orders of activity as tolerated  PTA, pt's daughter reports that pt require S for overall functional tasks including amb but refused to use AD & "furniture walks" at baseline  (+) baseline dementia  On eval, pt demonstrate dec mobility, balance, endurance & amb  Pt require modified I w/ bed mobility, S for transfers & minAx1 for amb w/ hand-held assistance + cues for techniques  Gait deviations as above, slow w/ dec foot clearance but no gross LOB noted  No dizziness reported t/o session  Nsg staff most recent vital signs as follows: /62 (BP Location: Right arm)   Pulse 64   Temp 97 9 °F (36 6 °C) (Oral)   Resp 16   Ht 5' 1" (1 549 m)   Wt 61 5 kg (135 lb 9 3 oz)   SpO2 96%   BMI 25 62 kg/m²   At end of session, pt back in bed in stable condition, call bell & phone in reach, bed alarm activated, all lines intact  Fall precautions reinforced w/ good understanding  Pt functioning below baseline hence will continue skilled PT to improve function & safety   The patient's AM-PAC Basic Mobility Inpatient Short Form Raw Score is 19, Standardized Score is 42 48  A standardized score less than 42 9 suggests the patient may benefit from discharge to post-acute rehabilitation services  From PT standpoint,  will anticipate good progress in PT for safe D/C to home  Will recommend HHPT & family support at D/C  CM to follow  Nsg staff to continue to mobilized pt (OOB in chair for all meals & ambulate in room/unit) as tolerated to prevent further decline in function  Nsg notified  PT Discharge Recommendation: Home with home health rehabilitation          See flowsheet documentation for full assessment

## 2021-06-11 VITALS
HEART RATE: 73 BPM | TEMPERATURE: 98.1 F | OXYGEN SATURATION: 95 % | RESPIRATION RATE: 18 BRPM | DIASTOLIC BLOOD PRESSURE: 81 MMHG | WEIGHT: 135.58 LBS | SYSTOLIC BLOOD PRESSURE: 146 MMHG | BODY MASS INDEX: 25.6 KG/M2 | HEIGHT: 61 IN

## 2021-06-11 LAB
ANION GAP SERPL CALCULATED.3IONS-SCNC: 6 MMOL/L (ref 4–13)
BUN SERPL-MCNC: 21 MG/DL (ref 5–25)
CALCIUM SERPL-MCNC: 8.9 MG/DL (ref 8.3–10.1)
CHLORIDE SERPL-SCNC: 106 MMOL/L (ref 100–108)
CO2 SERPL-SCNC: 29 MMOL/L (ref 21–32)
CREAT SERPL-MCNC: 1.16 MG/DL (ref 0.6–1.3)
GFR SERPL CREATININE-BSD FRML MDRD: 46 ML/MIN/1.73SQ M
GLUCOSE SERPL-MCNC: 100 MG/DL (ref 65–140)
POTASSIUM SERPL-SCNC: 4.6 MMOL/L (ref 3.5–5.3)
SODIUM SERPL-SCNC: 141 MMOL/L (ref 136–145)

## 2021-06-11 PROCEDURE — 80048 BASIC METABOLIC PNL TOTAL CA: CPT | Performed by: FAMILY MEDICINE

## 2021-06-11 PROCEDURE — NC001 PR NO CHARGE: Performed by: FAMILY MEDICINE

## 2021-06-11 PROCEDURE — 99239 HOSP IP/OBS DSCHRG MGMT >30: CPT | Performed by: FAMILY MEDICINE

## 2021-06-11 RX ADMIN — NIACIN 2000 MG: 500 TABLET, EXTENDED RELEASE ORAL at 09:17

## 2021-06-11 RX ADMIN — LEVOTHYROXINE SODIUM 150 MCG: 75 TABLET ORAL at 05:29

## 2021-06-11 RX ADMIN — METOPROLOL SUCCINATE 50 MG: 50 TABLET, EXTENDED RELEASE ORAL at 09:09

## 2021-06-11 RX ADMIN — ASPIRIN 81 MG: 81 TABLET, CHEWABLE ORAL at 09:08

## 2021-06-11 RX ADMIN — CITALOPRAM HYDROBROMIDE 10 MG: 20 TABLET ORAL at 09:08

## 2021-06-11 RX ADMIN — HEPARIN SODIUM 5000 UNITS: 5000 INJECTION INTRAVENOUS; SUBCUTANEOUS at 05:28

## 2021-06-11 RX ADMIN — GABAPENTIN 100 MG: 100 CAPSULE ORAL at 09:08

## 2021-06-11 NOTE — HOSPICE NOTE
Hospice liaison at bedside for home hospice evaluation  Pt was sitting in her chair watching TV  She is [place] but was alert and oriented  She was able to tell me why she came into the hospital, who she lives with and that she is going to be leaving soon  She has no wounds, no SOB, no pain  While here she has been eating, as a self feed  She is able to ambulate without assistance  She reports she does have a cane and walker at home if she needs it  Pt does has inct episodes at times but does attempt to go to the bathroom when she has the urge  She also has care givers of [facility] that come twice a week to help with anything the pt or family may need  Pt does NOT meet criteria for hospice at this time as there is no evidence pt has <6months with a terminal diagnosis  Liaison updated CM and they will follow up with family

## 2021-06-11 NOTE — NURSING NOTE
Discharge instructions reviewed with pt and daughter, answering all questions  IVs removed from pt, pt dressed and belongings reviewed  Pt wheeled out with RN and daughter

## 2021-06-11 NOTE — CASE MANAGEMENT
SW was notified by hospice Liaison Isatu that patient is not appropriate for home hospice  Per my previous conversation with patient's dtr Andry Flynn, if patient does not qualify for home hospice she would like the patient to receive the home therapy that was recommended by PT  A referral was made and is now under review  Will continue to follow  A post acute care recommendation was made by your care team for [facility]  Discussed [place] [facility] with patient  List of agencies given to patient via in person  patient aware the list is custom filtered for them by preference  and that [facility]'s post acute providers are designated

## 2021-06-11 NOTE — CASE MANAGEMENT
GMLOS:  Today            LOS: 2  BUNDLED? No  UNPLANNED READMISSION LEVEL: 10 low  30 DAY READMISSION? No    Patient was admitted on 6/9/21 with LAYLA  SW made a PC to patient's dtr Cesar Aguilar to conduct a CM assessment and discuss discharge planning  Patient lives in a [facility] with dtr Cesar Aguilar  4 steps to enter  She needs assistance with some of her ADL's  A caregiver comes to the home 2x a week from 11:30-2:30PM  Patient uses [employer] on [address]  Her PCP is Morena Apple  Dtr reports patient has some depression  She denies any hx of D&A  Her POA is Hedy Kurtis  Family transports patient to medical appointments  SLIM notified SW that dtr is interested in home hospice if patient qualifies  Dtr Cesar Aguilar confirmed this information and would like a referral to be placed to [facility], [facility]  A referral was made and it is pending evaluation from [facility]  Will continue to follow

## 2021-06-12 NOTE — PROGRESS NOTES
[facility]  Discharge summary - Connie Martinez [birth year], 66 y o  female MRN: [id number]  Unit/Bed#: E5 -01 Encounter: [id number]  Primary Care Provider: Jesusita John DO   Date and time admitted to hospital: 6/9/2021 12:24 PM    Acute encephalopathy -present on admission  In the setting of acute kidney injury and dehydration  CT of the brain was negative  Provided with IV fluids  Geriatrics has seen the patient  No suspicion for infection at this time  Urinalysis is normal as well as chest x-ray  No fevers  At the time of discharge, encephalopathy is resolved and patient is back to the baseline      Acute kidney injury                              baseline creatinine approximately 1,  on admission increased to 3 52, appears pre renal with decrease in taking continue use of diuretic  Creatinine improved to baseline    after provided IV fluids and holding diuretics        Hypothyroidism        poorly controlled                                 patient reportedly has been noncompliant with her thyroid medication  Resumed levothyroxine 150 mg daily  Will need to recheck TSH in 4-6 weeks      Dementia                                             care provided by daughter and aides, will request PT OT hanane, continue Aricept  Geriatrics consult as well  I feel that patient would benefit from a nursing facility, however, her family is not ready for that transition yet      Hypertension                                       currently well controlled will monitor     Will continue metoprolol holding HCTZ and lisinopril     History of anemia                                hemoglobin currently stable    12 1    Discharge Summary - [facility]    Patient Information: Connie Martinez 66 y o  female MRN: [id number]  Unit/Bed#: E5 -01 Encounter: [id number]    Discharging Physician / Practitioner: Donald Watson MD  PCP: Jesusita John DO  Admission Date: 6/9/2021  Discharge Date: 06/11/21    Reason for Admission:  Dehydration with altered mental state    Discharge Diagnoses:     Principal Problem:    LAYLA (acute kidney injury) ([facility] [address] )  Active Problems:    Postoperative hypothyroidism    Essential hypertension    Chronic kidney disease, stage III (moderate) ([facility] [address] )    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    Hearing loss    Acute encephalopathy  Resolved Problems:    * No resolved hospital problems  *      Consultations During Hospital Stay:  · Geriatrics    Procedures Performed:     · None    Significant Findings / Test Results:     · Creatinine:  3 5    Incidental Findings:   · none    Test Results Pending at Discharge (will require follow up):   · none     Outpatient Tests Requested:  · none    Complications:  none    Hospital Course:     Ernesto Damon is a 66 y o  female patient who originally presented to the hospital on 6/9/2021 due to worsening mental state and decreased p o  Intake  Reportedly the patient has been more lethargic/sleepy with decrease intake of food and fluids  Approximately 4 days ago patient had nausea/vomiting since progression of above symptoms  Daughter Saira Omer with whom she resides provides the history, she has been somewhat noncompliant with her medication, patient herself has no complaints  Patient presented with elevated creatinine suggestive of acute kidney injury which was likely due to dehydration  Patient has received IV fluids and her creatinine got back to baseline  Patient is currently doing much better in terms of mental state  She is alert and oriented x2  She is participating in a conversation and is able to feed herself independently  Patient was seen by the geriatric specialist and the hospice  who do not believe that she meets the criteria for hospice  Therefore, patient will be discharged home with nursing support      Condition at Discharge: stable     Discharge Day Visit / Exam: Subjective:  Patient seen and examined  She is doing very well today  She is sitting on the chair independently and eating her breakfast   Participates in the conversation  Vitals: Blood Pressure: 146/81 (06/11/21 0909)  Pulse: 73 (06/11/21 0909)  Temperature: 98 1 °F (36 7 °C) (06/11/21 0730)  Temp Source: Oral (06/11/21 0730)  Respirations: 18 (06/11/21 0730)  Height: 5' 1" (154 9 cm) (06/09/21 1441)  Weight - Scale: 61 5 kg (135 lb 9 3 oz) (06/09/21 1441)  SpO2: 95 % (06/11/21 0909)  Exam:   Physical Exam  Constitutional:       Appearance: She is well-developed  HENT:      Ears:      Comments: Hard of hearing  Cardiovascular:      Rate and Rhythm: Normal rate and regular rhythm  Pulmonary:      Effort: Pulmonary effort is normal       Breath sounds: Normal breath sounds  Abdominal:      Palpations: Abdomen is soft  Skin:     General: Skin is warm  Findings: No erythema  Neurological:      Mental Status: She is alert  Mental status is at baseline  Psychiatric:         Behavior: Behavior normal        Discussion with Family:  Patient's daughter    Discharge instructions/Information to patient and family:   See after visit summary for information provided to patient and family  Provisions for Follow-Up Care:  See after visit summary for information related to follow-up care and any pertinent home health orders  Disposition:     Home with VNA Services (Reminder: Complete face to face encounter)    For Discharges to [facility] SNF:   · Not Applicable to this Patient - Not Applicable to this Patient    Planned Readmission: no     Discharge Statement:  I spent 40 minutes discharging the patient  This time was spent on the day of discharge  I had direct contact with the patient on the day of discharge   Greater than 50% of the total time was spent examining patient, answering all patient questions, arranging and discussing plan of care with patient as well as directly providing post-discharge instructions  Additional time then spent on discharge activities  Discharge Medications:  See after visit summary for reconciled discharge medications provided to patient and family        ** Please Note: This note has been constructed using a voice recognition system **

## 2021-06-12 NOTE — DISCHARGE SUMMARY
[facility]  Discharge- Hugo Almeida 1945, 66 y o  female MRN: [id number]  Unit/Bed#: E5 -01 Encounter: [id number]  Primary Care Provider: Jodi Parrish DO   Date and time admitted to hospital: 6/9/2021 12:24 PM        Acute metabolic encephalopathy -present on admission  In the setting of acute kidney injury and dehydration  CT of the brain was negative  Provided with IV fluids  Geriatrics has seen the patient  No suspicion for infection at this time  Urinalysis is normal as well as chest x-ray  No fevers  At the time of discharge, encephalopathy is resolved and patient is back to the baseline      Acute kidney injury                              baseline creatinine approximately 1,  on admission increased to 3 52, appears pre renal with decrease in taking continue use of diuretic  Creatinine improved to baseline    after provided IV fluids and holding diuretics        Hypothyroidism        poorly controlled                                 patient reportedly has been noncompliant with her thyroid medication  Resumed levothyroxine 150 mg daily  Will need to recheck TSH in 4-6 weeks      Dementia                                             care provided by daughter and aides, will request PT OT hanane, continue Aricept  Geriatrics consult as well  I feel that patient would benefit from a nursing facility, however, her family is not ready for that transition yet      Hypertension                                       currently well controlled will monitor     Will continue metoprolol holding HCTZ and lisinopril     History of anemia                                hemoglobin currently stable    12 1    Discharge Summary - [facility]    Patient Information: Hugo Almeida 66 y o  female MRN: [id number]  Unit/Bed#: E5 -01 Encounter: [id number]    Discharging Physician / Practitioner: Carmelina Ng MD  PCP: Jodi Parrish,   Admission Date: 6/9/2021  Discharge Date: 06/11/21    Reason for Admission:  Dehydration with altered mental state    Discharge Diagnoses:     Principal Problem:    LAYLA (acute kidney injury) ([facility] [address] )  Active Problems:    Postoperative hypothyroidism    Essential hypertension    Chronic kidney disease, stage III (moderate) ([facility] [address] )    Late [facility] disease [facility] )    Hearing loss    Acute encephalopathy  Resolved Problems:    * No resolved hospital problems  *      Consultations During Hospital Stay:  · Geriatrics    Procedures Performed:     · None    Significant Findings / Test Results:     · Creatinine:  3 5    Incidental Findings:   · none    Test Results Pending at Discharge (will require follow up):   · none     Outpatient Tests Requested:  · none    Complications:  none    Hospital Course:     Nay Navarro is a 66 y o  female patient who originally presented to the hospital on 6/9/2021 due to worsening mental state and decreased p o  Intake  Reportedly the patient has been more lethargic/sleepy with decrease intake of food and fluids  Approximately 4 days ago patient had nausea/vomiting since progression of above symptoms  Daughter Yeyo Slade with whom she resides provides the history, she has been somewhat noncompliant with her medication, patient herself has no complaints  Patient presented with elevated creatinine suggestive of acute kidney injury which was likely due to dehydration  Patient has received IV fluids and her creatinine got back to baseline  Patient is currently doing much better in terms of mental state  She is alert and oriented x2  She is participating in a conversation and is able to feed herself independently  Patient was seen by the geriatric specialist and the hospice  who do not believe that she meets the criteria for hospice  Therefore, patient will be discharged home with nursing support      Condition at Discharge: stable     Discharge Day Visit / Exam: Subjective:  Patient seen and examined  She is doing very well today  She is sitting on the chair independently and eating her breakfast   Participates in the conversation  Vitals: Blood Pressure: 146/81 (06/11/21 0909)  Pulse: 73 (06/11/21 0909)  Temperature: 98 1 °F (36 7 °C) (06/11/21 0730)  Temp Source: Oral (06/11/21 0730)  Respirations: 18 (06/11/21 0730)  Height: 5' 1" (154 9 cm) (06/09/21 1441)  Weight - Scale: 61 5 kg (135 lb 9 3 oz) (06/09/21 1441)  SpO2: 95 % (06/11/21 0909)  Exam:   Physical Exam  Constitutional:       Appearance: She is well-developed  HENT:      Ears:      Comments: Hard of hearing  Cardiovascular:      Rate and Rhythm: Normal rate and regular rhythm  Pulmonary:      Effort: Pulmonary effort is normal       Breath sounds: Normal breath sounds  Abdominal:      Palpations: Abdomen is soft  Skin:     General: Skin is warm  Findings: No erythema  Neurological:      Mental Status: She is alert  Mental status is at baseline  Psychiatric:         Behavior: Behavior normal        Discussion with Family:  Patient's daughter    Discharge instructions/Information to patient and family:   See after visit summary for information provided to patient and family  Provisions for Follow-Up Care:  See after visit summary for information related to follow-up care and any pertinent home health orders  Disposition:     Home with VNA Services (Reminder: Complete face to face encounter)    For Discharges to [facility] SNF:   · Not Applicable to this Patient - Not Applicable to this Patient    Planned Readmission: no     Discharge Statement:  I spent 40 minutes discharging the patient  This time was spent on the day of discharge  I had direct contact with the patient on the day of discharge   Greater than 50% of the total time was spent examining patient, answering all patient questions, arranging and discussing plan of care with patient as well as directly providing post-discharge instructions  Additional time then spent on discharge activities  Discharge Medications:  See after visit summary for reconciled discharge medications provided to patient and family        ** Please Note: This note has been constructed using a voice recognition system **

## 2021-06-14 ENCOUNTER — TRANSITIONAL CARE MANAGEMENT (OUTPATIENT)
Dept: FAMILY MEDICINE CLINIC | Facility: CLINIC | Age: 76
End: 2021-06-14

## 2021-06-16 ENCOUNTER — DOCUMENTATION (OUTPATIENT)
Dept: SOCIAL WORK | Facility: HOSPITAL | Age: 76
End: 2021-06-16

## 2021-06-16 NOTE — PROGRESS NOTES
Home PT hanane performed this session with patient at baseline with mobility and also denies need for OT at this time   Aware that if needs for home PT or OT change a new order would be needed    Thank you,  Cira Celestin, DPT

## 2021-06-18 ENCOUNTER — OFFICE VISIT (OUTPATIENT)
Dept: FAMILY MEDICINE CLINIC | Facility: CLINIC | Age: 76
End: 2021-06-18
Payer: MEDICARE

## 2021-06-18 VITALS
HEIGHT: 61 IN | WEIGHT: 136.4 LBS | BODY MASS INDEX: 25.75 KG/M2 | HEART RATE: 80 BPM | DIASTOLIC BLOOD PRESSURE: 80 MMHG | OXYGEN SATURATION: 96 % | RESPIRATION RATE: 12 BRPM | SYSTOLIC BLOOD PRESSURE: 104 MMHG

## 2021-06-18 DIAGNOSIS — I10 ESSENTIAL HYPERTENSION: ICD-10-CM

## 2021-06-18 DIAGNOSIS — N17.9 ACUTE RENAL FAILURE, UNSPECIFIED ACUTE RENAL FAILURE TYPE (HCC): Primary | ICD-10-CM

## 2021-06-18 DIAGNOSIS — E03.9 HYPOTHYROIDISM, UNSPECIFIED TYPE: ICD-10-CM

## 2021-06-18 DIAGNOSIS — E89.0 POSTOPERATIVE HYPOTHYROIDISM: Chronic | ICD-10-CM

## 2021-06-18 DIAGNOSIS — H61.23 BILATERAL IMPACTED CERUMEN: ICD-10-CM

## 2021-06-18 PROCEDURE — 99495 TRANSJ CARE MGMT MOD F2F 14D: CPT | Performed by: INTERNAL MEDICINE

## 2021-06-18 RX ORDER — NIACIN 1000 MG
TABLET, EXTENDED RELEASE ORAL
COMMUNITY
Start: 2021-03-10 | End: 2021-10-04 | Stop reason: SDUPTHER

## 2021-06-18 RX ORDER — LIDOCAINE HYDROCHLORIDE 10 MG/ML
INJECTION, SOLUTION INFILTRATION; PERINEURAL
COMMUNITY
End: 2021-06-20 | Stop reason: ALTCHOICE

## 2021-06-18 RX ORDER — BETAMETHASONE SODIUM PHOSPHATE AND BETAMETHASONE ACETATE 3; 3 MG/ML; MG/ML
INJECTION, SUSPENSION INTRA-ARTICULAR; INTRALESIONAL; INTRAMUSCULAR; SOFT TISSUE
COMMUNITY
End: 2021-06-20 | Stop reason: ALTCHOICE

## 2021-06-18 RX ADMIN — CYANOCOBALAMIN 1000 MCG: 1000 INJECTION INTRAMUSCULAR; SUBCUTANEOUS at 11:46

## 2021-06-21 ENCOUNTER — OFFICE VISIT (OUTPATIENT)
Dept: FAMILY MEDICINE CLINIC | Facility: CLINIC | Age: 76
End: 2021-06-21

## 2021-06-21 VITALS
HEIGHT: 61 IN | WEIGHT: 135 LBS | HEART RATE: 56 BPM | SYSTOLIC BLOOD PRESSURE: 118 MMHG | BODY MASS INDEX: 25.49 KG/M2 | DIASTOLIC BLOOD PRESSURE: 60 MMHG | OXYGEN SATURATION: 97 %

## 2021-06-21 DIAGNOSIS — G93.40 ACUTE ENCEPHALOPATHY: ICD-10-CM

## 2021-06-21 DIAGNOSIS — Z87.891 FORMER SMOKER: ICD-10-CM

## 2021-06-21 DIAGNOSIS — N17.9 AKI (ACUTE KIDNEY INJURY) (HCC): ICD-10-CM

## 2021-06-21 DIAGNOSIS — G30.1 LATE ONSET ALZHEIMER'S DISEASE WITH BEHAVIORAL DISTURBANCE (HCC): ICD-10-CM

## 2021-06-21 DIAGNOSIS — D51.0 PERNICIOUS ANEMIA: ICD-10-CM

## 2021-06-21 DIAGNOSIS — I10 ESSENTIAL HYPERTENSION: ICD-10-CM

## 2021-06-21 DIAGNOSIS — H61.23 BILATERAL IMPACTED CERUMEN: Primary | ICD-10-CM

## 2021-06-21 DIAGNOSIS — N18.30 STAGE 3 CHRONIC KIDNEY DISEASE, UNSPECIFIED WHETHER STAGE 3A OR 3B CKD (HCC): Chronic | ICD-10-CM

## 2021-06-21 DIAGNOSIS — E89.0 POSTOPERATIVE HYPOTHYROIDISM: Chronic | ICD-10-CM

## 2021-06-21 DIAGNOSIS — F02.81 LATE ONSET ALZHEIMER'S DISEASE WITH BEHAVIORAL DISTURBANCE (HCC): ICD-10-CM

## 2021-06-21 PROBLEM — R53.83 OTHER FATIGUE: Status: ACTIVE | Noted: 2021-06-21

## 2021-06-21 RX ORDER — LISINOPRIL 20 MG/1
20 TABLET ORAL DAILY
COMMUNITY
End: 2021-06-22 | Stop reason: SDUPTHER

## 2021-06-21 NOTE — PROGRESS NOTES
Assessment/Plan:    No problem-specific Assessment & Plan notes found for this encounter  Diagnoses and all orders for this visit:    Acute renal failure, unspecified acute renal failure type ([address] )  Comments:  Currently resolved, before discharge creatinine within her baseline  Patient was instructed to stay hydrated and eat regularly  Hypothyroidism, unspecified type  -     TSH, 3rd generation with Free T4 reflex; Future    Essential hypertension  Comments: Well controlled, continue the same treatment  Postoperative hypothyroidism  Comments:  Repeat TSH in 6 months, she will continue current dose of levothyroxine, not sure that she was compliant with that  Bilateral impacted cerumen  Comments:  She will see us for bilateral ear irrigation next visit  Other orders  -     Niacin ER 1000 MG TBCR; Take 1 tablet po BID  -     Discontinue: lidocaine (XYLOCAINE) 1 %; lidocaine (PF) 10 mg/mL (1 %) injection solution   with corticosteroid  -     Discontinue: betamethasone acetate-betamethasone sodium phosphate (CELESTONE) 6 (3-3) mg/mL; betamethasone acetate and sodium phos 6 mg/mL suspension for injection   Take by injection route with local anethestic          TCM Call (since 5/21/2021)     Date and time call was made  6/14/2021  9:57 AM    Hospital care reviewed  Records reviewed    Patient was hospitialized at  [facility]        Date of Admission  06/09/21    Date of discharge  06/11/21    Diagnosis  LAYLA    Disposition  Home    Current Symptoms  None      TCM Call (since 5/21/2021)     Post hospital issues  None    Should patient be enrolled in anticoag monitoring? No    Scheduled for follow up?   Yes    Did you obtain your prescribed medications  Yes    Do you need help managing your prescriptions or medications  No    Is transportation to your appointment needed  No    I have advised the patient to call PCP with any new or worsening symptoms  Ondina ARISTIDES Ramon            Subjective: Patient ID: Hugo Almeida is a 66 y o  female  Patient came today for TCM visit after her recent hospital admission because of the volume depletion due to poor oral intake  She was found to have LAYLA, she was treated with IV fluids and before discharge her creatinine went back to her baseline  She is trying to stay hydrated right now she does not report any active complaints to me  Her recent blood work revealed highly elevated TSH  Looks like she was not compliant with her levothyroxine  We discussed with her that she needs to take it daily 30 minutes before breakfast   Will repeat TSH in 6 weeks  The following portions of the patient's history were reviewed and updated as appropriate: allergies, current medications, past family history, past medical history, past social history, past surgical history, and problem list     Review of Systems   Constitutional: Negative for chills and fever  HENT: Positive for hearing loss  Respiratory: Negative for cough and shortness of breath  Cardiovascular: Negative for chest pain  Gastrointestinal: Negative for abdominal distention  Genitourinary: Negative for dysuria and urgency  Musculoskeletal: Negative for gait problem  Psychiatric/Behavioral: Negative for confusion  Objective:      /80 (BP Location: Left arm, Patient Position: Sitting, Cuff Size: Standard)   Pulse 80   Resp 12   Ht 5' 1" (1 549 m)   Wt 61 9 kg (136 lb 6 4 oz)   SpO2 96%   BMI 25 77 kg/m²          Physical Exam  Constitutional:       General: She is not in acute distress  Appearance: She is not toxic-appearing  HENT:      Head: Normocephalic  Right Ear: There is impacted cerumen  Left Ear: There is impacted cerumen  Mouth/Throat:      Mouth: Mucous membranes are moist    Eyes:      Pupils: Pupils are equal, round, and reactive to light  Cardiovascular:      Rate and Rhythm: Normal rate  Pulses: Normal pulses     Pulmonary: Effort: Pulmonary effort is normal    Abdominal:      General: Abdomen is flat  Skin:     General: Skin is warm  Neurological:      General: No focal deficit present  Mental Status: She is alert  Psychiatric:         Mood and Affect: Mood normal                Current Outpatient Medications:     alendronate (FOSAMAX) 70 mg tablet, In AM on empty stomach with a full glass of water  Do not lie down for 30 min, Disp: , Rfl:     aspirin 81 MG tablet, Take 81 mg by mouth daily  , Disp: , Rfl:     calcium-vitamin D (OSCAL) 250-125 MG-UNIT per tablet, Take 1 tablet by mouth daily  , Disp: , Rfl:     citalopram (CeleXA) 10 mg tablet, Take 1 tablet (10 mg total) by mouth daily, Disp: 90 tablet, Rfl: 1    ergocalciferol (VITAMIN D2) 50,000 units, TAKE 1 CAPSULE BY MOUTH ONCE A WEEK, Disp: , Rfl:     gabapentin (NEURONTIN) 100 mg capsule, Take 1 capsule (100 mg total) by mouth 3 (three) times a day (to control leg pain), Disp: 90 capsule, Rfl: 2    levothyroxine 150 mcg tablet, Take 150 mcg by mouth daily, Disp: , Rfl:     metoprolol succinate (TOPROL-XL) 50 mg 24 hr tablet, Take 50 mg by mouth daily, Disp: , Rfl:     Niacin ER 1000 MG TBCR, Take 1 tablet po BID, Disp: , Rfl:     cyanocobalamin 1,000 mcg/mL, Injection Q 6-8 weeks re PA, Disp: 1 mL, Rfl:     donepezil (ARICEPT) 10 mg tablet, Take 1 tablet (10 mg total) by mouth daily at bedtime, Disp: 30 tablet, Rfl: 6    lisinopril (ZESTRIL) 20 mg tablet, Take 20 mg by mouth daily, Disp: , Rfl:     Current Facility-Administered Medications:     cyanocobalamin injection 1,000 mcg, 1,000 mcg, Intramuscular, Q56 Days, Antonella Galvan DO, 1,000 mcg at 06/18/21 1146

## 2021-06-21 NOTE — PROGRESS NOTES
FAMILY PRACTICE OFFICE VISIT  Alana العلي O  [facility]  [address]  [address] [place], [place], [address]      NAME: Lm [address]  AGE: 66 y o  SEX: female  : 1945   MRN: [id number]    DATE: 2021  TIME: 11:10 AM    Assessment and Plan     Problem List Items Addressed This Visit        Endocrine    Postoperative hypothyroidism (Chronic)       Cardiovascular and Mediastinum    Essential hypertension    Relevant Medications    lisinopril (ZESTRIL) 20 mg tablet       Nervous and Auditory    Late onset Alzheimer's disease with behavioral disturbance (HCC)    Acute encephalopathy       Genitourinary    Chronic kidney disease, stage III (moderate) (HCC) (Chronic)    RESOLVED: LAYLA (acute kidney injury) ([address] )    Relevant Orders    Basic metabolic panel       Other    Former smoker    Pernicious anemia      Other Visit Diagnoses     Bilateral impacted cerumen    -  Primary    Relevant Orders    Ear cerumen removal        Ear cerumen removal    Date/Time: 2021 11:00 AM  Performed by: Mima Watson DO  Authorized by: Mima Watson DO   Universal Protocol:  Consent: Verbal consent obtained  Risks and benefits: risks, benefits and alternatives were discussed  Consent given by: guardian      Patient location:  Clinic  Procedure details:     Location:  L ear and R ear    Procedure type: irrigation with instrumentation    Post-procedure details:     Complication:  None    Patient tolerance of procedure: Tolerated well, no immediate complications      Patient Instructions   Flushed both ears, does have some remaining wax but nonocclusive, they can use over-the-counter wax softener drops later this week, repeat in 2 weeks  Avoid Q-tips     _____________________________________________________________________    She had been seen here 3 days ago with transition of care, status post hospital stay  through     Had mental status changes with weakness, admission creatinine 3 52  Lisinopril HCT was held, creatinine 1 16 at discharge  Her daughter relates she is back on Lisinopril 20 mg plain, I would like her to redo BMP to reassess creatinine, again w some increased fatigue  Her blood pressure is fine here today  Inpatient TSH 69, she was not using medication routinely, is now back on that, she will be re-doing TSH in 4 to 5 weeks  She also will be following up with Endocrinology    She does have home nursing 2 times a week  Was evaluated by therapy, felt did not need therapy  Family does want to keep her at home  She will continue to see Geriatrics  She does have September 30th visit with me -  Also do B12 shots as before-   rcvd 3 days ago      Chief Complaint     Check ears    History of Present Illness   Darcie Miller is a 66y o -year-old female who had been seen in our office 3 days ago by Dr Frank Keene for a post hospital check, she is in today to irrigate her ears  She is in accompanied by her daughter with whom she lives  Family wanted to bring her home, declined rehab, nursing- she is nursing home eligible  Does have home nurse twice weekly, daughter jered was evaluated for therapy but was told did not need therapy  Does have increased fatigue since home, daughter relates she is sleeping a lot of the time, is up and down out of chair at other times  Is back on lisinopril 20 mg plain, off HCT  Is using all medication, previously had not been using meds, TSH inpatient was 69  Admission creatinine 3 52, was 1 16 at discharge    She is no longer smoking, daughter is not giving her cigarettes    Review of Systems   Review of Systems   Constitutional: Positive for fatigue  Negative for appetite change, fever and unexpected weight change  HENT: Positive for hearing loss  Negative for sore throat and trouble swallowing  Respiratory: Negative for cough, chest tightness and shortness of breath      Cardiovascular: Negative for chest pain, palpitations and leg swelling  Gastrointestinal: Negative for abdominal pain, blood in stool, nausea and vomiting  No acid reflux     No change in bowel   Genitourinary: Negative for dysuria and hematuria  Neurological: Positive for light-headedness  Negative for dizziness, syncope and headaches  Psychiatric/Behavioral: Positive for behavioral problems, confusion and sleep disturbance  Negative for agitation and hallucinations         Active Problem List     Patient Active Problem List   Diagnosis    Sinus bradycardia    Postoperative hypothyroidism    Essential hypertension    Urinary incontinence    Chronic kidney disease, stage III (moderate) ([facility])    Umbilical hernia    Trigger middle finger of right hand    Former smoker    Thyroid nodule    Pernicious anemia    Osteoarthritis    Neuropathy of both feet    Low back pain radiating to left lower extremity    Leukocytosis    Lacunar stroke ([facility])    Hyperglycemia    Hypercholesterolemia    Graves' disease    Gait disturbance    Dizziness    Disc degeneration, lumbar    Late onset Alzheimer's disease with behavioral disturbance ([facility])    Cough, chronic -  improved    Hearing loss    Recurrent major depressive disorder, in partial remission ([facility])    Visual impairment    Full dentures    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[address] )    Fall    Anemia    Hypotension, unspecified    Hypocalcemia    Spinal stenosis of lumbar region with neurogenic claudication    Psoriasis    Vitamin D deficiency    Elevated LFTs    Sleep disturbance    Acute encephalopathy    Other fatigue       Past Medical History:  Reviewed    Past Surgical History:  Reviewed    Family History:  Reviewed    Social History:  Reviewed    Objective     Vitals:    06/21/21 0954   BP: 118/60   BP Location: Left arm   Patient Position: Sitting   Cuff Size: Standard   Pulse: 56   SpO2: 97%   Weight: 61 2 kg (135 lb)   Height: 5' 1" (1 549 m)     Body mass index is 25 51 kg/m²     BP Readings from Last 3 Encounters:   06/21/21 118/60   06/18/21 104/80   06/11/21 146/81       Wt Readings from Last 3 Encounters:   06/21/21 61 2 kg (135 lb)   06/18/21 61 9 kg (136 lb 6 4 oz)   06/09/21 61 5 kg (135 lb 9 3 oz)       Physical Exam  Constitutional:       Comments: 58-year-old female seated in chair, nods off on occasion, minimally verbal, is cooperative  She did have fecal accident in the waiting room    Has wax occluding both ear canals, has long-term hearing loss  After procedure she does have some remaining wax in canal but I am able to visualize TM, intact, no sign of infection  Difficult to tell how much her hearing has improved due to her dementia but she was cooperative for procedure, ambulated out with walker afterwards         ALLERGIES:  Allergies   Allergen Reactions    Naproxen Itching    Niacin     Nsaids Other (See Comments) and Swelling    Azithromycin Nausea Only and Rash    Cephalexin Rash       Current Medications     Current Outpatient Medications   Medication Sig Dispense Refill    alendronate (FOSAMAX) 70 mg tablet In AM on empty stomach with a full glass of water  Do not lie down for 30 min      aspirin 81 MG tablet Take 81 mg by mouth daily   calcium-vitamin D (OSCAL) 250-125 MG-UNIT per tablet Take 1 tablet by mouth daily        citalopram (CeleXA) 10 mg tablet Take 1 tablet (10 mg total) by mouth daily 90 tablet 1    cyanocobalamin 1,000 mcg/mL Injection Q 6-8 weeks re PA 1 mL     donepezil (ARICEPT) 10 mg tablet Take 1 tablet (10 mg total) by mouth daily at bedtime 30 tablet 6    ergocalciferol (VITAMIN D2) 50,000 units TAKE 1 CAPSULE BY MOUTH ONCE A WEEK      gabapentin (NEURONTIN) 100 mg capsule Take 1 capsule (100 mg total) by mouth 3 (three) times a day (to control leg pain) 90 capsule 2    levothyroxine 150 mcg tablet Take 150 mcg by mouth daily      lisinopril (ZESTRIL) 20 mg tablet Take 20 mg by mouth daily      metoprolol succinate (TOPROL-XL) 50 mg 24 hr tablet Take 50 mg by mouth daily      Niacin ER 1000 MG TBCR Take 1 tablet po BID       Current Facility-Administered Medications   Medication Dose Route Frequency Provider Last Rate Last Admin    cyanocobalamin injection 1,000 mcg  1,000 mcg Intramuscular Q56 Days Mally Quintanilla DO   1,000 mcg at 06/18/21 1146            Orders Placed This Encounter   Procedures    Ear cerumen removal    Basic metabolic panel         Mally Quintanilla DO

## 2021-06-21 NOTE — PATIENT INSTRUCTIONS
Flushed both ears, does have some remaining wax but nonocclusive, they can use over-the-counter wax softener drops later this week, repeat in 2 weeks  Avoid Q-tips     _____________________________________________________________________    She had been seen here 3 days ago with transition of care, status post hospital stay June 9th through 11th  Had mental status changes with weakness, admission creatinine 3 52  Lisinopril HCT was held, creatinine 1 16 at discharge  Her daughter relates she is back on Lisinopril 20 mg plain, I would like her to redo BMP to reassess creatinine, again w some increased fatigue  Her blood pressure is fine here today  Inpatient TSH 69, she was not using medication routinely, is now back on that, she will be re-doing TSH in 4 to 5 weeks  She also will be following up with Endocrinology    She does have home nursing 2 times a week  Was evaluated by therapy, felt did not need therapy  Family does want to keep her at home  She will continue to see Geriatrics       She does have September 30th visit with me -  Also do B12 shots as before-   rcvd 3 days ago

## 2021-06-22 DIAGNOSIS — I10 ESSENTIAL HYPERTENSION: Primary | ICD-10-CM

## 2021-06-22 RX ORDER — LISINOPRIL 20 MG/1
20 TABLET ORAL DAILY
Qty: 90 TABLET | Refills: 1 | Status: SHIPPED | OUTPATIENT
Start: 2021-06-22 | End: 2021-06-24

## 2021-06-24 ENCOUNTER — TELEPHONE (OUTPATIENT)
Dept: FAMILY MEDICINE CLINIC | Facility: CLINIC | Age: 76
End: 2021-06-24

## 2021-06-24 DIAGNOSIS — I10 ESSENTIAL HYPERTENSION: ICD-10-CM

## 2021-06-24 RX ORDER — LISINOPRIL 20 MG/1
10 TABLET ORAL DAILY
Qty: 90 TABLET | Refills: 1
Start: 2021-06-24 | End: 2021-10-04 | Stop reason: SDUPTHER

## 2021-06-24 NOTE — TELEPHONE ENCOUNTER
Eleanor From [facility] called stating that patient is on LAYLA [facility] where they would like the patient have bp above 130  See reading below  Today     116/60 HR 60    110/53 HR 70      Yesterday    104/76 HR 65    132/62 HR 54    Thank you!

## 2021-06-26 DIAGNOSIS — F32.A DEPRESSION, UNSPECIFIED DEPRESSION TYPE: ICD-10-CM

## 2021-06-26 LAB
BUN SERPL-MCNC: 17 MG/DL (ref 7–25)
BUN/CREAT SERPL: 14 (CALC) (ref 6–22)
CALCIUM SERPL-MCNC: 9.1 MG/DL (ref 8.6–10.4)
CHLORIDE SERPL-SCNC: 99 MMOL/L (ref 98–110)
CO2 SERPL-SCNC: 29 MMOL/L (ref 20–32)
CREAT SERPL-MCNC: 1.21 MG/DL (ref 0.6–0.93)
GLUCOSE SERPL-MCNC: 142 MG/DL (ref 65–99)
POTASSIUM SERPL-SCNC: 5 MMOL/L (ref 3.5–5.3)
SL AMB EGFR AFRICAN AMERICAN: 50 ML/MIN/1.73M2
SL AMB EGFR NON AFRICAN AMERICAN: 43 ML/MIN/1.73M2
SODIUM SERPL-SCNC: 139 MMOL/L (ref 135–146)

## 2021-06-28 NOTE — TELEPHONE ENCOUNTER
I called Kay Brenner back and provided her with the message and I also called the home to advise patient and her daughter  Daughter stated she gave her the 20 mg today, but will start her on the 10mg tomorrow morning  Thank you!

## 2021-06-30 ENCOUNTER — TELEPHONE (OUTPATIENT)
Dept: FAMILY MEDICINE CLINIC | Facility: CLINIC | Age: 76
End: 2021-06-30

## 2021-06-30 RX ORDER — CITALOPRAM 10 MG/1
TABLET ORAL
Qty: 90 TABLET | Refills: 1 | Status: SHIPPED | OUTPATIENT
Start: 2021-06-30 | End: 2021-12-22

## 2021-06-30 NOTE — TELEPHONE ENCOUNTER
F Y I WANTED TO LET THE DOCTOR KNOW PATIENT FELL GOING UP TWO STEPS OUTSIDE YESTERDAY,KNOW PATIENT HAS PAIN IN L HIP  [facility] DOESNOT SEE ANY BRUISING ON HIP BUT PAINFUL FOR PAT TO TOUCH

## 2021-09-02 DIAGNOSIS — M51.36 DISC DEGENERATION, LUMBAR: ICD-10-CM

## 2021-09-02 DIAGNOSIS — M79.605 PAIN OF LEFT LOWER EXTREMITY: ICD-10-CM

## 2021-09-02 DIAGNOSIS — M79.605 LOW BACK PAIN RADIATING TO LEFT LOWER EXTREMITY: ICD-10-CM

## 2021-09-02 DIAGNOSIS — M54.50 LOW BACK PAIN RADIATING TO LEFT LOWER EXTREMITY: ICD-10-CM

## 2021-09-02 RX ORDER — GABAPENTIN 100 MG/1
100 CAPSULE ORAL 3 TIMES DAILY
Qty: 90 CAPSULE | Refills: 1 | Status: SHIPPED | OUTPATIENT
Start: 2021-09-02 | End: 2021-11-01

## 2021-09-22 DIAGNOSIS — F02.81 LATE ONSET ALZHEIMER'S DISEASE WITH BEHAVIORAL DISTURBANCE (HCC): ICD-10-CM

## 2021-09-22 DIAGNOSIS — G30.1 LATE ONSET ALZHEIMER'S DISEASE WITH BEHAVIORAL DISTURBANCE (HCC): ICD-10-CM

## 2021-09-22 RX ORDER — DONEPEZIL HYDROCHLORIDE 10 MG/1
TABLET, FILM COATED ORAL
Qty: 30 TABLET | Refills: 6 | Status: SHIPPED | OUTPATIENT
Start: 2021-09-22 | End: 2022-03-10

## 2021-10-04 ENCOUNTER — OFFICE VISIT (OUTPATIENT)
Dept: FAMILY MEDICINE CLINIC | Facility: CLINIC | Age: 76
End: 2021-10-04
Payer: MEDICARE

## 2021-10-04 VITALS
DIASTOLIC BLOOD PRESSURE: 60 MMHG | WEIGHT: 131 LBS | BODY MASS INDEX: 24.73 KG/M2 | HEART RATE: 62 BPM | HEIGHT: 61 IN | SYSTOLIC BLOOD PRESSURE: 122 MMHG | TEMPERATURE: 97.8 F | OXYGEN SATURATION: 97 %

## 2021-10-04 DIAGNOSIS — N18.30 STAGE 3 CHRONIC KIDNEY DISEASE, UNSPECIFIED WHETHER STAGE 3A OR 3B CKD (HCC): Chronic | ICD-10-CM

## 2021-10-04 DIAGNOSIS — D51.0 PERNICIOUS ANEMIA: ICD-10-CM

## 2021-10-04 DIAGNOSIS — F02.81 LATE ONSET ALZHEIMER'S DISEASE WITH BEHAVIORAL DISTURBANCE (HCC): ICD-10-CM

## 2021-10-04 DIAGNOSIS — I63.81 LACUNAR STROKE (HCC): ICD-10-CM

## 2021-10-04 DIAGNOSIS — I10 ESSENTIAL HYPERTENSION: Primary | ICD-10-CM

## 2021-10-04 DIAGNOSIS — Z23 NEED FOR INFLUENZA VACCINATION: ICD-10-CM

## 2021-10-04 DIAGNOSIS — G30.1 LATE ONSET ALZHEIMER'S DISEASE WITH BEHAVIORAL DISTURBANCE (HCC): ICD-10-CM

## 2021-10-04 DIAGNOSIS — F17.200 CURRENT SMOKER: ICD-10-CM

## 2021-10-04 DIAGNOSIS — L40.9 PSORIASIS: ICD-10-CM

## 2021-10-04 DIAGNOSIS — M48.062 SPINAL STENOSIS OF LUMBAR REGION WITH NEUROGENIC CLAUDICATION: ICD-10-CM

## 2021-10-04 DIAGNOSIS — E89.0 POSTOPERATIVE HYPOTHYROIDISM: Chronic | ICD-10-CM

## 2021-10-04 DIAGNOSIS — B37.89 CANDIDIASIS OF BREAST: ICD-10-CM

## 2021-10-04 PROBLEM — D72.829 LEUKOCYTOSIS: Status: RESOLVED | Noted: 2017-11-15 | Resolved: 2021-10-04

## 2021-10-04 PROBLEM — R53.83 OTHER FATIGUE: Status: RESOLVED | Noted: 2021-06-21 | Resolved: 2021-10-04

## 2021-10-04 PROBLEM — R79.89 ELEVATED LFTS: Status: RESOLVED | Noted: 2021-03-11 | Resolved: 2021-10-04

## 2021-10-04 PROBLEM — G93.40 ACUTE ENCEPHALOPATHY: Status: RESOLVED | Noted: 2021-06-10 | Resolved: 2021-10-04

## 2021-10-04 PROCEDURE — 99214 OFFICE O/P EST MOD 30 MIN: CPT | Performed by: FAMILY MEDICINE

## 2021-10-04 PROCEDURE — G0008 ADMIN INFLUENZA VIRUS VAC: HCPCS

## 2021-10-04 PROCEDURE — 90662 IIV NO PRSV INCREASED AG IM: CPT

## 2021-10-04 RX ORDER — CLOTRIMAZOLE AND BETAMETHASONE DIPROPIONATE 10; .64 MG/G; MG/G
CREAM TOPICAL 2 TIMES DAILY
Qty: 45 G | Refills: 2 | Status: SHIPPED | OUTPATIENT
Start: 2021-10-04 | End: 2022-02-17 | Stop reason: ALTCHOICE

## 2021-10-04 RX ORDER — LISINOPRIL 5 MG/1
5 TABLET ORAL DAILY
COMMUNITY
Start: 2021-07-09 | End: 2022-08-10

## 2021-10-04 RX ORDER — NIACIN 1000 MG
1 TABLET, EXTENDED RELEASE ORAL EVERY EVENING
COMMUNITY
Start: 2021-07-09 | End: 2022-06-20 | Stop reason: SDUPTHER

## 2021-10-04 RX ORDER — LEVOTHYROXINE SODIUM 175 UG/1
175 TABLET ORAL DAILY
COMMUNITY
Start: 2021-07-09 | End: 2022-06-20

## 2021-10-04 RX ORDER — NYSTATIN 100000 [USP'U]/G
POWDER TOPICAL 2 TIMES DAILY
Qty: 15 G | Refills: 0 | Status: SHIPPED | OUTPATIENT
Start: 2021-10-04 | End: 2021-12-01 | Stop reason: SDUPTHER

## 2021-10-04 RX ADMIN — CYANOCOBALAMIN 1000 MCG: 1000 INJECTION INTRAMUSCULAR; SUBCUTANEOUS at 14:43

## 2021-10-08 ENCOUNTER — HOSPITAL ENCOUNTER (EMERGENCY)
Facility: HOSPITAL | Age: 76
Discharge: HOME | End: 2021-10-08
Attending: EMERGENCY MEDICINE | Admitting: EMERGENCY MEDICINE
Payer: MEDICARE

## 2021-10-08 ENCOUNTER — APPOINTMENT (EMERGENCY)
Dept: CT IMAGING | Facility: HOSPITAL | Age: 76
End: 2021-10-08
Payer: MEDICARE

## 2021-10-08 VITALS
TEMPERATURE: 98.7 F | HEART RATE: 70 BPM | BODY MASS INDEX: 25.12 KG/M2 | WEIGHT: 132.94 LBS | SYSTOLIC BLOOD PRESSURE: 119 MMHG | OXYGEN SATURATION: 94 % | RESPIRATION RATE: 18 BRPM | DIASTOLIC BLOOD PRESSURE: 58 MMHG
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
## 2021-10-08 DIAGNOSIS — M54.2 NECK PAIN: ICD-10-CM

## 2021-10-08 PROCEDURE — 99283 EMERGENCY DEPT VISIT LOW MDM: CPT

## 2021-10-08 PROCEDURE — 70450 CT HEAD/BRAIN W/O DYE: CPT

## 2021-10-08 PROCEDURE — 72125 CT NECK SPINE W/O DYE: CPT

## 2021-10-08 PROCEDURE — 99282 EMERGENCY DEPT VISIT SF MDM: CPT | Performed by: EMERGENCY MEDICINE

## 2021-10-27 ENCOUNTER — OFFICE VISIT (OUTPATIENT)
Dept: GERIATRICS | Age: 76
End: 2021-10-27
Payer: MEDICARE

## 2021-10-27 VITALS
HEIGHT: 62 IN | OXYGEN SATURATION: 97 % | WEIGHT: 132.4 LBS | DIASTOLIC BLOOD PRESSURE: 90 MMHG | HEART RATE: 74 BPM | SYSTOLIC BLOOD PRESSURE: 142 MMHG | TEMPERATURE: 97.4 F | BODY MASS INDEX: 24.37 KG/M2

## 2021-10-27 DIAGNOSIS — H90.3 SENSORINEURAL HEARING LOSS (SNHL) OF BOTH EARS: ICD-10-CM

## 2021-10-27 DIAGNOSIS — E89.0 POSTOPERATIVE HYPOTHYROIDISM: Chronic | ICD-10-CM

## 2021-10-27 DIAGNOSIS — F51.05 INSOMNIA DUE TO OTHER MENTAL DISORDER: Primary | ICD-10-CM

## 2021-10-27 DIAGNOSIS — I10 ESSENTIAL HYPERTENSION: ICD-10-CM

## 2021-10-27 DIAGNOSIS — G30.1 LATE ONSET ALZHEIMER'S DISEASE WITH BEHAVIORAL DISTURBANCE (HCC): ICD-10-CM

## 2021-10-27 DIAGNOSIS — F02.81 LATE ONSET ALZHEIMER'S DISEASE WITH BEHAVIORAL DISTURBANCE (HCC): ICD-10-CM

## 2021-10-27 DIAGNOSIS — F99 INSOMNIA DUE TO OTHER MENTAL DISORDER: Primary | ICD-10-CM

## 2021-10-27 DIAGNOSIS — F33.41 RECURRENT MAJOR DEPRESSIVE DISORDER, IN PARTIAL REMISSION (HCC): ICD-10-CM

## 2021-10-27 DIAGNOSIS — L40.9 PSORIASIS: ICD-10-CM

## 2021-10-27 DIAGNOSIS — E55.9 VITAMIN D DEFICIENCY: ICD-10-CM

## 2021-10-27 PROCEDURE — 99214 OFFICE O/P EST MOD 30 MIN: CPT | Performed by: NURSE PRACTITIONER

## 2021-10-27 RX ORDER — TRAZODONE HYDROCHLORIDE 50 MG/1
25 TABLET ORAL
Qty: 30 TABLET | Refills: 0 | Status: SHIPPED | OUTPATIENT
Start: 2021-10-27 | End: 2021-12-21

## 2021-10-31 PROBLEM — F99 INSOMNIA DUE TO OTHER MENTAL DISORDER: Status: ACTIVE | Noted: 2021-10-27

## 2021-10-31 PROBLEM — F51.05 INSOMNIA DUE TO OTHER MENTAL DISORDER: Status: ACTIVE | Noted: 2021-10-27

## 2021-11-01 DIAGNOSIS — M51.36 DISC DEGENERATION, LUMBAR: ICD-10-CM

## 2021-11-01 DIAGNOSIS — M54.50 LOW BACK PAIN RADIATING TO LEFT LOWER EXTREMITY: ICD-10-CM

## 2021-11-01 DIAGNOSIS — M79.605 PAIN OF LEFT LOWER EXTREMITY: ICD-10-CM

## 2021-11-01 DIAGNOSIS — M79.605 LOW BACK PAIN RADIATING TO LEFT LOWER EXTREMITY: ICD-10-CM

## 2021-11-01 RX ORDER — GABAPENTIN 100 MG/1
100 CAPSULE ORAL 3 TIMES DAILY
Qty: 90 CAPSULE | Refills: 1 | Status: SHIPPED | OUTPATIENT
Start: 2021-11-01 | End: 2021-12-30

## 2021-11-02 LAB — VIT B12 SERPL-MCNC: 534 PG/ML (ref 200–1100)

## 2021-11-29 ENCOUNTER — CLINICAL SUPPORT (OUTPATIENT)
Dept: FAMILY MEDICINE CLINIC | Facility: CLINIC | Age: 76
End: 2021-11-29

## 2021-11-29 DIAGNOSIS — E53.8 B12 DEFICIENCY: Primary | ICD-10-CM

## 2021-12-01 DIAGNOSIS — B37.89 CANDIDIASIS OF BREAST: ICD-10-CM

## 2021-12-01 RX ORDER — NYSTATIN 100000 [USP'U]/G
POWDER TOPICAL 2 TIMES DAILY
Qty: 60 G | Refills: 1 | Status: SHIPPED | OUTPATIENT
Start: 2021-12-01 | End: 2022-06-20 | Stop reason: SDUPTHER

## 2021-12-20 DIAGNOSIS — F51.05 INSOMNIA DUE TO OTHER MENTAL DISORDER: ICD-10-CM

## 2021-12-20 DIAGNOSIS — F99 INSOMNIA DUE TO OTHER MENTAL DISORDER: ICD-10-CM

## 2021-12-21 DIAGNOSIS — F32.A DEPRESSION, UNSPECIFIED DEPRESSION TYPE: ICD-10-CM

## 2021-12-21 RX ORDER — TRAZODONE HYDROCHLORIDE 50 MG/1
TABLET ORAL
Qty: 30 TABLET | Refills: 0 | Status: SHIPPED | OUTPATIENT
Start: 2021-12-21 | End: 2022-02-02 | Stop reason: DRUGHIGH

## 2021-12-22 RX ORDER — CITALOPRAM 10 MG/1
TABLET ORAL
Qty: 90 TABLET | Refills: 1 | Status: SHIPPED | OUTPATIENT
Start: 2021-12-22 | End: 2022-02-02

## 2021-12-30 DIAGNOSIS — M79.605 LOW BACK PAIN RADIATING TO LEFT LOWER EXTREMITY: ICD-10-CM

## 2021-12-30 DIAGNOSIS — M54.50 LOW BACK PAIN RADIATING TO LEFT LOWER EXTREMITY: ICD-10-CM

## 2021-12-30 DIAGNOSIS — M79.605 PAIN OF LEFT LOWER EXTREMITY: ICD-10-CM

## 2021-12-30 DIAGNOSIS — M51.36 DISC DEGENERATION, LUMBAR: ICD-10-CM

## 2021-12-30 RX ORDER — GABAPENTIN 100 MG/1
100 CAPSULE ORAL 3 TIMES DAILY
Qty: 90 CAPSULE | Refills: 1 | Status: SHIPPED | OUTPATIENT
Start: 2021-12-30 | End: 2022-02-26

## 2022-01-05 ENCOUNTER — CONSULT (OUTPATIENT)
Dept: DERMATOLOGY | Facility: CLINIC | Age: 77
End: 2022-01-05
Payer: MEDICARE

## 2022-01-05 VITALS — WEIGHT: 133 LBS | TEMPERATURE: 98 F | HEIGHT: 62 IN | BODY MASS INDEX: 24.48 KG/M2

## 2022-01-05 DIAGNOSIS — L30.8 PSORIASIFORM DERMATITIS: ICD-10-CM

## 2022-01-05 PROCEDURE — 99204 OFFICE O/P NEW MOD 45 MIN: CPT | Performed by: DERMATOLOGY

## 2022-01-05 NOTE — PATIENT INSTRUCTIONS
1  PSORIASIFORM DERMATITIS    Physical Exam:   Anatomic Location Affected:  Trunk, extremities and posterior ears    Assessment and Plan:  Based on a thorough discussion of this condition and the management approach to it (including a comprehensive discussion of the known risks, side effects and potential benefits of treatment), the patient (family) agrees to implement the following specific plan:   Begin triamcinolone ointment 2-4 times a day for up to 2 weeks   Stop using lotrisone cream and nystatin powder

## 2022-01-05 NOTE — PROGRESS NOTES
[facility] Note     Patient Name: Lolly Baig  Encounter Date: 01/05/2022     Have you been cared for by a [facility] Dermatologist in the last 3 years and, if so, which one? No    · Have you traveled outside of the [address] [place] [address] in the past 3 months or outside of the [facility] area in the last 2 weeks? No     May we call your Preferred Phone number to discuss your specific medical information? Yes     May we leave a detailed message that includes your specific medical information? Yes      Today's Chief Concerns:   Concern #1:  Rash on arms, legs, posterior earsand inframammary      Past Medical History:  Have you personally ever had or currently have any of the following? · Skin cancer (such as Melanoma, Basal Cell Carcinoma, Squamous Cell Carcinoma? (If Yes, please provide more detail)- No  · Eczema: No  · Psoriasis: No  · HIV/AIDS: No  · Hepatitis B or C: No  · Tuberculosis: No  · Systemic Immunosuppression such as Diabetes, Biologic or Immunotherapy, Chemotherapy, Organ Transplantation, Bone Marrow Transplantation (If YES, please provide more detail): No  · Radiation Treatment (If YES, please provide more detail): No  · Any other major medical conditions/concerns? (If Yes, which types)- No    Social History:     What is/was your primary occupation? retired     What are your hobbies/past-times? Family History:  Have any of your "first degree relatives" (parent, brother, sister, or child) had any of the following       · Skin cancer such as Melanoma or Merkel Cell Carcinoma or Pancreatic Cancer? No  · Eczema, Asthma, Hay Fever or Seasonal Allergies: No  · Psoriasis or Psoriatic Arthritis: No  · Do any other medical conditions seem to run in your family? If Yes, what condition and which relatives?   No    Current Medications:   (please update all dermatological medications before printing patient's AVS!)      Current Outpatient Medications:    alendronate (FOSAMAX) 70 mg tablet, In AM on empty stomach with a full glass of water  Do not lie down for 30 min, Disp: , Rfl:     calcium-vitamin D (OSCAL) 250-125 MG-UNIT per tablet, Take 1 tablet by mouth daily  , Disp: , Rfl:     citalopram (CeleXA) 10 mg tablet, take 1 tablet by mouth once daily, Disp: 90 tablet, Rfl: 1    clotrimazole-betamethasone (LOTRISONE) 1-0 05 % cream, Apply topically 2 (two) times a day Re psoriasis, Disp: 45 g, Rfl: 2    donepezil (ARICEPT) 10 mg tablet, take 1 tablet by mouth daily at bedtime, Disp: 30 tablet, Rfl: 6    ergocalciferol (VITAMIN D2) 50,000 units, TAKE 1 CAPSULE BY MOUTH ONCE A WEEK, Disp: , Rfl:     gabapentin (NEURONTIN) 100 mg capsule, Take 1 capsule (100 mg total) by mouth 3 (three) times a day (to control leg pain), Disp: 90 capsule, Rfl: 1    levothyroxine 175 mcg tablet, Take 175 mcg by mouth daily, Disp: , Rfl:     lisinopril (ZESTRIL) 5 mg tablet, Take 5 mg by mouth daily, Disp: , Rfl:     metoprolol succinate (TOPROL-XL) 50 mg 24 hr tablet, Take 50 mg by mouth daily, Disp: , Rfl:     Niacin ER 1000 MG TBCR, Take 1 tablet by mouth every evening, Disp: , Rfl:     nystatin (MYCOSTATIN) powder, Apply topically 2 (two) times a day ( as need for fungal infection under breast ), Disp: 60 g, Rfl: 1    traZODone (DESYREL) 50 mg tablet, take 1/2 tablet by mouth daily at bedtime, Disp: 30 tablet, Rfl: 0    aspirin 81 MG tablet, Take 81 mg by mouth daily  (Patient not taking: Reported on 10/27/2021), Disp: , Rfl:     cyanocobalamin 1,000 mcg/mL, Injection Q 6-8 weeks re PA (Patient not taking: Reported on 10/4/2021), Disp: 1 mL, Rfl:     Current Facility-Administered Medications:     cyanocobalamin injection 1,000 mcg, 1,000 mcg, Intramuscular, Q56 Days, Sandro Pickard DO, 1,000 mcg at 10/04/21 1443      Review of Systems:  Have you recently had or currently have any of the following? If YES, what are you doing for the problem?     · Fever, chills or unintended weight loss: No  · Sudden loss or change in your vision: No  · Nausea, vomiting or blood in your stool: No  · Painful or swollen joints: No  · Wheezing or cough: YES, patient is smoker  · Changing mole or non-healing wound: No  · Nosebleeds: No  · Excessive sweating: No  · Easy or prolonged bleeding? No  · Over the last 2 weeks, how often have you been bothered by the following problems? · Taking little interest or pleasure in doing things: 1 - Not at All  · Feeling down, depressed, or hopeless: 1 - Not at All  · Rapid heartbeat with epinephrine:  No    · FEMALES ONLY:    · Are you pregnant or planning to become pregnant? No  · Are you currently or planning to be nursing or breast feeding? No    · Any known allergies? Allergies   Allergen Reactions    Naproxen Itching    Niacin     Nsaids Other (See Comments) and Swelling    Azithromycin Nausea Only and Rash    Cephalexin Rash   ·       Physical Exam:     Was a chaperone (Derm Clinical Assistant) present throughout the entire Physical Exam? Yes     Did the Dermatology Team specifically  the patient on the importance of a Full Skin Exam to be sure that nothing is missed clinically?  Yes}  o Did the patient ultimately request or accept a Full Skin Exam?  NO  o Did the patient specifically refuse to have the areas "under-the-bra" examined by the Dermatologist? Richar Cobb  o Did the patient specifically refuse to have the areas "under-the-underwear" examined by the Dermatologist? YES    CONSTITUTIONAL:   Vitals:    01/05/22 1539   Temp: 98 °F (36 7 °C)   TempSrc: Temporal   Weight: 60 3 kg (133 lb)   Height: 5' 2" (1 575 m)       PSYCH: Normal mood and affect  EYES: Normal conjunctiva  ENT: Normal lips and oral mucosa  CARDIOVASCULAR: No edema  RESPIRATORY: Normal respirations  HEME/LYMPH/IMMUNO:  No regional lymphadenopathy except as noted below in "ASSESSMENT AND PLAN BY DIAGNOSIS"      Assessment and Plan by Diagnosis:    History of Present Condition:     Duration:  How long has this been an issue for you?    o  2 years   Location Affected:  Where on the body is this affecting you?    o  Arms, legs, inframammary   Quality:  Is there any bleeding, pain, itch, burning/irritation, or redness associated with the skin lesion?    o  Itching, redness and scaling   Severity:  Describe any bleeding, pain, itch, burning/irritation, or redness on a scale of 1 to 10 (with 10 being the worst)  o  10   Timing:  Does this condition seem to be there pretty constantly or do you notice it more at specific times throughout the day?    o  Constantly   Context:  Have you ever noticed that this condition seems to be associated with specific activities you do?    o  Denies   Modifying Factors:    o Anything that seems to make the condition worse?    -  Denies  o What have you tried to do to make the condition better? -  Lotrisone cream and nystatin powder   Associated Signs and Symptoms:  Does this skin lesion seem to be associated with any of the following:  o  SL AMB DERM SIGNS AND SYMPTOMS: Redness and Itching and Scratching     1  PSORIASIFORM DERMATITIS    Physical Exam:   Anatomic Location Affected:  Trunk, extremities and posterior ears   Morphological Description:  See photos                              Additional History of Present Condition:  Present for 2 years  Patient is currently using lotrisone cream and nystatin powder   Patient used TMC 0 1% cream in the past     Assessment and Plan:  Based on a thorough discussion of this condition and the management approach to it (including a comprehensive discussion of the known risks, side effects and potential benefits of treatment), the patient (family) agrees to implement the following specific plan:   Begin triamcinolone ointment 2-4 times a day for up to 2 weeks   Stop using lotrisone cream and nystatin powder   Biopsy discussed, to confirm or refute psoriasis clinical diagnosis, but daughter not interested in systemic/parenteral treatment options at this time      Scribe Attestation    I,:  Lanie Isaac MA am acting as a scribe while in the presence of the attending physician :       I,:  Theo Ha MD personally performed the services described in this documentation    as scribed in my presence :

## 2022-01-24 ENCOUNTER — CLINICAL SUPPORT (OUTPATIENT)
Dept: FAMILY MEDICINE CLINIC | Facility: CLINIC | Age: 77
End: 2022-01-24
Payer: MEDICARE

## 2022-01-24 DIAGNOSIS — E53.8 B12 DEFICIENCY: Primary | ICD-10-CM

## 2022-01-24 RX ADMIN — CYANOCOBALAMIN 1000 MCG: 1000 INJECTION INTRAMUSCULAR; SUBCUTANEOUS at 15:09

## 2022-02-02 ENCOUNTER — OFFICE VISIT (OUTPATIENT)
Dept: GERIATRICS | Age: 77
End: 2022-02-02
Payer: MEDICARE

## 2022-02-02 VITALS
BODY MASS INDEX: 24.81 KG/M2 | SYSTOLIC BLOOD PRESSURE: 152 MMHG | DIASTOLIC BLOOD PRESSURE: 84 MMHG | RESPIRATION RATE: 17 BRPM | HEIGHT: 61 IN | TEMPERATURE: 96.1 F | OXYGEN SATURATION: 96 % | WEIGHT: 131.4 LBS | HEART RATE: 72 BPM

## 2022-02-02 DIAGNOSIS — L30.9 ECZEMA, UNSPECIFIED TYPE: ICD-10-CM

## 2022-02-02 DIAGNOSIS — E89.0 POSTOPERATIVE HYPOTHYROIDISM: Chronic | ICD-10-CM

## 2022-02-02 DIAGNOSIS — G47.9 SLEEP DISTURBANCE: ICD-10-CM

## 2022-02-02 DIAGNOSIS — R26.9 GAIT DISTURBANCE: ICD-10-CM

## 2022-02-02 DIAGNOSIS — N18.30 STAGE 3 CHRONIC KIDNEY DISEASE, UNSPECIFIED WHETHER STAGE 3A OR 3B CKD (HCC): Chronic | ICD-10-CM

## 2022-02-02 DIAGNOSIS — H90.3 SENSORINEURAL HEARING LOSS (SNHL) OF BOTH EARS: ICD-10-CM

## 2022-02-02 DIAGNOSIS — F17.200 CURRENT SMOKER: ICD-10-CM

## 2022-02-02 DIAGNOSIS — F02.81 LATE ONSET ALZHEIMER'S DISEASE WITH BEHAVIORAL DISTURBANCE (HCC): Primary | ICD-10-CM

## 2022-02-02 DIAGNOSIS — I10 ESSENTIAL HYPERTENSION: ICD-10-CM

## 2022-02-02 DIAGNOSIS — H54.7 VISUAL IMPAIRMENT: ICD-10-CM

## 2022-02-02 DIAGNOSIS — F33.41 RECURRENT MAJOR DEPRESSIVE DISORDER, IN PARTIAL REMISSION (HCC): ICD-10-CM

## 2022-02-02 DIAGNOSIS — G30.1 LATE ONSET ALZHEIMER'S DISEASE WITH BEHAVIORAL DISTURBANCE (HCC): Primary | ICD-10-CM

## 2022-02-02 PROCEDURE — 99214 OFFICE O/P EST MOD 30 MIN: CPT | Performed by: NURSE PRACTITIONER

## 2022-02-02 RX ORDER — TRAZODONE HYDROCHLORIDE 50 MG/1
50 TABLET ORAL
Qty: 60 TABLET | Refills: 1 | Status: SHIPPED | OUTPATIENT
Start: 2022-02-02 | End: 2022-05-22

## 2022-02-02 NOTE — PROGRESS NOTES
ASSESSMENT AND PLAN:  1  Late onset Alzheimer's disease with behavioral disturbance ([facility] [address] )  Assessment & Plan:  - Significant decline over the past several months  Currently stable  - MOCA score 10/30 today  , previously in October 2021 9/30 and  in April of last year score was 13/30  Patient reported to be  more forgetful and sleeping more during the day at previous visit  - Patient started on Trazodone 25 mg daily with mild improvement  - Will increase Trazodone to 50 mg PO daily at   - Continue Aricept 10 mg PO daily  - Socialization, exercise and cognitive activities encouraged  Recommend adult day care for a couple of hours during the day to keep patient engaged  - Continue home health aides and family support  - Daughter feels like she can manage taking care of her mother in her home with help of HHA's  - Manage chronic conditions with PCP  - Encourage good sleep hygiene  - Follow-up in 6 months    Orders:  -     traZODone (DESYREL) 50 mg tablet; Take 1 tablet (50 mg total) by mouth daily at bedtime  -     Ambulatory Referral to Speech Therapy; Future    2  Essential hypertension  Assessment & Plan:  · BP mildly elevated at 152/84  · Continue lisinopril 5 mg daily and metoprolol 50 mg daily  · Recommend home bp monitoring  · Follow-up with PCP      3  Postoperative hypothyroidism  Assessment & Plan:  - TSH 47 02 on 03/03/2021, TSH on 11/02/21 0 08  - Continue Levothyroxine 175 mcg daily  - Follow-up with Endocrine      4  Stage 3 chronic kidney disease, unspecified whether stage 3a or 3b CKD [facility])  Assessment & Plan:  Lab Results   Component Value Date    EGFR 46 06/11/2021    EGFR 27 06/10/2021    EGFR 12 06/09/2021    CREATININE 1 21 (H) 06/25/2021    CREATININE 1 16 06/11/2021    CREATININE 1 77 (H) 06/10/2021   · Avoid nephrotoxins and hypotension  · Adequate PO hydration encouraged  · Follow-up with PCP      5   Current smoker  Assessment & Plan:  - Recommend smoking cessation with Nicotine patch      6  Recurrent major depressive disorder, in partial remission ([facility] [address] )  Assessment & Plan:  · Patients mood is currently stable  · Trazodone 25 mg started at HS for insomnia  · Will increase Trazodone to 50 mg daily  · Citalopram discontinued  · Recommend participation in social, mental and physical activities as tolerated      7  Sensorineural hearing loss (SNHL) of both ears  Assessment & Plan:  · Follow-up regularly to have ear wax removed with Audiology      8  Eczema, unspecified type  Assessment & Plan:  · Continue triamcinolone ointment  · Follow-up with Dermatology      9  Gait disturbance  Assessment & Plan:  · No recent falls reported  · Maintain fall precautions      10  Visual impairment  Assessment & Plan:  · Follows up with Opthalmology for routine eye examinations      11  Sleep disturbance  Assessment & Plan:  · Trazodone increased to 50 mg daily  · Recommend good sleep hygiene            HPI:    Rody Dow is a 64year old female patient seen and examined today for geriatric follow-up with her daughter present  Daughter states that her mom's dementia continues to decline  She was started on Trazodone 25 mg at night  at the last follow-up visit for insomnia  Daughter states that there is a small improvement in her insomnia at night, however patient is still awake most of the night  Patient has a home health aide coming into the home for six hours a week and her nephews wife comes to the home to help  She needs minimal assistance with her ADLs  She lives with her daughter and her daughter manages her IADLs and medications  No behaviors or mood swings reported  No recent falls or hospitalizations since the last follow-up visit on 10/27/2021  She is following with Dermatology for a rash on her arms, legs, posterior ears due to eczema and relieved with a steroid cream   Patient is still reported to be smoking but is trying to cut back        Upon examination, patient is calm, cooperative and in no acute distress  She is without acute complaint today  ROS: Review of Systems   Constitutional: Negative for activity change, appetite change, chills, fatigue, fever and unexpected weight change  HENT: Positive for hearing loss  Negative for congestion, rhinorrhea and trouble swallowing  Eyes: Negative for pain, discharge, redness, itching and visual disturbance  Respiratory: Negative for cough, chest tightness, shortness of breath and wheezing  Cardiovascular: Negative for chest pain, palpitations and leg swelling  Gastrointestinal: Negative for abdominal distention, abdominal pain, constipation, diarrhea, nausea and vomiting  Genitourinary: Negative for difficulty urinating, dysuria, flank pain and frequency  Musculoskeletal: Negative for arthralgias, gait problem and myalgias  Skin: Negative for color change, pallor and rash  Neurological: Negative for dizziness, weakness, numbness and headaches  Psychiatric/Behavioral: Positive for confusion and sleep disturbance  Negative for behavioral problems and dysphoric mood  The patient is not nervous/anxious  Allergies   Allergen Reactions    Naproxen Itching    Niacin     Nsaids Other (See Comments) and Swelling    Azithromycin Nausea Only and Rash    Cephalexin Rash       Medications:    Current Outpatient Medications on File Prior to Visit   Medication Sig Dispense Refill    alendronate (FOSAMAX) 70 mg tablet In AM on empty stomach with a full glass of water  Do not lie down for 30 min      calcium-vitamin D (OSCAL) 250-125 MG-UNIT per tablet Take 1 tablet by mouth daily        donepezil (ARICEPT) 10 mg tablet take 1 tablet by mouth daily at bedtime 30 tablet 6    ergocalciferol (VITAMIN D2) 50,000 units TAKE 1 CAPSULE BY MOUTH ONCE A WEEK      gabapentin (NEURONTIN) 100 mg capsule Take 1 capsule (100 mg total) by mouth 3 (three) times a day (to control leg pain) 90 capsule 1    levothyroxine 175 mcg tablet Take 175 mcg by mouth daily 175 mcg alternating with 150 mcg daily       lisinopril (ZESTRIL) 5 mg tablet Take 5 mg by mouth daily      metoprolol succinate (TOPROL-XL) 50 mg 24 hr tablet Take 50 mg by mouth daily      Niacin ER 1000 MG TBCR Take 1 tablet by mouth every evening      triamcinolone (KENALOG) 0 1 % ointment Apply topicaly 2-4 times a day for up to 2 weeks  453 6 g 0    aspirin 81 MG tablet Take 81 mg by mouth daily   (Patient not taking: Reported on 2/2/2022 )      clotrimazole-betamethasone (LOTRISONE) 1-0 05 % cream Apply topically 2 (two) times a day Re psoriasis 45 g 2    cyanocobalamin 1,000 mcg/mL Injection Q 6-8 weeks re PA (Patient not taking: Reported on 10/4/2021) 1 mL     nystatin (MYCOSTATIN) powder Apply topically 2 (two) times a day ( as need for fungal infection under breast ) 60 g 1     Current Facility-Administered Medications on File Prior to Visit   Medication Dose Route Frequency Provider Last Rate Last Admin    cyanocobalamin injection 1,000 mcg  1,000 mcg Intramuscular Q56 Days Lamount Debar, DO   1,000 mcg at 01/24/22 1509       History:  Past Medical History:   Diagnosis Date    [facility]LAYLA[facility]/13/[facility]    Bed bug bite     LAST ASSESSED [id number]    Chronic pain disorder     Disease of thyroid gland     Extremity pain     Graves' disease     Hyperlipidemia     Hypertension     LLL pneumonia     LAST ASSESSED [id number]    Low back pain     Migraine 2001    OCULAR    Scabies     LAST ASSESSED [id number]    Syncope and collapse     LAST ASSESSED [id number]    Vertigo     RESOLVED      Past Surgical History:   Procedure Laterality Date    CHOLECYSTECTOMY      HYSTERECTOMY      TOTAL THYROIDECTOMY      TUBAL LIGATION Bilateral      Family History   Problem Relation Age of Onset    Breast cancer Sister     Diabetes Family     No Known Problems Mother     No Known Problems Father      Social History Socioeconomic History    Marital status:      Spouse name: Not on file    Number of children: Not on file    Years of education: Not on file    Highest education level: Not on file   Occupational History    Not on file   Tobacco Use    Smoking status: Current Every Day Smoker     Packs/day: 0 25    Smokeless tobacco: Never Used   Vaping Use    Vaping Use: Never used   Substance and Sexual Activity    Alcohol use: Never    Drug use: No    Sexual activity: Not Currently   Other Topics Concern    Not on file   Social History Narrative    COPY OF ADVANCED DIRECTIVE OBTAINED FROM PATIENT      Social Determinants of Health     Financial Resource Strain: Not on file   Food Insecurity: Not on file   Transportation Needs: Not on file   Physical Activity: Not on file   Stress: Not on file   Social Connections: Not on file   Intimate Partner Violence: Not on file   Housing Stability: Not on file     Past Surgical History:   Procedure Laterality Date    CHOLECYSTECTOMY      HYSTERECTOMY      TOTAL THYROIDECTOMY      TUBAL LIGATION Bilateral        OBJECTIVE:  Vitals:    02/02/22 1254   BP: 152/84   BP Location: Left arm   Patient Position: Sitting   Cuff Size: Standard   Pulse: 72   Resp: 17   Temp: (!) 96 1 °F (35 6 °C)   TempSrc: Temporal   SpO2: 96%   Weight: 59 6 kg (131 lb 6 4 oz)   Height: 5' 1" (1 549 m)     Body mass index is 24 83 kg/m²  Physical Exam  Constitutional:       General: She is not in acute distress  Appearance: Normal appearance  She is normal weight  She is not ill-appearing, toxic-appearing or diaphoretic  HENT:      Head: Normocephalic and atraumatic  Right Ear: Tympanic membrane, ear canal and external ear normal  There is no impacted cerumen  Left Ear: Tympanic membrane, ear canal and external ear normal  There is no impacted cerumen  Nose: Nose normal  No congestion or rhinorrhea        Mouth/Throat:      Mouth: Mucous membranes are moist       Pharynx: Oropharynx is clear  No oropharyngeal exudate or posterior oropharyngeal erythema  Eyes:      General: No scleral icterus  Right eye: No discharge  Left eye: No discharge  Extraocular Movements: Extraocular movements intact  Conjunctiva/sclera: Conjunctivae normal       Pupils: Pupils are equal, round, and reactive to light  Cardiovascular:      Rate and Rhythm: Normal rate and regular rhythm  Pulses: Normal pulses  Heart sounds: Murmur heard  Pulmonary:      Effort: Pulmonary effort is normal  No respiratory distress  Breath sounds: Normal breath sounds  No wheezing, rhonchi or rales  Abdominal:      General: Bowel sounds are normal  There is no distension  Palpations: Abdomen is soft  There is no mass  Tenderness: There is no abdominal tenderness  There is no guarding  Musculoskeletal:         General: No tenderness, deformity or signs of injury  Normal range of motion  Skin:     General: Skin is warm and dry  Coloration: Skin is not jaundiced  Findings: Rash present  No bruising, erythema or lesion  Comments: Healing rash on bilateral lower extremities, right upper extremity and posterior ears   Neurological:      General: No focal deficit present  Mental Status: She is alert  Mental status is at baseline  Cranial Nerves: No cranial nerve deficit  Motor: No weakness        Gait: Gait normal    Psychiatric:         Mood and Affect: Mood normal          Behavior: Behavior normal          MoCA:  10/30  TUGT: 20 seconds    Labs & Imaging:  Lab Results   Component Value Date    WBC 13 26 (H) 06/09/2021    HGB 12 1 06/09/2021    HCT 38 0 06/09/2021    MCV 89 06/09/2021     06/09/2021     Lab Results   Component Value Date    SODIUM 139 06/25/2021    K 5 0 06/25/2021    CL 99 06/25/2021    CO2 29 06/25/2021    BUN 17 06/25/2021    CREATININE 1 21 (H) 06/25/2021    GLUC 142 (H) 06/25/2021    CALCIUM 9 1 06/25/2021 Lab Results   Component Value Date    QXQPPRKZ01 959 11/02/2021     Lab Results   Component Value Date    [id number] 69 840 (H) 06/09/2021    TSH 2 20 04/21/2020     Lab Results   Component Value Date    [id number] 36 06/19/2019      Results for orders placed during the hospital encounter of 10/08/21    CT head without contrast    Narrative  CT BRAIN - WITHOUT CONTRAST    INDICATION:   Headache and head trauma  COMPARISON:  6/9/2021  TECHNIQUE:  CT examination of the brain was performed  In addition to axial images, sagittal and coronal 2D reformatted images were created and submitted for interpretation  Radiation dose length product (DLP) for this visit:  849 mGy-cm   This examination, like all CT scans performed in the [facility], was performed utilizing techniques to minimize radiation dose exposure, including the use of iterative  reconstruction and automated exposure control  IMAGE QUALITY:  Diagnostic  FINDINGS:    PARENCHYMA:  Stable encephalomalacia and gliosis in the right inferior parietal temporal region and in the left frontal lobe and operculum compatible with chronic infarcts  No acute intracranial hemorrhage or mass effect  VENTRICLES AND EXTRA-AXIAL SPACES:  No hydrocephalus or extra-axial collection  VISUALIZED ORBITS AND PARANASAL SINUSES:  Intact globes and orbits  Clear paranasal sinuses  CALVARIUM AND EXTRACRANIAL SOFT TISSUES:  No lytic or blastic lesion or fracture  Impression  No acute intracranial abnormality              Workstation performed: [id number]

## 2022-02-02 NOTE — PATIENT INSTRUCTIONS
1  Recommend adult day care several days per week  2  Increase Trazodone to 50 mg tablet, one tablet at night for sleep  3  Follow-up with PCP to manage chronic conditions  4  Recommend cognitive training with speech therapy at home through [facility]  5   Follow-up in 6 months

## 2022-02-03 ENCOUNTER — TELEPHONE (OUTPATIENT)
Dept: GERIATRICS | Age: 77
End: 2022-02-03

## 2022-02-03 NOTE — TELEPHONE ENCOUNTER
MSW called daughter to relay information on Adult day care centers for Pt  MSW told daughter MSW will mail out the following resources to Pt address    1  [facility]  2  [facility] at [facility]   3  [facility] Pichardo [address] [place] [address]

## 2022-02-05 NOTE — ASSESSMENT & PLAN NOTE
· BP mildly elevated at 152/84  · Continue lisinopril 5 mg daily and metoprolol 50 mg daily  · Recommend home bp monitoring  · Follow-up with PCP

## 2022-02-05 NOTE — ASSESSMENT & PLAN NOTE
- Significant decline over the past several months  Currently stable  - MOCA score 10/30 today  , previously in October 2021 9/30 and  in April of last year score was 13/30  Patient reported to be  more forgetful and sleeping more during the day at previous visit  - Patient started on Trazodone 25 mg daily with mild improvement  - Will increase Trazodone to 50 mg PO daily at   - Continue Aricept 10 mg PO daily  - Socialization, exercise and cognitive activities encouraged  Recommend adult day care for a couple of hours during the day to keep patient engaged  - Continue home health aides and family support  - Daughter feels like she can manage taking care of her mother in her home with help of HHA's      - Manage chronic conditions with PCP  - Encourage good sleep hygiene  - Follow-up in 6 months

## 2022-02-05 NOTE — ASSESSMENT & PLAN NOTE
· Patients mood is currently stable  · Trazodone 25 mg started at HS for insomnia  · Will increase Trazodone to 50 mg daily  · Citalopram discontinued  · Recommend participation in social, mental and physical activities as tolerated

## 2022-02-05 NOTE — ASSESSMENT & PLAN NOTE
Lab Results   Component Value Date    EGFR 46 06/11/2021    EGFR 27 06/10/2021    EGFR 12 06/09/2021    CREATININE 1 21 (H) 06/25/2021    CREATININE 1 16 06/11/2021    CREATININE 1 77 (H) 06/10/2021   · Avoid nephrotoxins and hypotension  · Adequate PO hydration encouraged  · Follow-up with PCP

## 2022-02-05 NOTE — ASSESSMENT & PLAN NOTE
- TSH 47 02 on 03/03/2021, TSH on 11/02/21 0 08  - Continue Levothyroxine 175 mcg daily  - Follow-up with Endocrine

## 2022-02-17 ENCOUNTER — OFFICE VISIT (OUTPATIENT)
Dept: FAMILY MEDICINE CLINIC | Facility: CLINIC | Age: 77
End: 2022-02-17
Payer: MEDICARE

## 2022-02-17 VITALS
WEIGHT: 136 LBS | SYSTOLIC BLOOD PRESSURE: 132 MMHG | HEIGHT: 61 IN | DIASTOLIC BLOOD PRESSURE: 70 MMHG | TEMPERATURE: 97.5 F | OXYGEN SATURATION: 95 % | BODY MASS INDEX: 25.68 KG/M2

## 2022-02-17 DIAGNOSIS — R26.9 GAIT DISTURBANCE: ICD-10-CM

## 2022-02-17 DIAGNOSIS — D51.0 PERNICIOUS ANEMIA: ICD-10-CM

## 2022-02-17 DIAGNOSIS — G30.1 LATE ONSET ALZHEIMER'S DISEASE WITH BEHAVIORAL DISTURBANCE (HCC): ICD-10-CM

## 2022-02-17 DIAGNOSIS — I10 ESSENTIAL HYPERTENSION: Primary | ICD-10-CM

## 2022-02-17 DIAGNOSIS — N18.30 STAGE 3 CHRONIC KIDNEY DISEASE, UNSPECIFIED WHETHER STAGE 3A OR 3B CKD (HCC): Chronic | ICD-10-CM

## 2022-02-17 DIAGNOSIS — F02.81 LATE ONSET ALZHEIMER'S DISEASE WITH BEHAVIORAL DISTURBANCE (HCC): ICD-10-CM

## 2022-02-17 DIAGNOSIS — R05.9 COUGH: ICD-10-CM

## 2022-02-17 DIAGNOSIS — I63.81 LACUNAR STROKE (HCC): ICD-10-CM

## 2022-02-17 DIAGNOSIS — F17.200 CURRENT SMOKER: ICD-10-CM

## 2022-02-17 DIAGNOSIS — E89.0 POSTOPERATIVE HYPOTHYROIDISM: Chronic | ICD-10-CM

## 2022-02-17 PROCEDURE — 99214 OFFICE O/P EST MOD 30 MIN: CPT | Performed by: FAMILY MEDICINE

## 2022-02-17 NOTE — PROGRESS NOTES
FAMILY PRACTICE OFFICE VISIT  [facility]  [address]  [address], [place], [facility]      NAME: [address]m [address]  AGE: 64 y o  SEX: female  : 1945   MRN: [id number]    DATE: 2022  TIME: 10:19 AM    Assessment and Plan     Problem List Items Addressed This Visit     Postoperative hypothyroidism (Chronic)    Chronic kidney disease, stage III (moderate) (HCC) (Chronic)    Essential hypertension - Primary    Current smoker    Pernicious anemia    Lacunar stroke (HCC)    Gait disturbance    Late onset Alzheimer's disease with behavioral disturbance (HCC)    Cough, chronic -  improved          Patient Instructions   She will continue to see geriatric group as is, saw them , MMSE 10/30, trazodone was increased to 50 mg night, she is sleeping better with that, continues on Aricept 10 mg as before  She will be starting occupational therapy with [facility] later this month, is homebound  Has fallen in the past   Continue to watch for falls  They may be starting adult   Regarding chronic pain she does use gabapentin 100 mg 3 times daily, her daughter does find this to be helpful, she appears to be tolerating this okay  She will continue to see Endocrinology, has slip to do blood work prior to seeing them in March  Thyroid has been very labile the last year, TSH 1 year ago 52, in November was 0 08  Currently on 150 mcg daily alternating with 175 mcg  Blood pressure acceptable here today, continue lisinopril low dose 5 mg daily along with metoprolol succinate 50 mg daily  She will continue to see Dermatology, her psoriatic rash is much better  She will continue with B12 injection every 6 to 8 weeks, doing well with that, in November B12 level 534  She is smoking less, up to few cigarettes weekly  Continue to try to avoid      We will see her again in 4 months with annual wellness check, also due for next B12 shot in 2-4 weeks ( B12 Q 6- 8 weeks )    Back in November BUN/creatinine 19/0 95 with potassium 5 0, cholesterol 152 with HDL 49, LDL 85  Chief Complaint     Chief Complaint   Patient presents with    Follow-up       History of Present Illness   Zeina Boudreaux is a 64y o -year-old female who is in today for a routine 4 month check, she is accompanied by her daughter with whom she resides  She does have caregiver at home  Does continue to see geriatric group, will be starting in-home therapy later this month, looking into adult  program   Geriatric group increased trazodone to 50 mg at night after February 2nd visit, she did have 1 fall since then, fortunately no injury  Continues to see Endocrinology, has slip to do blood work, is on medication as directed  Her daughter relates she is smoking less, was previously 1 pack per week, unfortunately they have not stopped her smoking  She does get B12 shots every 6 to 8 weeks    Saw Dermatology, rash on body much improved, does have scaling of scalp      Review of Systems   Review of Systems   Constitutional: Positive for fatigue (Baseline)  Negative for appetite change, fever and unexpected weight change  HENT: Negative for sore throat and trouble swallowing  Respiratory: Positive for cough (Chronic, no worse than baseline, no hemoptysis, no increased shortness of breath or wheezing)  Negative for chest tightness  Cardiovascular: Negative for chest pain, palpitations and leg swelling  Gastrointestinal: Negative for abdominal pain, blood in stool, nausea and vomiting  No acid reflux     No change in bowel   Genitourinary: Negative for dysuria and hematuria  Neurological: Positive for light-headedness (Occasional long-term)  Negative for syncope and headaches  Psychiatric/Behavioral: Positive for confusion         Active Problem List     Patient Active Problem List   Diagnosis    Sinus bradycardia    Postoperative hypothyroidism    Essential hypertension    Urinary incontinence    Chronic kidney disease, stage III (moderate) (HCC)    Umbilical hernia    Trigger middle finger of right hand    Current smoker    Thyroid nodule    Pernicious anemia    Osteoarthritis    Neuropathy of both feet    Low back pain radiating to left lower extremity    Lacunar stroke (HCC)    Hyperglycemia    Hypercholesterolemia    Graves' disease    Gait disturbance    Dizziness    Disc degeneration, lumbar    Late onset Alzheimer's disease with behavioral disturbance (HCC)    Cough, chronic -  improved    Hearing loss    Recurrent major depressive disorder, in partial remission ([facility])    Visual impairment    Full dentures    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[address] )    Fall    Anemia    Hypotension, unspecified    Hypocalcemia    Spinal stenosis of lumbar region with neurogenic claudication    Eczema    Vitamin D deficiency    Sleep disturbance    Insomnia due to other mental disorder       Past Medical History:  Reviewed    Past Surgical History:  Reviewed    Family History:  Reviewed    Social History:  Reviewed    Objective     Vitals:    02/17/22 0946   BP: 132/70   BP Location: Left arm   Patient Position: Sitting   Cuff Size: Large   Temp: 97 5 °F (36 4 °C)   SpO2: 95%   Weight: 61 7 kg (136 lb)   Height: 5' 1" (1 549 m)     Body mass index is 25 7 kg/m²  BP Readings from Last 3 Encounters:   02/17/22 132/70   02/02/22 152/84   10/27/21 142/90       Wt Readings from Last 3 Encounters:   02/17/22 61 7 kg (136 lb)   02/02/22 59 6 kg (131 lb 6 4 oz)   01/05/22 60 3 kg (133 lb)       Physical Exam  Constitutional:       Comments: 77-year-old female seated on table, flat affect, soft-spoken with minimal verbal response to questioning, daughter provides answers  She appears comfortable, no respiratory distress, no cough during our visit today   Eyes:      General: No scleral icterus    Cardiovascular:      Rate and Rhythm: Normal rate and regular rhythm  Heart sounds: Murmur heard  Pulmonary:      Comments: Slightly decreased breath sounds bilateral as before but no rhonchi, rales, wheezes  Abdominal:      General: There is no distension  Palpations: Abdomen is soft  Tenderness: There is no abdominal tenderness  There is no guarding  Musculoskeletal:      Right lower leg: No edema  Left lower leg: No edema  Lymphadenopathy:      Cervical: No cervical adenopathy  Skin:     Comments: She does have significant scaling of scalp   Neurological:      Comments: Alert   Psychiatric:      Comments: Cooperative for exam         ALLERGIES:  Allergies   Allergen Reactions    Naproxen Itching    Niacin     Nsaids Other (See Comments) and Swelling    Azithromycin Nausea Only and Rash    Cephalexin Rash       Current Medications     Current Outpatient Medications   Medication Sig Dispense Refill    alendronate (FOSAMAX) 70 mg tablet In AM on empty stomach with a full glass of water  Do not lie down for 30 min      calcium-vitamin D (OSCAL) 250-125 MG-UNIT per tablet Take 1 tablet by mouth daily   donepezil (ARICEPT) 10 mg tablet take 1 tablet by mouth daily at bedtime 30 tablet 6    ergocalciferol (VITAMIN D2) 50,000 units TAKE 1 CAPSULE BY MOUTH ONCE A WEEK      gabapentin (NEURONTIN) 100 mg capsule Take 1 capsule (100 mg total) by mouth 3 (three) times a day (to control leg pain) 90 capsule 1    levothyroxine 175 mcg tablet Take 175 mcg by mouth daily 175 mcg alternating with 150 mcg daily       lisinopril (ZESTRIL) 5 mg tablet Take 5 mg by mouth daily      metoprolol succinate (TOPROL-XL) 50 mg 24 hr tablet Take 50 mg by mouth daily      Niacin ER 1000 MG TBCR Take 1 tablet by mouth every evening      traZODone (DESYREL) 50 mg tablet Take 1 tablet (50 mg total) by mouth daily at bedtime 60 tablet 1    triamcinolone (KENALOG) 0 1 % ointment Apply topicaly 2-4 times a day for up to 2 weeks   453 6 g 0    aspirin 81 MG tablet Take 81 mg by mouth daily  (Patient not taking: Reported on 2/2/2022 )      cyanocobalamin 1,000 mcg/mL Injection Q 6-8 weeks re PA (Patient not taking: Reported on 10/4/2021) 1 mL     nystatin (MYCOSTATIN) powder Apply topically 2 (two) times a day ( as need for fungal infection under breast ) 60 g 1     Current Facility-Administered Medications   Medication Dose Route Frequency Provider Last Rate Last Admin    cyanocobalamin injection 1,000 mcg  1,000 mcg Intramuscular Q56 Days Timi Valdez DO   1,000 mcg at 01/24/22 1509            No orders of the defined types were placed in this encounter          Timi Vadlez DO

## 2022-02-17 NOTE — PATIENT INSTRUCTIONS
She will continue to see geriatric group as is, saw them February 2nd, MMSE 10/30, trazodone was increased to 50 mg night, she is sleeping better with that, continues on Aricept 10 mg as before  She will be starting occupational therapy with [facility] later this month, is homebound  Has fallen in the past   Continue to watch for falls  They may be starting adult   Regarding chronic pain she does use gabapentin 100 mg 3 times daily, her daughter does find this to be helpful, she appears to be tolerating this okay  She will continue to see Endocrinology, has slip to do blood work prior to seeing them in March  Thyroid has been very labile the last year, TSH 1 year ago 52, in November was 0 08  Currently on 150 mcg daily alternating with 175 mcg  Blood pressure acceptable here today, continue lisinopril low dose 5 mg daily along with metoprolol succinate 50 mg daily  She will continue to see Dermatology, her psoriatic rash is much better  She will continue with B12 injection every 6 to 8 weeks, doing well with that, in November B12 level 534  She is smoking less, up to few cigarettes weekly  Continue to try to avoid  We will see her again in 4 months with annual wellness check, also due for next B12 shot in 2-4 weeks ( B12 Q 6- 8 weeks )    Back in November BUN/creatinine 19/0 95 with potassium 5 0, cholesterol 152 with HDL 49, LDL 85

## 2022-02-23 ENCOUNTER — TELEPHONE (OUTPATIENT)
Dept: DERMATOLOGY | Facility: CLINIC | Age: 77
End: 2022-02-23

## 2022-02-23 NOTE — TELEPHONE ENCOUNTER
Pt was seeing by her Hearing aid doctor and he suggested seeing derm due to raw ear wax in left ear  He wanted to clean out the ear wax but didn't want to because it would start bleeding  I'm unable to find an appt for pt and would have to place her on a waitlist until the July schedule opens up  She would like a call to see if there's anything she can do in the mean time

## 2022-02-24 NOTE — TELEPHONE ENCOUNTER
Tried calling patient but phone number is blocking in coming calls and I am unable to leave a voicemail  If patient calls back please advise her that she needs to contact her PCP or see an ENT for ear wax, we do not handle

## 2022-02-26 DIAGNOSIS — M79.605 LOW BACK PAIN RADIATING TO LEFT LOWER EXTREMITY: ICD-10-CM

## 2022-02-26 DIAGNOSIS — M79.605 PAIN OF LEFT LOWER EXTREMITY: ICD-10-CM

## 2022-02-26 DIAGNOSIS — M54.50 LOW BACK PAIN RADIATING TO LEFT LOWER EXTREMITY: ICD-10-CM

## 2022-02-26 DIAGNOSIS — M51.36 DISC DEGENERATION, LUMBAR: ICD-10-CM

## 2022-02-26 RX ORDER — GABAPENTIN 100 MG/1
100 CAPSULE ORAL 3 TIMES DAILY
Qty: 90 CAPSULE | Refills: 1 | Status: SHIPPED | OUTPATIENT
Start: 2022-02-26 | End: 2022-04-25

## 2022-03-10 ENCOUNTER — CLINICAL SUPPORT (OUTPATIENT)
Dept: FAMILY MEDICINE CLINIC | Facility: CLINIC | Age: 77
End: 2022-03-10
Payer: MEDICARE

## 2022-03-10 DIAGNOSIS — E53.8 B12 DEFICIENCY: Primary | ICD-10-CM

## 2022-03-10 DIAGNOSIS — G30.1 LATE ONSET ALZHEIMER'S DISEASE WITH BEHAVIORAL DISTURBANCE (HCC): ICD-10-CM

## 2022-03-10 DIAGNOSIS — F02.81 LATE ONSET ALZHEIMER'S DISEASE WITH BEHAVIORAL DISTURBANCE (HCC): ICD-10-CM

## 2022-03-10 RX ORDER — DONEPEZIL HYDROCHLORIDE 10 MG/1
TABLET, FILM COATED ORAL
Qty: 30 TABLET | Refills: 6 | Status: SHIPPED | OUTPATIENT
Start: 2022-03-10

## 2022-03-10 RX ADMIN — CYANOCOBALAMIN 1000 MCG: 1000 INJECTION INTRAMUSCULAR; SUBCUTANEOUS at 10:43

## 2022-03-16 ENCOUNTER — NURSE TRIAGE (OUTPATIENT)
Dept: OTHER | Facility: OTHER | Age: 77
End: 2022-03-16

## 2022-03-16 ENCOUNTER — HOSPITAL ENCOUNTER (EMERGENCY)
Facility: HOSPITAL | Age: 77
Discharge: HOME | End: 2022-03-16
Attending: EMERGENCY MEDICINE | Admitting: EMERGENCY MEDICINE
Payer: MEDICARE

## 2022-03-16 VITALS
TEMPERATURE: 97.5 F | RESPIRATION RATE: 16 BRPM | DIASTOLIC BLOOD PRESSURE: 73 MMHG | WEIGHT: 134.04 LBS | HEART RATE: 59 BPM | HEIGHT: 61 IN | OXYGEN SATURATION: 95 % | SYSTOLIC BLOOD PRESSURE: 167 MMHG | BODY MASS INDEX: 25.31 KG/M2

## 2022-03-16 DIAGNOSIS — R09.89 FOREIGN BODY SENSATION IN THROAT: Primary | ICD-10-CM

## 2022-03-16 DIAGNOSIS — R07.0 THROAT PAIN: ICD-10-CM

## 2022-03-16 PROCEDURE — 99283 EMERGENCY DEPT VISIT LOW MDM: CPT

## 2022-03-16 PROCEDURE — 99284 EMERGENCY DEPT VISIT MOD MDM: CPT | Performed by: EMERGENCY MEDICINE

## 2022-03-16 RX ORDER — LIDOCAINE HYDROCHLORIDE 20 MG/ML
15 SOLUTION OROPHARYNGEAL ONCE
Status: COMPLETED | OUTPATIENT
Start: 2022-03-16 | End: 2022-03-16

## 2022-03-16 RX ORDER — LIDOCAINE HYDROCHLORIDE 10 MG/ML
10 INJECTION, SOLUTION EPIDURAL; INFILTRATION; INTRACAUDAL; PERINEURAL ONCE
Status: DISCONTINUED | OUTPATIENT
Start: 2022-03-16 | End: 2022-03-16

## 2022-03-16 RX ADMIN — LIDOCAINE HYDROCHLORIDE 15 ML: 20 SOLUTION ORAL; TOPICAL at 21:18

## 2022-03-17 NOTE — ED ATTENDING ATTESTATION
3/16/2022  ICelina DO, saw and evaluated the patient  I have discussed the patient with the resident/non-physician practitioner and agree with the resident's/non-physician practitioner's findings, Plan of Care, and MDM as documented in the resident's/non-physician practitioner's note, except where noted  All available labs and Radiology studies were reviewed  I was present for key portions of any procedure(s) performed by the resident/non-physician practitioner and I was immediately available to provide assistance  At this point I agree with the current assessment done in the Emergency Department  I have conducted an independent evaluation of this patient a history and physical is as follows:    Patient is a 25-year-old female that presents for possible retained foreign body in her throat  Patient was swallowing her niacin till at 7:45 p m  This evening  Patient felt like it got stuck right at the base of her throat  Patient called her family doctor's office and the nurse gave her instructions over the phone on what to do  Patient tried hot liquid in the eating bread  Patient states she is able to eat and drink without vomiting  Patient has no chest pain, shortness of breath, nausea, vomiting, abdominal pain, neck pain, hematemesis or hemoptysis  Patient's exam is overall normal and benign  She has no stridor, respiratory distress, signs of angioedema or any trouble swallowing her own secretions  Oropharynx is patent and non erythematous  She has no tenderness to palpation of her neck or epigastric area  Patient with probably pill esophagitis or a possible esophageal abrasion  Patient given viscous lidocaine here with much relief of her symptoms  I am unable to do a bedside fiberoptic exam but given her relief of symptoms, lack of airway involvement, lack of drooling and able to tolerate her own secretions I have a low suspicion for impacted foreign body in her throat  Will discharge home with supportive care and return ER if she does develop any signs or symptoms of obstruction    ED Course         Critical Care Time  Procedures

## 2022-03-17 NOTE — TELEPHONE ENCOUNTER
Regarding: FEELS PILLS STUCK DID NOT FULLY GO DOWN  ----- Message from Jose Mireles sent at 3/16/2022  8:14 PM EDT -----  Patient's daughter called that patient stating her medication feels as it I stuck in her esophagus and did not fully go down

## 2022-03-17 NOTE — ED PROVIDER NOTES
History  Chief Complaint   Patient presents with    Foreign Body in Throat     states tried to take one of her bigger pills around 1945 and it did not go all the way down  many attempts at home to get it down but with no success     Patient is a 63 YO F, PMHx including Graves, HTN and HLD, who presents to the ED with a foreign body sensation in her throat  She states that around 1945 this evening she took her nightly niacin pill  She states that since then, she feels as if the pill is stuck in her throat  Prior to coming in, patient called the after hours PCP nurse line and it was recommended that she try to eat bread as that could potentially help  Patient tried eating bread (which she was able to swallow without difficulty), but it did not help with the sensation  Patient also tried drinking warm water without relief  She denies any coughing while taking the pill  She denies any difficulty swallowing  She denies any difficulty breathing  She denies any other new or concerning symptoms  Prior to Admission Medications   Prescriptions Last Dose Informant Patient Reported? Taking? Niacin ER 1000 MG TBCR   Yes No   Sig: Take 1 tablet by mouth every evening   alendronate (FOSAMAX) 70 mg tablet  Self Yes No   Sig: In AM on empty stomach with a full glass of water  Do not lie down for 30 min   aspirin 81 MG tablet  Self Yes No   Sig: Take 81 mg by mouth daily  Patient not taking: Reported on 2/2/2022    calcium-vitamin D (OSCAL) 250-125 MG-UNIT per tablet  Self Yes No   Sig: Take 1 tablet by mouth daily     cyanocobalamin 1,000 mcg/mL  Self No No   Sig: Injection Q 6-8 weeks re PA   Patient not taking: Reported on 10/4/2021   donepezil (ARICEPT) 10 mg tablet   No No   Sig: take 1 tablet by mouth daily at bedtime   ergocalciferol (VITAMIN D2) 50,000 units  Self Yes No   Sig: TAKE 1 CAPSULE BY MOUTH ONCE A WEEK   gabapentin (NEURONTIN) 100 mg capsule   No No   Sig: Take 1 capsule (100 mg total) by mouth 3 (three) times a day (to control leg pain)   levothyroxine 175 mcg tablet   Yes No   Sig: Take 175 mcg by mouth daily 175 mcg alternating with 150 mcg daily    lisinopril (ZESTRIL) 5 mg tablet   Yes No   Sig: Take 5 mg by mouth daily   metoprolol succinate (TOPROL-XL) 50 mg 24 hr tablet  Self Yes No   Sig: Take 50 mg by mouth daily   nystatin (MYCOSTATIN) powder   No No   Sig: Apply topically 2 (two) times a day ( as need for fungal infection under breast )   traZODone (DESYREL) 50 mg tablet   No No   Sig: Take 1 tablet (50 mg total) by mouth daily at bedtime   triamcinolone (KENALOG) 0 1 % ointment   No No   Sig: Apply topicaly 2-4 times a day for up to 2 weeks  Facility-Administered Medications Last Administration Doses Remaining   cyanocobalamin injection 1,000 mcg 3/10/2022 10:43 AM           Past Medical History:   Diagnosis Date    Acute kidney injury (LAYLA) with acute tubular necrosis (ATN) ([facility]) 2/13/2020    Bed bug bite     LAST ASSESSED [id number]    Chronic pain disorder     Disease of thyroid gland     Extremity pain     Graves' disease     Hyperlipidemia     Hypertension     LLL pneumonia     LAST ASSESSED [id number]    Low back pain     Migraine 2001    OCULAR    Scabies     LAST ASSESSED [id number]    Syncope and collapse     LAST ASSESSED [id number]    Vertigo     RESOLVED        Past Surgical History:   Procedure Laterality Date    CHOLECYSTECTOMY      HYSTERECTOMY      TOTAL THYROIDECTOMY      TUBAL LIGATION Bilateral        Family History   Problem Relation Age of Onset    Breast cancer Sister     Diabetes Family     No Known Problems Mother     No Known Problems Father      I have reviewed and agree with the history as documented      E-Cigarette/Vaping    E-Cigarette Use Never User      E-Cigarette/Vaping Substances    Nicotine No     THC No     CBD No     Flavoring No     Other No     Unknown No      Social History     Tobacco Use    Smoking status: Current Every Day Smoker     Packs/day: 0 25    Smokeless tobacco: Never Used   Vaping Use    Vaping Use: Never used   Substance Use Topics    Alcohol use: Never    Drug use: No        Review of Systems   Constitutional: Negative for chills and fever  HENT: Negative for trouble swallowing and voice change  Foreign body sensation in throat   Respiratory: Negative for cough and shortness of breath  Cardiovascular: Negative for chest pain  Gastrointestinal: Negative for abdominal pain, nausea and vomiting  All other systems reviewed and are negative  Physical Exam  ED Triage Vitals   Temperature Pulse Respirations Blood Pressure SpO2   03/16/22 2053 03/16/22 2053 03/16/22 2053 03/16/22 2053 03/16/22 2053   97 5 °F (36 4 °C) 66 17 (!) 180/80 97 %      Temp Source Heart Rate Source Patient Position - Orthostatic VS BP Location FiO2 (%)   03/16/22 2053 03/16/22 2053 03/16/22 2115 03/16/22 2053 --   Oral Monitor Lying Left arm       Pain Score       --                    Orthostatic Vital Signs  Vitals:    03/16/22 2053 03/16/22 2115 03/16/22 2137   BP: (!) 180/80 135/71 167/73   Pulse: 66 58 59   Patient Position - Orthostatic VS:  Lying Lying       Physical Exam  Vitals and nursing note reviewed  Constitutional:       General: She is not in acute distress  Appearance: She is well-developed  She is not diaphoretic  HENT:      Head: Normocephalic and atraumatic  Right Ear: External ear normal       Left Ear: External ear normal       Nose: Nose normal       Mouth/Throat:      Mouth: Mucous membranes are moist       Pharynx: Oropharynx is clear  No pharyngeal swelling or posterior oropharyngeal erythema  Eyes:      General: Lids are normal  No scleral icterus  Neck:      Trachea: Trachea normal  No tracheal tenderness or abnormal tracheal secretions  Cardiovascular:      Rate and Rhythm: Normal rate and regular rhythm  Heart sounds: Normal heart sounds  No murmur heard  No friction rub   No gallop  Pulmonary:      Effort: Pulmonary effort is normal  No respiratory distress  Breath sounds: Normal breath sounds  No wheezing or rales  Musculoskeletal:         General: No deformity  Normal range of motion  Cervical back: Normal range of motion and neck supple  No edema, erythema, rigidity or crepitus  Skin:     General: Skin is warm and dry  Neurological:      General: No focal deficit present  Mental Status: She is alert  Psychiatric:         Mood and Affect: Mood normal          Behavior: Behavior normal          ED Medications  Medications   Lidocaine Viscous HCl (XYLOCAINE) 2 % mucosal solution 15 mL (15 mL Swish & Swallow Given 3/16/22 2118)       Diagnostic Studies  Results Reviewed     None                 No orders to display         Procedures  Procedures      ED Course  ED Course as of 03/17/22 1156   Wed Mar 16, 2022   2128 Patient re-evaluated  Resting comfortably  States sensation has resolved  Remains able to tolerate secretions  Will d/c  Recommended PCP f/u  If pain persists, recommended ENT f/u (number given)  Return precautions discussed  Patient verbalized understanding and agreed with plan of care  MDM  Number of Diagnoses or Management Options  Foreign body sensation in throat  Throat pain  Diagnosis management comments: Patient is a 64 y o  female who presents to the ED with concern that a niacin pill is stuck in her throat  Pt tolerating her secretions  Patient able to eat and drink without difficulty  No difficulty breathing  I suspect sensation patient is experiencing is due to irritation from the pill  As it has been over an hour since swallowing the pill (I e , most likely dissolved at this point), and patient is currently experience no symptoms consistent with airway or esophageal obstruction, no emergent treatment or imaging necessary  Plan: Viscous lidocaine, re-assessment, d/c  Portions of the record may have been created with voice recognition software  Occasional wrong word or "sound a like" substitutions may have occurred due to the inherent limitations of voice recognition software  Read the chart carefully and recognize, using context, where substitutions have occurred  Risk of Complications, Morbidity, and/or Mortality  Presenting problems: moderate  Diagnostic procedures: minimal  Management options: low    Patient Progress  Patient progress: stable      Disposition  Final diagnoses:   Foreign body sensation in throat   Throat pain     Time reflects when diagnosis was documented in both MDM as applicable and the Disposition within this note     Time User Action Codes Description Comment    3/16/2022  9:30 PM Ino Valdez Add [R09 89] Foreign body sensation in throat     3/16/2022  9:31 PM Ino Floress Add [R07 0] Throat pain       ED Disposition     ED Disposition Condition Date/Time Comment    Discharge Stable Wed Mar 16, 2022  9:30 PM [address] discharge to home/self care              Follow-up Information     Follow up With Specialties Details Why Contact Info Additional Information    Shane Lay DO Family Medicine   [address] [facility]  [address] [facility] [address] [phone]       [facility] Emergency Department Emergency Medicine  As needed [facility] [id number]  [address] [place] [address] Emergency Department, [address] [place] [address], Willian Morris [address] [facility]   [address] [place] [id number]  [address] [facility], [address] [place] [address], [id number] [phone]          Discharge Medication List as of 3/16/2022  9:33 PM      CONTINUE these medications which have NOT CHANGED    Details   alendronate (FOSAMAX) 70 mg tablet In AM on empty stomach with a full glass of water  Do not lie down for 30 min, Historical Med      aspirin 81 MG tablet Take 81 mg by mouth daily  , Historical Med      calcium-vitamin D (OSCAL) 250-125 MG-UNIT per tablet Take 1 tablet by mouth daily  , Historical Med      cyanocobalamin 1,000 mcg/mL Injection Q 6-8 weeks re PA, No Print      donepezil (ARICEPT) 10 mg tablet take 1 tablet by mouth daily at bedtime, Normal      ergocalciferol (VITAMIN D2) 50,000 units TAKE 1 CAPSULE BY MOUTH ONCE A WEEK, Historical Med      gabapentin (NEURONTIN) 100 mg capsule Take 1 capsule (100 mg total) by mouth 3 (three) times a day (to control leg pain), Starting Sat 2/26/2022, Normal      levothyroxine 175 mcg tablet Take 175 mcg by mouth daily 175 mcg alternating with 150 mcg daily , Starting Fri 7/9/2021, Until Sat 7/9/2022, Historical Med      lisinopril (ZESTRIL) 5 mg tablet Take 5 mg by mouth daily, Starting Fri 7/9/2021, Until Sat 7/9/2022, Historical Med      metoprolol succinate (TOPROL-XL) 50 mg 24 hr tablet Take 50 mg by mouth daily, Starting Fri 1/3/2020, Historical Med      Niacin ER 1000 MG TBCR Take 1 tablet by mouth every evening, Starting Fri 7/9/2021, Historical Med      nystatin (MYCOSTATIN) powder Apply topically 2 (two) times a day ( as need for fungal infection under breast ), Starting Wed 12/1/2021, Normal      traZODone (DESYREL) 50 mg tablet Take 1 tablet (50 mg total) by mouth daily at bedtime, Starting Wed 2/2/2022, Normal      triamcinolone (KENALOG) 0 1 % ointment Apply topicaly 2-4 times a day for up to 2 weeks  , Normal           No discharge procedures on file  PDMP Review     None           ED Provider  Attending physically available and evaluated Zeina Boudreaux  JOEL managed the patient along with the ED Attending      Electronically Signed by         Madhuri Mills DO  03/17/22 2176

## 2022-03-17 NOTE — DISCHARGE INSTRUCTIONS
You were seen in the ED for possible foreign body in your throat  Your symptoms improved with viscous lidocaine  Please follow up with your primary care provider  You may also follow up with ENT if your symptoms persists  Return to the emergency department if you develop any difficulty breathing, difficulty eating or drinking, difficulty swallowing, or for any other new or concerning symptoms

## 2022-03-17 NOTE — TELEPHONE ENCOUNTER
Reason for Disposition   Can't swallow water or bread   SEVERE symptoms of pill stuck in throat or esophagus (e g , severe pain, bleeding, or inability to swallow liquids)    Answer Assessment - Initial Assessment Questions  1  OBJECT: "What is it?"       A pill  2  SIZE: "How large is it?" (inches or cm, or compare it to standard coins)        Daughter is unsure-patient does not speak much and is [place]  3  ONSET: "How long ago did you swallow it?" (e g , minutes, hours)       A few minutes ago  4  MECHANISM: "Tell me how it happened "       It got stuck in her throat  5  OTHER SYMPTOMS: "Are there any other symptoms?" (e g , pain in neck or chest, difficulty breathing, difficulty swallowing)      Not drooling-able to swallow water but them it comes back up mucousy  Bread swallowed but it did not move the pill  6   PREGNANCY: "Is there any chance you are pregnant?" "When was your last menstrual period?"      N/A    Protocols used: SWALLOWED FOREIGN BODY-ADULT-

## 2022-03-17 NOTE — TELEPHONE ENCOUNTER
While patient was on the phone with me, I had her daughter try to have her swallow bread and it did nothing to move the pill  Daughter already gave her warm water which did not move the pill  She was able to swallow it but then she just spit up mucous  They will be going to [facility] ER now

## 2022-04-25 DIAGNOSIS — M79.605 PAIN OF LEFT LOWER EXTREMITY: ICD-10-CM

## 2022-04-25 DIAGNOSIS — M51.36 DISC DEGENERATION, LUMBAR: ICD-10-CM

## 2022-04-25 DIAGNOSIS — M79.605 LOW BACK PAIN RADIATING TO LEFT LOWER EXTREMITY: ICD-10-CM

## 2022-04-25 DIAGNOSIS — M54.50 LOW BACK PAIN RADIATING TO LEFT LOWER EXTREMITY: ICD-10-CM

## 2022-04-25 RX ORDER — GABAPENTIN 100 MG/1
100 CAPSULE ORAL 3 TIMES DAILY
Qty: 90 CAPSULE | Refills: 1 | Status: SHIPPED | OUTPATIENT
Start: 2022-04-25 | End: 2022-06-20

## 2022-05-21 DIAGNOSIS — G30.1 LATE ONSET ALZHEIMER'S DISEASE WITH BEHAVIORAL DISTURBANCE (HCC): ICD-10-CM

## 2022-05-21 DIAGNOSIS — F02.81 LATE ONSET ALZHEIMER'S DISEASE WITH BEHAVIORAL DISTURBANCE (HCC): ICD-10-CM

## 2022-05-22 RX ORDER — TRAZODONE HYDROCHLORIDE 50 MG/1
TABLET ORAL
Qty: 60 TABLET | Refills: 1 | Status: SHIPPED | OUTPATIENT
Start: 2022-05-22 | End: 2022-08-10 | Stop reason: SDUPTHER

## 2022-06-16 ENCOUNTER — RA CDI HCC (OUTPATIENT)
Dept: OTHER | Facility: HOSPITAL | Age: 77
End: 2022-06-16

## 2022-06-16 NOTE — PROGRESS NOTES
[address]  coding opportunities       Chart reviewed, no opportunity found: CHART REVIEWED, NO OPPORTUNITY FOUND        Patients Insurance     Medicare Insurance: Medicare

## 2022-06-20 DIAGNOSIS — M54.50 LOW BACK PAIN RADIATING TO LEFT LOWER EXTREMITY: ICD-10-CM

## 2022-06-20 DIAGNOSIS — M79.605 LOW BACK PAIN RADIATING TO LEFT LOWER EXTREMITY: ICD-10-CM

## 2022-06-20 DIAGNOSIS — M51.36 DISC DEGENERATION, LUMBAR: ICD-10-CM

## 2022-06-20 DIAGNOSIS — M79.605 PAIN OF LEFT LOWER EXTREMITY: ICD-10-CM

## 2022-06-20 RX ORDER — LEVOTHYROXINE SODIUM 0.15 MG/1
TABLET ORAL
COMMUNITY
Start: 2022-04-18 | End: 2022-06-20 | Stop reason: SDUPTHER

## 2022-06-20 RX ORDER — NIACIN 1000 MG/1
1000 TABLET, EXTENDED RELEASE ORAL
COMMUNITY
Start: 2022-05-10

## 2022-06-20 RX ORDER — GABAPENTIN 100 MG/1
100 CAPSULE ORAL 3 TIMES DAILY
Qty: 90 CAPSULE | Refills: 0 | Status: SHIPPED | OUTPATIENT
Start: 2022-06-20 | End: 2022-06-21 | Stop reason: SINTOL

## 2022-06-21 ENCOUNTER — OFFICE VISIT (OUTPATIENT)
Dept: FAMILY MEDICINE CLINIC | Facility: CLINIC | Age: 77
End: 2022-06-21
Payer: MEDICARE

## 2022-06-21 VITALS
DIASTOLIC BLOOD PRESSURE: 58 MMHG | OXYGEN SATURATION: 95 % | BODY MASS INDEX: 23.22 KG/M2 | TEMPERATURE: 97.8 F | SYSTOLIC BLOOD PRESSURE: 90 MMHG | HEART RATE: 77 BPM | WEIGHT: 123 LBS | HEIGHT: 61 IN

## 2022-06-21 DIAGNOSIS — D51.0 PERNICIOUS ANEMIA: ICD-10-CM

## 2022-06-21 DIAGNOSIS — I10 ESSENTIAL HYPERTENSION: Primary | ICD-10-CM

## 2022-06-21 DIAGNOSIS — J06.9 VIRAL UPPER RESPIRATORY TRACT INFECTION: ICD-10-CM

## 2022-06-21 DIAGNOSIS — I95.9 HYPOTENSION, UNSPECIFIED HYPOTENSION TYPE: ICD-10-CM

## 2022-06-21 DIAGNOSIS — F17.200 CURRENT SMOKER: ICD-10-CM

## 2022-06-21 DIAGNOSIS — G30.1 LATE ONSET ALZHEIMER'S DISEASE WITH BEHAVIORAL DISTURBANCE (HCC): ICD-10-CM

## 2022-06-21 DIAGNOSIS — E89.0 POSTOPERATIVE HYPOTHYROIDISM: ICD-10-CM

## 2022-06-21 DIAGNOSIS — N18.30 STAGE 3 CHRONIC KIDNEY DISEASE, UNSPECIFIED WHETHER STAGE 3A OR 3B CKD (HCC): Chronic | ICD-10-CM

## 2022-06-21 DIAGNOSIS — J43.9 PULMONARY EMPHYSEMA, UNSPECIFIED EMPHYSEMA TYPE (HCC): ICD-10-CM

## 2022-06-21 DIAGNOSIS — F02.81 LATE ONSET ALZHEIMER'S DISEASE WITH BEHAVIORAL DISTURBANCE (HCC): ICD-10-CM

## 2022-06-21 DIAGNOSIS — I63.81 LACUNAR STROKE (HCC): ICD-10-CM

## 2022-06-21 LAB
SARS-COV-2 AG UPPER RESP QL IA: NEGATIVE
VALID CONTROL: NORMAL

## 2022-06-21 PROCEDURE — 87811 SARS-COV-2 COVID19 W/OPTIC: CPT | Performed by: FAMILY MEDICINE

## 2022-06-21 PROCEDURE — G0439 PPPS, SUBSEQ VISIT: HCPCS | Performed by: FAMILY MEDICINE

## 2022-06-21 PROCEDURE — 99213 OFFICE O/P EST LOW 20 MIN: CPT | Performed by: FAMILY MEDICINE

## 2022-06-21 RX ORDER — LANOLIN ALCOHOL/MO/W.PET/CERES
1000 CREAM (GRAM) TOPICAL DAILY
Start: 2022-06-21

## 2022-06-21 RX ORDER — LEVOTHYROXINE SODIUM 0.15 MG/1
TABLET ORAL
Start: 2022-06-21 | End: 2022-08-10

## 2022-06-21 RX ORDER — METOPROLOL SUCCINATE 50 MG/1
25 TABLET, EXTENDED RELEASE ORAL EVERY MORNING
Start: 2022-06-21

## 2022-06-21 RX ORDER — LEVOTHYROXINE SODIUM 175 UG/1
TABLET ORAL
Qty: 30 TABLET | Refills: 11
Start: 2022-06-21 | End: 2022-08-10

## 2022-06-21 NOTE — PROGRESS NOTES
Shady PINEDO  [address] [facility]  [facility]  [address]  [address], [address] MEDICARE SUBSEQUENT VISIT NOTE ( part 1 )      NAME: Marshall Ramsey  AGE: 64 y o  SEX: female  : 1945   MRN: [id number]    DATE: 2022  TIME: 12:27 PM    Assessment and Plan     Problem List Items Addressed This Visit     Chronic kidney disease, stage III (moderate) (HCC) (Chronic)    Postoperative hypothyroidism (Chronic)    Relevant Medications    levothyroxine 175 mcg tablet    levothyroxine 150 mcg tablet    metoprolol succinate (TOPROL-XL) 50 mg 24 hr tablet    Hypotension, unspecified    Lacunar stroke ([facility] )    Essential hypertension - Primary    Relevant Medications    metoprolol succinate (TOPROL-XL) 50 mg 24 hr tablet    Late onset Alzheimer's disease with behavioral disturbance ([facility] )    Current smoker    Pernicious anemia    Relevant Medications    vitamin B-12 (VITAMIN B-12) 1,000 mcg tablet    Other Relevant Orders    Vitamin B12      Other Visit Diagnoses     Viral upper respiratory tract infection        Relevant Orders    POCT Rapid Covid Ag (Completed)    Pulmonary emphysema ([facility] )              Medicare Wellness Counseling/ Discussion    Patient Instructions     Reviewed health history along with medications  Her blood pressure is low here today, she does have what appears to be viral respiratory infection which started today, cough with mucus, loose stools  COVID test here today is negative, she has had no sick contacts  Observe, call us if worsens  She is down to 4 cigarettes daily, keep trying to keep cigarettes away from her  With blood pressure being low I would like her to decrease metoprolol 50 mg to 1/2 tablet every day, she can stay on lisinopril 5 mg daily as is  She does continue to see Endocrinology, when she saw them in March they had added Farxiga, A1c was 5 5 at that time  She is not checking sugars at home    It appears she has lost about 10 lb with medication, watch healthy diet  They also had adjusted her levothyroxine, currently using 175 mcg Saturday and Sunday, 150 mcg other 5 days  She will be re-doing blood work in July  I would like her to include a B12 level at that time, she last received B12 injection few months ago, history pernicious anemia, after discussion she will try oral B12 1000 mcg every day  If B12 low with next blood work we will resume injections  She does have some sedation, she will stop gabapentin, currently on 100 mg 3 times daily  She does sleep well with trazodone 50 mg at bedtime  She will be seeing geriatric group again in August, stay on donepezil 10 mg daily as is  She remains off Celexa  She had stopped that earlier this year  Reviewed other blood work from March, BUN/creatinine 21/1 02 with potassium 4 7, cholesterol was 170 with HDL 62, LDL 90  TSH was 0 39, CBC was okay with hemoglobin 13  Immunization History   Administered Date(s) Administered    COVID-19 MODERNA VACC 0 5 ML IM 03/29/2021, 04/30/2021, 04/12/2022    Influenza Split High Dose Preservative Free IM 09/18/2012, 11/14/2013, 09/25/2014, 09/08/2015, 08/29/2017    Influenza, high dose seasonal 0 7 mL 09/12/2019, 11/10/2020, 10/04/2021    Influenza, seasonal, injectable 09/27/2011    Pneumococcal Conjugate 13-Valent 06/20/2017    Pneumococcal Polysaccharide PPV23 01/16/2009    Td (adult), adsorbed 09/14/2007    Tdap 10/08/2021    influenza, trivalent, adjuvanted 09/30/2018       Discussed Vaccines, she did receive Shingrix at her endocrinology office  Regarding Colon Cancer screening,    Not indicated    She does not see her Gynecologist routinely  Mammogram screening was discussed, is  declined  Discussed bone density screening/ DEXA Scan, she does this via endocrinology  Vitamin-D level was over 100, currently on vitamin-D every other week       We discussed end of life planning, she does have a "LIVING WILL"     Glaucoma screening is up-to-date  Also sees Dermatology, was told has eczema, not psoriasis, rash is improved  We will see her back in 4 months, sooner as needed  Chief Complaint     Chief Complaint   Patient presents with   [facility] Visit     C/o cough with discharge, fatigue and diarrhea  Rapid Covid test done in office   Test results negative         History of Present Illness     Joel Tony is a 64y o -year-old female who is in today accompanied by her daughter with whom she resides  Today she has cough with congestion, some loose stools  She has had no known sick contacts  She does continue to see Endocrinology, they added Farxiga back in March, she has dropped about 10 lb  Dementia about the same, sleeps often  Is smoking about 4 cigarettes daily  Review of Systems     Review of Systems   Constitutional: Positive for fatigue and unexpected weight change  Negative for appetite change (Appetite remains about the same) and fever  HENT: Negative for sore throat and trouble swallowing  Respiratory: Positive for cough (Chronic mild, increased today with yellow mucus)  Negative for chest tightness  She has had no increased shortness of breath or respiratory distress   Cardiovascular: Negative for chest pain, palpitations and leg swelling  Gastrointestinal: Negative for abdominal pain, blood in stool, nausea and vomiting  No acid reflux        Genitourinary: Negative for dysuria and hematuria  Neurological: Positive for light-headedness (Occasional, not current)  Negative for dizziness, syncope and headaches  Psychiatric/Behavioral: Positive for confusion         Active Problem List     Patient Active Problem List   Diagnosis    Sinus bradycardia    Postoperative hypothyroidism    Essential hypertension    Urinary incontinence    Chronic kidney disease, stage III (moderate) (HCC)    Umbilical hernia    Trigger middle finger of right hand    Current smoker    Thyroid nodule    Pernicious anemia    Osteoarthritis    Neuropathy of both feet    Low back pain radiating to left lower extremity    [facility] stroke ([facility])    Hyperglycemia    Hypercholesterolemia    Graves' disease    Gait disturbance    Dizziness    Disc degeneration, lumbar    Late onset Alzheimer's disease with behavioral disturbance ([facility])    Cough, chronic -  improved    Hearing loss    Recurrent major depressive disorder, in partial remission (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) 2/13/2020    Bed bug bite     LAST ASSESSED [id number]    Chronic pain disorder     Disease of thyroid gland     Extremity pain     Graves' disease     Hyperlipidemia     Hypertension     LLL pneumonia     LAST ASSESSED [id number]    Low back pain     Migraine 2001    OCULAR    Scabies     LAST ASSESSED [id number]    Syncope and collapse     LAST ASSESSED [id number]    Vertigo     RESOLVED        Past Surgical History:  Past Surgical History:   Procedure Laterality Date    CHOLECYSTECTOMY      HYSTERECTOMY      TOTAL THYROIDECTOMY      TUBAL LIGATION Bilateral        Family History:  Family History   Problem Relation Age of Onset    Breast cancer Sister     Diabetes Family     No Known Problems Mother     No Known Problems Father        Social History:  Social History     Tobacco Use    Smoking status: Current Every Day Smoker     Packs/day: 0 25    Smokeless tobacco: Never Used   Substance Use Topics    Alcohol use: Never       Objective     Vitals:    06/21/22 1142   BP: 90/58   BP Location: Left arm   Patient Position: Sitting   Cuff Size: Large   Pulse: 77   Temp: 97 8 °F (36 6 °C)   SpO2: 95%   Weight: 55 8 kg (123 lb)   Height: 5' 1" (1 549 m)     Body mass index is 23 24 kg/m²  BP Readings from Last 3 Encounters:   06/21/22 90/58   03/16/22 167/73   02/17/22 132/70       Wt Readings from Last 3 Encounters:   06/21/22 55 8 kg (123 lb)   03/16/22 60 8 kg (134 lb 0 6 oz)   02/17/22 61 7 kg (136 lb)       Physical Exam  Constitutional:       Appearance: She is well-developed  Comments: 25-year-old female seated on table, withdrawn, falls asleep easily  Oxygenating okay on room air here today  Rapid COVID test is negative  Blood pressure is low but she is not orthostatic   Eyes:      General: No scleral icterus  Cardiovascular:      Rate and Rhythm: Normal rate and regular rhythm  Heart sounds: Murmur (1/6 systolic ejection murmur) heard  Pulmonary:      Comments: She has no respiratory distress  Initially somewhat rhonchitic, after cough her lungs are clear and equal bilateral  Abdominal:      Palpations: Abdomen is soft  Tenderness: There is no abdominal tenderness  There is no guarding  Musculoskeletal:      Right lower leg: No edema  Left lower leg: No edema  Lymphadenopathy:      Cervical: No cervical adenopathy  Skin:     Coloration: Skin is not jaundiced  Neurological:      Mental Status: Mental status is at baseline  ALLERGIES:  Allergies   Allergen Reactions    Naproxen Itching    Niacin     Nsaids Other (See Comments) and Swelling    Azithromycin Nausea Only and Rash    Cephalexin Rash       Current Medications     Current Outpatient Medications   Medication Sig Dispense Refill    alendronate (FOSAMAX) 70 mg tablet In AM on empty stomach with a full glass of water  Do not lie down for 30 min      calcium-vitamin D (OSCAL) 250-125 MG-UNIT per tablet Take 1 tablet by mouth daily        Dapagliflozin Propanediol 10 MG TABS Take 10 mg by mouth daily      donepezil (ARICEPT) 10 mg tablet take 1 tablet by mouth daily at bedtime 30 tablet 6    ergocalciferol (VITAMIN D2) 50,000 units 1 tablet every other week      levothyroxine 150 mcg tablet Use 175mcg every Sat & Sun, use 150 mcg other 5 days      levothyroxine 175 mcg tablet Use 175mcg every Sat & Sun, use 150 mcg other 5 days 30 tablet 11    lisinopril (ZESTRIL) 5 mg tablet Take 5 mg by mouth daily      metoprolol succinate (TOPROL-XL) 50 mg 24 hr tablet Take 0 5 tablets (25 mg total) by mouth every morning      niacin (NIASPAN) 1000 MG CR tablet Take 1,000 mg by mouth daily at bedtime      traZODone (DESYREL) 50 mg tablet take 1 tablet by mouth at bedtime 60 tablet 1    triamcinolone (KENALOG) 0 1 % ointment Apply topicaly 2-4 times a day for up to 2 weeks  453 6 g 0    vitamin B-12 (VITAMIN B-12) 1,000 mcg tablet Take 1 tablet (1,000 mcg total) by mouth daily      aspirin 81 MG tablet Take 81 mg by mouth daily   (Patient not taking: No sig reported)       Current Facility-Administered Medications   Medication Dose Route Frequency Provider Last Rate Last Admin    cyanocobalamin injection 1,000 mcg  1,000 mcg Intramuscular Q56 Days Sarmad Ashby,    1,000 mcg at 03/10/22 1043         Health Maintenance     See other note today re clinical AWV info             Most recent labs available from [address]   ( others may be available in Care Everywhere / Media sections)  Lab Results   Component Value Date    WBC 13 26 (H) 06/09/2021    HGB 12 1 06/09/2021    HCT 38 0 06/09/2021     06/09/2021    TRIG 121 06/19/2019    HDL 35 (L) 06/19/2019    ALT 20 06/09/2021    AST 35 06/09/2021     06/08/2015    K 5 0 06/25/2021    CL 99 06/25/2021    CREATININE 1 21 (H) 06/25/2021    BUN 17 06/25/2021    CO2 29 06/25/2021    TSH 2 20 04/21/2020    INR 0 99 02/18/2020    GLUF 90 01/11/2018    HGBA1C 5 5 03/01/2022       Orders Placed This Encounter Procedures    Vitamin B12    POCT Rapid Covid Ag             Alina Fernandez DO

## 2022-06-21 NOTE — PATIENT INSTRUCTIONS
Reviewed health history along with medications  Her blood pressure is low here today, she does have what appears to be viral respiratory infection which started today, cough with mucus, loose stools  COVID test here today is negative, she has had no sick contacts  Observe, call us if worsens  She is down to 4 cigarettes daily, keep trying to keep cigarettes away from her  With blood pressure being low I would like her to decrease metoprolol 50 mg to 1/2 tablet every day, she can stay on lisinopril 5 mg daily as is  She does continue to see Endocrinology, when she saw them in March they had added Farxiga, A1c was 5 5 at that time  She is not checking sugars at home  It appears she has lost about 10 lb with medication, watch healthy diet  They also had adjusted her levothyroxine, currently using 175 mcg Saturday and Sunday, 150 mcg other 5 days  She will be re-doing blood work in July  I would like her to include a B12 level at that time, she last received B12 injection few months ago, history pernicious anemia, after discussion she will try oral B12 1000 mcg every day  If B12 low with next blood work we will resume injections  She does have some sedation, she will stop gabapentin, currently on 100 mg 3 times daily  She does sleep well with trazodone 50 mg at bedtime  She will be seeing geriatric group again in August, stay on donepezil 10 mg daily as is  She remains off Celexa  She had stopped that earlier this year  Reviewed other blood work from March, BUN/creatinine 21/1 02 with potassium 4 7, cholesterol was 170 with HDL 62, LDL 90  TSH was 0 39, CBC was okay with hemoglobin 13       Immunization History   Administered Date(s) Administered    COVID-19 MODERNA VACC 0 5 ML IM 03/29/2021, 04/30/2021, 04/12/2022    Influenza Split High Dose Preservative Free IM 09/18/2012, 11/14/2013, 09/25/2014, 09/08/2015, 08/29/2017    Influenza, high dose seasonal 0 7 mL 09/12/2019, 11/10/2020, 10/04/2021    Influenza, seasonal, injectable 09/27/2011    Pneumococcal Conjugate 13-Valent 06/20/2017    Pneumococcal Polysaccharide PPV23 01/16/2009    Td (adult), adsorbed 09/14/2007    Tdap 10/08/2021    influenza, trivalent, adjuvanted 09/30/2018       Discussed Vaccines, she did receive Shingrix at her endocrinology office  Regarding Colon Cancer screening,    Not indicated    She does not see her Gynecologist routinely  Mammogram screening was discussed, is  declined  Discussed bone density screening/ DEXA Scan, she does this via endocrinology  Vitamin-D level was over 100, currently on vitamin-D every other week  We discussed end of life planning, she does have a  "LIVING WILL"     Glaucoma screening is up-to-date  Also sees Dermatology, was told has eczema, not psoriasis, rash is improved  We will see her back in 4 months, sooner as needed

## 2022-06-21 NOTE — PROGRESS NOTES
Assessment and Plan:     Problem List Items Addressed This Visit     Chronic kidney disease, stage III (moderate) (HCC) (Chronic)    Postoperative hypothyroidism (Chronic)    Relevant Medications    levothyroxine 175 mcg tablet    levothyroxine 150 mcg tablet    metoprolol succinate (TOPROL-XL) 50 mg 24 hr tablet    Hypotension, unspecified    Lacunar stroke ([facility] )    Essential hypertension - Primary    Relevant Medications    metoprolol succinate (TOPROL-XL) 50 mg 24 hr tablet    Late onset Alzheimer's disease with behavioral disturbance (HCC)    Current smoker    Pernicious anemia    Relevant Medications    vitamin B-12 (VITAMIN B-12) 1,000 mcg tablet    Other Relevant Orders    Vitamin B12      Other Visit Diagnoses     Viral upper respiratory tract infection        Relevant Orders    POCT Rapid Covid Ag (Completed)    Pulmonary emphysema ([facility] )               Preventive health issues were discussed with patient, and age appropriate screening tests were ordered as noted in patient's After Visit Summary  Personalized health advice and appropriate referrals for health education or preventive services given if needed, as noted in patient's After Visit Summary      See other provider note today     History of Present Illness:     Patient presents for a Medicare Wellness Visit    HPI   Patient Care Team:  Virgil Langford DO as PCP - Gildardo Becerril MD     Review of Systems:     Review of Systems     Problem List:     Patient Active Problem List   Diagnosis    Sinus bradycardia    Postoperative hypothyroidism    Essential hypertension    Urinary incontinence    Chronic kidney disease, stage III (moderate) ([address] )    Umbilical hernia    Trigger middle finger of right hand    Current smoker    Thyroid nodule    Pernicious anemia    Osteoarthritis    Neuropathy of both feet    Low back pain radiating to left lower extremity    Lacunar stroke ([facility] )    Hyperglycemia    Hypercholesterolemia   Lisa Jenkins disease    Gait disturbance    Dizziness    Disc degeneration, lumbar    Late onset Alzheimer's disease with behavioral disturbance (HCC)    Cough, chronic -  improved    Hearing loss    Recurrent major depressive disorder, in partial remission (HCC)    Visual impairment    Full dentures    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[facility]    Fall    Anemia    Hypotension, unspecified    Hypocalcemia    Spinal stenosis of lumbar region with neurogenic claudication    Eczema    Vitamin D deficiency    Sleep disturbance    Insomnia due to other mental disorder      Past Medical and Surgical History:     Past Medical History:   Diagnosis Date    Acute kidney injury (LAYLA) with acute tubular necrosis (ATN) ([facility] ) 2/13/2020    Bed bug bite     LAST ASSESSED [id number]    Chronic pain disorder     Disease of thyroid gland     Extremity pain     Graves' disease     Hyperlipidemia     Hypertension     LLL pneumonia     LAST ASSESSED [id number]    Low back pain     Migraine 2001    OCULAR    Scabies     LAST ASSESSED [id number]    Syncope and collapse     LAST ASSESSED [id number]    Vertigo     RESOLVED      Past Surgical History:   Procedure Laterality Date    CHOLECYSTECTOMY      HYSTERECTOMY      TOTAL THYROIDECTOMY      TUBAL LIGATION Bilateral       Family History:     Family History   Problem Relation Age of Onset    Breast cancer Sister     Diabetes Family     No Known Problems Mother     No Known Problems Father       Social History:     Social History     Socioeconomic History    Marital status:       Spouse name: None    Number of children: None    Years of education: None    Highest education level: None   Occupational History    None   Tobacco Use    Smoking status: Current Every Day Smoker     Packs/day: 0 25    Smokeless tobacco: Never Used   Vaping Use    Vaping Use: Never used   Substance and Sexual Activity    Alcohol use: Never    Drug use: No    Sexual activity: Not Currently   Other Topics Concern    None   Social History Narrative    COPY OF ADVANCED DIRECTIVE OBTAINED FROM PATIENT      Social Determinants of Health     Financial Resource Strain: Not on file   Food Insecurity: Not on file   Transportation Needs: Not on file   Physical Activity: Not on file   Stress: Not on file   Social Connections: Not on file   Intimate Partner Violence: Not on file   Housing Stability: Not on file      Medications and Allergies:     Current Outpatient Medications   Medication Sig Dispense Refill    alendronate (FOSAMAX) 70 mg tablet In AM on empty stomach with a full glass of water  Do not lie down for 30 min      calcium-vitamin D (OSCAL) 250-125 MG-UNIT per tablet Take 1 tablet by mouth daily   Dapagliflozin Propanediol 10 MG TABS Take 10 mg by mouth daily      donepezil (ARICEPT) 10 mg tablet take 1 tablet by mouth daily at bedtime 30 tablet 6    ergocalciferol (VITAMIN D2) 50,000 units 1 tablet every other week      levothyroxine 150 mcg tablet Use 175mcg every Sat & Sun, use 150 mcg other 5 days      levothyroxine 175 mcg tablet Use 175mcg every Sat & Sun, use 150 mcg other 5 days 30 tablet 11    lisinopril (ZESTRIL) 5 mg tablet Take 5 mg by mouth daily      metoprolol succinate (TOPROL-XL) 50 mg 24 hr tablet Take 0 5 tablets (25 mg total) by mouth every morning      niacin (NIASPAN) 1000 MG CR tablet Take 1,000 mg by mouth daily at bedtime      traZODone (DESYREL) 50 mg tablet take 1 tablet by mouth at bedtime 60 tablet 1    triamcinolone (KENALOG) 0 1 % ointment Apply topicaly 2-4 times a day for up to 2 weeks  453 6 g 0    vitamin B-12 (VITAMIN B-12) 1,000 mcg tablet Take 1 tablet (1,000 mcg total) by mouth daily      aspirin 81 MG tablet Take 81 mg by mouth daily   (Patient not taking: No sig reported)       Current Facility-Administered Medications   Medication Dose Route Frequency Provider Last Rate Last Admin    cyanocobalamin injection 1,000 mcg  1,000 mcg Intramuscular Q56 Days Wilberto Tiwari, DO   1,000 mcg at 03/10/22 1043     Allergies   Allergen Reactions    Naproxen Itching    Niacin     Nsaids Other (See Comments) and Swelling    Azithromycin Nausea Only and Rash    Cephalexin Rash      Immunizations:     Immunization History   Administered Date(s) Administered    COVID-19 MODERNA VACC 0 5 ML IM 03/29/2021, 04/30/2021, 04/12/2022    Influenza Split High Dose Preservative Free IM 09/18/2012, 11/14/2013, 09/25/2014, 09/08/2015, 08/29/2017    Influenza, high dose seasonal 0 7 mL 09/12/2019, 11/10/2020, 10/04/2021    Influenza, seasonal, injectable 09/27/2011    Pneumococcal Conjugate 13-Valent 06/20/2017    Pneumococcal Polysaccharide PPV23 01/16/2009    Td (adult), adsorbed 09/14/2007    Tdap 10/08/2021    influenza, trivalent, adjuvanted 09/30/2018      Health Maintenance:         Topic Date Due    Hepatitis C Screening  Completed     There are no preventive care reminders to display for this patient  Medicare Screening Tests and Risk Assessments:     Mario Huber is here for her Subsequent Wellness visit  Health Risk Assessment:   Patient rates overall health as fair  Patient feels that their physical health rating is same  Patient is satisfied with their life  Eyesight was rated as same  Hearing was rated as same  Patient feels that their emotional and mental health rating is same  Patients states they are never, rarely angry  Patient states they are sometimes unusually tired/fatigued  Pain experienced in the last 7 days has been some  Patient's pain rating has been 3/10  Patient states that she has experienced no weight loss or gain in last 6 months  Fall Risk Screening: In the past year, patient has experienced: no history of falling in past year      Urinary Incontinence Screening:   Patient has not leaked urine accidently in the last six months  Home Safety:  Patient does not have trouble with stairs inside or outside of their home   Patient has working smoke alarms and has no working carbon monoxide detector  Home safety hazards include: none  Nutrition:   Current diet is Regular  Medications:   Patient is currently taking over-the-counter supplements  OTC medications include: Calcium  Patient is able to manage medications  Activities of Daily Living (ADLs)/Instrumental Activities of Daily Living (IADLs):   Walk and transfer into and out of bed and chair?: Yes  Dress and groom yourself?: Yes    Bathe or shower yourself?: Yes    Feed yourself? Yes  Do your laundry/housekeeping?: Yes  Manage your money, pay your bills and track your expenses?: Yes  Make your own meals?: Yes    Do your own shopping?: No    Previous Hospitalizations:   Any hospitalizations or ED visits within the last 12 months?: No      Advance Care Planning:   Living will: Yes    Durable POA for healthcare: Yes    Advanced directive: Yes      PREVENTIVE SCREENINGS      Cardiovascular Screening:    General: Screening Not Indicated and History Lipid Disorder      Diabetes Screening:     General: Screening Current      Cervical Cancer Screening:    General: Screening Not Indicated      Lung Cancer Screening:     General: Screening Not Indicated      Hepatitis C Screening:    General: Screening Current    Screening, Brief Intervention, and Referral to Treatment (SBIRT)    Screening  Typical number of drinks in a day: 0  Typical number of drinks in a week: 0  Interpretation: Low risk drinking behavior      AUDIT-C Screenin) How often did you have a drink containing alcohol in the past year? never  2) How many drinks did you have on a typical day when you were drinking in the past year? 0  3) How often did you have 6 or more drinks on one occasion in the past year? never    AUDIT-C Score: 0  Interpretation: Score 0-2 (female): Negative screen for alcohol misuse    Single Item Drug Screening:  How often have you used an illegal drug (including marijuana) or a prescription medication for non-medical reasons in the past year? never    Single Item Drug Screen Score: 0  Interpretation: Negative screen for possible drug use disorder    No exam data present     Physical Exam:     BP 90/58 (BP Location: Left arm, Patient Position: Sitting, Cuff Size: Large)   Pulse 77   Temp 97 8 °F (36 6 °C)   Ht 5' 1" (1 549 m)   Wt 55 8 kg (123 lb)   SpO2 95%   BMI 23 24 kg/m²     Physical Exam     Catia Ask, DO

## 2022-07-14 LAB — VIT B12 SERPL-MCNC: 825 PG/ML (ref 200–1100)

## 2022-07-26 DIAGNOSIS — L30.8 PSORIASIFORM DERMATITIS: ICD-10-CM

## 2022-07-26 NOTE — TELEPHONE ENCOUNTER
Vm recd from patient requesting refill triamcinolone 0 1% oint     Returned call no answer     Detailed msg left informing the message was received and routed to provider

## 2022-08-10 ENCOUNTER — OFFICE VISIT (OUTPATIENT)
Dept: GERIATRICS | Age: 77
End: 2022-08-10
Payer: MEDICARE

## 2022-08-10 VITALS
RESPIRATION RATE: 16 BRPM | HEART RATE: 49 BPM | HEIGHT: 61 IN | BODY MASS INDEX: 23.41 KG/M2 | OXYGEN SATURATION: 97 % | TEMPERATURE: 97.3 F | SYSTOLIC BLOOD PRESSURE: 120 MMHG | WEIGHT: 124 LBS | DIASTOLIC BLOOD PRESSURE: 56 MMHG

## 2022-08-10 DIAGNOSIS — F02.818 LATE ONSET ALZHEIMER'S DISEASE WITH BEHAVIORAL DISTURBANCE (HCC): Primary | ICD-10-CM

## 2022-08-10 DIAGNOSIS — H91.8X3 OTHER SPECIFIED HEARING LOSS OF BOTH EARS: ICD-10-CM

## 2022-08-10 DIAGNOSIS — G30.1 LATE ONSET ALZHEIMER'S DISEASE WITH BEHAVIORAL DISTURBANCE (HCC): Primary | ICD-10-CM

## 2022-08-10 DIAGNOSIS — I63.81 LACUNAR STROKE (HCC): ICD-10-CM

## 2022-08-10 DIAGNOSIS — I10 ESSENTIAL HYPERTENSION: ICD-10-CM

## 2022-08-10 DIAGNOSIS — E89.0 POSTOPERATIVE HYPOTHYROIDISM: Chronic | ICD-10-CM

## 2022-08-10 PROCEDURE — 99215 OFFICE O/P EST HI 40 MIN: CPT | Performed by: FAMILY MEDICINE

## 2022-08-10 RX ORDER — LEVOTHYROXINE SODIUM 137 UG/1
150 TABLET ORAL DAILY
COMMUNITY
Start: 2022-07-20

## 2022-08-10 RX ORDER — TRAZODONE HYDROCHLORIDE 50 MG/1
50 TABLET ORAL
Qty: 60 TABLET | Refills: 1 | Status: SHIPPED | OUTPATIENT
Start: 2022-08-10

## 2022-08-10 NOTE — PROGRESS NOTES
Assessment & Plan:   Mel Barajas was seen today for follow-up  Diagnoses and all orders for this visit:    Late onset Alzheimer's disease with behavioral disturbance [facility])    Essential hypertension    Other specified hearing loss of both ears    Postoperative hypothyroidism    Lacunar stroke [facility])      Postoperative hypothyroidism   Patient follows with Endocrinology  Maintained on levothyroxine 137 mcg daily  Late onset Alzheimer's disease with behavioral disturbance ([facility] )  MOCA was 9/30, the same score she did last year  Continue supportive care from family  Encouraged participation in social activities at senior   Maintain socially, mentally, physically active   follow-up in 6 months  Essential hypertension   BP stable  Continue current regimen  Hearing loss   Advise to wear hearing aids at appropriate time  Lacunar stroke ([facility] )  CT head 2021 revealed Stable encephalomalacia and gliosis in the right inferior parietal temporal region and in the left frontal lobe and operculum compatible with chronic infarcts  continue aspirin and statin  HPI:  We had the pleasure of evaluating Julianna Ashraf who is a 64 y o  female   in Geriatric follow up today  She lives with daughter, Khris Miranda and granddaughter Winn Mad Ms Quita Claude is in the office with her daughter and Granddaughter today  Patient seen with Dr Parnell Spine  As per daughter and granddaughter, patient's memory has been worsening  No changes in appetite  She sleeps well  She goes to bed around 6:30  She wears hearing aids  Recognizes some familiar relatives, including daughter and granddaughter  She is dependent in most ADLs  Feeds by herself, but needs assistance in bathing, and dressing  No signs of depression  No hallucinations reported  No cane nor walker  Urinary incontinence for which she wears brief  Intermittent constipation and diarrhea    Was placed on Farxiga and family concerned of wt loss as a side effect  However, continue Wenona as per provider  No difficulty swallowing pills  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[employer] [facility] [employer] [facility] [employer] and would like to stay home as long as possible, and family respect her wish  Per patient report, she states she is doing well  She has no complaints  She enjoys watching TV and playing bingo  She c/o itchy rash on her body which bothers her at night time  She has normal appetite  Has BMs daily  ROS: Review of Systems   Unable to perform ROS: Dementia       Allergies: Allergies   Allergen Reactions    Naproxen Itching    Niacin     Nsaids Other (See Comments) and Swelling    Azithromycin Nausea Only and Rash    Cephalexin Rash       Medications:      Current Outpatient Medications:     alendronate (FOSAMAX) 70 mg tablet, Take 75 mg by mouth every 7 days, Disp: , Rfl:     aspirin 81 MG tablet, Take 81 mg by mouth daily, Disp: , Rfl:     calcium-vitamin D (OSCAL) 250-125 MG-UNIT per tablet, Take 1 tablet by mouth daily  , Disp: , Rfl:     donepezil (ARICEPT) 10 mg tablet, take 1 tablet by mouth daily at bedtime, Disp: 30 tablet, Rfl: 6    ergocalciferol (VITAMIN D2) 50,000 units, 1 tablet every other week, Disp: , Rfl:     levothyroxine 137 mcg tablet, Take 137 mcg by mouth daily, Disp: , Rfl:     lisinopril (ZESTRIL) 5 mg tablet, Take 5 mg by mouth daily, Disp: , Rfl:     metoprolol succinate (TOPROL-XL) 50 mg 24 hr tablet, Take 0 5 tablets (25 mg total) by mouth every morning, Disp: , Rfl:     niacin (NIASPAN) 1000 MG CR tablet, Take 1,000 mg by mouth daily at bedtime, Disp: , Rfl:     traZODone (DESYREL) 50 mg tablet, Take 1 tablet (50 mg total) by mouth daily at bedtime, Disp: 60 tablet, Rfl: 1    vitamin B-12 (VITAMIN B-12) 1,000 mcg tablet, Take 1 tablet (1,000 mcg total) by mouth daily, Disp: , Rfl:     Dapagliflozin Propanediol 10 MG TABS, Take 10 mg by mouth daily, Disp: , Rfl:     triamcinolone (KENALOG) 0 1 % ointment, Apply topicaly 2-4 times a day for up to 2 weeks  (Patient not taking: Reported on 8/10/2022), Disp: 80 g, Rfl: 0    Current Facility-Administered Medications:     cyanocobalamin injection 1,000 mcg, 1,000 mcg, Intramuscular, Q56 Days, Shane DO Alireza, 1,000 mcg at 03/10/22 1043    Vitals:  Vitals:    08/10/22 1510   BP: 120/56   Pulse: (!) 49   Resp: 16   Temp: (!) 97 3 °F (36 3 °C)   SpO2: 97%       History:  Past Medical History:   Diagnosis Date    Acute kidney injury (LAYLA) with acute tubular necrosis (ATN) ([facility] 2/13/2020    Bed bug bite     LAST ASSESSED [id number]    Chronic pain disorder     Disease of thyroid gland     Extremity pain     Graves' disease     Hyperlipidemia     Hypertension     LLL pneumonia     LAST ASSESSED [id number]    Low back pain     Migraine 2001    OCULAR    Scabies     LAST ASSESSED [id number]    Syncope and collapse     LAST ASSESSED [id number]    Vertigo     RESOLVED      Past Surgical History:   Procedure Laterality Date    CHOLECYSTECTOMY      HYSTERECTOMY      TOTAL THYROIDECTOMY      TUBAL LIGATION Bilateral      Family History   Problem Relation Age of Onset    Breast cancer Sister     Diabetes Family     No Known Problems Mother     No Known Problems Father      Social History     Socioeconomic History    Marital status:       Spouse name: Not on file    Number of children: Not on file    Years of education: Not on file    Highest education level: Not on file   Occupational History    Not on file   Tobacco Use    Smoking status: Current Every Day Smoker     Packs/day: 0 25    Smokeless tobacco: Never Used   Vaping Use    Vaping Use: Never used   Substance and Sexual Activity    Alcohol use: Never    Drug use: No    Sexual activity: Not Currently   Other Topics Concern    Not on file   Social History Narrative    COPY OF ADVANCED DIRECTIVE OBTAINED FROM PATIENT      Social Determinants of Health     Financial Resource Strain: Not on file   Food Insecurity: Not on file Transportation Needs: Not on file   Physical Activity: Not on file   Stress: Not on file   Social Connections: Not on file   Intimate Partner Violence: Not on file   Housing Stability: Not on file     Past Surgical History:   Procedure Laterality Date    CHOLECYSTECTOMY      HYSTERECTOMY      TOTAL THYROIDECTOMY      TUBAL LIGATION Bilateral          Physical Exam:   Physical Exam  Vitals and nursing note reviewed  Constitutional:       General: She is not in acute distress  HENT:      Head: Atraumatic  Right Ear: There is no impacted cerumen  Left Ear: There is no impacted cerumen  Nose: Nose normal       Mouth/Throat:      Mouth: Mucous membranes are moist    Eyes:      General:         Right eye: No discharge  Left eye: No discharge  Conjunctiva/sclera: Conjunctivae normal    Cardiovascular:      Rate and Rhythm: Normal rate and regular rhythm  Heart sounds: No murmur heard  Pulmonary:      Effort: Pulmonary effort is normal       Breath sounds: Normal breath sounds  No rales  Abdominal:      General: Bowel sounds are normal  There is no distension  Palpations: Abdomen is soft  Tenderness: There is no abdominal tenderness  Musculoskeletal:         General: No deformity  Normal range of motion  Cervical back: Neck supple  Right lower leg: No edema  Left lower leg: No edema  Skin:     General: Skin is warm  Findings: Rash (circular pink patches with scales in LEs and erythematous papules scattered on back, arms, and hands) present  Neurological:      Mental Status: She is alert and oriented to person, place, and time     Psychiatric:         Behavior: Behavior normal

## 2022-08-10 NOTE — ASSESSMENT & PLAN NOTE
MOCA was 9/30, the same score she did last year  Continue supportive care from family  Encouraged participation in social activities at senior   Maintain socially, mentally, physically active   follow-up in 6 months

## 2022-08-10 NOTE — PROGRESS NOTES
Vannesa Beaver [address], [address] [place] [address], [address] [place] [address]  [phone]    Social Work Follow-Up    LSW met with Abhinav Iglesias for follow up visit  Completed Jack Cognitive Assessment, score 9/30 (previously 10/30 in 02/02/22), and Geriatric Depression Screen, 3/15 (previously 3/15 in 04/21/22)  LSW also met with family who requested information on increasing days at [address] [place] [address] that the [facility] currently covers the cost for  Family stated that they have talked with the  but they have stated due to her assets she does not qualify for additional days  LSW suggested contacting the [place] [employer] to see if they can assist with increased home health aides in the home or increasing days at the adult day center  Larue Cognitive Assessment ([employer]) Version 8 1  Education: High School    Points Earned POSSIBLE Points   Visuospatial/Executive   Alternating Tillman Making 0 1   Visuoconstructional skills 0 1   Visuoconstructional skills (clock) 1 3   Naming   Naming Animals 1 3   Attention   Digit Span 2 2   Vigilance (letters) 0 1   Serial 7 subtraction 0 3   Language   Sentence Repetition 0 2   Verbal fluency 0 1   Abstraction   Abstraction (word pairings) 1 2   Delayed recall   Delayed recall 0 5   Memory index score: /15   Orientation   Orientation 3 6   TOTAL SCORE: 9/30  (Normal ?26/30)   Additional notes:      LSW to remain available as needed

## 2022-08-10 NOTE — ASSESSMENT & PLAN NOTE
CT head 2021 revealed Stable encephalomalacia and gliosis in the right inferior parietal temporal region and in the left frontal lobe and operculum compatible with chronic infarcts  continue aspirin and statin

## 2022-09-04 DIAGNOSIS — F02.818 LATE ONSET ALZHEIMER'S DISEASE WITH BEHAVIORAL DISTURBANCE (HCC): ICD-10-CM

## 2022-09-04 DIAGNOSIS — G30.1 LATE ONSET ALZHEIMER'S DISEASE WITH BEHAVIORAL DISTURBANCE (HCC): ICD-10-CM

## 2022-09-06 RX ORDER — DONEPEZIL HYDROCHLORIDE 10 MG/1
TABLET, FILM COATED ORAL
Qty: 30 TABLET | Refills: 6 | Status: SHIPPED | OUTPATIENT
Start: 2022-09-06

## 2022-10-21 ENCOUNTER — OFFICE VISIT (OUTPATIENT)
Dept: FAMILY MEDICINE CLINIC | Facility: CLINIC | Age: 77
End: 2022-10-21
Payer: MEDICARE

## 2022-10-21 ENCOUNTER — APPOINTMENT (OUTPATIENT)
Dept: RADIOLOGY | Facility: MEDICAL CENTER | Age: 77
End: 2022-10-21
Payer: MEDICARE

## 2022-10-21 VITALS
BODY MASS INDEX: 23.03 KG/M2 | OXYGEN SATURATION: 96 % | SYSTOLIC BLOOD PRESSURE: 160 MMHG | HEIGHT: 61 IN | TEMPERATURE: 98 F | DIASTOLIC BLOOD PRESSURE: 72 MMHG | HEART RATE: 65 BPM | WEIGHT: 122 LBS

## 2022-10-21 DIAGNOSIS — N18.31 STAGE 3A CHRONIC KIDNEY DISEASE (HCC): ICD-10-CM

## 2022-10-21 DIAGNOSIS — I10 ESSENTIAL HYPERTENSION: ICD-10-CM

## 2022-10-21 DIAGNOSIS — G30.1 LATE ONSET ALZHEIMER'S DISEASE WITH BEHAVIORAL DISTURBANCE (HCC): ICD-10-CM

## 2022-10-21 DIAGNOSIS — F02.818 LATE ONSET ALZHEIMER'S DISEASE WITH BEHAVIORAL DISTURBANCE (HCC): ICD-10-CM

## 2022-10-21 DIAGNOSIS — J44.1 CHRONIC OBSTRUCTIVE PULMONARY DISEASE WITH ACUTE EXACERBATION (HCC): ICD-10-CM

## 2022-10-21 DIAGNOSIS — R06.2 WHEEZING: ICD-10-CM

## 2022-10-21 DIAGNOSIS — E89.0 POSTOPERATIVE HYPOTHYROIDISM: Chronic | ICD-10-CM

## 2022-10-21 DIAGNOSIS — F17.200 CURRENT SMOKER: ICD-10-CM

## 2022-10-21 DIAGNOSIS — R26.9 GAIT DISTURBANCE: ICD-10-CM

## 2022-10-21 DIAGNOSIS — J44.1 CHRONIC OBSTRUCTIVE PULMONARY DISEASE WITH ACUTE EXACERBATION (HCC): Primary | ICD-10-CM

## 2022-10-21 DIAGNOSIS — D51.0 PERNICIOUS ANEMIA: ICD-10-CM

## 2022-10-21 DIAGNOSIS — Z23 NEED FOR INFLUENZA VACCINATION: ICD-10-CM

## 2022-10-21 DIAGNOSIS — I63.81 LACUNAR STROKE (HCC): ICD-10-CM

## 2022-10-21 PROBLEM — J44.9 COPD (CHRONIC OBSTRUCTIVE PULMONARY DISEASE) (HCC): Status: ACTIVE | Noted: 2022-10-21

## 2022-10-21 PROCEDURE — G0008 ADMIN INFLUENZA VIRUS VAC: HCPCS

## 2022-10-21 PROCEDURE — 90662 IIV NO PRSV INCREASED AG IM: CPT

## 2022-10-21 PROCEDURE — 99214 OFFICE O/P EST MOD 30 MIN: CPT | Performed by: FAMILY MEDICINE

## 2022-10-21 PROCEDURE — 71046 X-RAY EXAM CHEST 2 VIEWS: CPT

## 2022-10-21 RX ORDER — DOXYCYCLINE HYCLATE 100 MG/1
100 CAPSULE ORAL EVERY 12 HOURS SCHEDULED
Qty: 14 CAPSULE | Refills: 0 | Status: SHIPPED | OUTPATIENT
Start: 2022-10-21 | End: 2022-10-28

## 2022-10-21 NOTE — PATIENT INSTRUCTIONS
She has 4 days increased wheezing with occasional cough, smoking only 7 to 8 cigarettes weekly  No fevers, oxygenating okay at 96% on room air but I am concerned with risk pneumonia, chronic lung disease  She will do chest x-ray, we will cover with doxycycline, can use over-the-counter Robitussin DM  Continue to avoid giving her cigarettes  We will try to avoid prednisone, may benefit from nebulizer  Do not feel she would be able to coordinate inhaler    Systolic blood pressure 635 here today, few months ago was 120/56, 90/58  For now she will continue lisinopril 5 mg daily along with metoprolol succinate 25 mg daily, we may need to increase dose again  She will be seeing her endocrinologist later today, few months ago she had decrease levothyroxine to 137 mcg daily as TSH was undetectable, recent TSH is still down at 0 07, defer medication adjustment to them  Recent vitamin-D level was over 100, she will address this with endocrinology today  Other recent blood work shows BUN/creatinine to be okay at 13/1 03 with potassium 5 1, A1c was 5 2  She is on Thalia Hash via endocrinology, her weight loss has stabilized, 122 lb here today, was 123 back in June  Back in July cholesterol 146 with HDL 53, LDL 73, B12 was okay at 825  She will continue on her oral B12 along with other medications  She did see Geriatrics in August, sees them every 6 months, [place] 9/30  She was not approved due to finances for  more than once weekly, is not attending, family continues to care for her in-home  We will see her again in 4 months, call sooner if needed  Flu shot was given here today

## 2022-10-21 NOTE — PROGRESS NOTES
FAMILY PRACTICE OFFICE VISIT  [facility]  [address]  [address], [place], [facility]      NAME: Constanza Newman  AGE: 76 y o  SEX: female  : 1945   MRN: [id number]    DATE: 10/21/2022  TIME: 12:17 PM    Assessment and Plan     Problem List Items Addressed This Visit     Chronic kidney disease, stage III (moderate) (HCC) (Chronic)    Postoperative hypothyroidism (Chronic)    Gait disturbance    COPD (chronic obstructive pulmonary disease) (HCC) - Primary    Relevant Medications    doxycycline hyclate (VIBRAMYCIN) 100 mg capsule    Other Relevant Orders    XR chest pa & lateral    Lacunar stroke ([facility] [address] )    Essential hypertension    Late onset Alzheimer's disease with behavioral disturbance ([facility] [address] )    Current smoker    Relevant Orders    XR chest pa & lateral    Pernicious anemia      Other Visit Diagnoses     Wheezing        Relevant Orders    XR chest pa & lateral    Need for influenza vaccination        Relevant Orders    influenza vaccine, high-dose, PF 0 7 mL (FLUZONE HIGH-DOSE) (Completed)          Patient Instructions   She has 4 days increased wheezing with occasional cough, smoking only 7 to 8 cigarettes weekly  No fevers, oxygenating okay at 96% on room air but I am concerned with risk pneumonia, chronic lung disease  She will do chest x-ray, we will cover with doxycycline, can use over-the-counter Robitussin DM  Continue to avoid giving her cigarettes  We will try to avoid prednisone, may benefit from nebulizer  Do not feel she would be able to coordinate inhaler    Systolic blood pressure 541 here today, few months ago was 120/56, 90/58  For now she will continue lisinopril 5 mg daily along with metoprolol succinate 25 mg daily, we may need to increase dose again      She will be seeing her endocrinologist later today, few months ago she had decrease levothyroxine to 137 mcg daily as TSH was undetectable, recent TSH is still down at 0 07, defer medication adjustment to them  Recent vitamin-D level was over 100, she will address this with endocrinology today  Other recent blood work shows BUN/creatinine to be okay at 13/1 03 with potassium 5 1, A1c was 5 2  She is on Reshma Chyle via endocrinology, her weight loss has stabilized, 122 lb here today, was 123 back in June  Back in July cholesterol 146 with HDL 53, LDL 73, B12 was okay at 825  She will continue on her oral B12 along with other medications  She did see Geriatrics in August, sees them every 6 months, [facility] 9/30  She was not approved due to finances for  more than once weekly, is not attending, family continues to care for her in-home  We will see her again in 4 months, call sooner if needed  Flu shot was given here today  Chief Complaint     Chief Complaint   Patient presents with   • Follow-up       History of Present Illness   Reina Harrison is a 76y o -year-old female who is in today accompanied by her daughter who is her caregiver, they have noted some wheezing with bit of increased cough for the past 4 days  No fever, no respiratory distress  She has only been smoking 7 to 8 cigarettes daily, they relate she forgets to ask for cigarettes  She does continue to see Endocrinology, sees them again later today  Does see geriatric group    Is using medication as directed      Review of Systems   Review of Systems   Constitutional: Positive for fatigue  Negative for appetite change, fever and unexpected weight change (Previously dropped weight with Reshma Chyle, weight today is similar to few months ago)  HENT: Positive for congestion and postnasal drip  Negative for sore throat and trouble swallowing  Respiratory: Positive for cough  See HPI   Cardiovascular: Negative for chest pain, palpitations and leg swelling  Gastrointestinal: Negative for abdominal pain, blood in stool, nausea and vomiting          No acid reflux     No change in bowel   Genitourinary: Negative for dysuria and hematuria  Neurological: Negative for syncope  She has had no apparent neurological changes, no new weakness, extremely forgetful       Active Problem List     Patient Active Problem List   Diagnosis   • Sinus bradycardia   • Postoperative hypothyroidism   • Essential hypertension   • Urinary incontinence   • Chronic kidney disease, stage III (moderate) ([facility])   • Umbilical hernia   • Trigger middle finger of right hand   • Current smoker   • Thyroid nodule   • Pernicious anemia   • Osteoarthritis   • Neuropathy of both feet   • Low back pain radiating to left lower extremity   • Lacunar stroke ([facility])   • Hyperglycemia   • Hypercholesterolemia   • Graves' disease   • Gait disturbance   • Dizziness   • Disc degeneration, lumbar   • Late onset Alzheimer's disease with behavioral disturbance ([facility])   • Cough, chronic -  improved   • Hearing loss   • Recurrent major depressive disorder, in partial remission ([facility])   • Visual impairment   • Full dentures   • 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[facility])   • Fall   • Anemia   • Hypotension, unspecified   • Hypocalcemia   • Spinal stenosis of lumbar region with neurogenic claudication   • Eczema   • Vitamin D deficiency   • Sleep disturbance   • Insomnia due to other mental disorder   • COPD (chronic obstructive pulmonary disease) ([facility])       Past Medical History:  Reviewed    Past Surgical History:  Reviewed    Family History:  Reviewed    Social History:  Reviewed    Objective     Vitals:    10/21/22 1052   BP: 160/72   BP Location: Left arm   Patient Position: Sitting   Cuff Size: Standard   Pulse: 65   Temp: 98 °F (36 7 °C)   SpO2: 96%   Weight: 55 3 kg (122 lb)   Height: 5' 1" (1 549 m)     Body mass index is 23 05 kg/m²      BP Readings from Last 3 Encounters:   10/21/22 160/72   08/10/22 120/56   06/21/22 90/58       Wt Readings from Last 3 Encounters:   10/21/22 55 3 kg (122 lb)   08/10/22 56 2 kg (124 lb)   06/21/22 55 8 kg (123 lb)       Physical Exam  Constitutional:       Appearance: She is not toxic-appearing  Comments: 76year-old seated on table, appears comfortable at rest, minimal cough  Afebrile  HENT:      Right Ear: Tympanic membrane normal       Left Ear: Tympanic membrane normal    Eyes:      General: No scleral icterus  Cardiovascular:      Rate and Rhythm: Normal rate and regular rhythm  Heart sounds: Normal heart sounds  Pulmonary:      Comments: Mild upper rhonchi/expiratory wheeze, improved after cough, no rales  Abdominal:      Palpations: Abdomen is soft  Tenderness: There is no abdominal tenderness  There is no guarding  Musculoskeletal:      Right lower leg: No edema  Left lower leg: No edema  Lymphadenopathy:      Cervical: No cervical adenopathy  Skin:     Coloration: Skin is not jaundiced  Neurological:      Mental Status: Mental status is at baseline  ALLERGIES:  Allergies   Allergen Reactions   • Naproxen Itching   • Niacin    • Nsaids Other (See Comments) and Swelling   • Azithromycin Nausea Only and Rash   • Cephalexin Rash       Current Medications     Current Outpatient Medications   Medication Sig Dispense Refill   • alendronate (FOSAMAX) 70 mg tablet Take 75 mg by mouth every 7 days     • calcium-vitamin D (OSCAL) 250-125 MG-UNIT per tablet Take 1 tablet by mouth daily       • Dapagliflozin Propanediol 10 MG TABS Take 10 mg by mouth daily     • donepezil (ARICEPT) 10 mg tablet take 1 tablet by mouth daily at bedtime 30 tablet 6   • doxycycline hyclate (VIBRAMYCIN) 100 mg capsule Take 1 capsule (100 mg total) by mouth every 12 (twelve) hours for 7 days 14 capsule 0   • ergocalciferol (VITAMIN D2) 50,000 units 1 tablet every other week     • levothyroxine 137 mcg tablet Take 150 mcg by mouth daily Taking 150 mcg daily     • lisinopril (ZESTRIL) 5 mg tablet Take 5 mg by mouth daily     • metoprolol succinate (TOPROL-XL) 50 mg 24 hr tablet Take 0 5 tablets (25 mg total) by mouth every morning     • niacin (NIASPAN) 1000 MG CR tablet Take 1,000 mg by mouth daily at bedtime     • traZODone (DESYREL) 50 mg tablet Take 1 tablet (50 mg total) by mouth daily at bedtime 60 tablet 1   • triamcinolone (KENALOG) 0 1 % ointment Apply topicaly 2-4 times a day for up to 2 weeks   80 g 0   • vitamin B-12 (VITAMIN B-12) 1,000 mcg tablet Take 1 tablet (1,000 mcg total) by mouth daily     • aspirin 81 MG tablet Take 81 mg by mouth daily (Patient not taking: Reported on 10/21/2022)       Current Facility-Administered Medications   Medication Dose Route Frequency Provider Last Rate Last Admin   • cyanocobalamin injection 1,000 mcg  1,000 mcg Intramuscular Q56 Days Elia Liu, DO   1,000 mcg at 03/10/22 1043            Orders Placed This Encounter   Procedures   • XR chest pa & lateral   • influenza vaccine, high-dose, PF 0 7 mL (FLUZONE HIGH-DOSE)         Elia Liu

## 2023-01-03 ENCOUNTER — APPOINTMENT (OUTPATIENT)
Dept: CT IMAGING | Facility: HOSPITAL | Age: 78
End: 2023-01-03

## 2023-01-03 ENCOUNTER — APPOINTMENT (EMERGENCY)
Dept: RADIOLOGY | Facility: HOSPITAL | Age: 78
End: 2023-01-03
Attending: EMERGENCY MEDICINE

## 2023-01-03 ENCOUNTER — HOSPITAL ENCOUNTER (INPATIENT)
Facility: HOSPITAL | Age: 78
LOS: 1 days | Discharge: HOME HEALTH | End: 2023-01-05
Attending: EMERGENCY MEDICINE | Admitting: INTERNAL MEDICINE

## 2023-01-03 DIAGNOSIS — R55 SYNCOPE: Primary | ICD-10-CM

## 2023-01-03 LAB
2HR DELTA HS TROPONIN: 0 NG/L
ALBUMIN SERPL BCP-MCNC: 3.3 G/DL (ref 3.5–5)
ALP SERPL-CCNC: 87 U/L (ref 46–116)
ALT SERPL W P-5'-P-CCNC: 23 U/L (ref 12–78)
ANION GAP SERPL CALCULATED.3IONS-SCNC: 14 MMOL/L (ref 4–13)
APTT PPP: 24 SECONDS (ref 23–37)
AST SERPL W P-5'-P-CCNC: 34 U/L (ref 5–45)
ATRIAL RATE: 66 BPM
ATRIAL RATE: 66 BPM
BASOPHILS # BLD AUTO: 0.02 THOUSANDS/ÂΜL (ref 0–0.1)
BASOPHILS NFR BLD AUTO: 0 % (ref 0–1)
BILIRUB SERPL-MCNC: 0.49 MG/DL (ref 0.2–1)
BILIRUB UR QL STRIP: NEGATIVE
BUN SERPL-MCNC: 17 MG/DL (ref 5–25)
CALCIUM ALBUM COR SERPL-MCNC: 9.6 MG/DL (ref 8.3–10.1)
CALCIUM SERPL-MCNC: 9 MG/DL (ref 8.3–10.1)
CARDIAC TROPONIN I PNL SERPL HS: 7 NG/L
CARDIAC TROPONIN I PNL SERPL HS: 7 NG/L
CHLORIDE SERPL-SCNC: 102 MMOL/L (ref 96–108)
CLARITY UR: CLEAR
CO2 SERPL-SCNC: 27 MMOL/L (ref 21–32)
COLOR UR: YELLOW
CREAT SERPL-MCNC: 1.27 MG/DL (ref 0.6–1.3)
EOSINOPHIL # BLD AUTO: 0.24 THOUSAND/ÂΜL (ref 0–0.61)
EOSINOPHIL NFR BLD AUTO: 2 % (ref 0–6)
ERYTHROCYTE [DISTWIDTH] IN BLOOD BY AUTOMATED COUNT: 15.9 % (ref 11.6–15.1)
FLUAV RNA RESP QL NAA+PROBE: NEGATIVE
FLUBV RNA RESP QL NAA+PROBE: NEGATIVE
GFR SERPL CREATININE-BSD FRML MDRD: 40 ML/MIN/1.73SQ M
GLUCOSE SERPL-MCNC: 112 MG/DL (ref 65–140)
GLUCOSE UR STRIP-MCNC: ABNORMAL MG/DL
HCT VFR BLD AUTO: 47.7 % (ref 34.8–46.1)
HGB BLD-MCNC: 14.9 G/DL (ref 11.5–15.4)
HGB UR QL STRIP.AUTO: ABNORMAL
IMM GRANULOCYTES # BLD AUTO: 0.04 THOUSAND/UL (ref 0–0.2)
IMM GRANULOCYTES NFR BLD AUTO: 0 % (ref 0–2)
INR PPP: 1 (ref 0.84–1.19)
KETONES UR STRIP-MCNC: NEGATIVE MG/DL
LEUKOCYTE ESTERASE UR QL STRIP: NEGATIVE
LIPASE SERPL-CCNC: 106 U/L (ref 73–393)
LYMPHOCYTES # BLD AUTO: 2.78 THOUSANDS/ÂΜL (ref 0.6–4.47)
LYMPHOCYTES NFR BLD AUTO: 26 % (ref 14–44)
MCH RBC QN AUTO: 27.8 PG (ref 26.8–34.3)
MCHC RBC AUTO-ENTMCNC: 31.2 G/DL (ref 31.4–37.4)
MCV RBC AUTO: 89 FL (ref 82–98)
MONOCYTES # BLD AUTO: 0.67 THOUSAND/ÂΜL (ref 0.17–1.22)
MONOCYTES NFR BLD AUTO: 6 % (ref 4–12)
NEUTROPHILS # BLD AUTO: 6.82 THOUSANDS/ÂΜL (ref 1.85–7.62)
NEUTS SEG NFR BLD AUTO: 66 % (ref 43–75)
NITRITE UR QL STRIP: NEGATIVE
NRBC BLD AUTO-RTO: 0 /100 WBCS
P AXIS: 108 DEGREES
P AXIS: 73 DEGREES
PH UR STRIP.AUTO: 5.5 [PH] (ref 4.5–8)
PLATELET # BLD AUTO: 275 THOUSANDS/UL (ref 149–390)
PMV BLD AUTO: 10.5 FL (ref 8.9–12.7)
POTASSIUM SERPL-SCNC: 4.5 MMOL/L (ref 3.5–5.3)
PR INTERVAL: 166 MS
PR INTERVAL: 182 MS
PROT SERPL-MCNC: 7 G/DL (ref 6.4–8.4)
PROT UR STRIP-MCNC: ABNORMAL MG/DL
PROTHROMBIN TIME: 13.2 SECONDS (ref 11.6–14.5)
QRS AXIS: 58 DEGREES
QRS AXIS: 58 DEGREES
QRSD INTERVAL: 68 MS
QRSD INTERVAL: 76 MS
QT INTERVAL: 398 MS
QT INTERVAL: 410 MS
QTC INTERVAL: 417 MS
QTC INTERVAL: 429 MS
RBC # BLD AUTO: 5.36 MILLION/UL (ref 3.81–5.12)
RSV RNA RESP QL NAA+PROBE: NEGATIVE
SARS-COV-2 RNA RESP QL NAA+PROBE: NEGATIVE
SODIUM SERPL-SCNC: 143 MMOL/L (ref 135–147)
SP GR UR STRIP.AUTO: 1.02 (ref 1–1.03)
T WAVE AXIS: 75 DEGREES
T WAVE AXIS: 84 DEGREES
TSH SERPL DL<=0.05 MIU/L-ACNC: 2.4 UIU/ML (ref 0.45–4.5)
UROBILINOGEN UR QL STRIP.AUTO: 0.2 E.U./DL
VENTRICULAR RATE: 66 BPM
VENTRICULAR RATE: 66 BPM
WBC # BLD AUTO: 10.57 THOUSAND/UL (ref 4.31–10.16)

## 2023-01-03 RX ADMIN — SODIUM CHLORIDE 500 ML: 0.9 INJECTION, SOLUTION INTRAVENOUS at 18:04

## 2023-01-03 NOTE — ED PROVIDER NOTES
History  Chief Complaint   Patient presents with   • Syncope     Per family patient was sitting in chair and had a syncopal episode lasting approx  1 minute  • Abdominal Pain     Upper abdominal pain  Pt has dementia unaware when pain started  24-year-old female who had a syncopal episode while sitting in the chair in front of her family today  Her family states that she lost consciousness for about 1 minute before coming back around  No seizure-like activity  She did not fall to the ground or hit her head the patient has a history of dementia and has no altered mental status  No headaches, no chest pain, no shortness of breath, no abdominal pain  No fevers, no cough, no nausea/vomiting  Her family states that she had 1 episode of loose stools yesterday  She is eating and drinking less today and seems weaker than usual     The patient is denying abdominal pain now  I did review the triage note that says belly pain but the family and patient say she did not complain of belly pain and she is nontender on my exam           Prior to Admission Medications   Prescriptions Last Dose Informant Patient Reported? Taking? Dapagliflozin Propanediol 10 MG TABS   Yes Yes   Sig: Take 10 mg by mouth daily   alendronate (FOSAMAX) 70 mg tablet   Yes Yes   Sig: Take 75 mg by mouth every 7 days   aspirin 81 MG tablet   Yes No   Sig: Take 81 mg by mouth daily   Patient not taking: Reported on 10/21/2022   calcium-vitamin D (OSCAL) 250-125 MG-UNIT per tablet   Yes Yes   Sig: Take 1 tablet by mouth daily     donepezil (ARICEPT) 10 mg tablet   No Yes   Sig: take 1 tablet by mouth daily at bedtime   ergocalciferol (VITAMIN D2) 50,000 units   Yes Yes   Si tablet every other week   levothyroxine 137 mcg tablet   Yes Yes   Sig: Take 150 mcg by mouth daily Taking 150 mcg daily   lisinopril (ZESTRIL) 5 mg tablet   Yes Yes   Sig: Take 5 mg by mouth daily   metoprolol succinate (TOPROL-XL) 50 mg 24 hr tablet   No Yes   Sig: Take 0 5 tablets (25 mg total) by mouth every morning   niacin (NIASPAN) 1000 MG CR tablet   Yes Yes   Sig: Take 1,000 mg by mouth daily at bedtime   traZODone (DESYREL) 50 mg tablet   No Yes   Sig: Take 1 tablet (50 mg total) by mouth daily at bedtime   triamcinolone (KENALOG) 0 1 % ointment   No Yes   Sig: Apply topicaly 2-4 times a day for up to 2 weeks  vitamin B-12 (VITAMIN B-12) 1,000 mcg tablet   No Yes   Sig: Take 1 tablet (1,000 mcg total) by mouth daily      Facility-Administered Medications Last Administration Doses Remaining   cyanocobalamin injection 1,000 mcg 3/10/2022 10:43 AM           Past Medical History:   Diagnosis Date   • Acute kidney injury (LAYLA) with acute tubular necrosis (ATN) ([facility] ) 2/13/2020   • Bed bug bite     LAST ASSESSED [id number]   • Chronic pain disorder    • Disease of thyroid gland    • Extremity pain    • Graves' disease    • Hyperlipidemia    • Hypertension    • LLL pneumonia     LAST ASSESSED [id number]   • Low back pain    • Migraine 2001    OCULAR   • Scabies     LAST ASSESSED [id number]   • Syncope and collapse     LAST ASSESSED [id number]   • Vertigo     RESOLVED        Past Surgical History:   Procedure Laterality Date   • CHOLECYSTECTOMY     • HYSTERECTOMY     • TOTAL THYROIDECTOMY     • TUBAL LIGATION Bilateral        Family History   Problem Relation Age of Onset   • Breast cancer Sister    • Diabetes Family    • No Known Problems Mother    • No Known Problems Father      I have reviewed and agree with the history as documented      E-Cigarette/Vaping   • E-Cigarette Use Never User      E-Cigarette/Vaping Substances   • Nicotine No    • THC No    • CBD No    • Flavoring No    • Other No    • Unknown No      Social History     Tobacco Use   • Smoking status: Every Day     Packs/day: 0 25     Types: Cigarettes   • Smokeless tobacco: Never   Vaping Use   • Vaping Use: Never used   Substance Use Topics   • Alcohol use: Never   • Drug use: No       Review of Systems Constitutional: Positive for appetite change and fatigue  Negative for fever  HENT: Negative for rhinorrhea and sore throat  Eyes: Negative for pain  Respiratory: Negative for cough, shortness of breath and wheezing  Cardiovascular: Negative for chest pain and leg swelling  Gastrointestinal: Positive for abdominal pain and diarrhea  Negative for nausea and vomiting  Genitourinary: Negative for dysuria and flank pain  Musculoskeletal: Negative for back pain and neck pain  Skin: Negative for rash  Neurological: Positive for syncope and weakness  Negative for seizures and headaches  Psychiatric/Behavioral:        Mood normal       Physical Exam  Physical Exam  Vitals and nursing note reviewed  Constitutional:       Appearance: She is well-developed  HENT:      Head: Normocephalic and atraumatic  Right Ear: External ear normal       Left Ear: External ear normal       Mouth/Throat:      Comments: Tachy MM  Eyes:      General: No scleral icterus  Extraocular Movements: Extraocular movements intact  Cardiovascular:      Rate and Rhythm: Normal rate and regular rhythm  Pulmonary:      Effort: Pulmonary effort is normal  No respiratory distress  Breath sounds: Normal breath sounds  Abdominal:      Palpations: Abdomen is soft  Tenderness: There is no abdominal tenderness  Musculoskeletal:         General: No deformity or signs of injury  Normal range of motion  Cervical back: Normal range of motion and neck supple  Skin:     General: Skin is warm and dry  Coloration: Skin is not jaundiced or pale  Neurological:      General: No focal deficit present  Mental Status: She is alert and oriented to person, place, and time     Psychiatric:         Mood and Affect: Mood normal          Behavior: Behavior normal          Vital Signs  ED Triage Vitals [01/03/23 1720]   Temperature Pulse Respirations Blood Pressure SpO2   97 8 °F (36 6 °C) (!) 54 18 105/54 97 % Temp Source Heart Rate Source Patient Position - Orthostatic VS BP Location FiO2 (%)   Oral Monitor Lying Right arm --      Pain Score       --           Vitals:    01/03/23 1805 01/03/23 2109 01/03/23 2348 01/04/23 0125   BP: 120/57 110/59 117/61 96/55   Pulse: 68 71 74 70   Patient Position - Orthostatic VS: Lying Lying Lying Lying         Visual Acuity  Visual Acuity    Flowsheet Row Most Recent Value   L Pupil Size (mm) 3   R Pupil Size (mm) 3          ED Medications  Medications   sodium chloride 0 9 % bolus 500 mL (0 mL Intravenous Stopped 1/3/23 1935)       Diagnostic Studies  Results Reviewed     Procedure Component Value Units Date/Time    Urine Microscopic [[id number]]  (Abnormal) Collected: 01/03/23 2347    Lab Status: Final result Specimen: Urine, Clean Catch Updated: 01/04/23 0103     RBC, UA 4-10 /hpf      WBC, UA 4-10 /hpf      Epithelial Cells Occasional /hpf      Bacteria, UA Moderate /hpf     Urine Macroscopic, POC [[id number]]  (Abnormal) Collected: 01/03/23 2347    Lab Status: Final result Specimen: Urine Updated: 01/03/23 2349     Color, UA Yellow     Clarity, UA Clear     pH, UA 5 5     Leukocytes, UA Negative     Nitrite, UA Negative     Protein, UA 30 (1+) mg/dl      Glucose,  (1/2%) mg/dl      Ketones, UA Negative mg/dl      Urobilinogen, UA 0 2 E U /dl      Bilirubin, UA Negative     Occult Blood, UA Trace     Specific [place], UA 1 020    Narrative:      CLINITEK RESULT    COVID/FLU/RSV [[id number]]  (Normal) Collected: 01/03/23 2041    Lab Status: Final result Specimen: Nares from Nose Updated: 01/03/23 2211     SARS-CoV-2 Negative     INFLUENZA A PCR Negative     INFLUENZA B PCR Negative     RSV PCR Negative    Narrative:      FOR PEDIATRIC PATIENTS - copy/paste COVID Guidelines URL to browser: [email or web address]    SARS-CoV-2 assay is a Nucleic Acid Amplification assay intended for the  qualitative detection of nucleic acid from SARS-CoV-2 in nasopharyngeal  swabs  Results are for the presumptive identification of SARS-CoV-2 RNA  Positive results are indicative of infection with SARS-CoV-2, the virus  causing COVID-19, but do not rule out bacterial infection or co-infection  with other viruses  Laboratories within the [place] and its  territories are required to report all positive results to the appropriate  public health authorities  Negative results do not preclude SARS-CoV-2  infection and should not be used as the sole basis for treatment or other  patient management decisions  Negative results must be combined with  clinical observations, patient history, and epidemiological information  This test has not been FDA cleared or approved  This test has been authorized by FDA under an Emergency Use Authorization  (EUA)  This test is only authorized for the duration of time the  declaration that circumstances exist justifying the authorization of the  emergency use of an in vitro diagnostic tests for detection of SARS-CoV-2  virus and/or diagnosis of COVID-19 infection under section 564(b)(1) of  the Act, 21 U  S C  956JGT-9(R)(8), unless the authorization is terminated  or revoked sooner  The test has been validated but independent review by FDA  and CLIA is pending  Test performed using [employer]: This RT-PCR assay targets N2,  a region unique to SARS-CoV-2  A conserved region in the E-gene was chosen  for pan-Sarbecovirus detection which includes SARS-CoV-2  According to CMS-2020-01-R, this platform meets the definition of high-throughput technology      HS Troponin I 2hr [[id number]]  (Normal) Collected: 01/03/23 1938    Lab Status: Final result Specimen: Blood from Arm, Left Updated: 01/03/23 2017     hs TnI 2hr 7 ng/L      Delta 2hr hsTnI 0 ng/L     TSH [[id number]]  (Normal) Collected: 01/03/23 1734    Lab Status: Final result Specimen: Blood from Arm, Left Updated: 01/03/23 1935     TSH 3RD [place] Do 2 396 uIU/mL     Narrative:      Patients undergoing fluorescein dye angiography may retain small amounts of fluorescein in the body for 48-72 hours post procedure  Samples containing fluorescein can produce falsely depressed TSH values  If the patient had this procedure,a specimen should be resubmitted post fluorescein clearance        Lipase [[id number]]  (Normal) Collected: 01/03/23 1734    Lab Status: Final result Specimen: Blood from Arm, Left Updated: 01/03/23 1935     Lipase 106 u/L     Comprehensive metabolic panel [[id number]]  (Abnormal) Collected: 01/03/23 1734    Lab Status: Final result Specimen: Blood from Arm, Left Updated: 01/03/23 1931     Sodium 143 mmol/L      Potassium 4 5 mmol/L      Chloride 102 mmol/L      CO2 27 mmol/L      ANION GAP 14 mmol/L      BUN 17 mg/dL      Creatinine 1 27 mg/dL      Glucose 112 mg/dL      Calcium 9 0 mg/dL      Corrected Calcium 9 6 mg/dL      AST 34 U/L      ALT 23 U/L      Alkaline Phosphatase 87 U/L      Total Protein 7 0 g/dL      Albumin 3 3 g/dL      Total Bilirubin 0 49 mg/dL      eGFR 40 ml/min/1 73sq m     Narrative:      Michele guidelines for Chronic Kidney Disease (CKD):   •  Stage 1 with normal or high GFR (GFR > 90 mL/min/1 73 square meters)  •  Stage 2 Mild CKD (GFR = 60-89 mL/min/1 73 square meters)  •  Stage 3A Moderate CKD (GFR = 45-59 mL/min/1 73 square meters)  •  Stage 3B Moderate CKD (GFR = 30-44 mL/min/1 73 square meters)  •  Stage 4 Severe CKD (GFR = 15-29 mL/min/1 73 square meters)  •  Stage 5 End Stage CKD (GFR <15 mL/min/1 73 square meters)  Note: GFR calculation is accurate only with a steady state creatinine    HS Troponin 0hr (reflex protocol) [[id number]]  (Normal) Collected: 01/03/23 1734    Lab Status: Final result Specimen: Blood from Arm, Left Updated: 01/03/23 1849     hs TnI 0hr 7 ng/L     Protime-INR [[id number]]  (Normal) Collected: 01/03/23 1734    Lab Status: Final result Specimen: Blood from Arm, Left Updated: 01/03/23 1838     Protime 13 2 seconds      INR 1 00    APTT [[id number]]  (Normal) Collected: 01/03/23 1734    Lab Status: Final result Specimen: Blood from Arm, Left Updated: 01/03/23 1838     PTT 24 seconds     CBC and differential [[id number]]  (Abnormal) Collected: 01/03/23 1734    Lab Status: Final result Specimen: Blood from Arm, Left Updated: 01/03/23 1816     WBC 10 57 Thousand/uL      RBC 5 36 Million/uL      Hemoglobin 14 9 g/dL      Hematocrit 47 7 %      MCV 89 fL      MCH 27 8 pg      MCHC 31 2 g/dL      RDW 15 9 %      MPV 10 5 fL      Platelets 208 Thousands/uL      nRBC 0 /100 WBCs      Neutrophils Relative 66 %      Immat GRANS % 0 %      Lymphocytes Relative 26 %      Monocytes Relative 6 %      Eosinophils Relative 2 %      Basophils Relative 0 %      Neutrophils Absolute 6 82 Thousands/µL      Immature Grans Absolute 0 04 Thousand/uL      Lymphocytes Absolute 2 78 Thousands/µL      Monocytes Absolute 0 67 Thousand/µL      Eosinophils Absolute 0 24 Thousand/µL      Basophils Absolute 0 02 Thousands/µL                  XR chest 1 view portable    (Results Pending)   CT head wo contrast    (Results Pending)              Procedures  Procedures         ED Course                               SBIRT 22yo+    Flowsheet Row Most Recent Value   SBIRT (23 yo +)    In order to provide better care to our patients, we are screening all of our patients for alcohol and drug use  Would it be okay to ask you these screening questions? Yes Filed at: 01/03/2023 1935   Initial Alcohol Screen: US AUDIT-C     1  How often do you have a drink containing alcohol? 0 Filed at: 01/03/2023 1935   2  How many drinks containing alcohol do you have on a typical day you are drinking? 0 Filed at: 01/03/2023 1935   3b  FEMALE Any Age, or MALE 65+: How often do you have 4 or more drinks on one occassion?  0 Filed at: 01/03/2023 1935   Audit-C Score 0 Filed at: 01/03/2023 1935   ODRON: How many times in the past year have you  Used an illegal drug or used a prescription medication for non-medical reasons? Never Filed at: 01/03/2023 1935                    Medical Decision Making  Syncope: self-limited or minor problem  Amount and/or Complexity of Data Reviewed  Independent Historian: caregiver  Labs: ordered  Radiology: ordered  ECG/medicine tests: ordered and independent interpretation performed  Discussion of management or test interpretation with external provider(s): ekg nsr, no st elevation or depression  CXR nad    Patient was admitted to the hospitalist for further work-up/management    Risk  Decision regarding hospitalization  Disposition  Final diagnoses:   Syncope     Time reflects when diagnosis was documented in both MDM as applicable and the Disposition within this note     Time User Action Codes Description Comment    1/3/2023  8:05 PM Katheryn JUNE Add [R55] Syncope       ED Disposition     ED Disposition   Admit    Condition   Stable    Date/Time   Tue Brandon 3, 2023  8:05 PM    Comment   Case was discussed with PREM  and the patient's admission status was agreed to be obs/tele           Follow-up Information    None         Patient's Medications   Discharge Prescriptions    No medications on file       No discharge procedures on file      PDMP Review     None          ED Provider  Electronically Signed by           Joel Beckman MD  01/04/23 5842

## 2023-01-04 ENCOUNTER — APPOINTMENT (INPATIENT)
Dept: NON INVASIVE DIAGNOSTICS | Facility: HOSPITAL | Age: 78
End: 2023-01-04

## 2023-01-04 ENCOUNTER — APPOINTMENT (OUTPATIENT)
Dept: NON INVASIVE DIAGNOSTICS | Facility: HOSPITAL | Age: 78
End: 2023-01-04

## 2023-01-04 LAB
ALBUMIN SERPL BCP-MCNC: 2.7 G/DL (ref 3.5–5)
ALP SERPL-CCNC: 68 U/L (ref 46–116)
ALT SERPL W P-5'-P-CCNC: 18 U/L (ref 12–78)
ANION GAP SERPL CALCULATED.3IONS-SCNC: 7 MMOL/L (ref 4–13)
AORTIC VALVE MEAN VELOCITY: 6 M/S
APICAL FOUR CHAMBER EJECTION FRACTION: 66 %
AST SERPL W P-5'-P-CCNC: 20 U/L (ref 5–45)
ATRIAL RATE: 52 BPM
AV AREA BY CONTINUOUS VTI: 2 CM2
AV AREA PEAK VELOCITY: 2 CM2
AV LVOT MEAN GRADIENT: 1 MMHG
AV LVOT PEAK GRADIENT: 2 MMHG
AV MEAN GRADIENT: 2 MMHG
AV PEAK GRADIENT: 4 MMHG
AV VALVE AREA: 1.95 CM2
AV VELOCITY RATIO: 0.79
BACTERIA UR QL AUTO: ABNORMAL /HPF
BASOPHILS # BLD AUTO: 0.03 THOUSANDS/ÂΜL (ref 0–0.1)
BASOPHILS NFR BLD AUTO: 0 % (ref 0–1)
BILIRUB SERPL-MCNC: 0.31 MG/DL (ref 0.2–1)
BUN SERPL-MCNC: 16 MG/DL (ref 5–25)
CALCIUM ALBUM COR SERPL-MCNC: 9.3 MG/DL (ref 8.3–10.1)
CALCIUM SERPL-MCNC: 8.3 MG/DL (ref 8.3–10.1)
CHLORIDE SERPL-SCNC: 105 MMOL/L (ref 96–108)
CO2 SERPL-SCNC: 30 MMOL/L (ref 21–32)
CREAT SERPL-MCNC: 1.14 MG/DL (ref 0.6–1.3)
D DIMER PPP FEU-MCNC: 1.52 UG/ML FEU
DOP CALC AO PEAK VEL: 0.96 M/S
DOP CALC AO VTI: 21.27 CM
DOP CALC LVOT AREA: 2.54 CM2
DOP CALC LVOT DIAMETER: 1.8 CM
DOP CALC LVOT PEAK VEL VTI: 16.31 CM
DOP CALC LVOT PEAK VEL: 0.76 M/S
DOP CALC LVOT STROKE INDEX: 26.1 ML/M2
DOP CALC LVOT STROKE VOLUME: 41.48 CM3
E WAVE DECELERATION TIME: 241 MS
EOSINOPHIL # BLD AUTO: 0.27 THOUSAND/ÂΜL (ref 0–0.61)
EOSINOPHIL NFR BLD AUTO: 2 % (ref 0–6)
ERYTHROCYTE [DISTWIDTH] IN BLOOD BY AUTOMATED COUNT: 15.8 % (ref 11.6–15.1)
FRACTIONAL SHORTENING: 30 % (ref 28–44)
GFR SERPL CREATININE-BSD FRML MDRD: 45 ML/MIN/1.73SQ M
GLUCOSE P FAST SERPL-MCNC: 79 MG/DL (ref 65–99)
GLUCOSE SERPL-MCNC: 79 MG/DL (ref 65–140)
HCT VFR BLD AUTO: 37.4 % (ref 34.8–46.1)
HGB BLD-MCNC: 12 G/DL (ref 11.5–15.4)
IMM GRANULOCYTES # BLD AUTO: 0.05 THOUSAND/UL (ref 0–0.2)
IMM GRANULOCYTES NFR BLD AUTO: 1 % (ref 0–2)
INTERVENTRICULAR SEPTUM IN DIASTOLE (PARASTERNAL SHORT AXIS VIEW): 1.1 CM
INTERVENTRICULAR SEPTUM: 1.1 CM (ref 0.6–1.1)
LAAS-AP2: 14.1 CM2
LAAS-AP4: 11.9 CM2
LEFT ATRIUM AREA SYSTOLE SINGLE PLANE A4C: 12.5 CM2
LEFT ATRIUM SIZE: 3.2 CM
LEFT INTERNAL DIMENSION IN SYSTOLE: 2.8 CM (ref 2.1–4)
LEFT VENTRICULAR INTERNAL DIMENSION IN DIASTOLE: 4 CM (ref 3.5–6)
LEFT VENTRICULAR POSTERIOR WALL IN END DIASTOLE: 1.1 CM
LEFT VENTRICULAR STROKE VOLUME: 38 ML
LVSV (TEICH): 38 ML
LYMPHOCYTES # BLD AUTO: 2.49 THOUSANDS/ÂΜL (ref 0.6–4.47)
LYMPHOCYTES NFR BLD AUTO: 23 % (ref 14–44)
MAGNESIUM SERPL-MCNC: 1.5 MG/DL (ref 1.6–2.6)
MCH RBC QN AUTO: 28.1 PG (ref 26.8–34.3)
MCHC RBC AUTO-ENTMCNC: 32.1 G/DL (ref 31.4–37.4)
MCV RBC AUTO: 88 FL (ref 82–98)
MONOCYTES # BLD AUTO: 0.9 THOUSAND/ÂΜL (ref 0.17–1.22)
MONOCYTES NFR BLD AUTO: 8 % (ref 4–12)
MV E'TISSUE VEL-LAT: 8 CM/S
MV E'TISSUE VEL-SEP: 8 CM/S
MV PEAK A VEL: 1.01 M/S
MV PEAK E VEL: 82 CM/S
MV STENOSIS PRESSURE HALF TIME: 70 MS
MV VALVE AREA P 1/2 METHOD: 3.14 CM2
NEUTROPHILS # BLD AUTO: 7.33 THOUSANDS/ÂΜL (ref 1.85–7.62)
NEUTS SEG NFR BLD AUTO: 66 % (ref 43–75)
NON-SQ EPI CELLS URNS QL MICRO: ABNORMAL /HPF
NRBC BLD AUTO-RTO: 0 /100 WBCS
P AXIS: 66 DEGREES
PHOSPHATE SERPL-MCNC: 2.9 MG/DL (ref 2.3–4.1)
PLATELET # BLD AUTO: 199 THOUSANDS/UL (ref 149–390)
PMV BLD AUTO: 9.7 FL (ref 8.9–12.7)
POTASSIUM SERPL-SCNC: 4 MMOL/L (ref 3.5–5.3)
PR INTERVAL: 150 MS
PROT SERPL-MCNC: 5.9 G/DL (ref 6.4–8.4)
QRS AXIS: 24 DEGREES
QRSD INTERVAL: 72 MS
QT INTERVAL: 440 MS
QTC INTERVAL: 409 MS
RA PRESSURE ESTIMATED: 3 MMHG
RBC # BLD AUTO: 4.27 MILLION/UL (ref 3.81–5.12)
RBC #/AREA URNS AUTO: ABNORMAL /HPF
RIGHT ATRIUM AREA SYSTOLE A4C: 12.7 CM2
RIGHT VENTRICLE ID DIMENSION: 3.9 CM
RV PSP: 23 MMHG
SL CV LEFT ATRIUM LENGTH A2C: 4.2 CM
SL CV LV EF: 66
SL CV PED ECHO LEFT VENTRICLE DIASTOLIC VOLUME (MOD BIPLANE) 2D: 69 ML
SL CV PED ECHO LEFT VENTRICLE SYSTOLIC VOLUME (MOD BIPLANE) 2D: 30 ML
SODIUM SERPL-SCNC: 142 MMOL/L (ref 135–147)
T WAVE AXIS: 69 DEGREES
TR MAX PG: 20 MMHG
TR PEAK VELOCITY: 2.2 M/S
TRICUSPID VALVE PEAK REGURGITATION VELOCITY: 2.22 M/S
VENTRICULAR RATE: 52 BPM
WBC # BLD AUTO: 11.07 THOUSAND/UL (ref 4.31–10.16)
WBC #/AREA URNS AUTO: ABNORMAL /HPF

## 2023-01-04 RX ORDER — DOCUSATE SODIUM 100 MG/1
100 CAPSULE, LIQUID FILLED ORAL 2 TIMES DAILY
Status: DISCONTINUED | OUTPATIENT
Start: 2023-01-04 | End: 2023-01-05 | Stop reason: HOSPADM

## 2023-01-04 RX ORDER — ENOXAPARIN SODIUM 100 MG/ML
40 INJECTION SUBCUTANEOUS DAILY
Status: DISCONTINUED | OUTPATIENT
Start: 2023-01-04 | End: 2023-01-05 | Stop reason: HOSPADM

## 2023-01-04 RX ORDER — ONDANSETRON 2 MG/ML
4 INJECTION INTRAMUSCULAR; INTRAVENOUS EVERY 6 HOURS PRN
Status: DISCONTINUED | OUTPATIENT
Start: 2023-01-04 | End: 2023-01-05 | Stop reason: HOSPADM

## 2023-01-04 RX ORDER — MAGNESIUM SULFATE HEPTAHYDRATE 40 MG/ML
2 INJECTION, SOLUTION INTRAVENOUS ONCE
Status: COMPLETED | OUTPATIENT
Start: 2023-01-04 | End: 2023-01-04

## 2023-01-04 RX ORDER — CALCIUM CARBONATE 500(1250)
2 TABLET ORAL
Status: DISCONTINUED | OUTPATIENT
Start: 2023-01-04 | End: 2023-01-05 | Stop reason: HOSPADM

## 2023-01-04 RX ORDER — LEVOTHYROXINE SODIUM 0.07 MG/1
150 TABLET ORAL
Status: DISCONTINUED | OUTPATIENT
Start: 2023-01-04 | End: 2023-01-05 | Stop reason: HOSPADM

## 2023-01-04 RX ORDER — METOPROLOL SUCCINATE 25 MG/1
25 TABLET, EXTENDED RELEASE ORAL EVERY MORNING
Status: DISCONTINUED | OUTPATIENT
Start: 2023-01-04 | End: 2023-01-05 | Stop reason: HOSPADM

## 2023-01-04 RX ORDER — ACETAMINOPHEN 325 MG/1
650 TABLET ORAL EVERY 6 HOURS PRN
Status: DISCONTINUED | OUTPATIENT
Start: 2023-01-04 | End: 2023-01-05 | Stop reason: HOSPADM

## 2023-01-04 RX ORDER — LISINOPRIL 5 MG/1
5 TABLET ORAL DAILY
Status: DISCONTINUED | OUTPATIENT
Start: 2023-01-04 | End: 2023-01-05 | Stop reason: HOSPADM

## 2023-01-04 RX ORDER — DONEPEZIL HYDROCHLORIDE 10 MG/1
10 TABLET, FILM COATED ORAL
Status: DISCONTINUED | OUTPATIENT
Start: 2023-01-04 | End: 2023-01-05 | Stop reason: HOSPADM

## 2023-01-04 RX ADMIN — ENOXAPARIN SODIUM 40 MG: 40 INJECTION SUBCUTANEOUS at 08:45

## 2023-01-04 RX ADMIN — DONEPEZIL HYDROCHLORIDE 10 MG: 5 TABLET ORAL at 21:39

## 2023-01-04 RX ADMIN — CYANOCOBALAMIN TAB 500 MCG 1000 MCG: 500 TAB at 08:46

## 2023-01-04 RX ADMIN — LISINOPRIL 5 MG: 5 TABLET ORAL at 08:46

## 2023-01-04 RX ADMIN — CALCIUM 2 TABLET: 500 TABLET ORAL at 08:45

## 2023-01-04 RX ADMIN — LEVOTHYROXINE SODIUM 150 MCG: 75 TABLET ORAL at 05:29

## 2023-01-04 RX ADMIN — MAGNESIUM SULFATE HEPTAHYDRATE 2 G: 40 INJECTION, SOLUTION INTRAVENOUS at 17:55

## 2023-01-04 RX ADMIN — METOPROLOL SUCCINATE 25 MG: 25 TABLET, EXTENDED RELEASE ORAL at 08:46

## 2023-01-04 RX ADMIN — DONEPEZIL HYDROCHLORIDE 10 MG: 5 TABLET ORAL at 03:40

## 2023-01-04 RX ADMIN — DOCUSATE SODIUM 100 MG: 100 CAPSULE ORAL at 08:46

## 2023-01-04 NOTE — ASSESSMENT & PLAN NOTE
· Medical records reveal patient takes niacin 1000 mg nightly  · However, also reveals allergy to niacin?   · Hold niacin on admission->Defer to primary care for lipid-lowering therapies

## 2023-01-04 NOTE — CASE MANAGEMENT
Case Management Assessment & Discharge Planning Note    Patient name Jose Luis Brain  Location [facility] 2 /South 2 Alex Ly* MRN [id number]  : 1945 Date 2023       Current Admission Date: 1/3/2023  Current Admission Diagnosis:Syncope   Patient Active Problem List    Diagnosis Date Noted   • COPD (chronic obstructive pulmonary disease) ([facility] ) 10/21/2022   • Insomnia due to other mental disorder 10/27/2021   • Sleep disturbance 2021   • Vitamin D deficiency 2020   • Eczema 10/22/2020   • Spinal stenosis of lumbar region with neurogenic claudication    • Hypocalcemia 2020   • Hypotension, unspecified 2020   • Anemia 2019   • Fall 2019   • 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   • Recurrent major depressive disorder, in partial remission ([facility] ) 2019   • Visual impairment 2019   • Full dentures 2019   • Hearing loss 2018   • Cough, chronic -  improved    • Umbilical hernia    • Low back pain radiating to left lower extremity 10/10/2017   • Lacunar stroke ([facility] ) 2017   • Syncope 2017   • Sinus bradycardia 2017   • Postoperative hypothyroidism 2017   • Essential hypertension 2017   • Urinary incontinence 2017   • Chronic kidney disease, stage III (moderate) ([facility] ) 2017   • Neuropathy of both feet 2016   • Thyroid nodule 2016   • Current smoker 2015   • Late onset Alzheimer's disease with behavioral disturbance ([facility] ) 2015   • Trigger middle finger of right hand 2015   • Gait disturbance 2015   • Hyperlipidemia 2013   • Disc degeneration, lumbar 07/15/2013   • Dizziness 2013   • Pernicious anemia 2012   • Osteoarthritis 2012   • Hyperglycemia 2012   • Graves' disease 2012      LOS (days): 0  Geometric Mean LOS (GMLOS) (days):   Days to GMLOS:     OBJECTIVE:              Current admission status: Observation       Preferred Pharmacy:   [address] [place] [address] #[id number]  [facility] [address]  Phone: [phone] Fax: [phone]    Primary Care Provider: Marlis Nissen, DO    Primary Insurance: MEDICARE  Secondary Insurance: AARP    ASSESSMENT:  [id number] Representative - Child   Primary Phone: [phone] [facility]  Home Phone: [phone]               Advance Directives  Does patient have a [address]?: Yes  Does patient have Advance Directives?: Yes  Advance Directives: Living will, Power of  for health care  Primary Contact: Adeline Leblanc (Child)   [phone]         Readmission Root Cause  30 Day Readmission: No    Patient Information  Admitted from[de-identified] Home  Mental Status: Other (Comment) (Alzheimer's)  During Assessment patient was accompanied by: Daughter Adeline Leblanc (Child)   [phone])  Assessment information provided by[de-identified] Daughter Adeline Leblanc (Child)   [phone])  Primary Caregiver: Other (Comment) Kaylee Gerardo)  Caregiver's Name[de-identified] Vita Chavez - Monday & Thursday's 3 hours each day  Caregiver's Relationship to Patient[de-identified] Other (Specify)  Support Systems: [address] of Residence: [address] [place] [address] do you live in?: [address] [place] [address] entry access options   Select all that apply : Stairs  Number of steps to enter home : 4  Do the steps have railings?: Yes  Type of Current Residence: 2 Winter home  Upon entering residence, is there a bedroom on the main floor (no further steps)?: No  A bedroom is located on the following floor levels of residence (select all that apply):: 2nd Floor  Upon entering residence, is there a bathroom on the main floor (no further steps)?: No  Indicate which floors of current residence have a bathroom (select all the apply):: 2nd Floor  Number of steps to 2nd floor from main floor: One Flight  In the last 12 months, was there a time when you were not able to pay the mortgage or rent on time?: No  In the last 12 months, how many places have you lived?: 1  In the last 12 months, was there a time when you did not have a steady place to sleep or slept in a shelter (including now)?: No  Homeless/housing insecurity resource given?: N/A  Living Arrangements: Lives w/ Daughter  Is patient a ?: No    Activities of Daily Living Prior to Admission  Functional Status: Assistance  Completes ADLs independently?: No  Level of ADL dependence: Assistance  Ambulates independently?: No  Level of ambulatory dependence: Assistance  Does patient use assisted devices?: Yes  Assisted Devices (DME) used: Straight Cane, Walker, Bedside Commode, Shower Chair  Does patient currently own DME?: Yes  What DME does the patient currently own?: Bedside Commode, Shower Chair, Straight Cane, Walker  Does patient have a history of Outpatient Therapy (PT/OT)?: No  Does the patient have a history of Short-Term Rehab?: No  Does patient have a history of HHC?: Yes Stearns Ratel)  Does patient currently have [facility]?: Yes    Current Home Health Care  Type of Current Home Care Services: Home health aide, Home PT, Home OT  [address] [place] [address][de-identified] [address] [place] [address] Provider[de-identified] PCP    Patient Information Continued  Income Source: SSI/SSD  Does patient have prescription coverage?: Yes  Within the past 12 months, you worried that your food would run out before you got the money to buy more : Never true  Within the past 12 months, the food you bought just didn't last and you didn't have money to get more : Never true  Food insecurity resource given?: N/A  Does patient receive dialysis treatments?: No  Does patient have a history of substance abuse?: No  Does patient have a history of Mental Health Diagnosis?: Yes (Alzheimers dementia)         Means of Transportation  [facility]] Family transport  In the past 12 months, has lack of transportation kept you from medical appointments or from getting medications?: No  In the past 12 months, has lack of transportation kept you from meetings, work, or from getting things needed for daily living?: No  Was application for public transport provided?: N/A        DISCHARGE DETAILS:    Discharge planning discussed with[de-identified] Smitha Farmer (Child)   [phone]  Freedom of Choice: Yes  Comments - Freedom of Choice: Per Smitha Farmer (Child)   [phone] referral to [facility] (had before)    CM contacted family/caregiver?: Yes  Were Treatment Team discharge recommendations reviewed with patient/caregiver?: Yes          Contacts  Patient Contacts: Smitha Farmer (Child)   [phone]  Relationship to Patient[de-identified] Family  Contact Method: Phone  Phone Number: Smitha Farmer (Child)   [phone]  Reason/Outcome: Continuity of Care, Emergency Contact, Discharge [address]         Is the patient interested in [facility] at discharge?: Yes  [address] requested[de-identified] Physical Therapy, Speech Language Pathology, [address] [place] [address] Name[de-identified] Other  [address] [place] [address] Provider[de-identified] PCP  Home Health Services Needed[de-identified] Gait/ADL Training, Evaluate Functional Status and Safety  Homebound Criteria Met[de-identified] Uses an Assist Device (i e  cane, walker, etc)  Supporting Clincal Findings[de-identified] Limited Endurance    DME Referral Provided  Referral made for DME?: No    Other Referral/Resources/Interventions Provided:  Interventions: [facility]         Treatment Team Recommendation: Home with [facility]  Discharge Destination Plan[de-identified] Home with Ena at Discharge : Automobile (daughter)

## 2023-01-04 NOTE — ASSESSMENT & PLAN NOTE
Medical records reveal patient takes niacin 1000 mg nightly  However, also reveals allergy to niacin?   Hold niacin on admission; defer to primary care for lipid-lowering therapies

## 2023-01-04 NOTE — ASSESSMENT & PLAN NOTE
Lab Results   Component Value Date    EGFR 45 01/04/2023    EGFR 40 01/03/2023    EGFR 46 06/11/2021    CREATININE 1 14 01/04/2023    CREATININE 1 27 01/03/2023    CREATININE 1 21 (H) 06/25/2021     · Appears to be at baseline  · Avoid nephrotoxic medications and relative hypotension  · Continue to monitor renal function while admitted

## 2023-01-04 NOTE — PROGRESS NOTES
[address] [place] [address]  Progress Note - Julianna Ashraf [birth year], 76 y o  female MRN: [id number]  Unit/Bed#: [facility] [address] [id number] Encounter: [id number]  Primary Care Provider: Gerilyn Hammans, DO   Date and time admitted to hospital: 1/3/2023  5:18 PM    * Syncope  Assessment & Plan  · Cardiac:   a  Arrhythmia: EKG: NSR with marked sinus arrhythmia; Initiate telemetry and obtain echo   b  Ischemia: Troponins negative, EKG with no acute ischemic findings   c Structural/valvular: Echo pending   D  Cardiac tamponade: No clinical signs of tamponade at this time  · Hemmorrhage/ volume loss   A  Diarrhea: Patient reports loose stools day prior to admission   B  Vomiting: Denies   C  Orthostatic: Obtain orthostatic vitals  · Pulmonary embolism: Given vitals, low likelihood  D-dimer: 1 52  Follow up Echo  Will order Venous duplex  Consider CTA if respiratory status worsens  · Subarachnoid bleed: CT head negative for acute abnormalities  · Metabolic   A hypoglycemia: Glucose stable   B  Hypoxic: SPO2 97% on room air  · Psychiatric  · Neurogenic   A  TIA/CVA: CT head no acute abnormalities   B  Migraines: No history, monitor   C  Subclavian steal   D Seizures: As per ED notes, family reported no history of seizure-like activity    · Patient with Alzheimer's, poor historian  · Follow up syncope workup   · Initiate Fall precautions  · PT/OT eval: Recommending [facility]      [facility]  Assessment & Plan  · Does not appear to be in acute exacerbation  · Resting comfortably on room air  · Continue home medications    Anemia  Assessment & Plan  · Hemoglobin currently stable  · Monitor and transfuse as needed    Late onset Alzheimer's disease with behavioral disturbance ([facility] )  Assessment & Plan  · Noted to have dementia    · AOx2 at baseline  · Continue home medications:  · Aricept  · Continue Supportive care  · Regulate Sleep/Wake cycle    Graves' disease  Assessment & Plan  · TSH 2 396  · Continue Home medication:  · Levothyroxine    Hyperlipidemia  Assessment & Plan  · Medical records reveal patient takes niacin 1000 mg nightly  · However, also reveals allergy to niacin? · Hold niacin on admission->Defer to primary care for lipid-lowering therapies      Chronic kidney disease, stage III (moderate) [facility])  Assessment & Plan  Lab Results   Component Value Date    EGFR 45 2023    EGFR 40 2023    EGFR 46 2021    CREATININE 1 14 2023    CREATININE 1 27 2023    CREATININE 1 21 (H) 2021     · Appears to be at baseline  · Avoid nephrotoxic medications and relative hypotension  · Continue to monitor renal function while admitted    Essential hypertension  Assessment & Plan  · BP reviewed and fluctuating  · Continue Home medication:  · Metoprolol and Lisinopril        VTE Pharmacologic Prophylaxis: VTE Score: 5 High Risk (Score >/= 5) - Pharmacological DVT Prophylaxis Ordered: enoxaparin (Lovenox)  Sequential Compression Devices Ordered  Patient Centered Rounds: I performed bedside rounds with nursing staff today  Discussions with Specialists or Other Care Team Provider: LILIANE    Education and Discussions with Family / Patient: Updated  (daughter) at bedside  Time Spent for Care: 30 minutes  More than 50% of total time spent on counseling and coordination of care as described above  Current Length of Stay: 0 day(s)  Current Patient Status: Inpatient   Certification Statement: The patient will continue to require additional inpatient hospital stay due to snycopal workup  Discharge Plan: Anticipate discharge in 24-48 hrs to home with home services  Code Status: Level 3 - DNAR and DNI    Subjective:   Patient she feels good today  Patient states he wants go home  Patient denies any acute chest pain, shortness breath, abdominal pain, nausea, vomiting or diarrhea      Objective:     Vitals:   Temp (24hrs), Av 1 °F (36 7 °C), Min:97 8 °F (36 6 °C), Max:98 3 °F (36 8 °C)    Temp:  [97 8 °F (36 6 °C)-98 3 °F (36 8 °C)] 98 3 °F (36 8 °C)  HR:  [54-79] 61  Resp:  [16-22] 18  BP: ()/(54-66) 117/55  SpO2:  [94 %-97 %] 96 %  Body mass index is 23 05 kg/m²  Input and Output Summary (last 24 hours): Intake/Output Summary (Last 24 hours) at 1/4/2023 1607  Last data filed at 1/4/2023 0159  Gross per 24 hour   Intake 980 ml   Output 300 ml   Net 680 ml       Physical Exam:   Physical Exam  Vitals and nursing note reviewed  Constitutional:       General: She is not in acute distress  HENT:      Head: Normocephalic  Nose: Nose normal  No congestion  Mouth/Throat:      Mouth: Mucous membranes are moist       Pharynx: Oropharynx is clear  Cardiovascular:      Rate and Rhythm: Normal rate and regular rhythm  Pulses: Normal pulses  Heart sounds: No murmur heard  Pulmonary:      Effort: Pulmonary effort is normal  No respiratory distress  Breath sounds: Decreased breath sounds present  Abdominal:      General: Bowel sounds are normal  There is no distension  Palpations: Abdomen is soft  Tenderness: There is no abdominal tenderness  Musculoskeletal:         General: Normal range of motion  Cervical back: Normal range of motion  Right lower leg: No edema  Left lower leg: No edema  Skin:     General: Skin is warm and dry  Capillary Refill: Capillary refill takes less than 2 seconds  Neurological:      Mental Status: She is alert  Mental status is at baseline  Motor: Weakness (generalized) present  Comments: Alert and Oriented to person and place  Disoriented to time and situation   Psychiatric:         Mood and Affect: Mood normal          Speech: Speech normal          Behavior: Behavior is cooperative  Cognition and Memory: Cognition is impaired  Memory is impaired  Judgment: Judgment is impulsive            Additional Data:     Labs:  Results from last 7 days Lab Units 01/04/23  0533   WBC Thousand/uL 11 07*   HEMOGLOBIN g/dL 12 0   HEMATOCRIT % 37 4   PLATELETS Thousands/uL 199   NEUTROS PCT % 66   LYMPHS PCT % 23   MONOS PCT % 8   EOS PCT % 2     Results from last 7 days   Lab Units 01/04/23  0533   SODIUM mmol/L 142   POTASSIUM mmol/L 4 0   CHLORIDE mmol/L 105   CO2 mmol/L 30   BUN mg/dL 16   CREATININE mg/dL 1 14   ANION GAP mmol/L 7   CALCIUM mg/dL 8 3   ALBUMIN g/dL 2 7*   TOTAL BILIRUBIN mg/dL 0 31   ALK PHOS U/L 68   ALT U/L 18   AST U/L 20   GLUCOSE RANDOM mg/dL 79     Results from last 7 days   Lab Units 01/03/23  1734   INR  1 00                   Lines/Drains:  Invasive Devices     Peripheral Intravenous Line  Duration           Peripheral IV 01/03/23 Left Antecubital <1 day                  Telemetry:  Telemetry Orders (From admission, onward)             24 Hour Telemetry Monitoring  (ED Bridging Orders Panel)  Continuous x 24 Hours (Telem)        References:    Telemetry Guidelines   Question:  Reason for 24 Hour Telemetry  Answer:  Syncope suspected to be cardiac in origin                 Telemetry Reviewed: Normal Sinus Rhythm  Indication for Continued Telemetry Use: Arrthymias requiring medical therapy             Imaging: Reviewed radiology reports from this admission including: CT head    Recent Cultures (last 7 days):         Last 24 Hours Medication List:   Current Facility-Administered Medications   Medication Dose Route Frequency Provider Last Rate   • acetaminophen  650 mg Oral Q6H PRN Benigno Paniagua MD     • calcium carbonate  2 tablet Oral Daily With Breakfast Benigno Paniagua MD     • vitamin B-12  1,000 mcg Oral Daily Benigno Paniagua MD     • docusate sodium  100 mg Oral BID Benigno Paniagua MD     • donepezil  10 mg Oral HS Benigno Paniagua MD     • enoxaparin  40 mg Subcutaneous Daily Benigno Paniagua MD     • levothyroxine  150 mcg Oral Early Morning Benigno Paniagua MD     • lisinopril  5 mg Oral Daily Benigno Paniagua MD     • metoprolol succinate  25 mg Oral QAM Elisabeth Jj MD     • nicotine  1 patch Transdermal Daily Elisabeth Jj MD     • ondansetron  4 mg Intravenous Q6H PRN Elisabeth Jj MD          Today, Patient Was Seen By: KANDI Patrick    **Please Note: This note may have been constructed using a voice recognition system  **

## 2023-01-04 NOTE — ASSESSMENT & PLAN NOTE
· Does not appear to be in acute exacerbation  · Resting comfortably on room air  · Continue home medications

## 2023-01-04 NOTE — PHYSICAL THERAPY NOTE
PHYSICAL THERAPY EVALUATION          Patient Name: Ondina Lemus  [id number] Date: 1/4/2023  PT EVALUATION (flowsheet entered in error; accurate tx time [id number])    76 y o     [id number]    Syncope [R55]    Past Medical History:   Diagnosis Date   • Acute kidney injury (LAYLA) with acute tubular necrosis (ATN) ([facility] [address] ) 2/13/2020   • Bed bug bite     LAST ASSESSED [id number]   • Chronic pain disorder    • Disease of thyroid gland    • Extremity pain    • Graves' disease    • Hyperlipidemia    • Hypertension    • LLL pneumonia     LAST ASSESSED [id number]   • Low back pain    • Migraine 2001    OCULAR   • Scabies     LAST ASSESSED [id number]   • Syncope and collapse     LAST ASSESSED [id number]   • Vertigo     RESOLVED      Past Surgical History:   Procedure Laterality Date   • CHOLECYSTECTOMY     • HYSTERECTOMY     • TOTAL THYROIDECTOMY     • TUBAL LIGATION Bilateral         01/04/23 1051   PT Last Visit   PT Visit Date 01/04/23   Note Type   Note type Evaluation   Pain Assessment   Pain Assessment Tool 0-10   Pain Score No Pain   Restrictions/Precautions   Other Precautions Cognitive; Chair Alarm; Bed Alarm;Telemetry; Fall Risk;Hard of hearing   Home Living   Type of [address] [place] [address] Two level;Bed/bath upstairs;Stairs to enter with rails   Bathroom Equipment Grab bars in shower; Shower chair   Home Equipment Walker;Cane;Other (Comment)  (Rollator)   Additional Comments 4 JOE  FOS w bl HR to second floor   Prior Function   Level of [place] Independent with functional mobility; Needs assistance with ADLs; Needs assistance with IADLS   Lives With Daughter   Receives Help From Family;Personal care attendant  (aide twice a week for 3-4 hours)   IADLs Family/Friend/Other provides transportation; Family/Friend/Other provides meals; Family/Friend/Other provides medication management   Falls in the last 6 months 0   Vocational Retired   Comments per dtr, amb without an AD  never home alone General   Additional Pertinent History pt admitted 1/3/23 for syncope  ambulate patient orders  PMHx significant for chronic pain, graves disease, migraine, vertigo, COPD, Alz dementia, CKD, [place]   Cognition   Overall Cognitive Status Impaired   Arousal/Participation Cooperative   Orientation Level Oriented to person;Oriented to place;Oriented to time;Disoriented to time;Disoriented to situation  (oriented to month only, states year "2004")   Memory Decreased recall of precautions;Decreased recall of recent events;Decreased short term memory   Following Commands Follows one step commands with increased time or repetition  (due to [facility])   RLE Assessment   RLE Assessment WFL   LLE Assessment   LLE Assessment WFL   Bed Mobility   Supine to Sit 5  Supervision   Additional items HOB elevated; Increased time required;Verbal cues   Sit to Supine 5  Supervision   Additional items HOB elevated; Increased time required   Transfers   Sit to Stand 4  Minimal assistance   Additional items Assist x 1; Increased time required;Verbal cues   Stand to Sit 4  Minimal assistance   Additional items Assist x 1; Increased time required;Verbal cues   Toilet transfer 5  Supervision   Additional items Standard toilet; Increased time required   Ambulation/Elevation   Gait pattern Improper Weight shift; Wide SARAHI; Excessively slow   Gait Assistance 5  Supervision  (CGA)   Additional items Assist x 1;Verbal cues   Assistive Device None   Distance 100'   Stair Management Assistance Not tested   Balance   Static Standing Fair -   Dynamic Standing Fair -   Ambulatory Fair -   Endurance Deficit   Endurance Deficit No  (HR 70-80s)   Activity Tolerance   Activity Tolerance Patient tolerated treatment well   Medical Staff Made Aware OT   Nurse Made Aware Mag BISHOP   Assessment   Prognosis Good   Problem List Impaired balance;Decreased cognition; Impaired judgement;Decreased safety awareness; Impaired hearing;Decreased skin integrity   Assessment Manisha De La Rosa Jasson Vallejo is a 76 y o  female admitted to [address] on 1/3/2023 for Syncope  PT was consulted and pt was seen on 1/4/2023 for mobility assessment and d/c planning  Pt presents fall risk, telemetry monitoring  At baseline is indep for ambulation without an AD  Never home alone and gets assist for ADLs, IADLs  Pt is currently functioning at a supervision assistance x1 level for bed mobility, min A x1 for transfers for safety given elevated height of hospital stretcher  CGA for ambulation for safety given cognitive deficits impacting attention, safety awareness  Require frequent cues during session for attention, safety however reasonable given baseline cognitive deficits  Per daughter ambulation is at baseline and denies concerns w pt d/c home at current LOF  Pt will benefit from continued skilled IP PT to address the above mentioned impairments  in order to maximize recovery and increase functional independence when completing mobility and ADLs  At this time PT recommendations for d/c are home; may benefit from HHPT to reduce fall risk  Barriers to Discharge None   Goals   Patient Goals go to bathroom   STG Expiration Date 01/14/23   Short Term Goal #1 1)  Pt will perform bed mobility with S demonstrating appropriate technique 100% of the time in order to improve function  2)  Perform all transfers with S demonstrating safe and appropriate technique 100% of the time in order to improve ability to negotiate safely in home environment  3) Amb with least restrictive AD > 150'x1 with S in order to demonstrate ability to negotiate in home environment  4)  Improve overall strength and balance 1/2 grade in order to optimize ability to perform functional tasks and reduce fall risk  5) Increase activity tolerance to 45 minutes in order to improve endurance to functional tasks  6)  Negotiate stairs using most appropriate technique and S in order to be able to negotiate safely in home environment   7) PT for ongoing patient and family/caregiver education, DME needs and d/c planning in order to promote highest level of function in least restrictive environment  Plan   Treatment/Interventions Functional transfer training;Elevations;LE strengthening/ROM; Therapeutic exercise;Cognitive reorientation;Patient/family training;Equipment eval/education; Bed mobility;Gait training;Continued evaluation;Spoke to nursing;OT;Family   PT Frequency 1-2x/wk   Recommendation   PT Discharge Recommendation Home with home health rehabilitation  (will continue to evaluate need for HHPT)   AM-PAC Basic Mobility Inpatient   Turning in Bed Without Bedrails 4   Lying on Back to Sitting on Edge of Flat Bed 3   Moving Bed to Chair 3   Standing Up From Chair 3   Walk in Room 3   Climb 3-5 Stairs 3   Basic Mobility Inpatient Raw Score 19   Basic Mobility Standardized Score 42 48   Highest Level Of Mobility   JH-HLM Goal 6: Walk 10 steps or more   JH-HLM Achieved 7: Walk 25 feet or more   End of Consult   Patient Position at End of Consult Supine; All needs within reach   History: co - morbidities, fall risk, assist for adl's/ iadl's, cognition, multiple lines  Exam: impairments in systems including musculoskeletal (strength), neuromuscular (balance, gait, transfers, motor function), am-pac, cardiopulmonary, cognition  Clinical: unstable/unpredictable  Complexity:high      Melissa Cota, PT

## 2023-01-04 NOTE — PLAN OF CARE
Problem: Potential for Falls  Goal: Patient will remain free of falls  Description: INTERVENTIONS:  - Educate patient/family on patient safety including physical limitations  - Instruct patient to call for assistance with activity   - Consult OT/PT to assist with strengthening/mobility   - Keep Call bell within reach  - Keep bed low and locked with side rails adjusted as appropriate  - Keep care items and personal belongings within reach  - Initiate and maintain comfort rounds  - Make Fall Risk Sign visible to staff  - Apply yellow socks and bracelet for high fall risk patients  - Consider moving patient to room near nurses station  Outcome: Progressing     Problem: Prexisting or High Potential for Compromised Skin Integrity  Goal: Skin integrity is maintained or improved  Description: INTERVENTIONS:  - Identify patients at risk for skin breakdown  - Assess and monitor skin integrity  - Assess and monitor nutrition and hydration status  - Monitor labs   - Assess for incontinence   - Turn and reposition patient  - Assist with mobility/ambulation  - Relieve pressure over bony prominences  - Avoid friction and shearing  - Provide appropriate hygiene as needed including keeping skin clean and dry  - Evaluate need for skin moisturizer/barrier cream  - Collaborate with interdisciplinary team   - Patient/family teaching  - Consider wound care consult   Outcome: Progressing     Problem: CARDIOVASCULAR - ADULT  Goal: Maintains optimal cardiac output and hemodynamic stability  Description: INTERVENTIONS:  - Monitor I/O, vital signs and rhythm  - Monitor for S/S and trends of decreased cardiac output  - Administer and titrate ordered vasoactive medications to optimize hemodynamic stability  - Assess quality of pulses, skin color and temperature  - Assess for signs of decreased coronary artery perfusion  - Instruct patient to report change in severity of symptoms  Outcome: Progressing  Goal: Absence of cardiac dysrhythmias or at baseline rhythm  Description: INTERVENTIONS:  - Continuous cardiac monitoring, vital signs, obtain 12 lead EKG if ordered  - Administer antiarrhythmic and heart rate control medications as ordered  - Monitor electrolytes and administer replacement therapy as ordered  Outcome: Progressing

## 2023-01-04 NOTE — H&P
[facility]  H&P- Marianna Santoro 1945, 76 y o  female MRN: [id number]  Unit/Bed#: ED-01 Encounter: [id number]  Primary Care Provider: Balta Ocasio DO   Date and time admitted to hospital: 1/3/2023  5:18 PM    * Syncope  Assessment & Plan  1  Cardiac   a  Arrhythmia: EKG shows sinus rhythm with marked sinus arrhythmia; monitor on telemetry and obtain echo   b  Ischemia: Troponins negative, EKG with no acute ischemic findings   c Structural/valvular: Echo pending   D  Cardiac tamponade: Obtain echo, no clinical signs of tamponade at this time  2  Hemmorrhage/ volume loss   A  Diarrhea: Patient reports loose stools day prior to admission   B  Vomiting: Denies   C  Orthostatic: Obtain orthostatic vitals  3  Pulmonary embolism: Given vitals, low likelihood; obtain D-dimer  4  Subarachnoid bleed: Obtain CT head  5  Neurocardiogenic:  6  Orthostasis: Obtain orthostatics  7  Drugs of abuse: Pt doesn't report drug use  8  Metabolic   A hypoglycemia: Glucose stable   B  Hypoxic: SPO2 97% on room air  9  Psychiatric  10  Neurogenic   A  TIA/CVA: CT head ordered   B  Migraines: No history, monitor   C  Subclavian steal   D Seizures:  As per ED notes, family reported no history of seizure-like activity    Patient with Alzheimer's, poor historian; attempted to contact family, however no answer  Work-up syncope; CT head, echo, telemetry, orthostatics  Fall precautions, PT/OT      COPD (chronic obstructive pulmonary disease) ([facility] [address] )  Assessment & Plan  SPO2 currently 97% on room air; not in acute exacerbation  Continue home medications    Anemia  Assessment & Plan  Hemoglobin currently 14 9; monitor and transfuse as needed    Late onset Alzheimer's disease with behavioral disturbance ([facility] [address] )  Assessment & Plan  Noted to have dementia; continue home medications  Supportive care    Graves' disease  Assessment & Plan  TSH 2 396; continue L T4 137 micrograms    Hyperlipidemia  Assessment & Plan  Medical records reveal patient takes niacin 1000 mg nightly  However, also reveals allergy to niacin? Hold niacin on admission; defer to primary care for lipid-lowering therapies      Chronic kidney disease, stage III (moderate) (HCC)  Assessment & Plan  Lab Results   Component Value Date    EGFR 40 01/03/2023    EGFR 46 06/11/2021    EGFR 27 06/10/2021    CREATININE 1 27 01/03/2023    CREATININE 1 21 (H) 06/25/2021    CREATININE 1 16 06/11/2021     Appears to be at baseline; monitor  Avoid nephrotoxic medications and relative hypotension    Essential hypertension  Assessment & Plan  Blood pressure well controlled on admission; continue home medications      VTE Prophylaxis: Enoxaparin (Lovenox)  / sequential compression device   Code Status: DNR/DNI  POLST: POLST form is not discussed and not completed at this time  Discussion with family: Attempted to contact family, no answer    Anticipated Length of Stay:  Patient will be admitted on an Observation basis with an anticipated length of stay of less than 2 midnights  Justification for Hospital Stay: Syncope work-up    Total Time for Visit, including Counseling / Coordination of Care: 60 minutes  Greater than 50% of this total time spent on direct patient counseling and coordination of care  Chief Complaint:   Syncope    History of Present Illness:    Weston Llanos is a 76 y o  female with past medical history of dementia, anemia, hypertension, CKD 3, Graves' disease who presents with 1 episode of syncope that was witnessed by her family members  According to family members via ER note, patient had episode of syncope without striking her head and while in a seated position  Family denied seizure-like activities and, as per record, this is happened in the past   Patient is alert and oriented X2; exceedingly hard of hearing and unable to provide meaningful history  As per ED notes, patient had 1 episode of diarrhea a day prior to admission    Patient also complained of erythematous, papular rash that encompasses her entire body  Patient will be admitted for work-up of syncopal episode  Review of Systems:    Review of Systems   Unable to perform ROS: Dementia        Past Medical and Surgical History:     Past Medical History:   Diagnosis Date   • Acute kidney injury (LAYLA) with acute tubular necrosis (ATN) ([facility] [address] ) 2/13/2020   • Bed bug bite     LAST ASSESSED [id number]   • Chronic pain disorder    • Disease of thyroid gland    • Extremity pain    • Graves' disease    • Hyperlipidemia    • Hypertension    • LLL pneumonia     LAST ASSESSED [id number]   • Low back pain    • Migraine 2001    OCULAR   • Scabies     LAST ASSESSED [id number]   • Syncope and collapse     LAST ASSESSED [id number]   • Vertigo     RESOLVED        Past Surgical History:   Procedure Laterality Date   • CHOLECYSTECTOMY     • HYSTERECTOMY     • TOTAL THYROIDECTOMY     • TUBAL LIGATION Bilateral        Meds/Allergies:    Prior to Admission medications    Medication Sig Start Date End Date Taking? Authorizing Provider   alendronate (FOSAMAX) 70 mg tablet Take 75 mg by mouth every 7 days 11/4/20  Yes Historical Provider, MD   calcium-vitamin D (OSCAL) 250-125 MG-UNIT per tablet Take 1 tablet by mouth daily     Yes Historical Provider, MD   Dapagliflozin Propanediol 10 MG TABS Take 10 mg by mouth daily 3/18/22  Yes Historical Provider, MD   donepezil (ARICEPT) 10 mg tablet take 1 tablet by mouth daily at bedtime 9/6/22  Yes Katey Suarez MD   ergocalciferol (VITAMIN D2) 50,000 units 1 tablet every other week 3/10/21  Yes Historical Provider, MD   levothyroxine 137 mcg tablet Take 150 mcg by mouth daily Taking 150 mcg daily 7/20/22  Yes Historical Provider, MD   lisinopril (ZESTRIL) 5 mg tablet Take 5 mg by mouth daily 7/9/21 1/3/23 Yes Historical Provider, MD   metoprolol succinate (TOPROL-XL) 50 mg 24 hr tablet Take 0 5 tablets (25 mg total) by mouth every morning 6/21/22  Yes Bri Lehman DO   niacin ( Rias) 1000 MG CR tablet Take 1,000 mg by mouth daily at bedtime 5/10/22  Yes Historical Provider, MD   traZODone (DESYREL) 50 mg tablet Take 1 tablet (50 mg total) by mouth daily at bedtime 8/10/22  Yes Kathy Allison MD   triamcinolone (KENALOG) 0 1 % ointment Apply topicaly 2-4 times a day for up to 2 weeks  7/26/22  Yes Guzman Foreman MD   vitamin B-12 (VITAMIN B-12) 1,000 mcg tablet Take 1 tablet (1,000 mcg total) by mouth daily 6/21/22  Yes Antonella Galvan DO   aspirin 81 MG tablet Take 81 mg by mouth daily  Patient not taking: Reported on 10/21/2022    Historical Provider, MD     I have reviewed home medications using allscripts  Allergies: Allergies   Allergen Reactions   • Naproxen Itching   • Niacin    • Nsaids Other (See Comments) and Swelling   • Azithromycin Nausea Only and Rash   • Cephalexin Rash       Social History:     Marital Status:    Occupation: Retired  Patient Pre-hospital Living Situation: Lives with family  Patient Pre-hospital Level of Mobility:   Patient Pre-hospital Diet Restrictions: None  Substance Use History:   Social History     Substance and Sexual Activity   Alcohol Use Never     Social History     Tobacco Use   Smoking Status Every Day   • Packs/day: 0 25   • Types: Cigarettes   Smokeless Tobacco Never     Social History     Substance and Sexual Activity   Drug Use No       Family History:    Family History   Problem Relation Age of Onset   • Breast cancer Sister    • Diabetes Family    • No Known Problems Mother    • No Known Problems Father        Physical Exam:     Vitals:   Blood Pressure: 117/61 (01/03/23 2348)  Pulse: 74 (01/03/23 2348)  Temperature: 97 8 °F (36 6 °C) (01/03/23 1720)  Temp Source: Oral (01/03/23 1720)  Respirations: 18 (01/03/23 2348)  SpO2: 94 % (01/03/23 2109)    Physical Exam  Vitals and nursing note reviewed  Constitutional:       General: She is not in acute distress  Appearance: She is well-developed     HENT:      Head: Normocephalic and atraumatic  Eyes:      Comments: Pale conjunctive a   Cardiovascular:      Rate and Rhythm: Normal rate and regular rhythm  Heart sounds: No murmur heard  Pulmonary:      Effort: Pulmonary effort is normal  No respiratory distress  Abdominal:      Palpations: Abdomen is soft  Tenderness: There is no abdominal tenderness  Musculoskeletal:         General: No swelling  Cervical back: Neck supple  Skin:     General: Skin is warm and dry  Capillary Refill: Capillary refill takes more than 3 seconds  Findings: Rash present  Comments: Erythematous papular rash on all extremities and trunk   Neurological:      Mental Status: She is alert  Comments: Alert and oriented X2   Psychiatric:         Mood and Affect: Mood normal              Additional Data:     Lab Results: I have personally reviewed pertinent reports  Results from last 7 days   Lab Units 01/03/23  1734   WBC Thousand/uL 10 57*   HEMOGLOBIN g/dL 14 9   HEMATOCRIT % 47 7*   PLATELETS Thousands/uL 275   NEUTROS PCT % 66   LYMPHS PCT % 26   MONOS PCT % 6   EOS PCT % 2     Results from last 7 days   Lab Units 01/03/23  1734   SODIUM mmol/L 143   POTASSIUM mmol/L 4 5   CHLORIDE mmol/L 102   CO2 mmol/L 27   BUN mg/dL 17   CREATININE mg/dL 1 27   ANION GAP mmol/L 14*   CALCIUM mg/dL 9 0   ALBUMIN g/dL 3 3*   TOTAL BILIRUBIN mg/dL 0 49   ALK PHOS U/L 87   ALT U/L 23   AST U/L 34   GLUCOSE RANDOM mg/dL 112     Results from last 7 days   Lab Units 01/03/23  1734   INR  1 00                   Imaging: I have personally reviewed pertinent reports  XR chest 1 view portable    (Results Pending)   CT head wo contrast    (Results Pending)       EKG, Pathology, and Other Studies Reviewed on Admission:   · EKG: Sinus rhythm with marked sinus arrhythmia    [facility] Yes     ** Please Note: This note has been constructed using a voice recognition system   **

## 2023-01-04 NOTE — SPEECH THERAPY NOTE
Speech Language/Pathology  Speech/Language Pathology  Assessment    Patient Name: Loren Calderon  TFVUC'N Date: 1/4/2023     Problem List  Principal Problem:    Syncope  Active Problems:    Essential hypertension    Chronic kidney disease, stage III (moderate) (HCC)    Hyperlipidemia    Graves' disease    Late onset Alzheimer's disease with behavioral disturbance ([facility] [address] )    Anemia    COPD [facility]ch[facility] disease) ([facility] )    Past Medical History  Past Medical History:   Diagnosis Date   • Acute kidney injury (LAYLA) with acute tubular necrosis (ATN) ([facility] ) 2/13/2020   • Bed bug bite     LAST ASSESSED [id number]   • Chronic pain disorder    • Disease of thyroid gland    • Extremity pain    • Graves' disease    • Hyperlipidemia    • Hypertension    • LLL pneumonia     LAST ASSESSED [id number]   • Low back pain    • Migraine 2001    OCULAR   • Scabies     LAST ASSESSED [id number]   • Syncope and collapse     LAST ASSESSED [id number]   • Vertigo     RESOLVED      Past Surgical History  Past Surgical History:   Procedure Laterality Date   • CHOLECYSTECTOMY     • HYSTERECTOMY     • TOTAL THYROIDECTOMY     • TUBAL LIGATION Bilateral      ST note  Pt screened  No facial asymmetry, speech clear ([place]), very pleasant  Tolerating diet "I eat like a horse!"  Will d/c ST order  please notify of concerns arise  H&P/Admit info/ pertinent provider notes: (PMH noted above)  Chief Complaint:   Syncope  History of Present Illness:  Loren Calderon is a 76 y o  female with past medical history of dementia, anemia, hypertension, CKD 3, Graves' disease who presents with 1 episode of syncope that was witnessed by her family members  According to family members via ER note, patient had episode of syncope without striking her head and while in a seated position    Family denied seizure-like activities and, as per record, this is happened in the past   Patient is alert and oriented X2; exceedingly hard of hearing and unable to provide meaningful history  As per ED notes, patient had 1 episode of diarrhea a day prior to admission  Patient also complained of erythematous, papular rash that encompasses her entire body  Patient will be admitted for work-up of syncopal episode  Special Studies:  Ct head  No acute intracranial abnormality     Unchanged chronic infarcts in right parietotemporal lobe, left inferolateral frontal lobe, and left caudate with mild chronic microangiopathy    cxr nad

## 2023-01-04 NOTE — PLAN OF CARE
Problem: PHYSICAL THERAPY ADULT  Goal: Performs mobility at highest level of function for planned discharge setting  See evaluation for individualized goals  Description: Treatment/Interventions: Functional transfer training, Elevations, LE strengthening/ROM, Therapeutic exercise, Cognitive reorientation, Patient/family training, Equipment eval/education, Bed mobility, Gait training, Continued evaluation, Spoke to nursing, OT, Family          See flowsheet documentation for full assessment, interventions and recommendations  Note: Prognosis: Good  Problem List: Impaired balance, Decreased cognition, Impaired judgement, Decreased safety awareness, Impaired hearing, Decreased skin integrity  Assessment: Yuki Jensen is a 76 y o  female admitted to [facility] on 1/3/2023 for Syncope  PT was consulted and pt was seen on 1/4/2023 for mobility assessment and d/c planning  Pt presents fall risk, telemetry monitoring  At baseline is indep for ambulation without an AD  Never home alone and gets assist for ADLs, IADLs  Pt is currently functioning at a supervision assistance x1 level for bed mobility, min A x1 for transfers for safety given elevated height of hospital stretcher  CGA for ambulation for safety given cognitive deficits impacting attention, safety awareness  Require frequent cues during session for attention, safety however reasonable given baseline cognitive deficits  Per daughter ambulation is at baseline and denies concerns w pt d/c home at current LOF  Pt will benefit from continued skilled IP PT to address the above mentioned impairments  in order to maximize recovery and increase functional independence when completing mobility and ADLs  At this time PT recommendations for d/c are home; may benefit from HHPT to reduce fall risk    Barriers to Discharge: None     PT Discharge Recommendation: Home with home health rehabilitation (will continue to evaluate need for HHPT)    See flowsheet documentation for full assessment

## 2023-01-04 NOTE — ED NOTES
Spoke to patient's daughter Richard Murillo and provided an update on plan of care  Daughter provided mobility information on patient, ambulates with a walker and or assist of one        Franc Venegas RN  01/04/23 2043

## 2023-01-04 NOTE — ASSESSMENT & PLAN NOTE
· Cardiac:   a  Arrhythmia: EKG: NSR with marked sinus arrhythmia; Initiate telemetry and obtain echo   b  Ischemia: Troponins negative, EKG with no acute ischemic findings   c Structural/valvular: Echo pending   D  Cardiac tamponade: No clinical signs of tamponade at this time  · Hemmorrhage/ volume loss   A  Diarrhea: Patient reports loose stools day prior to admission   B  Vomiting: Denies   C  Orthostatic: Obtain orthostatic vitals  · Pulmonary embolism: Given vitals, low likelihood  D-dimer: 1 52  Follow up Echo  Will order Venous duplex  Consider CTA if respiratory status worsens  · Subarachnoid bleed: CT head negative for acute abnormalities  · Metabolic   A hypoglycemia: Glucose stable   B  Hypoxic: SPO2 97% on room air  · Psychiatric  · Neurogenic   A  TIA/CVA: CT head no acute abnormalities   B  Migraines: No history, monitor   C  Subclavian steal   D Seizures:  As per ED notes, family reported no history of seizure-like activity    · Patient with Alzheimer's, poor historian  · Follow up syncope workup   · Initiate Fall precautions  · PT/OT eval: Recommending [facility]

## 2023-01-04 NOTE — ASSESSMENT & PLAN NOTE
· Noted to have dementia    · AOx2 at baseline  · Continue home medications:  · Aricept  · Continue Supportive care  · Regulate Sleep/Wake cycle

## 2023-01-04 NOTE — ASSESSMENT & PLAN NOTE
Lab Results   Component Value Date    EGFR 40 01/03/2023    EGFR 46 06/11/2021    EGFR 27 06/10/2021    CREATININE 1 27 01/03/2023    CREATININE 1 21 (H) 06/25/2021    CREATININE 1 16 06/11/2021     Appears to be at baseline; monitor  Avoid nephrotoxic medications and relative hypotension

## 2023-01-05 VITALS
RESPIRATION RATE: 15 BRPM | HEART RATE: 55 BPM | TEMPERATURE: 97.2 F | DIASTOLIC BLOOD PRESSURE: 66 MMHG | BODY MASS INDEX: 23.03 KG/M2 | SYSTOLIC BLOOD PRESSURE: 156 MMHG | WEIGHT: 122 LBS | HEIGHT: 61 IN | OXYGEN SATURATION: 96 %

## 2023-01-05 LAB
ANION GAP SERPL CALCULATED.3IONS-SCNC: 7 MMOL/L (ref 4–13)
BASOPHILS # BLD AUTO: 0.05 THOUSANDS/ÂΜL (ref 0–0.1)
BASOPHILS NFR BLD AUTO: 1 % (ref 0–1)
BUN SERPL-MCNC: 15 MG/DL (ref 5–25)
CALCIUM SERPL-MCNC: 8.9 MG/DL (ref 8.3–10.1)
CHLORIDE SERPL-SCNC: 106 MMOL/L (ref 96–108)
CO2 SERPL-SCNC: 32 MMOL/L (ref 21–32)
CREAT SERPL-MCNC: 1.04 MG/DL (ref 0.6–1.3)
EOSINOPHIL # BLD AUTO: 0.46 THOUSAND/ÂΜL (ref 0–0.61)
EOSINOPHIL NFR BLD AUTO: 5 % (ref 0–6)
ERYTHROCYTE [DISTWIDTH] IN BLOOD BY AUTOMATED COUNT: 15.8 % (ref 11.6–15.1)
GFR SERPL CREATININE-BSD FRML MDRD: 51 ML/MIN/1.73SQ M
GLUCOSE SERPL-MCNC: 85 MG/DL (ref 65–140)
HCT VFR BLD AUTO: 38.9 % (ref 34.8–46.1)
HGB BLD-MCNC: 12.1 G/DL (ref 11.5–15.4)
IMM GRANULOCYTES # BLD AUTO: 0.03 THOUSAND/UL (ref 0–0.2)
IMM GRANULOCYTES NFR BLD AUTO: 0 % (ref 0–2)
LYMPHOCYTES # BLD AUTO: 2.97 THOUSANDS/ÂΜL (ref 0.6–4.47)
LYMPHOCYTES NFR BLD AUTO: 31 % (ref 14–44)
MAGNESIUM SERPL-MCNC: 1.7 MG/DL (ref 1.6–2.6)
MCH RBC QN AUTO: 27.8 PG (ref 26.8–34.3)
MCHC RBC AUTO-ENTMCNC: 31.1 G/DL (ref 31.4–37.4)
MCV RBC AUTO: 89 FL (ref 82–98)
MONOCYTES # BLD AUTO: 0.89 THOUSAND/ÂΜL (ref 0.17–1.22)
MONOCYTES NFR BLD AUTO: 9 % (ref 4–12)
NEUTROPHILS # BLD AUTO: 5.31 THOUSANDS/ÂΜL (ref 1.85–7.62)
NEUTS SEG NFR BLD AUTO: 54 % (ref 43–75)
NRBC BLD AUTO-RTO: 0 /100 WBCS
PLATELET # BLD AUTO: 198 THOUSANDS/UL (ref 149–390)
PMV BLD AUTO: 10.2 FL (ref 8.9–12.7)
POTASSIUM SERPL-SCNC: 4.2 MMOL/L (ref 3.5–5.3)
RBC # BLD AUTO: 4.36 MILLION/UL (ref 3.81–5.12)
SODIUM SERPL-SCNC: 145 MMOL/L (ref 135–147)
WBC # BLD AUTO: 9.71 THOUSAND/UL (ref 4.31–10.16)

## 2023-01-05 RX ADMIN — LISINOPRIL 5 MG: 5 TABLET ORAL at 10:11

## 2023-01-05 RX ADMIN — LEVOTHYROXINE SODIUM 150 MCG: 75 TABLET ORAL at 05:44

## 2023-01-05 RX ADMIN — METOPROLOL SUCCINATE 25 MG: 25 TABLET, EXTENDED RELEASE ORAL at 10:11

## 2023-01-05 RX ADMIN — CALCIUM 2 TABLET: 500 TABLET ORAL at 10:11

## 2023-01-05 RX ADMIN — CYANOCOBALAMIN TAB 500 MCG 1000 MCG: 500 TAB at 10:11

## 2023-01-05 NOTE — ASSESSMENT & PLAN NOTE
Lab Results   Component Value Date    EGFR 51 01/05/2023    EGFR 45 01/04/2023    EGFR 40 01/03/2023    CREATININE 1 04 01/05/2023    CREATININE 1 14 01/04/2023    CREATININE 1 27 01/03/2023     · Appears to be at baseline  · Avoid nephrotoxic medications and relative hypotension

## 2023-01-05 NOTE — DISCHARGE SUMMARY
[facility]  Discharge- Yin Mejia 1945, 76 y o  female MRN: [id number]  Unit/Bed#: [place] [address] [id number] Encounter: [id number]  Primary Care Provider: Aneudy Swift DO   Date and time admitted to hospital: 1/3/2023  5:18 PM    * Syncope  Assessment & Plan  · Cardiac:   a  Arrhythmia: EKG: NSR with marked sinus arrhythmia; normal echo   b  Ischemia: Troponins negative, EKG with no acute ischemic findings   c Structural/valvular: normal   D  Cardiac tamponade: No clinical signs of tamponade at this time  · Hemmorrhage/ volume loss   A  Diarrhea: Patient reports loose stools day prior to admission  now resolved   B  Vomiting: Denies   C  Orthostatic:vitals normal  · Pulmonary embolism: Given vitals, low likelihood  D-dimer: 1 52  Follow up Echo  Will order Venous duplex  Consider CTA if respiratory status worsens  · Subarachnoid bleed: CT head negative for acute abnormalities  · Metabolic   A hypoglycemia: Glucose stable   B  Hypoxic: SPO2 97% on room air  · Psychiatric  · Neurogenic   A  TIA/CVA: CT head no acute abnormalities   B  Migraines: No history, monitor   C  Subclavian steal   D Seizures: As per ED notes, family reported no history of seizure-like activity    · Patient with Alzheimer's, poor historian  · Initiate Fall precautions  · PT/OT eval: Recommending C  dc home today      COPD (chronic obstructive pulmonary disease) (HCC)  Assessment & Plan  · Does not appear to be in acute exacerbation  · Resting comfortably on room air  · Continue home medications    Anemia  Assessment & Plan  · Hemoglobin currently stable  · Monitor and transfuse as needed    Late onset Alzheimer's disease with behavioral disturbance ([facility] [address] )  Assessment & Plan  · Noted to have dementia    · AOx2 at baseline  · Continue home medications:  · Aricept  · Continue Supportive care  · Regulate Sleep/Wake cycle    Graves' disease  Assessment & Plan  · TSH 2 396  · Continue Home medication:  · Levothyroxine    Hyperlipidemia  Assessment & Plan  · Medical records reveal patient takes niacin 1000 mg nightly  · However, also reveals allergy to niacin? · Hold niacin on admission->Defer to primary care for lipid-lowering therapies      Chronic kidney disease, stage III (moderate) (HCC)  Assessment & Plan  Lab Results   Component Value Date    EGFR 51 01/05/2023    EGFR 45 01/04/2023    EGFR 40 01/03/2023    CREATININE 1 04 01/05/2023    CREATININE 1 14 01/04/2023    CREATININE 1 27 01/03/2023     · Appears to be at baseline  · Avoid nephrotoxic medications and relative hypotension    Essential hypertension  Assessment & Plan  · BP reviewed and fluctuating  · Continue Home medication:  · Metoprolol and Lisinopril      Discharging Physician / Practitioner: Renzo Medina MD  PCP: Adarsh Gonzalez DO  Admission Date:   Admission Orders (From admission, onward)     Ordered        01/04/23 1455  Inpatient Admission  Once            01/03/23 2005  Place in Observation  Once                      Discharge Date: 01/05/23    Medical Problems     Resolved Problems  Date Reviewed: 1/5/2023   None         Consultations During Hospital Stay:  · Pt/ot    Procedures Performed:   · none    Significant Findings / Test Results:   XR chest 1 view portable    Result Date: 1/4/2023  Impression: No acute cardiopulmonary disease  Workstation performed: [id number]     CT head wo contrast    Result Date: 1/4/2023  Impression: No acute intracranial abnormality  Unchanged chronic infarcts in right parietotemporal lobe, left inferolateral frontal lobe, and left caudate with mild chronic microangiopathy   Workstation performed: [id number]     Incidental Findings:   · none    Test Results Pending at Discharge (will require follow up):   · none     Outpatient Tests Requested:  · none    Complications:  none    Reason for Admission: Syncope    Hospital Course:     Jeff Conklin is a 76 y o  female patient who originally presented to the hospital on 1/3/2023 due to syncope was witnessed by family members  She did not have any head injury  No seizure-like activity reported  Patient does have episode of diarrhea a day before this occurred  She noted to have sinus arrhythmia with no other events noted on telemetry monitoring  Blood pressure stable  Diarrhea has resolved  Feeling better back to baseline and ambulating with a walker in the hallway with physical therapy  2D echo normal   Will discharge back home today with home PT OT services        Please see above list of diagnoses and related plan for additional information  Condition at Discharge: good     Discharge Day Visit / Exam:     Subjective: She denies any complaints today feels well willing to go home today  Denies any dizziness  Vitals: Blood Pressure: 156/66 (01/05/23 1134)  Pulse: 55 (01/05/23 1134)  Temperature: (!) 97 2 °F (36 2 °C) (01/05/23 1134)  Temp Source: Oral (01/04/23 0810)  Respirations: 15 (01/05/23 1134)  Height: 5' 1" (154 9 cm) (01/04/23 1000)  Weight - Scale: 55 3 kg (122 lb) (01/04/23 1000)  SpO2: 96 % (01/05/23 1134)  Exam:   Physical Exam  Vitals and nursing note reviewed  Constitutional:       Appearance: Normal appearance  Comments: Hard of hearing   HENT:      Head: Normocephalic and atraumatic  Right Ear: External ear normal       Left Ear: External ear normal       Nose: Nose normal       Mouth/Throat:      Pharynx: Oropharynx is clear  Eyes:      Pupils: Pupils are equal, round, and reactive to light  Cardiovascular:      Rate and Rhythm: Normal rate and regular rhythm  Heart sounds: Normal heart sounds  Pulmonary:      Effort: Pulmonary effort is normal       Breath sounds: Normal breath sounds  Abdominal:      General: Bowel sounds are normal       Palpations: Abdomen is soft  Tenderness: There is no abdominal tenderness  Musculoskeletal:         General: Normal range of motion        Cervical back: Normal range of motion and neck supple  Skin:     General: Skin is warm and dry  Capillary Refill: Capillary refill takes less than 2 seconds  Neurological:      General: No focal deficit present  Mental Status: She is alert and oriented to person, place, and time  Psychiatric:         Mood and Affect: Mood normal          Discussion with Family: Discussed with daughter    Discharge instructions/Information to patient and family:   See after visit summary for information provided to patient and family  Provisions for Follow-Up Care:  See after visit summary for information related to follow-up care and any pertinent home health orders  Disposition:     Home with VNA Services (Reminder: Complete face to face encounter)    For Discharges to [facility]:   · Not Applicable to this Patient - Not Applicable to this Patient    Planned Readmission: None     Discharge Statement:  I spent 35 minutes discharging the patient  This time was spent on the day of discharge  I had direct contact with the patient on the day of discharge  Greater than 50% of the total time was spent examining patient, answering all patient questions, arranging and discussing plan of care with patient as well as directly providing post-discharge instructions  Additional time then spent on discharge activities  Discharge Medications:  See after visit summary for reconciled discharge medications provided to patient and family        ** Please Note: This note has been constructed using a voice recognition system **

## 2023-01-05 NOTE — NURSING NOTE
AVS reviewed with pt and daughter, both verbalized understanding  IV removed  Pt d/c via wheelchair  No Rx to fill

## 2023-01-05 NOTE — PLAN OF CARE
Problem: Potential for Falls  Goal: Patient will remain free of falls  Description: INTERVENTIONS:  - Educate patient/family on patient safety including physical limitations  - Instruct patient to call for assistance with activity   - Consult OT/PT to assist with strengthening/mobility   - Keep Call bell within reach  - Keep bed low and locked with side rails adjusted as appropriate  - Keep care items and personal belongings within reach  - Initiate and maintain comfort rounds  - Make Fall Risk Sign visible to staff  - Apply yellow socks and bracelet for high fall risk patients  - Consider moving patient to room near nurses station  Outcome: Adequate for Discharge     Problem: Prexisting or High Potential for Compromised Skin Integrity  Goal: Skin integrity is maintained or improved  Description: INTERVENTIONS:  - Identify patients at risk for skin breakdown  - Assess and monitor skin integrity  - Assess and monitor nutrition and hydration status  - Monitor labs   - Assess for incontinence   - Turn and reposition patient  - Assist with mobility/ambulation  - Relieve pressure over bony prominences  - Avoid friction and shearing  - Provide appropriate hygiene as needed including keeping skin clean and dry  - Evaluate need for skin moisturizer/barrier cream  - Collaborate with interdisciplinary team   - Patient/family teaching  - Consider wound care consult   Outcome: Adequate for Discharge     Problem: CARDIOVASCULAR - ADULT  Goal: Maintains optimal cardiac output and hemodynamic stability  Description: INTERVENTIONS:  - Monitor I/O, vital signs and rhythm  - Monitor for S/S and trends of decreased cardiac output  - Administer and titrate ordered vasoactive medications to optimize hemodynamic stability  - Assess quality of pulses, skin color and temperature  - Assess for signs of decreased coronary artery perfusion  - Instruct patient to report change in severity of symptoms  Outcome: Adequate for Discharge  Goal: Absence of cardiac dysrhythmias or at baseline rhythm  Description: INTERVENTIONS:  - Continuous cardiac monitoring, vital signs, obtain 12 lead EKG if ordered  - Administer antiarrhythmic and heart rate control medications as ordered  - Monitor electrolytes and administer replacement therapy as ordered  Outcome: Adequate for Discharge     Problem: MOBILITY - ADULT  Goal: Maintain or return to baseline ADL function  Description: INTERVENTIONS:  -  Assess patient's ability to carry out ADLs; assess patient's baseline for ADL function and identify physical deficits which impact ability to perform ADLs (bathing, care of mouth/teeth, toileting, grooming, dressing, etc )  - Assess/evaluate cause of self-care deficits   - Assess range of motion  - Assess patient's mobility; develop plan if impaired  - Assess patient's need for assistive devices and provide as appropriate  - Encourage maximum independence but intervene and supervise when necessary  - Involve family in performance of ADLs  - Assess for home care needs following discharge   - Consider OT consult to assist with ADL evaluation and planning for discharge  - Provide patient education as appropriate  Outcome: Adequate for Discharge  Goal: Maintains/Returns to pre admission functional level  Description: INTERVENTIONS:  - Perform BMAT or MOVE assessment daily    - Set and communicate daily mobility goal to care team and patient/family/caregiver  - Collaborate with rehabilitation services on mobility goals if consulted  - Perform Range of Motion 3 times a day  - Reposition patient every 2 hours    - Dangle patient 3 times a day  - Stand patient 3 times a day  - Ambulate patient 3 times a day  - Out of bed to chair 3 times a day   - Out of bed for meals 3 times a day  - Out of bed for toileting  - Record patient progress and toleration of activity level   Outcome: Adequate for Discharge

## 2023-01-05 NOTE — ASSESSMENT & PLAN NOTE
· Cardiac:   a  Arrhythmia: EKG: NSR with marked sinus arrhythmia; normal echo   b  Ischemia: Troponins negative, EKG with no acute ischemic findings   c Structural/valvular: normal   D  Cardiac tamponade: No clinical signs of tamponade at this time  · Hemmorrhage/ volume loss   A  Diarrhea: Patient reports loose stools day prior to admission  now resolved   B  Vomiting: Denies   C  Orthostatic:vitals normal  · Pulmonary embolism: Given vitals, low likelihood  D-dimer: 1 52  Follow up Echo  Will order Venous duplex  Consider CTA if respiratory status worsens  · Subarachnoid bleed: CT head negative for acute abnormalities  · Metabolic   A hypoglycemia: Glucose stable   B  Hypoxic: SPO2 97% on room air  · Psychiatric  · Neurogenic   A  TIA/CVA: CT head no acute abnormalities   B  Migraines: No history, monitor   C  Subclavian steal   D Seizures: As per ED notes, family reported no history of seizure-like activity    · Patient with Alzheimer's, poor historian  · Initiate Fall precautions  · PT/OT eval: Recommending [facility]  dc home today

## 2023-01-05 NOTE — OCCUPATIONAL THERAPY NOTE
Occupational Therapy Screen:    Patient Name: Yin JACOBS Date: 1/5/2023    OT consult received  Chart reviewed  Pt seen ambulating hallway w/ nursing at a Supervision level w/ use of RW  Spoke to PT  Pt receives assist w/ all ADLs at baseline  Pt w/ hx of Alzheimer's diease, cognition at baseline  Pt has 24/7 care at home  Pt planned for D/C today per MD  No acute skilled OT needs identified at this time  Will D/C OT  Please re-consult as needed      Ashley Valadez OTR/L

## 2023-01-05 NOTE — CASE MANAGEMENT
Case Management Discharge Planning Note    Patient name Ondina Lemus  Location [place] [address] 2 [address] Ajit Prabhakar* MRN [id number]  : 1945 Date 2023       Current Admission Date: 1/3/2023  Current Admission Diagnosis:Syncope   Patient Active Problem List    Diagnosis Date Noted   • COPD (chronic obstructive pulmonary disease) ([facility] [address] ) 10/21/2022   • Insomnia due to other mental disorder 10/27/2021   • Sleep disturbance 2021   • Vitamin D deficiency 2020   • Eczema 10/22/2020   • Spinal stenosis of lumbar region with neurogenic claudication    • Hypocalcemia 2020   • Hypotension, unspecified 2020   • Anemia 2019   • Fall 2019   • 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   • Recurrent major depressive disorder, in partial remission ([facility] ) 2019   • Visual impairment 2019   • Full dentures 2019   • Hearing loss 2018   • Cough, chronic -  improved    • Umbilical hernia    • Low back pain radiating to left lower extremity 10/10/2017   • Lacunar stroke ([facility] ) 2017   • Syncope 2017   • Sinus bradycardia 2017   • Postoperative hypothyroidism 2017   • Essential hypertension 2017   • Urinary incontinence 2017   • Chronic kidney disease, stage III (moderate) ([facility] [address] ) 2017   • Neuropathy of both feet 2016   • Thyroid nodule 2016   • Current smoker 2015   • Late onset Alzheimer's disease with behavioral disturbance ([facility] ) 2015   • Trigger middle finger of right hand 2015   • Gait disturbance 2015   • Hyperlipidemia 2013   • Disc degeneration, lumbar 07/15/2013   • Dizziness 2013   • Pernicious anemia 2012   • Osteoarthritis 2012   • Hyperglycemia 2012   • Graves' disease 2012      LOS (days): 1  Geometric Mean LOS (GMLOS) (days): 2 30  Days to GMLOS:1 4     OBJECTIVE:  Risk of Unplanned Readmission Score: 12 31         Current admission status: Inpatient   Preferred Pharmacy:   [address] [place] [address] [id number] Geoffrey GUY  [facility] [address]  Phone: [phone] Fax: [phone]    Primary Care Provider: Aye Loza DO    Primary Insurance: MEDICARE  Secondary Insurance: AARP    DISCHARGE DETAILS:    Discharge planning discussed with[de-identified] Mariella Aguilar (Child) [phone]  Freedom of Choice: Yes  Comments - Freedom of Choice: Per Mariella Aguilar (Child) [phone] referral to [facility] (OUT[facility] (had before)    CM contacted family/caregiver?: Yes  Were Treatment Team discharge recommendations reviewed with patient/caregiver?: Yes  Did patient/caregiver verbalize understanding of patient care needs?: Yes       Contacts  Patient Contacts: Mariella Aguilar (Child)   [phone]  Relationship to Patient[de-identified] Family  Contact Method: Phone  Phone Number: Mariella Aguilar (Child)   [phone]  Reason/Outcome: Discharge [address]         Is the patient interested in [address] at discharge?: Yes    DME Referral Provided  Referral made for DME?: No    Other Referral/Resources/Interventions Provided:  Interventions: [address]         Treatment Team Recommendation: Home with [facility]  Discharge Destination Plan[de-identified] Home with Ena at Discharge : Automobile (daughter)      IMM Given (Date):: 01/05/23  IMM Given to[de-identified] Family  Family notified[de-identified] Mariella Aguilar

## 2023-01-05 NOTE — PROGRESS NOTES
-- Patient:  -- MRN: [id number]  -- Aidin Request ID: [id number]  -- Level of care reserved: Physical Therapy / Day Rehab  -- Partner Reserved:  [facility], [place], [address] [phone]  -- Clinical needs requested:  -- Geography searched: [facility]  -- Start of Service:  -- Request sent: 2:18pm EST on 1/4/2023 by Leeanna Gillette  -- Partner reserved: 2:31pm EST on 1/4/2023 by Leeanna Gillette  -- Choice list shared:

## 2023-01-05 NOTE — PLAN OF CARE
Problem: Potential for Falls  Goal: Patient will remain free of falls  Description: INTERVENTIONS:  - Educate patient/family on patient safety including physical limitations  - Instruct patient to call for assistance with activity   - Consult OT/PT to assist with strengthening/mobility   - Keep Call bell within reach  - Keep bed low and locked with side rails adjusted as appropriate  - Keep care items and personal belongings within reach  - Initiate and maintain comfort rounds  - Make Fall Risk Sign visible to staff  - Offer Toileting every  Hours, in advance of need  - Initiate/Maintain alarm  - Obtain necessary fall risk management equipment:   - Apply yellow socks and bracelet for high fall risk patients  - Consider moving patient to room near nurses station  Outcome: Progressing     Problem: Prexisting or High Potential for Compromised Skin Integrity  Goal: Skin integrity is maintained or improved  Description: INTERVENTIONS:  - Identify patients at risk for skin breakdown  - Assess and monitor skin integrity  - Assess and monitor nutrition and hydration status  - Monitor labs   - Assess for incontinence   - Turn and reposition patient  - Assist with mobility/ambulation  - Relieve pressure over bony prominences  - Avoid friction and shearing  - Provide appropriate hygiene as needed including keeping skin clean and dry  - Evaluate need for skin moisturizer/barrier cream  - Collaborate with interdisciplinary team   - Patient/family teaching  - Consider wound care consult   Outcome: Progressing     Problem: CARDIOVASCULAR - ADULT  Goal: Maintains optimal cardiac output and hemodynamic stability  Description: INTERVENTIONS:  - Monitor I/O, vital signs and rhythm  - Monitor for S/S and trends of decreased cardiac output  - Administer and titrate ordered vasoactive medications to optimize hemodynamic stability  - Assess quality of pulses, skin color and temperature  - Assess for signs of decreased coronary artery perfusion  - Instruct patient to report change in severity of symptoms  Outcome: Progressing  Goal: Absence of cardiac dysrhythmias or at baseline rhythm  Description: INTERVENTIONS:  - Continuous cardiac monitoring, vital signs, obtain 12 lead EKG if ordered  - Administer antiarrhythmic and heart rate control medications as ordered  - Monitor electrolytes and administer replacement therapy as ordered  Outcome: Progressing     Problem: MOBILITY - ADULT  Goal: Maintain or return to baseline ADL function  Description: INTERVENTIONS:  -  Assess patient's ability to carry out ADLs; assess patient's baseline for ADL function and identify physical deficits which impact ability to perform ADLs (bathing, care of mouth/teeth, toileting, grooming, dressing, etc )  - Assess/evaluate cause of self-care deficits   - Assess range of motion  - Assess patient's mobility; develop plan if impaired  - Assess patient's need for assistive devices and provide as appropriate  - Encourage maximum independence but intervene and supervise when necessary  - Involve family in performance of ADLs  - Assess for home care needs following discharge   - Consider OT consult to assist with ADL evaluation and planning for discharge  - Provide patient education as appropriate  Outcome: Progressing  Goal: Maintains/Returns to pre admission functional level  Description: INTERVENTIONS:  - Perform BMAT or MOVE assessment daily    - Set and communicate daily mobility goal to care team and patient/family/caregiver  - Collaborate with rehabilitation services on mobility goals if consulted  - Perform Range of Motion  times a day  - Reposition patient every  hours    - Dangle patient  times a day  - Stand patient  times a day  - Ambulate patient  times a day  - Out of bed to chair  times a day   - Out of bed for meals times a day  - Out of bed for toileting  - Record patient progress and toleration of activity level   Outcome: Progressing

## 2023-01-06 ENCOUNTER — TRANSITIONAL CARE MANAGEMENT (OUTPATIENT)
Dept: FAMILY MEDICINE CLINIC | Facility: CLINIC | Age: 78
End: 2023-01-06

## 2023-01-06 ENCOUNTER — TELEPHONE (OUTPATIENT)
Dept: OTHER | Facility: OTHER | Age: 78
End: 2023-01-06

## 2023-01-10 ENCOUNTER — TELEPHONE (OUTPATIENT)
Dept: FAMILY MEDICINE CLINIC | Facility: CLINIC | Age: 78
End: 2023-01-10

## 2023-01-10 NOTE — TELEPHONE ENCOUNTER
Reggie Jeffries, PGY1 Pharmacy Resident      REASON FOR VISIT: TCM appointment to pharmacy services for medication review following    hospitalization for sycnopy from 1/3 to 1/5  Appointment was completed with: Britt Ramsey's daughter    TCM appointment with PCP: Scheduled for 1/11     Assessment/Plan:                 Disease State            Medication Discrepancy Identified            Recommendation            COPD     1  No inhaler  Indication: Needs additional medication therapy   1  Patient currently not on inhaler therapy for COPD  No PFT's documented in medical record  Can consider starting bronchodilator for treatment of COPD  According to GOLD guidelines and patient factors, "any bronchodilator" is recommended  Recommend starting Spiriva Respimat (2 5 mcg/actuation): 2 inhalations once daily  Other     2  Niacin/lipid lowering  Indication: Needs additional medication therapy               2  Discharge summary deferred lipid lowering options to PCP for management  Although niacin is effective in lowering LDL/improving lipid panel, it is not known to have benefit in cardiovascular risk  Patient's ASCVD risk = 18 4%  Can consider starting moderate intensity statin  Recommend atorvastatin 20 mg once daily  Patient was counseled on the following lifestyle modifications:  · Explained to patient's daughter that dapagliflozin can act as a mild diuretic and the weight loss the patient was experiencing was most likely "water weight"  Educated patient's daughter that dapagliflozin can have a side effect of hypotension and was most likely discontinued since patient was experiencing syncopy  · Also educated that niacin can have hypotension effects as well that could have been contributing to her syncopal event  Subjective     1   Medication Review: medications reviewed with patient, reports the following discrepancies:  · Vitamin D2: Once a month; states that vitamin d levels are checked regularly when visiting with PCP   · Levothyroxine: taking 137 mcg once daily  · Triamcinolone: uses daily after the shower  · Dapagliflozin: Confirmed discontinuation; patient's daughter stated that patient lost weight while taking, prefers patient to not take medication  · Niacin: inquired about allergy on medical record; patient's daughter stated that patient does not have allergy to niacin     Does the patient understand/know which medications were changed/added at discharge? Yes    Person Responsible for setting up patient medications: daughter      Missed doses: No  # of missed doses in the past week: 0      # of times per day patient takes meds: 2      Use of supportive adherence tools: Yes, describe: pill box      2  Medication Efficacy:      ROS  · No new symptoms  · Syncopy has improved; daughter states that patient is more herself again     Home Monitoring  · Blood Pressure Readings: checks 1x/day, avg not sure   · Weights: avg 123 lbs, checks 2x/week        3  Lifestyle:    Lifestyle modifications since discharge: eating more (breakfast lunch and dinner)     [address] upon discharge: Yes      Smoking Status: Current smoker (2 cigarettes/day) - trying to support quitting      Objective     Pertinent medication changes from hospital:  · Heart Failure   ? Dapagliflozin (discontinued)  · Other  ? Aspirin (discontinued)  ?  Niacin (discontinued)     Pertinent labs    Lab Results   Component Value Date    SODIUM 145 01/05/2023    K 4 2 01/05/2023     01/05/2023    CO2 32 01/05/2023    BUN 15 01/05/2023    CREATININE 1 04 01/05/2023    GLUC 85 01/05/2023    CALCIUM 8 9 01/05/2023      Lab Results   Component Value Date    WBC 9 71 01/05/2023    HGB 12 1 01/05/2023    HCT 38 9 01/05/2023    MCV 89 01/05/2023     01/05/2023         PF Readings from Last 3 Encounters:   No data found for PF         PULMONARY FUNCTION TESTS (None documented)     Time Spent:   Direct Patient Care: 20 mins    Care Coordination: 50 mins

## 2023-01-10 NOTE — TELEPHONE ENCOUNTER
Reggie Royal 71 Nesha, PGY1 Pharmacy Resident      REASON FOR VISIT: TCM appointment to pharmacy services for medication review following    hospitalization for syncope from 1/3 to 1/5  Attempted call with: Adrianne Murdock     Patient was contact for pharmacy resident TCM medication review  Unable to reach, VM left      TCM appointment with PCP: Scheduled for 1/11

## 2023-01-11 ENCOUNTER — OFFICE VISIT (OUTPATIENT)
Dept: FAMILY MEDICINE CLINIC | Facility: CLINIC | Age: 78
End: 2023-01-11

## 2023-01-11 DIAGNOSIS — M86.9 SAPHO SYNDROME (HCC): Primary | ICD-10-CM

## 2023-01-11 DIAGNOSIS — M85.80 SAPHO SYNDROME (HCC): Primary | ICD-10-CM

## 2023-01-11 DIAGNOSIS — I10 ESSENTIAL HYPERTENSION: ICD-10-CM

## 2023-01-11 DIAGNOSIS — F02.818 LATE ONSET ALZHEIMER'S DISEASE WITH BEHAVIORAL DISTURBANCE (HCC): ICD-10-CM

## 2023-01-11 DIAGNOSIS — L70.9 SAPHO SYNDROME (HCC): Primary | ICD-10-CM

## 2023-01-11 DIAGNOSIS — G30.1 LATE ONSET ALZHEIMER'S DISEASE WITH BEHAVIORAL DISTURBANCE (HCC): ICD-10-CM

## 2023-01-11 DIAGNOSIS — M65.9 SAPHO SYNDROME (HCC): Primary | ICD-10-CM

## 2023-01-11 DIAGNOSIS — L40.3 SAPHO SYNDROME (HCC): Primary | ICD-10-CM

## 2023-01-11 DIAGNOSIS — L30.8 PSORIASIFORM DERMATITIS: ICD-10-CM

## 2023-01-11 DIAGNOSIS — J44.9 CHRONIC OBSTRUCTIVE PULMONARY DISEASE, UNSPECIFIED COPD TYPE (HCC): ICD-10-CM

## 2023-01-11 DIAGNOSIS — F33.41 RECURRENT MAJOR DEPRESSIVE DISORDER, IN PARTIAL REMISSION (HCC): ICD-10-CM

## 2023-01-11 PROBLEM — E66.01 OBESITY, MORBID (HCC): Status: ACTIVE | Noted: 2023-01-11

## 2023-01-11 NOTE — PROGRESS NOTES
TCM Call     Date and time call was made  1/6/2023  4:20 PM    Hospital care reviewed  Records reviewed    Patient was hospitialized at  [facility] [address]    Date of Admission  01/03/23    Date of discharge  01/05/23    Diagnosis  syncope    Disposition  Home    Were the patients medications reviewed and updated  Yes    Current Symptoms  None      TCM Call     Post hospital issues  None    Should patient be enrolled in anticoag monitoring? No    Scheduled for follow up?  --  tryign to reach pt    Did you obtain your prescribed medications  Yes    Do you need help managing your prescriptions or medications  No    Is transportation to your appointment needed  No    I have advised the patient to call PCP with any new or worsening symptoms  ARISTIDES Davis    Living Arrangements  Spouse or Significiant other    Comments  pt scheduled with dr Rozann Cranker 2/27        Assessment/Plan:       Problem List Items Addressed This Visit        Respiratory    COPD (chronic obstructive pulmonary disease) (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 [address] )   Other Visit Diagnoses     Psoriasiform dermatitis        Relevant Medications    triamcinolone (KENALOG) 0 1 % ointment          Returned to baseline after hospitalization continue routine follow up, stop farxiga and naicin limit polypharmacy, continue routine follow ups  Subjective:      Patient ID: Jeff Conklin is a 76 y o  female      HPI     76year old presenting for hospital follow up, She had presented for sycnope, does not appear a cause was found  Since discharge she has been at her baseline, likely vasovagal/dehydration as patient stated she was going to pass out before she did and was having loose stools the day prior  Naicin has been stopped, patient had listed allergy, unlikely allergic, but not getting benefit from medicine so will stop  Has therapists coming to house  Nydia Cordon stopped due to weight loss, A1c 5 2    The following portions of the patient's history were reviewed and updated as appropriate: allergies, current medications, past family history, past medical history, past social history, past surgical history and problem list       Current Outpatient Medications:   •  alendronate (FOSAMAX) 70 mg tablet, Take 75 mg by mouth every 7 days, Disp: , Rfl:   •  calcium-vitamin D (OSCAL) 250-125 MG-UNIT per tablet, Take 1 tablet by mouth daily  , Disp: , Rfl:   •  donepezil (ARICEPT) 10 mg tablet, take 1 tablet by mouth daily at bedtime, Disp: 30 tablet, Rfl: 6  •  ergocalciferol (VITAMIN D2) 50,000 units, Take 1 capsule by mouth every 30 (thirty) days, Disp: , Rfl:   •  levothyroxine 137 mcg tablet, Take 137 mcg by mouth daily Taking 137 mcg daily, Disp: , Rfl:   •  lisinopril (ZESTRIL) 5 mg tablet, Take 5 mg by mouth daily, Disp: , Rfl:   •  metoprolol succinate (TOPROL-XL) 50 mg 24 hr tablet, Take 0 5 tablets (25 mg total) by mouth every morning (Patient taking differently: Take 25 mg by mouth every morning 25mg once a day), Disp: , Rfl:   •  traZODone (DESYREL) 50 mg tablet, Take 1 tablet (50 mg total) by mouth daily at bedtime, Disp: 60 tablet, Rfl: 1  •  triamcinolone (KENALOG) 0 1 % ointment, Apply topicaly 2-4 times a day for up to 2 weeks  , Disp: 453 6 g, Rfl: 2  •  vitamin B-12 (VITAMIN B-12) 1,000 mcg tablet, Take 1 tablet (1,000 mcg total) by mouth daily, Disp: , Rfl:      Review of Systems   Unable to perform ROS: Dementia         Objective:      /90   Pulse 57   Temp (!) 97 1 °F (36 2 °C) (Temporal)   Ht 5' 1" (1 549 m)   Wt 53 9 kg (118 lb 12 8 oz)   SpO2 95%   BMI 22 45 kg/m²          Physical Exam  Constitutional:       Appearance: Normal appearance  Cardiovascular:      Rate and Rhythm: Normal rate and regular rhythm  Pulses: Normal pulses  Heart sounds: Normal heart sounds  Pulmonary:      Effort: Pulmonary effort is normal       Breath sounds: Normal breath sounds  Neurological:      Mental Status: She is alert        Comments: Oreiented to name only, would like to go home           Hoda Patiño MD

## 2023-01-12 VITALS
SYSTOLIC BLOOD PRESSURE: 158 MMHG | HEIGHT: 61 IN | BODY MASS INDEX: 22.43 KG/M2 | HEART RATE: 57 BPM | DIASTOLIC BLOOD PRESSURE: 90 MMHG | WEIGHT: 118.8 LBS | TEMPERATURE: 97.1 F | OXYGEN SATURATION: 95 %

## 2023-01-12 NOTE — TELEPHONE ENCOUNTER
Note was written by Jose R Davis, pharmacy Resident  GIUSEPEP PISANO, Pharmacist was not present during the patient appointment and was available as needed  Colin Neri, Pharmacist  have reviewed and discussed the case with the resident and agree with the findings and plan as documented      Pharmacist Tracking Tool  Reason For Outreach: Embedded Pharmacist  Demographics:  Intervention Method: Phone  Type of Intervention: New  Topics Addressed: COPD, Dyslipidemia, Polypharmacy and Transitions of Care  Pharmacologic Interventions: Medication Initiation, Medication Discontinuation and Med Rec  Non-Pharmacologic Interventions: Adherence addressed, Care coordination, Disease state education and Medication Monitoring  Time:  Direct Patient Care: 20 mins  Care Coordination: 50 mins  Recommendation Recipient: Provider  Outcome: Pending/ Follow-up Required

## 2023-01-13 ENCOUNTER — TELEPHONE (OUTPATIENT)
Dept: FAMILY MEDICINE CLINIC | Facility: CLINIC | Age: 78
End: 2023-01-13

## 2023-01-13 DIAGNOSIS — L30.8 PSORIASIFORM DERMATITIS: ICD-10-CM

## 2023-01-13 RX ORDER — TRIAMCINOLONE ACETONIDE 1 MG/G
OINTMENT TOPICAL
Qty: 80 G | Refills: 1 | Status: SHIPPED | OUTPATIENT
Start: 2023-01-13

## 2023-01-13 NOTE — TELEPHONE ENCOUNTER
Carolann from the pharmacy called because of the triamcinolone ointment that was ordered by Dr Bradley Duran  She said that it is not coved if he orders it because he is not enrolled in the secondary insurance program through the state  They asked if another provider can send her prescription over  Please advise, thank you

## 2023-02-08 ENCOUNTER — OFFICE VISIT (OUTPATIENT)
Age: 78
End: 2023-02-08

## 2023-02-08 VITALS
TEMPERATURE: 98.5 F | DIASTOLIC BLOOD PRESSURE: 70 MMHG | HEART RATE: 87 BPM | RESPIRATION RATE: 18 BRPM | OXYGEN SATURATION: 95 % | HEIGHT: 61 IN | SYSTOLIC BLOOD PRESSURE: 160 MMHG | BODY MASS INDEX: 22.43 KG/M2 | WEIGHT: 118.8 LBS

## 2023-02-08 DIAGNOSIS — G47.00 INSOMNIA, UNSPECIFIED TYPE: ICD-10-CM

## 2023-02-08 DIAGNOSIS — I63.81 LACUNAR STROKE (HCC): ICD-10-CM

## 2023-02-08 DIAGNOSIS — F02.818 LATE ONSET ALZHEIMER'S DISEASE WITH BEHAVIORAL DISTURBANCE (HCC): Primary | ICD-10-CM

## 2023-02-08 DIAGNOSIS — E89.0 POSTOPERATIVE HYPOTHYROIDISM: Chronic | ICD-10-CM

## 2023-02-08 DIAGNOSIS — G30.1 LATE ONSET ALZHEIMER'S DISEASE WITH BEHAVIORAL DISTURBANCE (HCC): Primary | ICD-10-CM

## 2023-02-08 DIAGNOSIS — M81.0 AGE RELATED OSTEOPOROSIS, UNSPECIFIED PATHOLOGICAL FRACTURE PRESENCE: ICD-10-CM

## 2023-02-08 DIAGNOSIS — I10 ESSENTIAL HYPERTENSION: ICD-10-CM

## 2023-02-08 RX ORDER — LEVOTHYROXINE SODIUM 0.12 MG/1
125 TABLET ORAL DAILY
COMMUNITY
Start: 2023-01-12

## 2023-02-08 RX ORDER — TRAZODONE HYDROCHLORIDE 50 MG/1
75 TABLET ORAL
Qty: 45 TABLET | Refills: 2 | Status: SHIPPED | OUTPATIENT
Start: 2023-02-08 | End: 2023-05-09

## 2023-02-08 NOTE — ASSESSMENT & PLAN NOTE
· BP was mildly elevated today  · Continue lisinopril 5 mg daily and metoprolol 50 mg daily  · Recommend home bp monitoring  · Follow-up with PCP

## 2023-02-08 NOTE — ASSESSMENT & PLAN NOTE
CT head 2021 revealed stable encephalomalacia and gliosis in the right inferior parietal temporal region and in the left frontal lobe and operculum compatible with chronic infarcts    She is not on aspirin, she is allergic to NSAIDs   Follow up with PCP

## 2023-02-08 NOTE — ASSESSMENT & PLAN NOTE
[address] [facility] 7/30 , prior 9/30  Continue supportive care from family  Encouraged participation in social activities at senior   Maintain socially, mentally, physically active  Follow-up in 6 months    On donezepil 10 mg daily

## 2023-02-08 NOTE — PROGRESS NOTES
Neisha Serrano [facility]  [address] [facility], [address] [facility], [address] [place] [address]  [phone]    Social Work Follow-Up    LSW met with Daisy Pop for follow up visit  LSW completed Jack Cognitive Assessment, score 7/30 (previously 9/30 in 08/10/22) with patient wearing Super Ears which appeared to help with hearing for the assessment  LSW also met with daughter to offer support/resources  Daughter declined any needs  Jack Cognitive Assessment (MoCA) Version 8 2  Education: High School    Points Earned POSSIBLE Points   Visuospatial/Executive   Alternating [place] Making 0 1   Visuoconstructional skills 0 1   Visuoconstructional skills (clock) 1 3   Naming   Naming Animals 2 3   Attention   Digit Span 0 2   Vigilance (letters) 0 1   Serial 7 subtraction 0 3   Language   Sentence Repetition 1 2   Verbal fluency 0 1   Abstraction   Abstraction (word pairings) 0 2   Delayed recall   Delayed recall 0 5   Memory index score: /15   Orientation   Orientation 2 6   TOTAL SCORE: 7/30  (Normal ?26/30)   Additional notes:      LSW to remain available as needed

## 2023-02-08 NOTE — PROGRESS NOTES
ASSESSMENT AND PLAN:  1  Late onset Alzheimer's disease with behavioral disturbance [facility])  Assessment & Plan:  [facility] [place] [facility] 7/30 , prior 9/30  Continue supportive care from family  Encouraged participation in social activities at senior   Maintain socially, mentally, physically active  Follow-up in 6 months  On donezepil 10 mg daily       2  Postoperative hypothyroidism  Assessment & Plan:  Last TSH was 2 3 in 1/23   Currently taking levothyroxine 137 mcg daily       3  Essential hypertension  Assessment & Plan:  · BP was mildly elevated today  · Continue lisinopril 5 mg daily and metoprolol 50 mg daily  · Recommend home bp monitoring  · Follow-up with PCP      4  Lacunar stroke [facility])  Assessment & Plan:  CT head 2021 revealed stable encephalomalacia and gliosis in the right inferior parietal temporal region and in the left frontal lobe and operculum compatible with chronic infarcts  She is not on aspirin, she is allergic to NSAIDs   Follow up with PCP           5  Age related osteoporosis, unspecified pathological fracture presence  Assessment & Plan:  Taking Fosamax and vit D supplements  Follow up with PCP       6  Insomnia, unspecified type  -     traZODone (DESYREL) 50 mg tablet; Take 1 5 tablets (75 mg total) by mouth daily at bedtime      HPI:    We had the pleasure of evaluating Sharon Fletcher who is a 76 y o  female in Geriatric follow up today  Ms Temo Spivey is in the office with her daughter   She lives with her daughter who is main caregiver  Patient was in the hospital after syncopal episode at home, BP was low  Weight stable, eating and drinking  She is in the process of getting PT now, using her cane on and off  Her major issue is the insomnia she is up 2-3 times at night  Worsening memory, confusion and getting more and more dependent  Patient reports she is doing well, she enjoys playing bingo and watching TV  Her appetite remains acceptable, not loosing weight       HISTORY AS PER : daughter ACP REVIEW:      Allergies   Allergen Reactions   • Naproxen Itching   • Niacin    • Nsaids Other (See Comments) and Swelling   • Azithromycin Nausea Only and Rash   • Cephalexin Rash       Medications:    Current Outpatient Medications on File Prior to Visit   Medication Sig Dispense Refill   • alendronate (FOSAMAX) 70 mg tablet Take 75 mg by mouth every 7 days     • calcium-vitamin D (OSCAL) 250-125 MG-UNIT per tablet Take 1 tablet by mouth daily  • donepezil (ARICEPT) 10 mg tablet take 1 tablet by mouth daily at bedtime 30 tablet 6   • ergocalciferol (VITAMIN D2) 50,000 units Take 1 capsule by mouth every 30 (thirty) days     • levothyroxine 125 mcg tablet Take 125 mcg by mouth daily     • levothyroxine 137 mcg tablet Take 137 mcg by mouth daily Taking 137 mcg daily     • lisinopril (ZESTRIL) 5 mg tablet Take 5 mg by mouth daily     • metoprolol succinate (TOPROL-XL) 50 mg 24 hr tablet Take 0 5 tablets (25 mg total) by mouth every morning (Patient taking differently: Take 25 mg by mouth every morning 25mg once a day)     • triamcinolone (KENALOG) 0 1 % ointment Apply topicaly 2-4 times a day for up to 2 weeks  80 g 1   • vitamin B-12 (VITAMIN B-12) 1,000 mcg tablet Take 1 tablet (1,000 mcg total) by mouth daily     • [DISCONTINUED] traZODone (DESYREL) 50 mg tablet Take 1 tablet (50 mg total) by mouth daily at bedtime 60 tablet 1     No current facility-administered medications on file prior to visit         History:  Past Medical History:   Diagnosis Date   • Acute kidney injury (LAYLA) with acute tubular necrosis (ATN) ([facility] [address] ) 2/13/2020   • Bed bug bite     LAST ASSESSED [id number]   • Chronic pain disorder    • Disease of thyroid gland    • Extremity pain    • Graves' disease    • Hyperlipidemia    • Hypertension    • LLL pneumonia     LAST ASSESSED [id number]   • Low back pain    • Migraine 2001    OCULAR   • Scabies     LAST ASSESSED [id number]   • Syncope and collapse     LAST ASSESSED [id number]   • Vertigo RESOLVED      Past Surgical History:   Procedure Laterality Date   • CHOLECYSTECTOMY     • HYSTERECTOMY     • TOTAL THYROIDECTOMY     • TUBAL LIGATION Bilateral      Family History   Problem Relation Age of Onset   • Breast cancer Sister    • Diabetes Family    • No Known Problems Mother    • No Known Problems Father      Social History     Socioeconomic History   • Marital status:      Spouse name: Not on file   • Number of children: Not on file   • Years of education: Not on file   • Highest education level: Not on file   Occupational History   • Not on file   Tobacco Use   • Smoking status: Every Day     Packs/day: 0 25     Types: Cigarettes   • Smokeless tobacco: Never   Vaping Use   • Vaping Use: Never used   Substance and Sexual Activity   • Alcohol use: Never   • Drug use: No   • Sexual activity: Not Currently   Other Topics Concern   • Not on file   Social History Narrative    COPY OF ADVANCED DIRECTIVE OBTAINED FROM PATIENT      Social Determinants of Health     Financial Resource Strain: Not on file   Food Insecurity: No Food Insecurity   • Worried About Running Out of Food in the Last Year: Never true   • Ran Out of Food in the Last Year: Never true   Transportation Needs: No Transportation Needs   • Lack of Transportation (Medical): No   • Lack of Transportation (Non-Medical):  No   Physical Activity: Not on file   Stress: Not on file   Social Connections: Not on file   Intimate Partner Violence: Not on file   Housing Stability: Low Risk    • Unable to Pay for Housing in the Last Year: No   • Number of Places Lived in the Last Year: 1   • Unstable Housing in the Last Year: No     Past Surgical History:   Procedure Laterality Date   • CHOLECYSTECTOMY     • HYSTERECTOMY     • TOTAL THYROIDECTOMY     • TUBAL LIGATION Bilateral        OBJECTIVE:  Vitals:    02/08/23 1614   BP: 160/70   Pulse: 87   Resp: 18   Temp: 98 5 °F (36 9 °C)   SpO2: 95%   Weight: 53 9 kg (118 lb 12 8 oz)   Height: 5' 1 42" (1 56 m)     Body mass index is 22 14 kg/m²  Physical Exam  Vitals reviewed  Neurological:      Mental Status: She is alert  MoCA: 7/30      Labs & Imaging:  Lab Results   Component Value Date    WBC 9 71 01/05/2023    HGB 12 1 01/05/2023    HCT 38 9 01/05/2023    MCV 89 01/05/2023     01/05/2023     Lab Results   Component Value Date     06/08/2015    SODIUM 145 01/05/2023    K 4 2 01/05/2023     01/05/2023    CO2 32 01/05/2023    ANIONGAP 11 06/08/2015    AGAP 7 01/05/2023    BUN 15 01/05/2023    CREATININE 1 04 01/05/2023    GLUC 85 01/05/2023    GLUF 79 01/04/2023    CALCIUM 8 9 01/05/2023    AST 20 01/04/2023    ALT 18 01/04/2023    ALKPHOS 68 01/04/2023    PROT 8 0 06/08/2015    TP 5 9 (L) 01/04/2023    BILITOT 0 4 06/08/2015    TBILI 0 31 01/04/2023    EGFR 51 01/05/2023     Lab Results   Component Value Date    HGBA1C 5 2 10/14/2022     Lab Results   Component Value Date    CHOLESTEROL 112 06/19/2019     Lab Results   Component Value Date    HDL 35 (L) 06/19/2019     Lab Results   Component Value Date    TRIG 121 06/19/2019     No results found for: [place]  Lab Results   Component Value Date    LDLCALC 57 06/19/2019     Lab Results   Component Value Date    HEAWBOSW16 262 07/13/2022     Lab Results   Component Value Date    GHJ7LQHPESEP 2 396 01/03/2023    TSH 2 20 04/21/2020     No results found for: RPR  Lab Results   Component Value Date    ERQP87IHZCKJ 36 06/19/2019      Results for orders placed during the hospital encounter of 01/03/23    CT head wo contrast    Narrative  CT BRAIN - WITHOUT CONTRAST    INDICATION:   Syncope, recurrent  Recurrent syncope  COMPARISON:  CT head without contrast October 8, 2021  TECHNIQUE:  CT examination of the brain was performed  In addition to axial images, sagittal and coronal 2D reformatted images were created and submitted for interpretation  Radiation dose length product (DLP) for this visit:  792 71 mGy-cm     This examination, like all CT scans performed in the [facility], was performed utilizing techniques to minimize radiation dose exposure, including the use of iterative  reconstruction and automated exposure control  IMAGE QUALITY:  Diagnostic  FINDINGS:    PARENCHYMA: Decreased attenuation is noted in periventricular and subcortical white matter demonstrating an appearance that is statistically most likely to represent mild microangiopathic change  Unchanged chronic infarcts in right parietotemporal lobe,  left inferolateral frontal lobe, and left caudate  No CT signs of acute infarction  No intracranial mass, mass effect or midline shift  No acute parenchymal hemorrhage  Arterial calcifications of carotid siphons and left intradural vertebral artery  VENTRICLES AND EXTRA-AXIAL SPACES:  Normal for the patient's age  VISUALIZED ORBITS AND PARANASAL SINUSES:  Mild mucosal thickening in right ethmoid sinus  Normal visualized paranasal sinuses  CALVARIUM AND EXTRACRANIAL SOFT TISSUES:  Normal     Impression  No acute intracranial abnormality  Unchanged chronic infarcts in right parietotemporal lobe, left inferolateral frontal lobe, and left caudate with mild chronic microangiopathy  Workstation performed: [id number]    No results found for this or any previous visit  No results found for this or any previous visit  No results found for this or any previous visit  No results found for this or any previous visit  No results found for this or any previous visit  I have spent 60 minutes with Patient and family today including taking history from family, patient, review of records, charting, and counseling regarding diagnosis and management of conditions

## 2023-02-21 ENCOUNTER — OFFICE VISIT (OUTPATIENT)
Dept: FAMILY MEDICINE CLINIC | Facility: CLINIC | Age: 78
End: 2023-02-21

## 2023-02-21 VITALS
SYSTOLIC BLOOD PRESSURE: 146 MMHG | TEMPERATURE: 97.6 F | OXYGEN SATURATION: 97 % | HEIGHT: 61 IN | BODY MASS INDEX: 22.09 KG/M2 | DIASTOLIC BLOOD PRESSURE: 84 MMHG | WEIGHT: 117 LBS | HEART RATE: 83 BPM

## 2023-02-21 DIAGNOSIS — J43.9 PULMONARY EMPHYSEMA, UNSPECIFIED EMPHYSEMA TYPE (HCC): ICD-10-CM

## 2023-02-21 DIAGNOSIS — N18.31 STAGE 3A CHRONIC KIDNEY DISEASE (HCC): Chronic | ICD-10-CM

## 2023-02-21 DIAGNOSIS — I10 ESSENTIAL HYPERTENSION: ICD-10-CM

## 2023-02-21 DIAGNOSIS — M54.50 LOW BACK PAIN RADIATING TO LEFT LOWER EXTREMITY: ICD-10-CM

## 2023-02-21 DIAGNOSIS — I63.81 LACUNAR STROKE (HCC): ICD-10-CM

## 2023-02-21 DIAGNOSIS — F17.200 CURRENT SMOKER: ICD-10-CM

## 2023-02-21 DIAGNOSIS — D51.0 PERNICIOUS ANEMIA: ICD-10-CM

## 2023-02-21 DIAGNOSIS — F02.818 LATE ONSET ALZHEIMER'S DISEASE WITH BEHAVIORAL DISTURBANCE (HCC): Primary | ICD-10-CM

## 2023-02-21 DIAGNOSIS — M79.605 LOW BACK PAIN RADIATING TO LEFT LOWER EXTREMITY: ICD-10-CM

## 2023-02-21 DIAGNOSIS — G30.1 LATE ONSET ALZHEIMER'S DISEASE WITH BEHAVIORAL DISTURBANCE (HCC): Primary | ICD-10-CM

## 2023-02-21 RX ORDER — METOPROLOL SUCCINATE 50 MG/1
25 TABLET, EXTENDED RELEASE ORAL EVERY MORNING
Qty: 45 TABLET | Refills: 3 | Status: SHIPPED | OUTPATIENT
Start: 2023-02-21

## 2023-02-21 NOTE — PATIENT INSTRUCTIONS
Overall medically stable here today, blood pressure after sitting 146/84, runs similar numbers at home, acceptable, continue lisinopril 5 mg daily along with metoprolol succinate 50 mg 1/2 tablet daily as is  Does have history of hypotension, lightheadedness, hold off on increasingly aggressive treatment for hypertension  She did see geriatrics earlier this month, sees them every 6 months, MoCA 7/30  Is doing okay with trazodone 75 mg at night  Continues on donepezil  She will be seeing endocrinology tomorrow, TSH in early January was okay at 2 3, is on levothyroxine 125mcg daily since was decreased from 137 QD in Oct by Endo  Recent TSH 0 27 on same dose of 125 daily ? Has good appetite, has dropped about 15 pounds versus last year, continue to monitor  Other recent blood work showed CBC to be unremarkable with hemoglobin 14, cholesterol 193/HDL 47/, family prefers staying off niacin/cholesterol medication which I am okay with  A1c 5 1 off medication  BUNs/creatinine 20/1 1 with potassium 5 0      We will see her again in 4 months with an annual wellness check, call sooner if needed

## 2023-02-21 NOTE — PROGRESS NOTES
FAMILY PRACTICE OFFICE VISIT  [facility]  [address]  [address], [place], [address]      NAME: Abhinav Iglesias  AGE: 76 y o  SEX: female  : 1945   MRN: [id number]    DATE: 2023  TIME: 11:49 AM    Assessment and Plan     Problem List Items Addressed This Visit     Chronic kidney disease, stage III (moderate) (HCC) (Chronic)    Low back pain radiating to left lower extremity    COPD (chronic obstructive pulmonary disease) (HCC)    Lacunar stroke (HCC)    Essential hypertension    Relevant Medications    metoprolol succinate (TOPROL-XL) 50 mg 24 hr tablet    Late onset Alzheimer's disease with behavioral disturbance ([facility] [address] ) - Primary    Current smoker    Pernicious anemia       Patient Instructions   Overall medically stable here today, blood pressure after sitting 146/84, runs similar numbers at home, acceptable, continue lisinopril 5 mg daily along with metoprolol succinate 50 mg 1/2 tablet daily as is  Does have history of hypotension, lightheadedness, hold off on increasingly aggressive treatment for hypertension  She did see geriatrics earlier this month, sees them every 6 months, [facility]   Is doing okay with trazodone 75 mg at night  Continues on donepezil  She will be seeing endocrinology tomorrow, TSH in early January was okay at 2 3, is on levothyroxine 125mcg daily since was decreased from 137 QD in Oct by Endo  Recent TSH 0 27 on same dose of 125 daily ? Has good appetite, has dropped about 15 pounds versus last year, continue to monitor  Other recent blood work showed CBC to be unremarkable with hemoglobin 14, cholesterol 193/HDL 47/, family prefers staying off niacin/cholesterol medication which I am okay with  A1c 5 1 off medication  BUNs/creatinine 20/1 1 with potassium 5 0      We will see her again in 4 months with an annual wellness check, call sooner if needed      Chief Complaint Chief Complaint   Patient presents with   • Follow-up       History of Present Illness   Nay Navarro is a 76y o -year-old female who is in today accompanied by her daughter with whom she resides for a routine follow-up  She has had no recent falls, no new complaints of pain, no increased shortness of breath  Still gets few cigarettes daily at the most, daughter does supply those  She saw geriatrics earlier this month, 7/30 on [facility]  He is sleeping okay with trazodone 70 5 at night  No increased lightheadedness, he is using metoprolol 50 mg 1/2 tablet daily along with lisinopril 5 mg daily  Has had significant weight loss since last year, weight today is about the same as 1 month ago by history  Appetite okay as per daughter  Continues to see endocrinology, sees them again tomorrow, levothyroxine was decreased to 125 mcg back in October    Has been doing therapy, helps with low back, does have chronic low back pain  Review of Systems   Review of Systems   Constitutional: Positive for fatigue and unexpected weight change  Negative for appetite change and fever  HENT: Negative for sore throat and trouble swallowing  Respiratory: Positive for shortness of breath (Baseline, no hemoptysis)  Negative for cough and chest tightness  Cardiovascular: Negative for chest pain, palpitations and leg swelling  Gastrointestinal: Negative for abdominal pain, blood in stool, nausea and vomiting  No acid reflux     No change in bowel   Genitourinary: Negative for dysuria and hematuria  Neurological: Positive for light-headedness  Negative for dizziness, syncope and headaches     Psychiatric/Behavioral:        No increased behavioral issues       Active Problem List     Patient Active Problem List   Diagnosis   • Syncope   • Sinus bradycardia   • Postoperative hypothyroidism   • Essential hypertension   • Urinary incontinence   • Chronic kidney disease, stage III (moderate) ([facility])   • Umbilical hernia   • Trigger middle finger of right hand   • Current smoker   • Thyroid nodule   • Pernicious anemia   • Osteoarthritis   • Neuropathy of both feet   • Low back pain radiating to left lower extremity   • Lacunar stroke ([facility])   • Hyperglycemia   • Hyperlipidemia   • Graves' disease   • Gait disturbance   • Dizziness   • Disc degeneration, lumbar   • Late onset Alzheimer's disease with behavioral disturbance ([facility])   • Cough, chronic -  improved   • Hearing loss   • Recurrent major depressive disorder, in partial remission ([facility])   • Visual impairment   • Full dentures   • 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[facility])   • Fall   • Anemia   • Hypotension, unspecified   • Hypocalcemia   • Spinal stenosis of lumbar region with neurogenic claudication   • Eczema   • Vitamin D deficiency   • Sleep disturbance   • Insomnia due to other mental disorder   • COPD (chronic obstructive pulmonary disease) ([facility])   • Age related osteoporosis       Past Medical History:  Reviewed    Past Surgical History:  Reviewed    Family History:  Reviewed    Social History:  Reviewed    Objective     Vitals:    02/21/23 1110   BP: 146/84   BP Location: Left arm   Patient Position: Sitting   Cuff Size: Standard   Pulse: 83   Temp: 97 6 °F (36 4 °C)   SpO2: 97%   Weight: 53 1 kg (117 lb)   Height: 5' 1" (1 549 m)     Body mass index is 22 11 kg/m²  BP Readings from Last 3 Encounters:   02/21/23 146/84   02/08/23 160/70   01/12/23 158/90       Wt Readings from Last 3 Encounters:   02/21/23 53 1 kg (117 lb)   02/08/23 53 9 kg (118 lb 12 8 oz)   01/11/23 53 9 kg (118 lb 12 8 oz)       Physical Exam  Constitutional:       Comments: 66-year-old female seated on table, withdrawn today, little verbal response, appears comfortable   Eyes:      General: No scleral icterus  Cardiovascular:      Rate and Rhythm: Regular rhythm  Heart sounds: Normal heart sounds     Pulmonary:      Comments: Few rhonchi on left, otherwise clear, no Rales  Abdominal: Palpations: Abdomen is soft  Tenderness: There is no abdominal tenderness  There is no guarding  Musculoskeletal:      Right lower leg: No edema  Left lower leg: No edema  Lymphadenopathy:      Cervical: No cervical adenopathy  Skin:     Coloration: Skin is not jaundiced  Neurological:      Mental Status: Mental status is at baseline  ALLERGIES:  Allergies   Allergen Reactions   • Naproxen Itching   • Niacin    • Nsaids Other (See Comments) and Swelling   • Azithromycin Nausea Only and Rash   • Cephalexin Rash       Current Medications     Current Outpatient Medications   Medication Sig Dispense Refill   • calcium-vitamin D (OSCAL) 250-125 MG-UNIT per tablet Take 1 tablet by mouth daily  • donepezil (ARICEPT) 10 mg tablet take 1 tablet by mouth daily at bedtime 30 tablet 6   • ergocalciferol (VITAMIN D2) 50,000 units Take 1 capsule by mouth every 30 (thirty) days     • levothyroxine 125 mcg tablet Take 125 mcg by mouth daily     • lisinopril (ZESTRIL) 5 mg tablet Take 5 mg by mouth daily     • metoprolol succinate (TOPROL-XL) 50 mg 24 hr tablet Take 0 5 tablets (25 mg total) by mouth every morning 25mg once a day 45 tablet 3   • traZODone (DESYREL) 50 mg tablet Take 1 5 tablets (75 mg total) by mouth daily at bedtime 45 tablet 2   • triamcinolone (KENALOG) 0 1 % ointment Apply topicaly 2-4 times a day for up to 2 weeks  80 g 1   • vitamin B-12 (VITAMIN B-12) 1,000 mcg tablet Take 1 tablet (1,000 mcg total) by mouth daily       No current facility-administered medications for this visit  No orders of the defined types were placed in this encounter          Jenna Collins DO

## 2023-03-13 ENCOUNTER — EPISODE CHANGES (OUTPATIENT)
Dept: CASE MANAGEMENT | Facility: OTHER | Age: 78
End: 2023-03-13

## 2023-03-15 ENCOUNTER — PATIENT OUTREACH (OUTPATIENT)
Dept: FAMILY MEDICINE CLINIC | Facility: CLINIC | Age: 78
End: 2023-03-15

## 2023-03-15 NOTE — PROGRESS NOTES
Chart review completed for the following sections:  • Recent Vital Signs  • Allergies/Contradictions  • Medication Review   • History   • SDOH   • Problem List  • Immunizations  • Past hospitalizations and major procedures, including surgery  • Significant past illnesses and treatment history including: History and Physical, Other provider notes and Medications/Infusions/Transfusions  • Relevant past medications related to the patient's condition    Contacted patient's daughter Marquise Baez to inquire interest in [facility] [employer] Program   Daughter states patient was diagnosed with COPD, but is not on any inhalers or medications  Respiratory status is stable  Patient has Alzheimer's and daughter lives with her and is her primary caregiver  She currently has options program through the [facility] with caregiver help for 6 hours per week through [facility]  She has applied for waiver services to receive more caregiver assistance  She states her mother is eating well and taking all medications without issue  Daughter transports to appointments  She declined participation in program but is agreeable to be called at a later date

## 2023-03-16 DIAGNOSIS — F02.818 LATE ONSET ALZHEIMER'S DISEASE WITH BEHAVIORAL DISTURBANCE (HCC): ICD-10-CM

## 2023-03-16 DIAGNOSIS — G30.1 LATE ONSET ALZHEIMER'S DISEASE WITH BEHAVIORAL DISTURBANCE (HCC): ICD-10-CM

## 2023-03-23 RX ORDER — DONEPEZIL HYDROCHLORIDE 10 MG/1
TABLET, FILM COATED ORAL
Qty: 30 TABLET | Refills: 6 | Status: SHIPPED | OUTPATIENT
Start: 2023-03-23

## 2023-06-15 ENCOUNTER — TELEPHONE (OUTPATIENT)
Dept: FAMILY MEDICINE CLINIC | Facility: CLINIC | Age: 78
End: 2023-06-15

## 2023-06-15 NOTE — TELEPHONE ENCOUNTER
CORI [facility] nurse came for monthly check up and Pt BP readings were high  198/84  198/78  Pt daughter wants to know what you recommend  She does not have palpations, SOB, or pain in chest  Please advise

## 2023-06-15 NOTE — TELEPHONE ENCOUNTER
After discussing with PCP I was told to advise daughter to keep monitoring BP and make sure to keep upcoming appt  Daughter has been advised

## 2023-06-21 PROBLEM — E83.51 HYPOCALCEMIA: Status: RESOLVED | Noted: 2020-02-14 | Resolved: 2023-06-21

## 2023-06-21 PROBLEM — Z86.73 HISTORY OF LACUNAR CEREBROVASCULAR ACCIDENT (CVA): Status: ACTIVE | Noted: 2017-04-14

## 2023-06-21 PROBLEM — D64.9 ANEMIA: Status: RESOLVED | Noted: 2019-09-03 | Resolved: 2023-06-21

## 2023-06-21 RX ORDER — LEVOTHYROXINE SODIUM 112 UG/1
112 TABLET ORAL DAILY
COMMUNITY
Start: 2023-05-17

## 2023-06-21 RX ORDER — ALENDRONATE SODIUM 70 MG/1
TABLET ORAL
COMMUNITY
Start: 2023-05-30

## 2023-06-21 RX ORDER — LISINOPRIL 10 MG/1
10 TABLET ORAL DAILY
COMMUNITY
Start: 2023-05-17

## 2023-06-21 NOTE — PATIENT INSTRUCTIONS
Reviewed health history along with medications  See recent phone calls, blood pressure at home has been running up into the 928O systolic, 77-92 diastolic  She will continue lisinopril 10 mg daily in the morning which was increased by endocrinology few months ago, continue metoprolol succinate 25 mg once daily in the morning as is  Does have history orthostatic hypotension, we do need to be cautious with medication  I would like her to start amlodipine 2 5 mg every evening    She will be seeing endocrinology again tomorrow, had blood work June 14, TSH still low at 0 38 despite dose being decreased further to 112 mcg daily few months ago  A1c 5 2, glucose 83, remote history diabetes, resolved with weight loss  Weight has been stable, weight today is the same as February  Continue to try to encourage healthy diet  BUNs/creatinine were stable at 15/1 09 with potassium 4 9  Vitamin D level was 68  Cholesterol 200 with HDL 54,   She remains off cholesterol-lowering medication  She did see geriatric group back in February, no change in medication, [facility] at that time was 7/30  Continues on Aricept, trazodone 75 mg in the evening  Has done speech therapy  Has been awakening at night, naps during the day, continue to watch safety at home, fortunately has not fallen  She does have home aide 2 times per week, 3 hours each time  Continue on B12 by mouth, last B12 level was excellent at 825      Immunization History   Administered Date(s) Administered    COVID-19 MODERNA VACC 0 5 ML IM 03/29/2021, 04/30/2021, 04/12/2022    COVID-19 Moderna Vac BIVALENT 12 Yr+ IM (BOOSTER ONLY) 0 5 ML 12/09/2022    INFLUENZA 10/21/2022    Influenza Split High Dose Preservative Free IM 09/18/2012, 11/14/2013, 09/25/2014, 09/08/2015, 08/29/2017    Influenza, high dose seasonal 0 7 mL 09/12/2019, 11/10/2020, 10/04/2021, 10/21/2022    Influenza, seasonal, injectable 09/27/2011    Pneumococcal Conjugate 13-Valent 06/20/2017 "   Pneumococcal Polysaccharide PPV23 01/16/2009    Td (adult), adsorbed 09/14/2007    Tdap 10/08/2021    influenza, trivalent, adjuvanted 09/30/2018     Discussed Vaccines,    Pneumococcal vax  is up to date  She does do yearly Flu shot  Tdap/tetanus shot is up to date  (done every 10 yrs for superficial cuts, every 5 yrs for deep wounds)   Can also look into coverage for 'shingles' shot, Shingrix  Can do that at pharmacy  Covid vaccine received, can do booster in the fall    Currently smoking roughly 1/2 pack/week, does not smoke daily, continue to avoid supplying cigarettes  Regarding Colon Cancer screening,    Not indicated    She does not see her Gynecologist routinely  Not indicated  Mammogram screening was discussed, last was few years ago, can hold off after discussion  Discussed bone density screening/ DEXA Scan, up-to-date  Ordered by endocrinology, had one in December 2021, is on alendronate weekly along with calcium with D, extra vitamin D     We discussed end of life planning, she does have a  \"LIVING WILL\"     Glaucoma screening is   due    We will see her back in 4 months, sooner as needed  I would like an update regarding home blood pressure readings in 2 to 3 weeks           "

## 2023-06-22 ENCOUNTER — OFFICE VISIT (OUTPATIENT)
Dept: FAMILY MEDICINE CLINIC | Facility: CLINIC | Age: 78
End: 2023-06-22
Payer: MEDICARE

## 2023-06-22 VITALS
HEIGHT: 61 IN | WEIGHT: 118.6 LBS | BODY MASS INDEX: 22.39 KG/M2 | OXYGEN SATURATION: 97 % | HEART RATE: 83 BPM | DIASTOLIC BLOOD PRESSURE: 84 MMHG | TEMPERATURE: 97.4 F | SYSTOLIC BLOOD PRESSURE: 148 MMHG

## 2023-06-22 DIAGNOSIS — I10 ESSENTIAL HYPERTENSION: ICD-10-CM

## 2023-06-22 DIAGNOSIS — M48.062 SPINAL STENOSIS OF LUMBAR REGION WITH NEUROGENIC CLAUDICATION: ICD-10-CM

## 2023-06-22 DIAGNOSIS — Z86.39 HISTORY OF DIABETES MELLITUS: ICD-10-CM

## 2023-06-22 DIAGNOSIS — N39.46 MIXED STRESS AND URGE URINARY INCONTINENCE: Chronic | ICD-10-CM

## 2023-06-22 DIAGNOSIS — G30.1 LATE ONSET ALZHEIMER'S DISEASE WITH BEHAVIORAL DISTURBANCE (HCC): ICD-10-CM

## 2023-06-22 DIAGNOSIS — R26.9 GAIT DISTURBANCE: ICD-10-CM

## 2023-06-22 DIAGNOSIS — E78.2 MIXED HYPERLIPIDEMIA: ICD-10-CM

## 2023-06-22 DIAGNOSIS — Z00.00 MEDICARE ANNUAL WELLNESS VISIT, SUBSEQUENT: Primary | ICD-10-CM

## 2023-06-22 DIAGNOSIS — D51.0 PERNICIOUS ANEMIA: ICD-10-CM

## 2023-06-22 DIAGNOSIS — N18.31 STAGE 3A CHRONIC KIDNEY DISEASE (HCC): Chronic | ICD-10-CM

## 2023-06-22 DIAGNOSIS — F02.818 LATE ONSET ALZHEIMER'S DISEASE WITH BEHAVIORAL DISTURBANCE (HCC): ICD-10-CM

## 2023-06-22 DIAGNOSIS — F17.200 CURRENT SMOKER ON SOME DAYS: ICD-10-CM

## 2023-06-22 DIAGNOSIS — Z86.73 HISTORY OF LACUNAR CEREBROVASCULAR ACCIDENT (CVA): ICD-10-CM

## 2023-06-22 DIAGNOSIS — E89.0 POSTOPERATIVE HYPOTHYROIDISM: Chronic | ICD-10-CM

## 2023-06-22 PROCEDURE — G0439 PPPS, SUBSEQ VISIT: HCPCS | Performed by: FAMILY MEDICINE

## 2023-06-22 PROCEDURE — 99214 OFFICE O/P EST MOD 30 MIN: CPT | Performed by: FAMILY MEDICINE

## 2023-06-22 RX ORDER — AMLODIPINE BESYLATE 2.5 MG/1
2.5 TABLET ORAL EVERY EVENING
Qty: 30 TABLET | Refills: 5 | Status: SHIPPED | OUTPATIENT
Start: 2023-06-22

## 2023-06-22 NOTE — PROGRESS NOTES
BMI Counseling: Body mass index is 22 1 kg/m²  The BMI is above normal  Nutrition recommendations include encouraging healthy choices of fruits and vegetables  Rationale for BMI follow-up plan is due to patient being overweight or obese  Urinary Incontinence Plan of Care: counseling topics discussed: limiting fluid intake 3-4 hours before bed  Shady KHRIS  [facility]  [facility]  [address]  [address], [address] MEDICARE SUBSEQUENT VISIT NOTE ( part 1 )      NAME: Jamarcus Ramsey  AGE: 76 y o  SEX: female  : 1945   MRN: [id number]    DATE: 2023  TIME: 9:25 AM    Assessment and Plan     Problem List Items Addressed This Visit     Urinary incontinence (Chronic)    Chronic kidney disease, stage III (moderate) (HCC) (Chronic)    Postoperative hypothyroidism (Chronic)    Relevant Medications    levothyroxine 112 mcg tablet    History of lacunar cerebrovascular accident (CVA)    Gait disturbance    Spinal stenosis of lumbar region with neurogenic claudication    Essential hypertension    Relevant Medications    lisinopril (ZESTRIL) 10 mg tablet    amLODIPine (NORVASC) 2 5 mg tablet    Late onset Alzheimer's disease with behavioral disturbance ([facility] [address] )    Current smoker    Hyperlipidemia    Pernicious anemia   Other Visit Diagnoses     Medicare annual wellness visit, subsequent    -  Primary    History of diabetes mellitus - ok off meds              Medicare Wellness Counseling/ Discussion    Patient Instructions     Reviewed health history along with medications  See recent phone calls, blood pressure at home has been running up into the 683Z systolic, 56-68 diastolic  She will continue lisinopril 10 mg daily in the morning which was increased by endocrinology few months ago, continue metoprolol succinate 25 mg once daily in the morning as is    Does have history orthostatic hypotension, we do need to be cautious with medication  I would like her to start amlodipine 2 5 mg every evening    She will be seeing endocrinology again tomorrow, had blood work June 14, TSH still low at 0 38 despite dose being decreased further to 112 mcg daily few months ago  A1c 5 2, glucose 83, remote history diabetes, resolved with weight loss  Weight has been stable, weight today is the same as February  Continue to try to encourage healthy diet  BUNs/creatinine were stable at 15/1 09 with potassium 4 9  Vitamin D level was 68  Cholesterol 200 with HDL 54,   She remains off cholesterol-lowering medication  She did see geriatric group back in February, no change in medication, [facility] at that time was 7/30  Continues on Aricept, trazodone 75 mg in the evening  Has done speech therapy  Has been awakening at night, naps during the day, continue to watch safety at home, fortunately has not fallen  She does have home aide 2 times per week, 3 hours each time  Continue on B12 by mouth, last B12 level was excellent at 825  Immunization History   Administered Date(s) Administered   • COVID-19 MODERNA VACC 0 5 ML IM 03/29/2021, 04/30/2021, 04/12/2022   • COVID-19 Moderna Vac BIVALENT 12 Yr+ IM (BOOSTER ONLY) 0 5 ML 12/09/2022   • INFLUENZA 10/21/2022   • Influenza Split High Dose Preservative Free IM 09/18/2012, 11/14/2013, 09/25/2014, 09/08/2015, 08/29/2017   • Influenza, high dose seasonal 0 7 mL 09/12/2019, 11/10/2020, 10/04/2021, 10/21/2022   • Influenza, seasonal, injectable 09/27/2011   • Pneumococcal Conjugate 13-Valent 06/20/2017   • Pneumococcal Polysaccharide PPV23 01/16/2009   • Td (adult), adsorbed 09/14/2007   • Tdap 10/08/2021   • influenza, trivalent, adjuvanted 09/30/2018     Discussed Vaccines,    Pneumococcal vax  is up to date  She does do yearly Flu shot     Tdap/tetanus shot is up to date  (done every 10 yrs for superficial cuts, every 5 yrs for deep wounds)   Can also look into coverage for 'shingles' shot, "Shingrix  Can do that at pharmacy  Covid vaccine received, can do booster in the fall    Currently smoking roughly 1/2 pack/week, does not smoke daily, continue to avoid supplying cigarettes  Regarding Colon Cancer screening,    Not indicated    She does not see her Gynecologist routinely  Not indicated  Mammogram screening was discussed, last was few years ago, can hold off after discussion  Discussed bone density screening/ DEXA Scan, up-to-date  Ordered by endocrinology, had one in December 2021, is on alendronate weekly along with calcium with D, extra vitamin D     We discussed end of life planning, she does have a  \"LIVING WILL\"     Glaucoma screening is   due    We will see her back in 4 months, sooner as needed  I would like an update regarding home blood pressure readings in 2 to 3 weeks  Chief Complaint     Chief Complaint   Patient presents with   • Medicare Wellness Visit       History of Present Illness     Elen Siddiqui is a 76y o -year-old female who is in today accompanied by her daughter with whom she resides for a routine follow-up/annual wellness check  I had seen her back in February  They had called recently, home blood pressure readings have been 198/80 range  Blood pressure does fluctuate at home, has run as low as 128/84 at times, had been hospitalized with low blood pressure in the past   Currently on lisinopril 10 mg daily which had been increased by endocrinology few months ago, also uses metoprolol succinate 50 mg 1/2 tablet in the morning  Has home care twice weekly for 3 hours  Has had no falls  Is up at times at night but no increased behavioral issues other than frequent \"humming\" past few months  Only smoking 1/2 pack/week, appears to be asking for cigarettes less frequently  Did do speech therapy    Endocrinology has been adjusting downward with thyroid medication      She did see geriatric group few months ago, no change in medication    Review of " Systems     Review of Systems   Constitutional: Positive for fatigue  Negative for appetite change, fever and unexpected weight change (Previous weight loss has stabilized)  HENT: Negative for sore throat and trouble swallowing  Eyes: Positive for visual disturbance  Respiratory: Positive for cough (Mild chronic, no hemoptysis) and shortness of breath (No increased shortness of breath versus baseline)  Negative for chest tightness  Cardiovascular: Negative for chest pain, palpitations and leg swelling  Gastrointestinal: Negative for abdominal pain, blood in stool, nausea and vomiting  No acid reflux     No change in bowel   Genitourinary: Negative for dysuria and hematuria  No increased urinary complaints, no increased incontinence   Musculoskeletal:        Back pain has been tolerable recently, she has not been complaining of that   Neurological: Negative for dizziness, syncope, light-headedness (None recently) and headaches  Psychiatric/Behavioral: Positive for confusion         Active Problem List     Patient Active Problem List   Diagnosis   • Syncope   • Sinus bradycardia   • Postoperative hypothyroidism   • Essential hypertension   • Urinary incontinence   • Chronic kidney disease, stage III (moderate) ([facility])   • Umbilical hernia   • Trigger middle finger of right hand   • Current smoker   • Pernicious anemia   • Osteoarthritis   • Neuropathy of both feet   • Low back pain radiating to left lower extremity   • History of lacunar cerebrovascular accident (CVA)   • Hyperglycemia   • Hyperlipidemia   • Graves' disease   • Gait disturbance   • Dizziness   • Disc degeneration, lumbar   • Late onset Alzheimer's disease with behavioral disturbance ([facility])   • Cough, chronic -  improved   • Hearing loss   • Recurrent major depressive disorder, in partial remission ([facility])   • Visual impairment   • Full dentures   • 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[address] )   • Fall   • Hypotension, unspecified   • Spinal stenosis of "lumbar region with neurogenic claudication   • Eczema   • Vitamin D deficiency   • Sleep disturbance   • Insomnia due to other mental disorder   • COPD (chronic obstructive pulmonary disease) (HCC)   • Age related osteoporosis       Past Medical History:  Past Medical History:   Diagnosis Date   • Acute kidney injury (LAYLA) with acute tubular necrosis (ATN) ([facility] [address] ) 2/13/2020   • Anemia 9/3/2019   • Bed bug bite     LAST ASSESSED [id number]   • Chronic pain disorder    • Disease of thyroid gland    • Extremity pain    • Graves' disease    • Hyperlipidemia    • Hypertension    • Hypocalcemia 2/14/2020   • LLL pneumonia     LAST ASSESSED [id number]   • Low back pain    • Migraine 2001    OCULAR   • Scabies     LAST ASSESSED [id number]   • Syncope and collapse     LAST ASSESSED [id number]   • Vertigo     RESOLVED        Past Surgical History:  Past Surgical History:   Procedure Laterality Date   • CHOLECYSTECTOMY     • HYSTERECTOMY     • TOTAL THYROIDECTOMY     • TUBAL LIGATION Bilateral        Family History:  Family History   Problem Relation Age of Onset   • Breast cancer Sister    • Diabetes Family    • No Known Problems Mother    • No Known Problems Father        Social History:  Social History     Tobacco Use   • Smoking status: Some Days     Packs/day: 0 25     Types: Cigarettes   • Smokeless tobacco: Never   Substance Use Topics   • Alcohol use: Never       Objective     Vitals:    06/22/23 0834   BP: 148/84   BP Location: Left arm   Patient Position: Sitting   Cuff Size: Adult   Pulse: 83   Temp: (!) 97 4 °F (36 3 °C)   TempSrc: Temporal   SpO2: 97%   Weight: 53 8 kg (118 lb 9 6 oz)   Height: 5' 1 42\" (1 56 m)     Body mass index is 22 1 kg/m²      BP Readings from Last 3 Encounters:   06/22/23 148/84   02/21/23 146/84   02/08/23 160/70       Wt Readings from Last 3 Encounters:   06/22/23 53 8 kg (118 lb 9 6 oz)   02/21/23 53 1 kg (117 lb)   02/08/23 53 9 kg (118 lb 12 8 oz)       Physical Exam  Constitutional:       " Comments: 26-year-old seated in chair, occasionally has rocking, occasionally humming  Overall appears comfortable, oxygenating well at 97% on room air after walking in  Eyes:      General: No scleral icterus  Cardiovascular:      Rate and Rhythm: Normal rate and regular rhythm  Heart sounds: Murmur (1/6 systolic ejection murmur) heard  Pulmonary:      Comments: Initially scattered rhonchi, after coughing she had minimal rhonchi, no rales  Abdominal:      Palpations: Abdomen is soft  Tenderness: There is no abdominal tenderness  There is no guarding  Musculoskeletal:      Right lower leg: No edema  Left lower leg: No edema  Lymphadenopathy:      Cervical: No cervical adenopathy  Skin:     Coloration: Skin is not jaundiced  Neurological:      Comments: No focal weakness in arm, leg  When last seen by neurology [facility] 7/30-appears to be at baseline  She is alert, cooperative         ALLERGIES:  Allergies   Allergen Reactions   • Naproxen Itching   • Niacin    • Nsaids Other (See Comments) and Swelling   • Azithromycin Nausea Only and Rash   • Cephalexin Rash       Current Medications     Current Outpatient Medications   Medication Sig Dispense Refill   • alendronate (FOSAMAX) 70 mg tablet take 1 tablet by mouth every week ON AN EMPTY STOMACH WITH 6-8 OZ     (REFER TO PRESCRIPTION NOTES)  • amLODIPine (NORVASC) 2 5 mg tablet Take 1 tablet (2 5 mg total) by mouth every evening 30 tablet 5   • calcium-vitamin D (OSCAL) 250-125 MG-UNIT per tablet Take 1 tablet by mouth daily       • donepezil (ARICEPT) 10 mg tablet take 1 tablet by mouth daily at bedtime 30 tablet 6   • ergocalciferol (VITAMIN D2) 50,000 units Take 1 capsule by mouth every 30 (thirty) days     • levothyroxine 112 mcg tablet Take 112 mcg by mouth daily     • lisinopril (ZESTRIL) 10 mg tablet Take 10 mg by mouth daily     • metoprolol succinate (TOPROL-XL) 50 mg 24 hr tablet Take 0 5 tablets (25 mg total) by mouth every morning 25mg once a day 45 tablet 3   • triamcinolone (KENALOG) 0 1 % ointment Apply topicaly 2-4 times a day for up to 2 weeks  80 g 1   • vitamin B-12 (VITAMIN B-12) 1,000 mcg tablet Take 1 tablet (1,000 mcg total) by mouth daily     • traZODone (DESYREL) 50 mg tablet Take 1 5 tablets (75 mg total) by mouth daily at bedtime 45 tablet 2     No current facility-administered medications for this visit  Health Maintenance     See other note today re clinical AWV info             [facility] [address] [facility]   ( others may be available in Care Everywhere / Media sections)  Lab Results   Component Value Date    WBC 9 71 01/05/2023    HGB 12 1 01/05/2023    HCT 38 9 01/05/2023     01/05/2023    TRIG 121 06/19/2019    HDL 35 (L) 06/19/2019    ALT 18 01/04/2023    AST 20 01/04/2023     06/08/2015    K 4 2 01/05/2023     01/05/2023    CREATININE 1 04 01/05/2023    BUN 15 01/05/2023    CO2 32 01/05/2023    TSH 2 20 04/21/2020    INR 1 00 01/03/2023    GLUF 79 01/04/2023    HGBA1C 5 2 06/14/2023       No orders of the defined types were placed in this encounter              Anna Macdonald DO

## 2023-06-22 NOTE — PROGRESS NOTES
Assessment and Plan:     Problem List Items Addressed This Visit     Urinary incontinence (Chronic)    Chronic kidney disease, stage III (moderate) ([facility]) (Chronic)    Postoperative hypothyroidism (Chronic)    Relevant Medications    levothyroxine 112 mcg tablet    History of lacunar cerebrovascular accident (CVA)    Gait disturbance    Spinal stenosis of lumbar region with neurogenic claudication    Essential hypertension    Relevant Medications    lisinopril (ZESTRIL) 10 mg tablet    amLODIPine (NORVASC) 2 5 mg tablet    Late onset Alzheimer's disease with behavioral disturbance (HCC)    Current smoker    Hyperlipidemia    Pernicious anemia   Other Visit Diagnoses     Medicare annual wellness visit, subsequent    -  Primary    History of diabetes mellitus - ok off meds               Preventive health issues were discussed with patient, and age appropriate screening tests were ordered as noted in patient's After Visit Summary  Personalized health advice and appropriate referrals for health education or preventive services given if needed, as noted in patient's After Visit Summary      See other note today regarding provider information       History of Present Illness:     Patient presents for a Medicare Wellness Visit     Patient Care Team:  Nessa Back DO as PCP - General  Jimena Crandall MD         Problem List:     Patient Active Problem List   Diagnosis   • Syncope   • Sinus bradycardia   • Postoperative hypothyroidism   • Essential hypertension   • Urinary incontinence   • Chronic kidney disease, stage III (moderate) (HCC)   • Umbilical hernia   • Trigger middle finger of right hand   • Current smoker   • Pernicious anemia   • Osteoarthritis   • Neuropathy of both feet   • Low back pain radiating to left lower extremity   • History of lacunar cerebrovascular accident (CVA)   • Hyperglycemia   • Hyperlipidemia   • Graves' disease   • Gait disturbance   • Dizziness   • Disc degeneration, lumbar   • Late onset Alzheimer's disease with behavioral disturbance ([facility])   • Cough, chronic -  improved   • Hearing loss   • Recurrent major depressive disorder, in partial remission ([facility])   • Visual impairment   • Full dentures   • 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[facility])   • Fall   • Hypotension, unspecified   • Spinal stenosis of lumbar region with neurogenic claudication   • Eczema   • Vitamin D deficiency   • Sleep disturbance   • Insomnia due to other mental disorder   • COPD (chronic obstructive pulmonary disease) ([facility])   • Age related osteoporosis      Past Medical and Surgical History:     Past Medical History:   Diagnosis Date   • Acute kidney injury (LAYLA) with acute tubular necrosis (ATN) ([facility] [address] ) 2/13/2020   • Anemia 9/3/2019   • Bed bug bite     LAST ASSESSED [id number]   • Chronic pain disorder    • Disease of thyroid gland    • Extremity pain    • Graves' disease    • Hyperlipidemia    • Hypertension    • Hypocalcemia 2/14/2020   • LLL pneumonia     LAST ASSESSED [id number]   • Low back pain    • Migraine 2001    OCULAR   • Scabies     LAST ASSESSED [id number]   • Syncope and collapse     LAST ASSESSED [id number]   • Vertigo     RESOLVED      Past Surgical History:   Procedure Laterality Date   • CHOLECYSTECTOMY     • HYSTERECTOMY     • TOTAL THYROIDECTOMY     • TUBAL LIGATION Bilateral       Family History:     Family History   Problem Relation Age of Onset   • Breast cancer Sister    • Diabetes Family    • No Known Problems Mother    • No Known Problems Father       Social History:     Social History     Socioeconomic History   • Marital status:       Spouse name: None   • Number of children: None   • Years of education: None   • Highest education level: None   Occupational History   • None   Tobacco Use   • Smoking status: Some Days     Packs/day: 0 25     Types: Cigarettes   • Smokeless tobacco: Never   Vaping Use   • Vaping Use: Never used   Substance and Sexual Activity   • Alcohol use: Never   • Drug use: No   • Sexual activity: Not Currently   Other Topics Concern   • None   Social History Narrative    COPY OF ADVANCED DIRECTIVE OBTAINED FROM PATIENT      Social Determinants of Health     Financial Resource Strain: Low Risk  (6/15/2023)    Overall Financial Resource Strain (CARDIA)    • Difficulty of Paying Living Expenses: Not hard at all   Food Insecurity: No Food Insecurity (1/4/2023)    Hunger Vital Sign    • Worried About Running Out of Food in the Last Year: Never true    • Ran Out of Food in the Last Year: Never true   Transportation Needs: No Transportation Needs (6/15/2023)    PRAPARE - Transportation    • Lack of Transportation (Medical): No    • Lack of Transportation (Non-Medical): No   Physical Activity: Not on file   Stress: Not on file   Social Connections: Not on file   Intimate Partner Violence: Not on file   Housing Stability: Low Risk  (1/4/2023)    Housing Stability Vital Sign    • Unable to Pay for Housing in the Last Year: No    • Number of Places Lived in the Last Year: 1    • Unstable Housing in the Last Year: No      Medications and Allergies:     Current Outpatient Medications   Medication Sig Dispense Refill   • alendronate (FOSAMAX) 70 mg tablet take 1 tablet by mouth every week ON AN EMPTY STOMACH WITH 6-8 OZ     (REFER TO PRESCRIPTION NOTES)  • amLODIPine (NORVASC) 2 5 mg tablet Take 1 tablet (2 5 mg total) by mouth every evening 30 tablet 5   • calcium-vitamin D (OSCAL) 250-125 MG-UNIT per tablet Take 1 tablet by mouth daily       • donepezil (ARICEPT) 10 mg tablet take 1 tablet by mouth daily at bedtime 30 tablet 6   • ergocalciferol (VITAMIN D2) 50,000 units Take 1 capsule by mouth every 30 (thirty) days     • levothyroxine 112 mcg tablet Take 112 mcg by mouth daily     • lisinopril (ZESTRIL) 10 mg tablet Take 10 mg by mouth daily     • metoprolol succinate (TOPROL-XL) 50 mg 24 hr tablet Take 0 5 tablets (25 mg total) by mouth every morning 25mg once a day 45 tablet 3   • triamcinolone (KENALOG) 0 1 % ointment Apply topicaly 2-4 times a day for up to 2 weeks  80 g 1   • vitamin B-12 (VITAMIN B-12) 1,000 mcg tablet Take 1 tablet (1,000 mcg total) by mouth daily     • traZODone (DESYREL) 50 mg tablet Take 1 5 tablets (75 mg total) by mouth daily at bedtime 45 tablet 2     No current facility-administered medications for this visit  Allergies   Allergen Reactions   • Naproxen Itching   • Niacin    • Nsaids Other (See Comments) and Swelling   • Azithromycin Nausea Only and Rash   • Cephalexin Rash      Immunizations:     Immunization History   Administered Date(s) Administered   • COVID-19 MODERNA VACC 0 5 ML IM 03/29/2021, 04/30/2021, 04/12/2022   • COVID-19 Moderna Vac BIVALENT 12 Yr+ IM (BOOSTER ONLY) 0 5 ML 12/09/2022   • INFLUENZA 10/21/2022   • Influenza Split High Dose Preservative Free IM 09/18/2012, 11/14/2013, 09/25/2014, 09/08/2015, 08/29/2017   • Influenza, high dose seasonal 0 7 mL 09/12/2019, 11/10/2020, 10/04/2021, 10/21/2022   • Influenza, seasonal, injectable 09/27/2011   • Pneumococcal Conjugate 13-Valent 06/20/2017   • Pneumococcal Polysaccharide PPV23 01/16/2009   • Td (adult), adsorbed 09/14/2007   • Tdap 10/08/2021   • influenza, trivalent, adjuvanted 09/30/2018      Health Maintenance:         Topic Date Due   • Hepatitis C Screening  Completed         Topic Date Due   • COVID-19 Vaccine (5 - Moderna series) 04/09/2023      Medicare Screening Tests and Risk Assessments:     Benigno Vincent is here for her Subsequent Wellness visit  Health Risk Assessment:   Patient rates overall health as fair  Patient feels that their physical health rating is same  Patient is dissatisfied with their life  Eyesight was rated as same  Hearing was rated as same  Patient feels that their emotional and mental health rating is same  Patients states they are never, rarely angry  Patient states they are never, rarely unusually tired/fatigued  Pain experienced in the last 7 days has been none   Patient states that she has experienced weight loss or gain in last 6 months  Wt loss stabilized    Depression Screening:   PHQ-9 Score: 1      Fall Risk Screening: In the past year, patient has experienced: no history of falling in past year      Urinary Incontinence Screening:   Patient has leaked urine accidently in the last six months  Home Safety:  Patient does not have trouble with stairs inside or outside of their home  Patient has working smoke alarms and has working carbon monoxide detector  Home safety hazards include: none  Nutrition:   Current diet is Regular  Medications:   Patient is not currently taking any over-the-counter supplements  Patient is not able to manage medications  Family oversees medications    Activities of Daily Living (ADLs)/Instrumental Activities of Daily Living (IADLs):   Walk and transfer into and out of bed and chair?: Yes  Dress and groom yourself?: Yes    Bathe or shower yourself?: Yes    Feed yourself? Yes  Do your laundry/housekeeping?: No  Manage your money, pay your bills and track your expenses?: No  Make your own meals?: No    Do your own shopping?: No    ADL comments: Family does financial, shopping, cooking    Previous Hospitalizations:   Any hospitalizations or ED visits within the last 12 months?: Yes    How many hospitalizations have you had in the last year?: 1-2    Hospitalization Comments: Last hospital stay/evaluation was in January  See previous visits/reports    Advance Care Planning:   Living will: Yes    Durable POA for healthcare: Yes    Advanced directive: Yes      Cognitive Screening:   Provider or family/friend/caregiver concerned regarding cognition?: Yes  Jack Cognitive Assessment (MoCA) Score: 7  Interpretation: MoCA Score < 25: abnormal/possible cognitive impairment    Cognition Comments: See previous evaluation with geriatric group    Continues on Aricept    PREVENTIVE SCREENINGS      Cardiovascular Screening:    General: Screening Not Indicated and History Lipid Disorder      Diabetes Screening:     General: Screening Current      Colorectal Cancer Screening:     General: Screening Not Indicated      Breast Cancer Screening:     General: Screening Not Indicated      Cervical Cancer Screening:    General: Screening Not Indicated      Osteoporosis Screening:    General: History Osteoporosis and Screening Current      Abdominal Aortic Aneurysm (AAA) Screening:        General: Screening Not Indicated      Lung Cancer Screening:     General: Screening Not Indicated      Hepatitis C Screening:    General: Screening Current    Screening, Brief Intervention, and Referral to Treatment (SBIRT)    Screening  Typical number of drinks in a day: 0  Typical number of drinks in a week: 0  Interpretation: Low risk drinking behavior      AUDIT-C Screenin) How often did you have a drink containing alcohol in the past year? never  2) How many drinks did you have on a typical day when you were drinking in the past year? 0  3) How often did you have 6 or more drinks on one occasion in the past year? never    AUDIT-C Score: 0  Interpretation: Score 0-2 (female): Negative screen for alcohol misuse    Single Item Drug Screening:  How often have you used an illegal drug (including marijuana) or a prescription medication for non-medical reasons in the past year? never    Single Item Drug Screen Score: 0  Interpretation: Negative screen for possible drug use disorder        Orvis Leisure, DO

## 2023-07-26 ENCOUNTER — OFFICE VISIT (OUTPATIENT)
Age: 78
End: 2023-07-26
Payer: MEDICARE

## 2023-07-26 VITALS
OXYGEN SATURATION: 96 % | WEIGHT: 116.2 LBS | SYSTOLIC BLOOD PRESSURE: 120 MMHG | TEMPERATURE: 97.3 F | RESPIRATION RATE: 18 BRPM | HEART RATE: 68 BPM | HEIGHT: 61 IN | BODY MASS INDEX: 21.94 KG/M2 | DIASTOLIC BLOOD PRESSURE: 66 MMHG

## 2023-07-26 DIAGNOSIS — G30.1 SEVERE LATE ONSET ALZHEIMER'S DEMENTIA WITHOUT BEHAVIORAL DISTURBANCE, PSYCHOTIC DISTURBANCE, MOOD DISTURBANCE, OR ANXIETY (HCC): Primary | ICD-10-CM

## 2023-07-26 DIAGNOSIS — F99 INSOMNIA DUE TO OTHER MENTAL DISORDER: ICD-10-CM

## 2023-07-26 DIAGNOSIS — I10 ESSENTIAL HYPERTENSION: ICD-10-CM

## 2023-07-26 DIAGNOSIS — F51.05 INSOMNIA DUE TO OTHER MENTAL DISORDER: ICD-10-CM

## 2023-07-26 DIAGNOSIS — F02.C0 SEVERE LATE ONSET ALZHEIMER'S DEMENTIA WITHOUT BEHAVIORAL DISTURBANCE, PSYCHOTIC DISTURBANCE, MOOD DISTURBANCE, OR ANXIETY (HCC): Primary | ICD-10-CM

## 2023-07-26 DIAGNOSIS — M81.0 AGE RELATED OSTEOPOROSIS, UNSPECIFIED PATHOLOGICAL FRACTURE PRESENCE: ICD-10-CM

## 2023-07-26 DIAGNOSIS — E03.9 HYPOTHYROIDISM, UNSPECIFIED TYPE: ICD-10-CM

## 2023-07-26 PROCEDURE — 99215 OFFICE O/P EST HI 40 MIN: CPT | Performed by: FAMILY MEDICINE

## 2023-07-26 NOTE — PROGRESS NOTES
Assessment & Plan:   Julius Wellington was seen today for follow-up. Diagnoses and all orders for this visit:    Severe late onset Alzheimer's dementia without behavioral disturbance, psychotic disturbance, mood disturbance, or anxiety (HCC)    Hypothyroidism, unspecified type    Essential hypertension    Age related osteoporosis, unspecified pathological fracture presence    Insomnia due to other mental disorder      No problem-specific Assessment & Plan notes found for this encounter. Severe late onset Alzheimer's dementia: Remains without any behavioral changes. No side effects reported with donepezil; will continue. Patient is to be placed in memory care facility at '[facility]'. Hypothyroidism: Most recent TSH was 0.38. Appears to be clinically stable. Patient follows with endocrinology, so will defer levothyroxine management to the aforementioned. HPI:  We had the pleasure of evaluating Damian Abbee who is a 76 y.o. female in Geriatric follow up today. She lives with her daughter. Ms. Reuben Shaikh is in the office with her daughter. Patient seen with Dr. Peyman Arana    Ms. Ramsey presented to clinic today for routine follow-up, accompanied by her daughter. On initial discussion with patient's daughter, family is planning to place patient in memory care facility about. Patient has been noted to roam.  Also noted for reduction in interaction with family. No behavioral changes reported. Patient report remains capable of bathing, dressing and toileting herself with minimal to no assistance. Daughter administers medications. Patient reports no acute issues since last encounter. States that she is able to feed and dress herself. Denies any headaches, dizziness, chest pain or palpitation. ROS: Review of Systems   Constitutional: Negative for chills and fever. HENT: Negative for ear pain and sore throat. Eyes: Negative for pain and visual disturbance.    Respiratory: Negative for cough and shortness of breath. Cardiovascular: Negative for chest pain and palpitations. Gastrointestinal: Negative for abdominal pain and vomiting. Genitourinary: Negative for dysuria and hematuria. Musculoskeletal: Negative for arthralgias and back pain. Skin: Negative for color change and rash. Neurological: Negative for dizziness, seizures, syncope and headaches. Psychiatric/Behavioral: Negative for agitation and behavioral problems. All other systems reviewed and are negative. Allergies: Allergies   Allergen Reactions   • Naproxen Itching   • Niacin    • Nsaids Other (See Comments) and Swelling   • Azithromycin Nausea Only and Rash   • Cephalexin Rash       Medications:      Current Outpatient Medications:   •  alendronate (FOSAMAX) 70 mg tablet, take 1 tablet by mouth every week ON AN EMPTY STOMACH WITH 6-8 OZ. ..  (REFER TO PRESCRIPTION NOTES). , Disp: , Rfl:   •  amLODIPine (NORVASC) 2.5 mg tablet, Take 1 tablet (2.5 mg total) by mouth every evening, Disp: 30 tablet, Rfl: 5  •  calcium-vitamin D (OSCAL) 250-125 MG-UNIT per tablet, Take 1 tablet by mouth daily. , Disp: , Rfl:   •  donepezil (ARICEPT) 10 mg tablet, take 1 tablet by mouth daily at bedtime, Disp: 30 tablet, Rfl: 6  •  ergocalciferol (VITAMIN D2) 50,000 units, Take 1 capsule by mouth every 30 (thirty) days, Disp: , Rfl:   •  levothyroxine 112 mcg tablet, Take 112 mcg by mouth daily, Disp: , Rfl:   •  lisinopril (ZESTRIL) 10 mg tablet, Take 10 mg by mouth daily, Disp: , Rfl:   •  metoprolol succinate (TOPROL-XL) 50 mg 24 hr tablet, Take 0.5 tablets (25 mg total) by mouth every morning 25mg once a day, Disp: 45 tablet, Rfl: 3  •  triamcinolone (KENALOG) 0.1 % ointment, Apply topicaly 2-4 times a day for up to 2 weeks. , Disp: 80 g, Rfl: 1  •  vitamin B-12 (VITAMIN B-12) 1,000 mcg tablet, Take 1 tablet (1,000 mcg total) by mouth daily, Disp: , Rfl:   •  traZODone (DESYREL) 50 mg tablet, Take 1.5 tablets (75 mg total) by mouth daily at bedtime, Disp: 45 tablet, Rfl: 2    Vitals:  Vitals:    07/26/23 1252   BP: 120/66   Pulse: 68   Resp: 18   Temp: (!) 97.3 °F (36.3 °C)   SpO2: 96%       History:  Past Medical History:   Diagnosis Date   • Acute kidney injury (LAYLA) with acute tubular necrosis (ATN) ([address]) 2/13/2020   • Anemia 9/3/2019   • Bed bug bite     LAST ASSESSED [id number]   • Chronic pain disorder    • Disease of thyroid gland    • Extremity pain    • Graves' disease    • Hyperlipidemia    • Hypertension    • Hypocalcemia 2/14/2020   • LLL pneumonia     LAST ASSESSED [id number]   • Low back pain    • Migraine 2001    OCULAR   • Scabies     LAST ASSESSED [id number]   • Syncope and collapse     LAST ASSESSED [id number]   • Vertigo     RESOLVED      Past Surgical History:   Procedure Laterality Date   • CHOLECYSTECTOMY     • HYSTERECTOMY     • TOTAL THYROIDECTOMY     • TUBAL LIGATION Bilateral      Family History   Problem Relation Age of Onset   • Breast cancer Sister    • Diabetes Family    • No Known Problems Mother    • No Known Problems Father      Social History     Socioeconomic History   • Marital status:       Spouse name: Not on file   • Number of children: Not on file   • Years of education: Not on file   • Highest education level: Not on file   Occupational History   • Not on file   Tobacco Use   • Smoking status: Some Days     Packs/day: 0.25     Types: Cigarettes   • Smokeless tobacco: Never   Vaping Use   • Vaping Use: Never used   Substance and Sexual Activity   • Alcohol use: Never   • Drug use: No   • Sexual activity: Not Currently   Other Topics Concern   • Not on file   Social History Narrative    COPY OF ADVANCED DIRECTIVE OBTAINED FROM PATIENT      Social Determinants of Health     Financial Resource Strain: Low Risk  (6/15/2023)    Overall Financial Resource Strain (CARDIA)    • Difficulty of Paying Living Expenses: Not hard at all   Food Insecurity: No Food Insecurity (1/4/2023)    Hunger Vital Sign    • Worried About Running Out of Food in the Last Year: Never true    • Ran Out of Food in the Last Year: Never true   Transportation Needs: No Transportation Needs (6/15/2023)    PRAPARE - Transportation    • Lack of Transportation (Medical): No    • Lack of Transportation (Non-Medical): No   Physical Activity: Not on file   Stress: Not on file   Social Connections: Not on file   Intimate Partner Violence: Not on file   Housing Stability: Low Risk  (1/4/2023)    Housing Stability Vital Sign    • Unable to Pay for Housing in the Last Year: No    • Number of Places Lived in the Last Year: 1    • Unstable Housing in the Last Year: No     Past Surgical History:   Procedure Laterality Date   • CHOLECYSTECTOMY     • HYSTERECTOMY     • TOTAL THYROIDECTOMY     • TUBAL LIGATION Bilateral          Physical Exam:   Physical Exam  Vitals reviewed. Constitutional:       Appearance: Normal appearance. HENT:      Mouth/Throat:      Mouth: Mucous membranes are moist.   Eyes:      General:         Right eye: No discharge. Left eye: No discharge. Conjunctiva/sclera: Conjunctivae normal.   Cardiovascular:      Rate and Rhythm: Normal rate and regular rhythm. Heart sounds: S1 normal and S2 normal.   Pulmonary:      Effort: Pulmonary effort is normal.      Breath sounds: Normal breath sounds. Abdominal:      Palpations: Abdomen is soft. Musculoskeletal:         General: No swelling or tenderness. Cervical back: Neck supple. Skin:     General: Skin is warm and dry. Neurological:      General: No focal deficit present. Mental Status: She is alert.    Psychiatric:         Mood and Affect: Mood normal.         Behavior: Behavior normal.

## 2023-07-31 DIAGNOSIS — E89.0 POSTOPERATIVE HYPOTHYROIDISM: Primary | Chronic | ICD-10-CM

## 2023-07-31 RX ORDER — LEVOTHYROXINE SODIUM 112 UG/1
TABLET ORAL
Start: 2023-07-31

## 2023-07-31 NOTE — PROGRESS NOTES
Her daughter was in today, I did sign DNR form, she is moving into long-term care facility. I also updated med list, endocrinology recently decreased levothyroxine 112 mcg, uses 1 every day other than only uses 1/2 tablet every Sunday. Endocrinology also ordered Crestor/rosuvastatin 20 mg daily, unclear if she is using that.

## 2023-10-01 ENCOUNTER — HOSPITAL ENCOUNTER (EMERGENCY)
Facility: HOSPITAL | Age: 78
Discharge: NURSING, REHAB OR LONG-TERM CARE | End: 2023-10-01
Attending: EMERGENCY MEDICINE
Payer: MEDICARE

## 2023-10-01 ENCOUNTER — APPOINTMENT (EMERGENCY)
Dept: CT IMAGING | Facility: HOSPITAL | Age: 78
End: 2023-10-01
Payer: MEDICARE

## 2023-10-01 VITALS
OXYGEN SATURATION: 95 % | DIASTOLIC BLOOD PRESSURE: 59 MMHG | HEART RATE: 52 BPM | SYSTOLIC BLOOD PRESSURE: 125 MMHG | TEMPERATURE: 98 F | RESPIRATION RATE: 18 BRPM
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
## 2023-10-01 DIAGNOSIS — S09.90XA CLOSED HEAD INJURY, INITIAL ENCOUNTER: Primary | ICD-10-CM

## 2023-10-01 PROCEDURE — 99284 EMERGENCY DEPT VISIT MOD MDM: CPT

## 2023-10-01 PROCEDURE — 99284 EMERGENCY DEPT VISIT MOD MDM: CPT | Performed by: EMERGENCY MEDICINE

## 2023-10-01 PROCEDURE — G1004 CDSM NDSC: HCPCS

## 2023-10-01 PROCEDURE — 72125 CT NECK SPINE W/O DYE: CPT

## 2023-10-01 PROCEDURE — 70450 CT HEAD/BRAIN W/O DYE: CPT

## 2023-10-01 NOTE — ED PROVIDER NOTES
History  Chief Complaint   Patient presents with   • Fall     Patient arriving from [facility] after an unwitnessed fall. Pt has hx of dementia and is poor historian of event. Patient has small abrasion to middle of forehead and reports striking head, denies LOC. Unknown how she fell. No blood thinners, has no complaints. Mentation is at baseline. 52-year-old female with a past medical history of hypertension, hyperlipidemia, hypothyroidism, and dementia who was brought in for evaluation after an unwitnessed fall. Patient currently resides in a nursing facility. Patient's daughter states that one of the nursing facility staff members found the patient on the floor outside of the bathroom this evening. It is unknown if patient hit her head or lost consciousness, but the patient did have a small abrasion to her anterior forehead, so she was sent in for further evaluation. Patient currently endorses a headache, denies any pain elsewhere, including neck pain, back pain, hip pain, or other injuries. Patient's daughter states that she is currently at her mental baseline, and has been acting normally since the fall. Patient is not on any antiplatelet or anticoagulant medications. Prior to Admission Medications   Prescriptions Last Dose Informant Patient Reported? Taking? alendronate (FOSAMAX) 70 mg tablet   Yes No   Sig: take 1 tablet by mouth every week ON AN EMPTY STOMACH WITH 6-8 OZ. ..  (REFER TO PRESCRIPTION NOTES). amLODIPine (NORVASC) 2.5 mg tablet   No No   Sig: Take 1 tablet (2.5 mg total) by mouth every evening   calcium-vitamin D (OSCAL) 250-125 MG-UNIT per tablet  Child Yes No   Sig: Take 1 tablet by mouth daily.    donepezil (ARICEPT) 10 mg tablet   No No   Sig: take 1 tablet by mouth daily at bedtime   ergocalciferol (VITAMIN D2) 50,000 units  Child Yes No   Sig: Take 1 capsule by mouth every 30 (thirty) days   levothyroxine 112 mcg tablet   No No   Sig: Tablet daily on empty stomach ( except only 1/2 pill every Sunday - Rx via Endo )   lisinopril (ZESTRIL) 10 mg tablet   Yes No   Sig: Take 10 mg by mouth daily   metoprolol succinate (TOPROL-XL) 50 mg 24 hr tablet   No No   Sig: Take 0.5 tablets (25 mg total) by mouth every morning 25mg once a day   traZODone (DESYREL) 50 mg tablet   No No   Sig: Take 1.5 tablets (75 mg total) by mouth daily at bedtime   triamcinolone (KENALOG) 0.1 % ointment   No No   Sig: Apply topicaly 2-4 times a day for up to 2 weeks. vitamin B-12 (VITAMIN B-12) 1,000 mcg tablet  Child No No   Sig: Take 1 tablet (1,000 mcg total) by mouth daily      Facility-Administered Medications: None       Past Medical History:   Diagnosis Date   • Acute kidney injury (LAYLA) with acute tubular necrosis (ATN) ([address]) 2/13/2020   • Anemia 9/3/2019   • Bed bug bite     LAST ASSESSED [id number]   • Chronic pain disorder    • Disease of thyroid gland    • Extremity pain    • Graves' disease    • Hyperlipidemia    • Hypertension    • Hypocalcemia 2/14/2020   • LLL pneumonia     LAST ASSESSED [id number]   • Low back pain    • Migraine 2001    OCULAR   • Scabies     LAST ASSESSED [id number]   • Syncope and collapse     LAST ASSESSED [id number]   • Vertigo     RESOLVED        Past Surgical History:   Procedure Laterality Date   • CHOLECYSTECTOMY     • HYSTERECTOMY     • TOTAL THYROIDECTOMY     • TUBAL LIGATION Bilateral        Family History   Problem Relation Age of Onset   • Breast cancer Sister    • Diabetes Family    • No Known Problems Mother    • No Known Problems Father      I have reviewed and agree with the history as documented.     E-Cigarette/Vaping   • E-Cigarette Use Never User      E-Cigarette/Vaping Substances   • Nicotine No    • THC No    • CBD No    • Flavoring No    • Other No    • Unknown No      Social History     Tobacco Use   • Smoking status: Some Days     Packs/day: 0.25     Types: Cigarettes   • Smokeless tobacco: Never   Vaping Use   • Vaping Use: Never used   Substance Use Topics   • Alcohol use: Never   • Drug use: No       Review of Systems   Unable to perform ROS: Dementia   Musculoskeletal: Negative for arthralgias, back pain and neck pain. Neurological: Positive for headaches. Physical Exam  Physical Exam  Vitals and nursing note reviewed. Constitutional:       General: She is awake. She is not in acute distress. Appearance: She is not toxic-appearing. HENT:      Head: Normocephalic and atraumatic. No raccoon eyes, contusion or laceration. Comments: Small abrasion over anterior forehead     Nose: Nose normal. No nasal deformity. Right Nostril: No epistaxis. Left Nostril: No epistaxis. Mouth/Throat:      Lips: Pink. Mouth: Mucous membranes are moist.   Eyes:      General: Vision grossly intact. Gaze aligned appropriately. Cardiovascular:      Rate and Rhythm: Normal rate and regular rhythm. Heart sounds: Normal heart sounds. Pulmonary:      Effort: Pulmonary effort is normal. No respiratory distress. Breath sounds: Normal breath sounds. Musculoskeletal:      Cervical back: Full passive range of motion without pain and neck supple. No spinous process tenderness. Right hip: No deformity or bony tenderness. Left hip: No deformity or bony tenderness. Skin:     General: Skin is warm and dry. Comments: Small abrasion to anterior forehead   Neurological:      General: No focal deficit present. Mental Status: She is alert and oriented to person, place, and time.          Vital Signs  ED Triage Vitals [10/01/23 1736]   Temperature Pulse Respirations Blood Pressure SpO2   98 °F (36.7 °C) (!) 54 18 142/65 95 %      Temp Source Heart Rate Source Patient Position - Orthostatic VS BP Location FiO2 (%)   Oral Monitor Lying Right arm --      Pain Score       --           Vitals:    10/01/23 1736 10/01/23 1945   BP: 142/65 125/59   Pulse: (!) 54 (!) 52   Patient Position - Orthostatic VS: Lying Lying         Visual Acuity      ED Medications  Medications - No data to display    Diagnostic Studies  Results Reviewed     None                 CT head without contrast   Final Result by Danielle Ryan MD (1943)      No acute intracranial abnormality. Stable chronic microangiopathic changes within the brain. 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 Workstation performed: [id number]                    Procedures  Procedures         ED Course                               SBIRT 20yo+    Flowsheet Row Most Recent Value   Initial Alcohol Screen: US AUDIT-C     1. How often do you have a drink containing alcohol? 0 Filed at: 10/01/2023 1741   2. How many drinks containing alcohol do you have on a typical day you are drinking? 0 Filed at: 10/01/2023 1741   3a. Male UNDER 65: How often do you have five or more drinks on one occasion? 0 Filed at: 10/01/2023 1741   3b. FEMALE Any Age, or MALE 65+: How often do you have 4 or more drinks on one occassion? 0 Filed at: 10/01/2023 1741   Audit-C Score 0 Filed at: 10/01/2023 1741   DORON: How many times in the past year have you. .. Used an illegal drug or used a prescription medication for non-medical reasons? Never Filed at: 10/01/2023 1741                    Medical Decision Making  64-year-old female brought in for evaluation after an unwitnessed fall. Patient currently endorsing a headache, and she is noted to have a small abrasion to her anterior forehead.   No other injuries noted on exam.  Given that patient potentially had a head strike, as well as the fact that she is a poor historian and at risk for serious injury secondary to her age and underlying medical conditions, decision was made to evaluate with a CT scan of the head and cervical spine. CT scans of the head and cervical spine were negative for acute injuries. Patient discharged home in stable condition with symptomatic care instructions and strict ED return precautions. Closed head injury, initial encounter: acute illness or injury  Fall, initial encounter: acute illness or injury  Amount and/or Complexity of Data Reviewed  Radiology: ordered. Disposition  Final diagnoses:   Closed head injury, initial encounter   Fall, initial encounter     Time reflects when diagnosis was documented in both MDM as applicable and the Disposition within this note     Time User Action Codes Description Comment    10/1/2023  8:05 PM Bonifacio Blanks Add [S09.90XA] Closed head injury, initial encounter     10/1/2023  8:05 PM Bonifacio Blanks Add [D55. Herman Player, initial encounter       ED Disposition     ED Disposition   Discharge    Condition   Stable    Date/Time   Sun Oct 1, 2023  8:05 PM    Comment   Carrie De La Torre Roxy discharge to home/self care. Follow-up Information     Follow up With Specialties Details Why Contact Info Additional Information    Roland Segovia,  Family Medicine Schedule an appointment as soon as possible for a visit in 1 week As needed [facility].  [address] [place] [id number] [phone]       [address] [facility] Emergency Department Emergency Medicine Go to  If symptoms worsen [address] [address]  [facility] Emergency Department, [address] [place] [address], [id number]          Discharge Medication List as of 10/1/2023  8:05 PM      CONTINUE these medications which have NOT CHANGED    Details   alendronate (FOSAMAX) 70 mg tablet take 1 tablet by mouth every week ON AN EMPTY STOMACH WITH 6-8 OZ. ..  (REFER TO PRESCRIPTION NOTES). , Historical Med      amLODIPine (NORVASC) 2.5 mg tablet Take 1 tablet (2.5 mg total) by mouth every evening, Starting Thu 6/22/2023, Normal      calcium-vitamin D (OSCAL) 250-125 MG-UNIT per tablet Take 1 tablet by mouth daily. , Historical Med      donepezil (ARICEPT) 10 mg tablet take 1 tablet by mouth daily at bedtime, Normal      ergocalciferol (VITAMIN D2) 50,000 units Take 1 capsule by mouth every 30 (thirty) days, Starting Wed 3/10/2021, Historical Med      levothyroxine 112 mcg tablet Tablet daily on empty stomach ( except only 1/2 pill every Sunday - Rx via Endo ), No Print      lisinopril (ZESTRIL) 10 mg tablet Take 10 mg by mouth daily, Starting Wed 5/17/2023, Historical Med      metoprolol succinate (TOPROL-XL) 50 mg 24 hr tablet Take 0.5 tablets (25 mg total) by mouth every morning 25mg once a day, Starting Tue 2/21/2023, Normal      traZODone (DESYREL) 50 mg tablet Take 1.5 tablets (75 mg total) by mouth daily at bedtime, Starting Wed 2/8/2023, Until Tue 5/9/2023, Normal      triamcinolone (KENALOG) 0.1 % ointment Apply topicaly 2-4 times a day for up to 2 weeks. , Normal      vitamin B-12 (VITAMIN B-12) 1,000 mcg tablet Take 1 tablet (1,000 mcg total) by mouth daily, Starting Tue 6/21/2022, No Print             No discharge procedures on file.     PDMP Review     None          ED Provider  Electronically Signed by           Rosa Malin DO  10/01/23 1909

## 2023-10-02 NOTE — ED NOTES
RN called [facility] to let them know pt was discharged and is on her way back to facility with daughter     Cecy Ocasio RN  10/01/23 2020

## 2023-12-05 ENCOUNTER — TELEPHONE (OUTPATIENT)
Dept: FAMILY MEDICINE CLINIC | Facility: CLINIC | Age: 78
End: 2023-12-05

## 2023-12-05 NOTE — TELEPHONE ENCOUNTER
Received paperwork from [facility] for a med refill on 12/1/23. Dr. Lynn Evans stated he hasn't seen pt since 6/22/23, and asked that I call the family to see if pt see's a provider at her new facility - [address] [place] [address]. Called pt's daughter, Rosalva Gray and spoke with her about the med refill request we received, and she stated they should not have sent that to us. Rosalva Gray stated her mom sees a new provider at her facility, and she requested I call the pharmacy and ask them why it wasn't sent to the new provider. [facility] and asked why they sent us the med refill, and they apologized for sending it to us. They stated they will send it to new provider. Pt no longer seen here.

## 2024-02-21 PROBLEM — R05.9 COUGH: Status: RESOLVED | Noted: 2017-11-28 | Resolved: 2024-02-21

## 2025-03-27 ENCOUNTER — VBI (OUTPATIENT)
Dept: ADMINISTRATIVE | Facility: OTHER | Age: 80
End: 2025-03-27

## 2025-03-27 NOTE — TELEPHONE ENCOUNTER
Patient contacted to schedule Annual Wellness Visit.   Patient transferred care out of network.     Thank you.  Andreas Saucedo MA  [facility]

## 2025-03-29 NOTE — TELEPHONE ENCOUNTER
03/28/25 11:52 PM        The office's request has been received, reviewed, and the patient chart updated. The PCP has successfully been removed with a patient attribution note. This message will now be completed.        Thank you  Rojelio Tavarez

## 2025-06-12 ENCOUNTER — APPOINTMENT (OUTPATIENT)
Dept: LAB | Facility: CLINIC | Age: 80
End: 2025-06-12
Attending: PHYSICIAN ASSISTANT
Payer: MEDICARE

## 2025-06-12 DIAGNOSIS — I51.9 MYXEDEMA HEART DISEASE: ICD-10-CM

## 2025-06-12 DIAGNOSIS — I10 ESSENTIAL HYPERTENSION, MALIGNANT: ICD-10-CM

## 2025-06-12 DIAGNOSIS — E03.9 MYXEDEMA HEART DISEASE: ICD-10-CM

## 2025-06-12 DIAGNOSIS — N18.31 CHRONIC KIDNEY DISEASE (CKD) STAGE G3A/A1, MODERATELY DECREASED GLOMERULAR FILTRATION RATE (GFR) BETWEEN 45-59 ML/MIN/1.73 SQUARE METER AND ALBUMINURIA CREATININE RATIO LESS THAN 30 MG/G (HCC): ICD-10-CM

## 2025-06-12 LAB
25(OH)D3 SERPL-MCNC: 58 NG/ML (ref 30–100)
ALBUMIN SERPL BCG-MCNC: 4.1 G/DL (ref 3.5–5)
ALP SERPL-CCNC: 53 U/L (ref 34–104)
ALT SERPL W P-5'-P-CCNC: 9 U/L (ref 7–52)
ANION GAP SERPL CALCULATED.3IONS-SCNC: 10 MMOL/L (ref 4–13)
AST SERPL W P-5'-P-CCNC: 15 U/L (ref 13–39)
BASOPHILS # BLD AUTO: 0.06 THOUSANDS/ÂΜL (ref 0–0.1)
BASOPHILS NFR BLD AUTO: 1 % (ref 0–1)
BILIRUB SERPL-MCNC: 0.27 MG/DL (ref 0.2–1)
BUN SERPL-MCNC: 23 MG/DL (ref 5–25)
CALCIUM SERPL-MCNC: 9.5 MG/DL (ref 8.4–10.2)
CHLORIDE SERPL-SCNC: 101 MMOL/L (ref 96–108)
CO2 SERPL-SCNC: 29 MMOL/L (ref 21–32)
CREAT SERPL-MCNC: 1.09 MG/DL (ref 0.6–1.3)
EOSINOPHIL # BLD AUTO: 0.67 THOUSAND/ÂΜL (ref 0–0.61)
EOSINOPHIL NFR BLD AUTO: 6 % (ref 0–6)
ERYTHROCYTE [DISTWIDTH] IN BLOOD BY AUTOMATED COUNT: 14.2 % (ref 11.6–15.1)
GFR SERPL CREATININE-BSD FRML MDRD: 47 ML/MIN/1.73SQ M
GLUCOSE P FAST SERPL-MCNC: 90 MG/DL (ref 65–99)
HCT VFR BLD AUTO: 38.5 % (ref 34.8–46.1)
HGB BLD-MCNC: 12 G/DL (ref 11.5–15.4)
IMM GRANULOCYTES # BLD AUTO: 0.06 THOUSAND/UL (ref 0–0.2)
IMM GRANULOCYTES NFR BLD AUTO: 1 % (ref 0–2)
LYMPHOCYTES # BLD AUTO: 2.17 THOUSANDS/ÂΜL (ref 0.6–4.47)
LYMPHOCYTES NFR BLD AUTO: 19 % (ref 14–44)
MCH RBC QN AUTO: 28.4 PG (ref 26.8–34.3)
MCHC RBC AUTO-ENTMCNC: 31.2 G/DL (ref 31.4–37.4)
MCV RBC AUTO: 91 FL (ref 82–98)
MONOCYTES # BLD AUTO: 0.89 THOUSAND/ÂΜL (ref 0.17–1.22)
MONOCYTES NFR BLD AUTO: 8 % (ref 4–12)
NEUTROPHILS # BLD AUTO: 7.56 THOUSANDS/ÂΜL (ref 1.85–7.62)
NEUTS SEG NFR BLD AUTO: 65 % (ref 43–75)
NRBC BLD AUTO-RTO: 0 /100 WBCS
PLATELET # BLD AUTO: 347 THOUSANDS/UL (ref 149–390)
PMV BLD AUTO: 10.5 FL (ref 8.9–12.7)
POTASSIUM SERPL-SCNC: 4.9 MMOL/L (ref 3.5–5.3)
PROT SERPL-MCNC: 7.4 G/DL (ref 6.4–8.4)
RBC # BLD AUTO: 4.22 MILLION/UL (ref 3.81–5.12)
SODIUM SERPL-SCNC: 140 MMOL/L (ref 135–147)
T4 FREE SERPL-MCNC: 1.04 NG/DL (ref 0.61–1.12)
TSH SERPL DL<=0.05 MIU/L-ACNC: 0.48 UIU/ML (ref 0.45–4.5)
WBC # BLD AUTO: 11.41 THOUSAND/UL (ref 4.31–10.16)

## 2025-06-12 PROCEDURE — 82306 VITAMIN D 25 HYDROXY: CPT

## 2025-06-12 PROCEDURE — 36415 COLL VENOUS BLD VENIPUNCTURE: CPT

## 2025-06-12 PROCEDURE — 80053 COMPREHEN METABOLIC PANEL: CPT

## 2025-06-12 PROCEDURE — 85025 COMPLETE CBC W/AUTO DIFF WBC: CPT

## 2025-06-12 PROCEDURE — 84439 ASSAY OF FREE THYROXINE: CPT

## 2025-06-12 PROCEDURE — 84443 ASSAY THYROID STIM HORMONE: CPT
